# Patient Record
Sex: MALE | Race: OTHER | Employment: UNEMPLOYED | URBAN - NONMETROPOLITAN AREA
[De-identification: names, ages, dates, MRNs, and addresses within clinical notes are randomized per-mention and may not be internally consistent; named-entity substitution may affect disease eponyms.]

---

## 2019-08-12 ENCOUNTER — HOSPITAL ENCOUNTER (INPATIENT)
Age: 49
LOS: 3 days | Discharge: HOME | DRG: 313 | End: 2019-08-15
Attending: EMERGENCY MEDICINE | Admitting: INTERNAL MEDICINE
Payer: MEDICAID

## 2019-08-12 ENCOUNTER — APPOINTMENT (OUTPATIENT)
Dept: GENERAL RADIOLOGY | Age: 49
DRG: 313 | End: 2019-08-12
Payer: MEDICAID

## 2019-08-12 ENCOUNTER — APPOINTMENT (OUTPATIENT)
Dept: CT IMAGING | Age: 49
DRG: 313 | End: 2019-08-12
Payer: MEDICAID

## 2019-08-12 ENCOUNTER — ANESTHESIA EVENT (OUTPATIENT)
Dept: OPERATING ROOM | Age: 49
DRG: 313 | End: 2019-08-12
Payer: MEDICAID

## 2019-08-12 ENCOUNTER — ANESTHESIA (OUTPATIENT)
Dept: OPERATING ROOM | Age: 49
DRG: 313 | End: 2019-08-12
Payer: MEDICAID

## 2019-08-12 VITALS — SYSTOLIC BLOOD PRESSURE: 104 MMHG | DIASTOLIC BLOOD PRESSURE: 60 MMHG | OXYGEN SATURATION: 99 %

## 2019-08-12 PROBLEM — E87.1 HYPONATREMIA: Status: ACTIVE | Noted: 2019-08-12

## 2019-08-12 PROBLEM — I10 HTN (HYPERTENSION): Status: ACTIVE | Noted: 2019-08-12

## 2019-08-12 PROBLEM — S82.891A CLOSED FRACTURE DISLOCATION OF RIGHT ANKLE: Status: ACTIVE | Noted: 2019-08-12

## 2019-08-12 PROBLEM — S82.851A CLOSED RIGHT TRIMALLEOLAR FRACTURE, INITIAL ENCOUNTER: Status: ACTIVE | Noted: 2019-08-12

## 2019-08-12 PROBLEM — Z72.0 TOBACCO ABUSE: Status: ACTIVE | Noted: 2019-08-12

## 2019-08-12 PROBLEM — S82.851A CLOSED TRIMALLEOLAR FRACTURE OF RIGHT ANKLE: Status: ACTIVE | Noted: 2019-08-12

## 2019-08-12 PROBLEM — F10.10 ALCOHOL ABUSE: Status: ACTIVE | Noted: 2019-08-12

## 2019-08-12 LAB
ABO/RH: NORMAL
ABSOLUTE EOS #: 0.04 K/UL (ref 0–0.44)
ABSOLUTE IMMATURE GRANULOCYTE: 0.08 K/UL (ref 0–0.3)
ABSOLUTE LYMPH #: 1.45 K/UL (ref 1.1–3.7)
ABSOLUTE MONO #: 0.45 K/UL (ref 0.1–1.2)
ALBUMIN SERPL-MCNC: 3.5 G/DL (ref 3.5–5.2)
ALBUMIN/GLOBULIN RATIO: 0.8 (ref 1–2.5)
ALP BLD-CCNC: 159 U/L (ref 40–129)
ALT SERPL-CCNC: 28 U/L (ref 5–41)
AMPHETAMINE SCREEN URINE: NEGATIVE
ANION GAP SERPL CALCULATED.3IONS-SCNC: 14 MMOL/L (ref 9–17)
ANTIBODY SCREEN: NEGATIVE
ARM BAND NUMBER: NORMAL
AST SERPL-CCNC: 54 U/L
BARBITURATE SCREEN URINE: NEGATIVE
BASOPHILS # BLD: 0 % (ref 0–2)
BASOPHILS ABSOLUTE: <0.03 K/UL (ref 0–0.2)
BENZODIAZEPINE SCREEN, URINE: NEGATIVE
BILIRUB SERPL-MCNC: 0.43 MG/DL (ref 0.3–1.2)
BUN BLDV-MCNC: 21 MG/DL (ref 6–20)
BUN/CREAT BLD: 11 (ref 9–20)
BUPRENORPHINE URINE: NEGATIVE
CALCIUM SERPL-MCNC: 8.3 MG/DL (ref 8.6–10.4)
CANNABINOID SCREEN URINE: NEGATIVE
CHLORIDE BLD-SCNC: 97 MMOL/L (ref 98–107)
CO2: 16 MMOL/L (ref 20–31)
COCAINE METABOLITE, URINE: NEGATIVE
CREAT SERPL-MCNC: 1.93 MG/DL (ref 0.7–1.2)
DIFFERENTIAL TYPE: ABNORMAL
EOSINOPHILS RELATIVE PERCENT: 1 % (ref 1–4)
ESTIMATED AVERAGE GLUCOSE: 82 MG/DL
ETHANOL PERCENT: 0.2 %
ETHANOL: 204 MG/DL
EXPIRATION DATE: NORMAL
GFR AFRICAN AMERICAN: 45 ML/MIN
GFR NON-AFRICAN AMERICAN: 37 ML/MIN
GFR SERPL CREATININE-BSD FRML MDRD: ABNORMAL ML/MIN/{1.73_M2}
GFR SERPL CREATININE-BSD FRML MDRD: ABNORMAL ML/MIN/{1.73_M2}
GLUCOSE BLD-MCNC: 113 MG/DL (ref 70–99)
HBA1C MFR BLD: 4.5 % (ref 4.8–5.9)
HCT VFR BLD CALC: 33.3 % (ref 40.7–50.3)
HEMOGLOBIN: 10.5 G/DL (ref 13–17)
IMMATURE GRANULOCYTES: 1 %
INR BLD: 1.1 (ref 0.9–1.2)
LYMPHOCYTES # BLD: 20 % (ref 24–43)
MCH RBC QN AUTO: 29.7 PG (ref 25.2–33.5)
MCHC RBC AUTO-ENTMCNC: 31.5 G/DL (ref 28.4–34.8)
MCV RBC AUTO: 94.3 FL (ref 82.6–102.9)
MDMA URINE: ABNORMAL
METHADONE SCREEN, URINE: NEGATIVE
METHAMPHETAMINE, URINE: NEGATIVE
MONOCYTES # BLD: 6 % (ref 3–12)
NRBC AUTOMATED: 0 PER 100 WBC
OPIATES, URINE: POSITIVE
OXYCODONE SCREEN URINE: NEGATIVE
PARTIAL THROMBOPLASTIN TIME: 26.5 SEC (ref 23.2–34.4)
PDW BLD-RTO: 14.2 % (ref 11.8–14.4)
PHENCYCLIDINE, URINE: NEGATIVE
PLATELET # BLD: 117 K/UL (ref 138–453)
PLATELET ESTIMATE: ABNORMAL
PMV BLD AUTO: 10.4 FL (ref 8.1–13.5)
POTASSIUM SERPL-SCNC: 4.6 MMOL/L (ref 3.7–5.3)
PROPOXYPHENE, URINE: NEGATIVE
PROTHROMBIN TIME: 10.9 SEC (ref 9.7–12.2)
RBC # BLD: 3.53 M/UL (ref 4.21–5.77)
RBC # BLD: ABNORMAL 10*6/UL
SEG NEUTROPHILS: 72 % (ref 36–65)
SEGMENTED NEUTROPHILS ABSOLUTE COUNT: 5.37 K/UL (ref 1.5–8.1)
SODIUM BLD-SCNC: 127 MMOL/L (ref 135–144)
TEST INFORMATION: ABNORMAL
TOTAL PROTEIN: 7.9 G/DL (ref 6.4–8.3)
TRICYCLIC ANTIDEPRESSANTS, UR: NEGATIVE
WBC # BLD: 7.4 K/UL (ref 3.5–11.3)
WBC # BLD: ABNORMAL 10*3/UL

## 2019-08-12 PROCEDURE — 83036 HEMOGLOBIN GLYCOSYLATED A1C: CPT

## 2019-08-12 PROCEDURE — 2580000003 HC RX 258: Performed by: ORTHOPAEDIC SURGERY

## 2019-08-12 PROCEDURE — 71045 X-RAY EXAM CHEST 1 VIEW: CPT

## 2019-08-12 PROCEDURE — 99152 MOD SED SAME PHYS/QHP 5/>YRS: CPT

## 2019-08-12 PROCEDURE — 3700000000 HC ANESTHESIA ATTENDED CARE: Performed by: ORTHOPAEDIC SURGERY

## 2019-08-12 PROCEDURE — 6360000002 HC RX W HCPCS: Performed by: INTERNAL MEDICINE

## 2019-08-12 PROCEDURE — 6360000002 HC RX W HCPCS: Performed by: PHYSICIAN ASSISTANT

## 2019-08-12 PROCEDURE — 73600 X-RAY EXAM OF ANKLE: CPT

## 2019-08-12 PROCEDURE — 3700000001 HC ADD 15 MINUTES (ANESTHESIA): Performed by: ORTHOPAEDIC SURGERY

## 2019-08-12 PROCEDURE — 6370000000 HC RX 637 (ALT 250 FOR IP): Performed by: ORTHOPAEDIC SURGERY

## 2019-08-12 PROCEDURE — 3600000012 HC SURGERY LEVEL 2 ADDTL 15MIN: Performed by: ORTHOPAEDIC SURGERY

## 2019-08-12 PROCEDURE — 73610 X-RAY EXAM OF ANKLE: CPT

## 2019-08-12 PROCEDURE — 6360000002 HC RX W HCPCS: Performed by: ORTHOPAEDIC SURGERY

## 2019-08-12 PROCEDURE — 86901 BLOOD TYPING SEROLOGIC RH(D): CPT

## 2019-08-12 PROCEDURE — 80306 DRUG TEST PRSMV INSTRMNT: CPT

## 2019-08-12 PROCEDURE — 85730 THROMBOPLASTIN TIME PARTIAL: CPT

## 2019-08-12 PROCEDURE — 6370000000 HC RX 637 (ALT 250 FOR IP): Performed by: EMERGENCY MEDICINE

## 2019-08-12 PROCEDURE — 73630 X-RAY EXAM OF FOOT: CPT

## 2019-08-12 PROCEDURE — 2580000003 HC RX 258: Performed by: INTERNAL MEDICINE

## 2019-08-12 PROCEDURE — 3600000002 HC SURGERY LEVEL 2 BASE: Performed by: ORTHOPAEDIC SURGERY

## 2019-08-12 PROCEDURE — 99285 EMERGENCY DEPT VISIT HI MDM: CPT

## 2019-08-12 PROCEDURE — 6360000002 HC RX W HCPCS: Performed by: EMERGENCY MEDICINE

## 2019-08-12 PROCEDURE — 96375 TX/PRO/DX INJ NEW DRUG ADDON: CPT

## 2019-08-12 PROCEDURE — 73700 CT LOWER EXTREMITY W/O DYE: CPT

## 2019-08-12 PROCEDURE — 7100000001 HC PACU RECOVERY - ADDTL 15 MIN: Performed by: ORTHOPAEDIC SURGERY

## 2019-08-12 PROCEDURE — 86850 RBC ANTIBODY SCREEN: CPT

## 2019-08-12 PROCEDURE — 2500000003 HC RX 250 WO HCPCS: Performed by: ORTHOPAEDIC SURGERY

## 2019-08-12 PROCEDURE — 7100000000 HC PACU RECOVERY - FIRST 15 MIN: Performed by: ORTHOPAEDIC SURGERY

## 2019-08-12 PROCEDURE — 2580000003 HC RX 258: Performed by: PHYSICIAN ASSISTANT

## 2019-08-12 PROCEDURE — 27810 TREATMENT OF ANKLE FRACTURE: CPT

## 2019-08-12 PROCEDURE — 85610 PROTHROMBIN TIME: CPT

## 2019-08-12 PROCEDURE — 86900 BLOOD TYPING SEROLOGIC ABO: CPT

## 2019-08-12 PROCEDURE — 6370000000 HC RX 637 (ALT 250 FOR IP): Performed by: INTERNAL MEDICINE

## 2019-08-12 PROCEDURE — 2500000003 HC RX 250 WO HCPCS: Performed by: NURSE ANESTHETIST, CERTIFIED REGISTERED

## 2019-08-12 PROCEDURE — 85025 COMPLETE CBC W/AUTO DIFF WBC: CPT

## 2019-08-12 PROCEDURE — 93005 ELECTROCARDIOGRAM TRACING: CPT | Performed by: PHYSICIAN ASSISTANT

## 2019-08-12 PROCEDURE — 80053 COMPREHEN METABOLIC PANEL: CPT

## 2019-08-12 PROCEDURE — 0SSFXZZ REPOSITION RIGHT ANKLE JOINT, EXTERNAL APPROACH: ICD-10-PCS | Performed by: ORTHOPAEDIC SURGERY

## 2019-08-12 PROCEDURE — 36415 COLL VENOUS BLD VENIPUNCTURE: CPT

## 2019-08-12 PROCEDURE — 6360000002 HC RX W HCPCS: Performed by: NURSE ANESTHETIST, CERTIFIED REGISTERED

## 2019-08-12 PROCEDURE — 2500000003 HC RX 250 WO HCPCS: Performed by: PHYSICIAN ASSISTANT

## 2019-08-12 PROCEDURE — 2500000003 HC RX 250 WO HCPCS: Performed by: EMERGENCY MEDICINE

## 2019-08-12 PROCEDURE — G0480 DRUG TEST DEF 1-7 CLASSES: HCPCS

## 2019-08-12 PROCEDURE — 1200000000 HC SEMI PRIVATE

## 2019-08-12 PROCEDURE — 2580000003 HC RX 258: Performed by: NURSE ANESTHETIST, CERTIFIED REGISTERED

## 2019-08-12 PROCEDURE — 96374 THER/PROPH/DIAG INJ IV PUSH: CPT

## 2019-08-12 RX ORDER — LABETALOL HYDROCHLORIDE 5 MG/ML
INJECTION, SOLUTION INTRAVENOUS PRN
Status: DISCONTINUED | OUTPATIENT
Start: 2019-08-12 | End: 2019-08-12 | Stop reason: SDUPTHER

## 2019-08-12 RX ORDER — SODIUM CHLORIDE, SODIUM LACTATE, POTASSIUM CHLORIDE, CALCIUM CHLORIDE 600; 310; 30; 20 MG/100ML; MG/100ML; MG/100ML; MG/100ML
INJECTION, SOLUTION INTRAVENOUS CONTINUOUS PRN
Status: DISCONTINUED | OUTPATIENT
Start: 2019-08-12 | End: 2019-08-12 | Stop reason: SDUPTHER

## 2019-08-12 RX ORDER — CARVEDILOL 25 MG/1
25 TABLET ORAL 2 TIMES DAILY WITH MEALS
COMMUNITY

## 2019-08-12 RX ORDER — FENTANYL CITRATE 50 UG/ML
50 INJECTION, SOLUTION INTRAMUSCULAR; INTRAVENOUS EVERY 5 MIN PRN
Status: DISCONTINUED | OUTPATIENT
Start: 2019-08-12 | End: 2019-08-12 | Stop reason: HOSPADM

## 2019-08-12 RX ORDER — HYDROMORPHONE HCL 110MG/55ML
0.5 PATIENT CONTROLLED ANALGESIA SYRINGE INTRAVENOUS
Status: DISCONTINUED | OUTPATIENT
Start: 2019-08-12 | End: 2019-08-12

## 2019-08-12 RX ORDER — LORAZEPAM 1 MG/1
4 TABLET ORAL
Status: DISCONTINUED | OUTPATIENT
Start: 2019-08-12 | End: 2019-08-15 | Stop reason: HOSPADM

## 2019-08-12 RX ORDER — HYDROMORPHONE HCL 110MG/55ML
1 PATIENT CONTROLLED ANALGESIA SYRINGE INTRAVENOUS ONCE
Status: DISCONTINUED | OUTPATIENT
Start: 2019-08-12 | End: 2019-08-12

## 2019-08-12 RX ORDER — ENALAPRILAT 2.5 MG/2ML
1.25 INJECTION INTRAVENOUS
Status: DISCONTINUED | OUTPATIENT
Start: 2019-08-12 | End: 2019-08-12 | Stop reason: HOSPADM

## 2019-08-12 RX ORDER — HYDROMORPHONE HCL 110MG/55ML
1 PATIENT CONTROLLED ANALGESIA SYRINGE INTRAVENOUS
Status: DISCONTINUED | OUTPATIENT
Start: 2019-08-12 | End: 2019-08-13

## 2019-08-12 RX ORDER — PROPOFOL 10 MG/ML
INJECTION, EMULSION INTRAVENOUS PRN
Status: DISCONTINUED | OUTPATIENT
Start: 2019-08-12 | End: 2019-08-12 | Stop reason: SDUPTHER

## 2019-08-12 RX ORDER — MIDAZOLAM HYDROCHLORIDE 1 MG/ML
INJECTION INTRAMUSCULAR; INTRAVENOUS PRN
Status: DISCONTINUED | OUTPATIENT
Start: 2019-08-12 | End: 2019-08-12 | Stop reason: SDUPTHER

## 2019-08-12 RX ORDER — FENTANYL CITRATE 50 UG/ML
25 INJECTION, SOLUTION INTRAMUSCULAR; INTRAVENOUS EVERY 5 MIN PRN
Status: DISCONTINUED | OUTPATIENT
Start: 2019-08-12 | End: 2019-08-12 | Stop reason: HOSPADM

## 2019-08-12 RX ORDER — KETAMINE HYDROCHLORIDE 100 MG/ML
INJECTION, SOLUTION INTRAMUSCULAR; INTRAVENOUS PRN
Status: DISCONTINUED | OUTPATIENT
Start: 2019-08-12 | End: 2019-08-12 | Stop reason: SDUPTHER

## 2019-08-12 RX ORDER — FENTANYL CITRATE 50 UG/ML
100 INJECTION, SOLUTION INTRAMUSCULAR; INTRAVENOUS ONCE
Status: COMPLETED | OUTPATIENT
Start: 2019-08-12 | End: 2019-08-12

## 2019-08-12 RX ORDER — SODIUM CHLORIDE 0.9 % (FLUSH) 0.9 %
10 SYRINGE (ML) INJECTION PRN
Status: DISCONTINUED | OUTPATIENT
Start: 2019-08-12 | End: 2019-08-15 | Stop reason: HOSPADM

## 2019-08-12 RX ORDER — ALLOPURINOL 300 MG/1
300 TABLET ORAL DAILY
Status: DISCONTINUED | OUTPATIENT
Start: 2019-08-12 | End: 2019-08-15 | Stop reason: HOSPADM

## 2019-08-12 RX ORDER — SODIUM CHLORIDE 0.9 % (FLUSH) 0.9 %
10 SYRINGE (ML) INJECTION EVERY 12 HOURS SCHEDULED
Status: DISCONTINUED | OUTPATIENT
Start: 2019-08-12 | End: 2019-08-15 | Stop reason: HOSPADM

## 2019-08-12 RX ORDER — LORAZEPAM 1 MG/1
3 TABLET ORAL
Status: DISCONTINUED | OUTPATIENT
Start: 2019-08-12 | End: 2019-08-15 | Stop reason: HOSPADM

## 2019-08-12 RX ORDER — OXYCODONE HYDROCHLORIDE AND ACETAMINOPHEN 5; 325 MG/1; MG/1
1 TABLET ORAL ONCE
Status: COMPLETED | OUTPATIENT
Start: 2019-08-12 | End: 2019-08-12

## 2019-08-12 RX ORDER — OXYCODONE HYDROCHLORIDE AND ACETAMINOPHEN 5; 325 MG/1; MG/1
2 TABLET ORAL EVERY 4 HOURS PRN
Status: DISCONTINUED | OUTPATIENT
Start: 2019-08-12 | End: 2019-08-12

## 2019-08-12 RX ORDER — HYDROMORPHONE HCL 110MG/55ML
1 PATIENT CONTROLLED ANALGESIA SYRINGE INTRAVENOUS
Status: DISCONTINUED | OUTPATIENT
Start: 2019-08-12 | End: 2019-08-12

## 2019-08-12 RX ORDER — OXYCODONE HYDROCHLORIDE AND ACETAMINOPHEN 5; 325 MG/1; MG/1
2 TABLET ORAL EVERY 4 HOURS PRN
Status: DISCONTINUED | OUTPATIENT
Start: 2019-08-12 | End: 2019-08-13

## 2019-08-12 RX ORDER — CITALOPRAM 20 MG/1
40 TABLET ORAL DAILY
Status: DISCONTINUED | OUTPATIENT
Start: 2019-08-12 | End: 2019-08-15 | Stop reason: HOSPADM

## 2019-08-12 RX ORDER — MORPHINE SULFATE 4 MG/ML
4 INJECTION, SOLUTION INTRAMUSCULAR; INTRAVENOUS
Status: DISCONTINUED | OUTPATIENT
Start: 2019-08-12 | End: 2019-08-12

## 2019-08-12 RX ORDER — HYDROMORPHONE HCL 110MG/55ML
2 PATIENT CONTROLLED ANALGESIA SYRINGE INTRAVENOUS ONCE
Status: COMPLETED | OUTPATIENT
Start: 2019-08-12 | End: 2019-08-12

## 2019-08-12 RX ORDER — CITALOPRAM 40 MG/1
40 TABLET ORAL DAILY
COMMUNITY

## 2019-08-12 RX ORDER — ETOMIDATE 2 MG/ML
INJECTION INTRAVENOUS
Status: DISCONTINUED
Start: 2019-08-12 | End: 2019-08-12

## 2019-08-12 RX ORDER — ONDANSETRON 2 MG/ML
4 INJECTION INTRAMUSCULAR; INTRAVENOUS ONCE
Status: COMPLETED | OUTPATIENT
Start: 2019-08-12 | End: 2019-08-12

## 2019-08-12 RX ORDER — METOPROLOL TARTRATE 5 MG/5ML
5 INJECTION INTRAVENOUS EVERY 6 HOURS PRN
Status: DISCONTINUED | OUTPATIENT
Start: 2019-08-12 | End: 2019-08-13 | Stop reason: ALTCHOICE

## 2019-08-12 RX ORDER — LORAZEPAM 2 MG/ML
4 INJECTION INTRAMUSCULAR
Status: DISCONTINUED | OUTPATIENT
Start: 2019-08-12 | End: 2019-08-15 | Stop reason: HOSPADM

## 2019-08-12 RX ORDER — OXYCODONE HYDROCHLORIDE AND ACETAMINOPHEN 5; 325 MG/1; MG/1
1 TABLET ORAL EVERY 4 HOURS PRN
Status: DISCONTINUED | OUTPATIENT
Start: 2019-08-12 | End: 2019-08-13

## 2019-08-12 RX ORDER — LORAZEPAM 2 MG/ML
3 INJECTION INTRAMUSCULAR
Status: DISCONTINUED | OUTPATIENT
Start: 2019-08-12 | End: 2019-08-15 | Stop reason: HOSPADM

## 2019-08-12 RX ORDER — MORPHINE SULFATE 2 MG/ML
2 INJECTION, SOLUTION INTRAMUSCULAR; INTRAVENOUS
Status: DISCONTINUED | OUTPATIENT
Start: 2019-08-12 | End: 2019-08-12

## 2019-08-12 RX ORDER — LORAZEPAM 2 MG/ML
2 INJECTION INTRAMUSCULAR
Status: DISCONTINUED | OUTPATIENT
Start: 2019-08-12 | End: 2019-08-15 | Stop reason: HOSPADM

## 2019-08-12 RX ORDER — ETOMIDATE 2 MG/ML
INJECTION INTRAVENOUS DAILY PRN
Status: COMPLETED | OUTPATIENT
Start: 2019-08-12 | End: 2019-08-12

## 2019-08-12 RX ORDER — CARVEDILOL 25 MG/1
25 TABLET ORAL 2 TIMES DAILY WITH MEALS
Status: DISCONTINUED | OUTPATIENT
Start: 2019-08-12 | End: 2019-08-15 | Stop reason: HOSPADM

## 2019-08-12 RX ORDER — LORAZEPAM 2 MG/ML
1 INJECTION INTRAMUSCULAR
Status: DISCONTINUED | OUTPATIENT
Start: 2019-08-12 | End: 2019-08-15 | Stop reason: HOSPADM

## 2019-08-12 RX ORDER — MORPHINE SULFATE 4 MG/ML
4 INJECTION, SOLUTION INTRAMUSCULAR; INTRAVENOUS ONCE
Status: COMPLETED | OUTPATIENT
Start: 2019-08-12 | End: 2019-08-12

## 2019-08-12 RX ORDER — LORAZEPAM 1 MG/1
1 TABLET ORAL
Status: DISCONTINUED | OUTPATIENT
Start: 2019-08-12 | End: 2019-08-15 | Stop reason: HOSPADM

## 2019-08-12 RX ORDER — NICOTINE 21 MG/24HR
1 PATCH, TRANSDERMAL 24 HOURS TRANSDERMAL DAILY
Status: DISCONTINUED | OUTPATIENT
Start: 2019-08-12 | End: 2019-08-15 | Stop reason: HOSPADM

## 2019-08-12 RX ORDER — LABETALOL HYDROCHLORIDE 5 MG/ML
5 INJECTION, SOLUTION INTRAVENOUS EVERY 10 MIN PRN
Status: DISCONTINUED | OUTPATIENT
Start: 2019-08-12 | End: 2019-08-12 | Stop reason: HOSPADM

## 2019-08-12 RX ORDER — FAMOTIDINE 20 MG/1
20 TABLET, FILM COATED ORAL 2 TIMES DAILY
Status: DISCONTINUED | OUTPATIENT
Start: 2019-08-12 | End: 2019-08-12

## 2019-08-12 RX ORDER — CLONIDINE 100 UG/ML
INJECTION, SOLUTION EPIDURAL PRN
Status: DISCONTINUED | OUTPATIENT
Start: 2019-08-12 | End: 2019-08-12 | Stop reason: SDUPTHER

## 2019-08-12 RX ORDER — LORAZEPAM 1 MG/1
2 TABLET ORAL
Status: DISCONTINUED | OUTPATIENT
Start: 2019-08-12 | End: 2019-08-15 | Stop reason: HOSPADM

## 2019-08-12 RX ORDER — ALLOPURINOL 300 MG/1
300 TABLET ORAL DAILY
COMMUNITY

## 2019-08-12 RX ORDER — SODIUM CHLORIDE 9 MG/ML
INJECTION, SOLUTION INTRAVENOUS CONTINUOUS
Status: DISCONTINUED | OUTPATIENT
Start: 2019-08-12 | End: 2019-08-15 | Stop reason: HOSPADM

## 2019-08-12 RX ORDER — ONDANSETRON 2 MG/ML
4 INJECTION INTRAMUSCULAR; INTRAVENOUS EVERY 6 HOURS PRN
Status: DISCONTINUED | OUTPATIENT
Start: 2019-08-12 | End: 2019-08-15 | Stop reason: HOSPADM

## 2019-08-12 RX ORDER — HYDROMORPHONE HCL 110MG/55ML
0.5 PATIENT CONTROLLED ANALGESIA SYRINGE INTRAVENOUS
Status: DISCONTINUED | OUTPATIENT
Start: 2019-08-12 | End: 2019-08-13

## 2019-08-12 RX ORDER — HYDRALAZINE HYDROCHLORIDE 20 MG/ML
5 INJECTION INTRAMUSCULAR; INTRAVENOUS EVERY 10 MIN PRN
Status: DISCONTINUED | OUTPATIENT
Start: 2019-08-12 | End: 2019-08-12 | Stop reason: HOSPADM

## 2019-08-12 RX ADMIN — FAMOTIDINE 20 MG: 10 INJECTION, SOLUTION INTRAVENOUS at 20:59

## 2019-08-12 RX ADMIN — MORPHINE SULFATE 4 MG: 4 INJECTION, SOLUTION INTRAMUSCULAR; INTRAVENOUS at 05:51

## 2019-08-12 RX ADMIN — HYDROMORPHONE HYDROCHLORIDE 1 MG: 2 INJECTION, SOLUTION INTRAMUSCULAR; INTRAVENOUS; SUBCUTANEOUS at 12:37

## 2019-08-12 RX ADMIN — CLONIDINE HYDROCHLORIDE 100 MCG: 100 INJECTION, SOLUTION INTRAVENOUS at 18:14

## 2019-08-12 RX ADMIN — CARVEDILOL 25 MG: 25 TABLET, FILM COATED ORAL at 07:51

## 2019-08-12 RX ADMIN — HYDROMORPHONE HYDROCHLORIDE 2 MG: 2 INJECTION, SOLUTION INTRAMUSCULAR; INTRAVENOUS; SUBCUTANEOUS at 16:03

## 2019-08-12 RX ADMIN — METOPROLOL TARTRATE 5 MG: 5 INJECTION INTRAVENOUS at 14:38

## 2019-08-12 RX ADMIN — FENTANYL CITRATE 100 MCG: 50 INJECTION INTRAMUSCULAR; INTRAVENOUS at 03:11

## 2019-08-12 RX ADMIN — ALLOPURINOL 300 MG: 300 TABLET ORAL at 07:51

## 2019-08-12 RX ADMIN — HYDROMORPHONE HYDROCHLORIDE 1 MG: 2 INJECTION, SOLUTION INTRAMUSCULAR; INTRAVENOUS; SUBCUTANEOUS at 09:52

## 2019-08-12 RX ADMIN — PROPOFOL 30 MG: 10 INJECTION, EMULSION INTRAVENOUS at 18:40

## 2019-08-12 RX ADMIN — SODIUM CHLORIDE: 9 INJECTION, SOLUTION INTRAVENOUS at 20:59

## 2019-08-12 RX ADMIN — ONDANSETRON 4 MG: 2 INJECTION INTRAMUSCULAR; INTRAVENOUS at 03:11

## 2019-08-12 RX ADMIN — MORPHINE SULFATE 4 MG: 4 INJECTION, SOLUTION INTRAMUSCULAR; INTRAVENOUS at 07:46

## 2019-08-12 RX ADMIN — OXYCODONE HYDROCHLORIDE AND ACETAMINOPHEN 1 TABLET: 5; 325 TABLET ORAL at 04:49

## 2019-08-12 RX ADMIN — PROPOFOL 30 MG: 10 INJECTION, EMULSION INTRAVENOUS at 18:30

## 2019-08-12 RX ADMIN — FOLIC ACID: 5 INJECTION, SOLUTION INTRAMUSCULAR; INTRAVENOUS; SUBCUTANEOUS at 08:59

## 2019-08-12 RX ADMIN — HYDROMORPHONE HYDROCHLORIDE 1 MG: 2 INJECTION, SOLUTION INTRAMUSCULAR; INTRAVENOUS; SUBCUTANEOUS at 14:38

## 2019-08-12 RX ADMIN — KETAMINE HYDROCHLORIDE 20 MG: 100 INJECTION INTRAMUSCULAR; INTRAVENOUS at 18:30

## 2019-08-12 RX ADMIN — FAMOTIDINE 20 MG: 10 INJECTION, SOLUTION INTRAVENOUS at 07:50

## 2019-08-12 RX ADMIN — LABETALOL HYDROCHLORIDE 5 MG: 5 INJECTION INTRAVENOUS at 19:08

## 2019-08-12 RX ADMIN — ETOMIDATE 30 MG: 2 INJECTION INTRAVENOUS at 03:36

## 2019-08-12 RX ADMIN — LABETALOL HYDROCHLORIDE 5 MG: 5 INJECTION, SOLUTION INTRAVENOUS at 18:27

## 2019-08-12 RX ADMIN — CITALOPRAM HYDROBROMIDE 40 MG: 20 TABLET ORAL at 07:51

## 2019-08-12 RX ADMIN — HYDROMORPHONE HYDROCHLORIDE 1 MG: 2 INJECTION, SOLUTION INTRAMUSCULAR; INTRAVENOUS; SUBCUTANEOUS at 22:25

## 2019-08-12 RX ADMIN — KETAMINE HYDROCHLORIDE 20 MG: 100 INJECTION INTRAMUSCULAR; INTRAVENOUS at 18:26

## 2019-08-12 RX ADMIN — SODIUM CHLORIDE, POTASSIUM CHLORIDE, SODIUM LACTATE AND CALCIUM CHLORIDE: 600; 310; 30; 20 INJECTION, SOLUTION INTRAVENOUS at 18:11

## 2019-08-12 RX ADMIN — OXYCODONE HYDROCHLORIDE AND ACETAMINOPHEN 2 TABLET: 5; 325 TABLET ORAL at 21:04

## 2019-08-12 RX ADMIN — PROPOFOL 30 MG: 10 INJECTION, EMULSION INTRAVENOUS at 18:26

## 2019-08-12 RX ADMIN — MORPHINE SULFATE 4 MG: 4 INJECTION, SOLUTION INTRAMUSCULAR; INTRAVENOUS at 02:46

## 2019-08-12 RX ADMIN — MIDAZOLAM HYDROCHLORIDE 2 MG: 1 INJECTION, SOLUTION INTRAMUSCULAR; INTRAVENOUS at 18:17

## 2019-08-12 RX ADMIN — SODIUM CHLORIDE: 9 INJECTION, SOLUTION INTRAVENOUS at 05:51

## 2019-08-12 RX ADMIN — PROPOFOL 10 MG: 10 INJECTION, EMULSION INTRAVENOUS at 18:37

## 2019-08-12 RX ADMIN — HYDROMORPHONE HYDROCHLORIDE 1 MG: 2 INJECTION, SOLUTION INTRAMUSCULAR; INTRAVENOUS; SUBCUTANEOUS at 17:48

## 2019-08-12 ASSESSMENT — PAIN DESCRIPTION - FREQUENCY
FREQUENCY: INTERMITTENT
FREQUENCY: CONTINUOUS

## 2019-08-12 ASSESSMENT — PULMONARY FUNCTION TESTS
PIF_VALUE: 1
PIF_VALUE: 0
PIF_VALUE: 1
PIF_VALUE: 0
PIF_VALUE: 1
PIF_VALUE: 14
PIF_VALUE: 1
PIF_VALUE: 0
PIF_VALUE: 0
PIF_VALUE: 1
PIF_VALUE: 0
PIF_VALUE: 1

## 2019-08-12 ASSESSMENT — PAIN SCALES - GENERAL
PAINLEVEL_OUTOF10: 1
PAINLEVEL_OUTOF10: 9
PAINLEVEL_OUTOF10: 4
PAINLEVEL_OUTOF10: 7
PAINLEVEL_OUTOF10: 10
PAINLEVEL_OUTOF10: 8
PAINLEVEL_OUTOF10: 9
PAINLEVEL_OUTOF10: 4
PAINLEVEL_OUTOF10: 0
PAINLEVEL_OUTOF10: 9
PAINLEVEL_OUTOF10: 5
PAINLEVEL_OUTOF10: 4
PAINLEVEL_OUTOF10: 7
PAINLEVEL_OUTOF10: 7
PAINLEVEL_OUTOF10: 5
PAINLEVEL_OUTOF10: 10
PAINLEVEL_OUTOF10: 10
PAINLEVEL_OUTOF10: 9
PAINLEVEL_OUTOF10: 10
PAINLEVEL_OUTOF10: 8
PAINLEVEL_OUTOF10: 0
PAINLEVEL_OUTOF10: 7
PAINLEVEL_OUTOF10: 10
PAINLEVEL_OUTOF10: 4
PAINLEVEL_OUTOF10: 10

## 2019-08-12 ASSESSMENT — PAIN DESCRIPTION - DESCRIPTORS
DESCRIPTORS: ACHING;CONSTANT
DESCRIPTORS: ACHING;SHARP
DESCRIPTORS: ACHING
DESCRIPTORS: PRESSURE;SHARP;THROBBING
DESCRIPTORS: ACHING
DESCRIPTORS: ACHING
DESCRIPTORS: PRESSURE;SHARP;THROBBING
DESCRIPTORS: PRESSURE;SHARP;THROBBING

## 2019-08-12 ASSESSMENT — PAIN DESCRIPTION - ORIENTATION
ORIENTATION: RIGHT
ORIENTATION: LEFT
ORIENTATION: RIGHT

## 2019-08-12 ASSESSMENT — PAIN DESCRIPTION - LOCATION
LOCATION: ANKLE

## 2019-08-12 ASSESSMENT — PAIN DESCRIPTION - PROGRESSION
CLINICAL_PROGRESSION: GRADUALLY IMPROVING
CLINICAL_PROGRESSION: RAPIDLY WORSENING
CLINICAL_PROGRESSION: GRADUALLY WORSENING
CLINICAL_PROGRESSION: NOT CHANGED
CLINICAL_PROGRESSION: GRADUALLY WORSENING
CLINICAL_PROGRESSION: NOT CHANGED

## 2019-08-12 ASSESSMENT — ENCOUNTER SYMPTOMS
COUGH: 0
SHORTNESS OF BREATH: 0
COLOR CHANGE: 0
NAUSEA: 0
ABDOMINAL DISTENTION: 0
BACK PAIN: 0
TROUBLE SWALLOWING: 0
ABDOMINAL PAIN: 0

## 2019-08-12 ASSESSMENT — LIFESTYLE VARIABLES: SMOKING_STATUS: 1

## 2019-08-12 ASSESSMENT — PAIN DESCRIPTION - PAIN TYPE
TYPE: SURGICAL PAIN
TYPE: ACUTE PAIN
TYPE: SURGICAL PAIN
TYPE: ACUTE PAIN
TYPE: SURGICAL PAIN
TYPE: ACUTE PAIN
TYPE: ACUTE PAIN

## 2019-08-12 ASSESSMENT — PAIN - FUNCTIONAL ASSESSMENT: PAIN_FUNCTIONAL_ASSESSMENT: ACTIVITIES ARE NOT PREVENTED

## 2019-08-12 ASSESSMENT — PAIN DESCRIPTION - ONSET: ONSET: AWAKENED FROM SLEEP

## 2019-08-12 NOTE — PROGRESS NOTES
Dr. Pao Mohr office called to receive update on patient's oral status. Looks like he will be performing sx tonight for Dr. Beatriz Reed. Patient updated at this time.

## 2019-08-12 NOTE — H&P
sore throat, negative nasal congestion, and negative for earache  Respiratory: negative for shortness of breath, negative for cough, and negative for wheezing  Cardiovascular: negative for chest pain, negative for palpitations, and negative for syncope  Gastrointestinal: negative for abdominal pain, negative for nausea,negative for vomiting, negative for diarrhea, negative for constipation, and negative for hematochezia or melena  Genitourinary: negative for dysuria, negative for urinary urgency, negative for urinary frequency, and negative for hematuria  Skin: negative for skin rash, and negative for skin lesions  Neurological: negative for unilateral weakness, numbness or tingling.     Physical Exam:    Vitals:   Temp: 98.2 °F (36.8 °C)  BP: (!) 190/110  Resp: 18  Pulse: 87  SpO2: 99 %  24HR INTAKE/OUTPUT:      Intake/Output Summary (Last 24 hours) at 8/12/2019 0740  Last data filed at 8/12/2019 4665  Gross per 24 hour   Intake --   Output 400 ml   Net -400 ml       Exam:  GEN:  alert and oriented to person, place and time, well-developed and well-nourished, in no acute distress  EYES: PERRL  NECK: normal, supple  PULM: clear to auscultation bilaterally- no wheezes, rales or rhonchi, normal air movement, no respiratory distress  COR: regular rate & rhythm and no murmurs  ABD:  Obese, soft, non-tender, non-distended, normal bowel sounds, no masses or organomegaly  EXT:   RLE splinted, distal motor and sensation intact, very tender  NEURO: follows commands, TOLENTINO, no deficits  SKIN:  no rashes  -----------------------------------------------------------------  Diagnostic Data:   Lab Results   Component Value Date    WBC 7.4 08/12/2019    HGB 10.5 (L) 08/12/2019     (L) 08/12/2019       Lab Results   Component Value Date    BUN 21 (H) 08/12/2019    CREATININE 1.93 (H) 08/12/2019     (L) 08/12/2019    K 4.6 08/12/2019    CALCIUM 8.3 (L) 08/12/2019    CL 97 (L) 08/12/2019    CO2 16 (L) 08/12/2019    LABGLOM

## 2019-08-12 NOTE — PROGRESS NOTES
Dr. Zee Cartwright called and states that he did not want anesthesia to perform a block d/t swelling and compartment syndrome concerns. He educated writer on how to re-adjust splint and ace wrap for comfort if needed.

## 2019-08-12 NOTE — CONSULTS
Daily  nicotine (NICODERM CQ) 21 MG/24HR 1 patch, 1 patch, Transdermal, Daily  LORazepam (ATIVAN) tablet 1 mg, 1 mg, Oral, Q1H PRN **OR** LORazepam (ATIVAN) injection 1 mg, 1 mg, Intravenous, Q1H PRN **OR** LORazepam (ATIVAN) tablet 2 mg, 2 mg, Oral, Q1H PRN **OR** LORazepam (ATIVAN) injection 2 mg, 2 mg, Intravenous, Q1H PRN **OR** LORazepam (ATIVAN) tablet 3 mg, 3 mg, Oral, Q1H PRN **OR** LORazepam (ATIVAN) injection 3 mg, 3 mg, Intravenous, Q1H PRN **OR** LORazepam (ATIVAN) tablet 4 mg, 4 mg, Oral, Q1H PRN **OR** LORazepam (ATIVAN) injection 4 mg, 4 mg, Intravenous, Q1H PRN  metoprolol (LOPRESSOR) injection 5 mg, 5 mg, Intravenous, Q6H PRN  famotidine (PEPCID) injection 20 mg, 20 mg, Intravenous, BID  HYDROmorphone (DILAUDID) injection 0.5 mg, 0.5 mg, Intravenous, Q3H PRN **OR** HYDROmorphone (DILAUDID) injection 1 mg, 1 mg, Intravenous, Q3H PRN    Allergies:  Patient has no known allergies. Social History:   TOBACCO:   reports that he has been smoking cigarettes. He has been smoking about 1.00 pack per day. He has never used smokeless tobacco.  ETOH:   reports that he drinks about 84.0 standard drinks of alcohol per week. OCCUPATION: unemployed     Family History:   History reviewed. No pertinent family history. REVIEW OF SYSTEMS:     CONSTITUTIONAL: No weight loss, fever, chills, weakness or fatigue. HEENT:  No visual loss, blurred vision, double vision or yellow sclerae. No hearing loss, sneezing, congestion, runny nose or sore throat. SKIN: No rash or itching. CARDIOVASCULAR: No chest pain, chest pressure or chest discomfort. No palpitations or edema. RESPIRATORY: No shortness of breath, cough or sputum. GASTROINTESTINAL: No anorexia, nausea, vomiting or diarrhea. No abdominal pain or blood. GENITOURINARY: No burning on urination. No urinary urgency, No hematuria. NEUROLOGICAL: No headache, dizziness, syncope, paralysis, ataxia, numbness or tingling in the extremities.    MUSCULOSKELETAL: 08/12/2019    RDW 14.2 08/12/2019    LYMPHOPCT 20 08/12/2019    MONOPCT 6 08/12/2019    BASOPCT 0 08/12/2019    MONOSABS 0.45 08/12/2019    LYMPHSABS 1.45 08/12/2019    EOSABS 0.04 08/12/2019    BASOSABS <0.03 08/12/2019    DIFFTYPE NOT REPORTED 08/12/2019     CMP:    Lab Results   Component Value Date     08/12/2019    K 4.6 08/12/2019    CL 97 08/12/2019    CO2 16 08/12/2019    BUN 21 08/12/2019    CREATININE 1.93 08/12/2019    GFRAA 45 08/12/2019    LABGLOM 37 08/12/2019    GLUCOSE 113 08/12/2019    PROT 7.9 08/12/2019    LABALBU 3.5 08/12/2019    CALCIUM 8.3 08/12/2019    BILITOT 0.43 08/12/2019    ALKPHOS 159 08/12/2019    AST 54 08/12/2019    ALT 28 08/12/2019     ETOH level: .204  Opiate positive in urine    Multiple views of the right ankle demonstrate a trimalleolar ankle fracture dislocation which has been reduced. There is comminution to the distal fibula and shortening. Medial malleolus and posterior malleolus fractures are noted. Medial and lateral malleolar fractures are displaced. Posterior malleolus fracture appears nondisplaced. CT scan right ankle:  1. Acute trimalleolar fracture of the ankle.  Posterior malleolar fragment   measures 17 x 7 x 16 mm.   2. Nondisplaced acute fracture in the base of the 1st metatarsal.  Follow-up   foot radiographs should be considered to evaluate tarsometatarsal joint   stability. 3. Linear avulsion fracture from the medial talus associated with deltoid   ligament injury.  Linear dorsal talar avulsion fracture as well. IMPRESSION/RECOMMENDATIONS:    1. Right trimalleolar ankle fracture dislocation  2. Right talus avulsion fractures  3. Right nondisplaced 1st metatarsal base fracture    Diagnosis for this patient was discussed with him in detail. Imaging was reviewed with him. Surgical and nonsurgical treatment options were discussed with the patient in depth.   Surgical treatment in the form of open reduction internal fixation right

## 2019-08-12 NOTE — ANESTHESIA PRE PROCEDURE
Department of Anesthesiology  Preprocedure Note       Name:  Carola Massey   Age:  51 y.o.  :  1970                                          MRN:  [id number]         Date:  2019      Surgeon: Everette Remy):  Patrick Carney MD    Procedure: ANKLE CLOSED REDUCTION (Right )    Medications prior to admission:   Prior to Admission medications    Medication Sig Start Date End Date Taking?  Authorizing Provider   carvedilol (COREG) 25 MG tablet Take 25 mg by mouth 2 times daily (with meals)   Yes Historical Provider, MD   allopurinol (ZYLOPRIM) 300 MG tablet Take 300 mg by mouth daily   Yes Historical Provider, MD   citalopram (CELEXA) 40 MG tablet Take 40 mg by mouth daily   Yes Historical Provider, MD       Current medications:    Current Facility-Administered Medications   Medication Dose Route Frequency Provider Last Rate Last Dose    [MAR Hold] allopurinol (ZYLOPRIM) tablet 300 mg  300 mg Oral Daily Gracy Alvarez MD   300 mg at 19 0751    [MAR Hold] carvedilol (COREG) tablet 25 mg  25 mg Oral BID WC Gracy Alvarez MD   25 mg at 19 0751    [MAR Hold] citalopram (CELEXA) tablet 40 mg  40 mg Oral Daily Gracy Alvarez MD   40 mg at 19 0751    [MAR Hold] 0.9 % sodium chloride infusion   Intravenous Continuous Gracy Alvarez MD 75 mL/hr at 19 0551      [MAR Hold] sodium chloride flush 0.9 % injection 10 mL  10 mL Intravenous 2 times per day Gracy Alvarez MD        [facility] Hold] sodium chloride flush 0.9 % injection 10 mL  10 mL Intravenous PRN Gracy Alvarez MD        [facility] Hold] magnesium hydroxide (MILK OF MAGNESIA) 400 MG/5ML suspension 30 mL  30 mL Oral Daily PRN Gracy Alvarez MD        [facility] Hold] ondansetron [facility] injection 4 mg  4 mg Intravenous Q6H PRN rGacy Alvarez MD        [facility] Hold] sodium chloride flush 0.9 % injection 10 mL  10 mL Intravenous 2 times per day Christina Mcintyre PA-C        [facility] Hold] sodium chloride flush 0.9

## 2019-08-12 NOTE — ED PROVIDER NOTES
Judgment and thought content normal.     Nursing Notes were reviewed. Past Medical History:   Diagnosis Date    Depression     Gout     Hypertension        History reviewed. No pertinent family history. Past Surgical History:   Procedure Laterality Date    HERNIA REPAIR      SHOULDER SURGERY Left        Social History     Socioeconomic History    Marital status: Single     Spouse name: None    Number of children: None    Years of education: None    Highest education level: None   Occupational History    None   Social Needs    Financial resource strain: None    Food insecurity:     Worry: None     Inability: None    Transportation needs:     Medical: None     Non-medical: None   Tobacco Use    Smoking status: Current Every Day Smoker     Packs/day: 1.00     Types: Cigarettes    Smokeless tobacco: Never Used   Substance and Sexual Activity    Alcohol use: Yes     Alcohol/week: 84.0 standard drinks     Types: 84 Cans of beer per week    Drug use: Never    Sexual activity: Yes   Lifestyle    Physical activity:     Days per week: None     Minutes per session: None    Stress: None   Relationships    Social connections:     Talks on phone: None     Gets together: None     Attends Jew service: None     Active member of club or organization: None     Attends meetings of clubs or organizations: None     Relationship status: None    Intimate partner violence:     Fear of current or ex partner: None     Emotionally abused: None     Physically abused: None     Forced sexual activity: None   Other Topics Concern    None   Social History Narrative    None       Procedures    MDM CT scan of the neck and head was ordered patient refused to have the test done. Even though patient has been drinking he does not appear intoxicated. Patient tolerate the reduction of his ankle. He has been put in an OCL splint. He will require surgery.   I have spoken with Dr. Mony Mathew who is willing to admit the patient as hospitalist.       Labs PT 10.9 with an INR of 1.1 PTT 26.5. Ethanol 0.204. Glucose is 113. BUN 21 and creatinine 1.93. Sodium 127, potassium 4.6, chloride 97 and CO2 16. Alk phos is 159, ALT 28, AST 54 total bili 0.43. WBCs are 7.4 hemoglobin 10.5 with 72 neutrophils, 20 lymphs, 6 monocytes. Tox screen was positive for opiates but we had given him some pain medication so I do not believe he is abusing opiates. Radiology trimalleolar fracture of the right ankle. Postreduction film was good. EKG Interpretation. Summation      Patient Course: Uncomplicated fracture was reduced easily. ED Medications administered this visit:    Medications   etomidate (AMIDATE) 2 MG/ML injection (has no administration in time range)   etomidate (AMIDATE) 2 MG/ML injection (has no administration in time range)   morphine injection 4 mg (4 mg Intravenous Given 8/12/19 0246)   fentaNYL (SUBLIMAZE) injection 100 mcg (100 mcg Intravenous Given 8/12/19 0311)   ondansetron (ZOFRAN) injection 4 mg (4 mg Intravenous Given 8/12/19 0311)   etomidate (AMIDATE) injection (30 mg Intravenous Given 8/12/19 0336)       New Prescriptions from this visit:    New Prescriptions    No medications on file       Follow-up:  No follow-up provider specified. Final Impression:   1. Closed trimalleolar fracture of right ankle, initial encounter    2. Alcohol abuse               (Please note that portions of this note were completed with a voice recognition program.  Efforts were made to edit the dictations but occasionally words are mis-transcribed. )       Elvis Yarbrough MD  08/21/19 5356

## 2019-08-12 NOTE — ED NOTES
Received call from Dr Adrienne Bautista he stated that he was not on call his brother Dr Bethany Toussaint is on call. Per perfect serve states Adrienne Bautista is on call. Called Dr Bethany Toussaint and connected with Dr Blanca Coburn.      Kathy Murillo  08/12/19 7440

## 2019-08-12 NOTE — ANESTHESIA POSTPROCEDURE EVALUATION
Department of Anesthesiology  Postprocedure Note    Patient: Cheyanne Meier  MRN: [id number]  YOB: 1970  Date of evaluation: 8/12/2019  Time:  7:04 PM     Procedure Summary     Date:  08/12/19 Room / Location:  [address] [place] [address] / [facility]    Anesthesia Start:  8855 Anesthesia Stop:  1851    Procedure:  ANKLE CLOSED REDUCTION (Right ) Diagnosis:  (CLOSED RIGHT Orbie Rede)    Surgeon:  Asia Kaur MD Responsible Provider:  JAISON Vasquez CRNA    Anesthesia Type:  general ASA Status:  3          Anesthesia Type: general    Sade Phase I: Sade Score: 10    Sade Phase II:      Last vitals: Reviewed and per EMR flowsheets.        Anesthesia Post Evaluation    Patient location during evaluation: PACU  Patient participation: complete - patient participated  Level of consciousness: awake and alert  Pain score: 4  Airway patency: patent  Nausea & Vomiting: no vomiting and no nausea  Complications: no  Cardiovascular status: hemodynamically stable  Respiratory status: room air and spontaneous ventilation  Hydration status: stable

## 2019-08-13 ENCOUNTER — APPOINTMENT (OUTPATIENT)
Dept: GENERAL RADIOLOGY | Age: 49
DRG: 313 | End: 2019-08-13
Payer: MEDICAID

## 2019-08-13 ENCOUNTER — ANESTHESIA (OUTPATIENT)
Dept: OPERATING ROOM | Age: 49
DRG: 313 | End: 2019-08-13
Payer: MEDICAID

## 2019-08-13 ENCOUNTER — APPOINTMENT (OUTPATIENT)
Dept: CT IMAGING | Age: 49
DRG: 313 | End: 2019-08-13
Payer: MEDICAID

## 2019-08-13 ENCOUNTER — ANESTHESIA EVENT (OUTPATIENT)
Dept: OPERATING ROOM | Age: 49
DRG: 313 | End: 2019-08-13
Payer: MEDICAID

## 2019-08-13 VITALS
SYSTOLIC BLOOD PRESSURE: 90 MMHG | RESPIRATION RATE: 1 BRPM | TEMPERATURE: 99.7 F | OXYGEN SATURATION: 94 % | DIASTOLIC BLOOD PRESSURE: 47 MMHG

## 2019-08-13 LAB
ABSOLUTE EOS #: 0.07 K/UL (ref 0–0.44)
ABSOLUTE IMMATURE GRANULOCYTE: 0.07 K/UL (ref 0–0.3)
ABSOLUTE LYMPH #: 1.82 K/UL (ref 1.1–3.7)
ABSOLUTE MONO #: 0.63 K/UL (ref 0.1–1.2)
ANION GAP SERPL CALCULATED.3IONS-SCNC: 10 MMOL/L (ref 9–17)
BASOPHILS # BLD: 0 % (ref 0–2)
BASOPHILS ABSOLUTE: 0 K/UL (ref 0–0.2)
BUN BLDV-MCNC: 22 MG/DL (ref 6–20)
BUN/CREAT BLD: 12 (ref 9–20)
CALCIUM SERPL-MCNC: 8.4 MG/DL (ref 8.6–10.4)
CHLORIDE BLD-SCNC: 106 MMOL/L (ref 98–107)
CO2: 20 MMOL/L (ref 20–31)
CREAT SERPL-MCNC: 1.9 MG/DL (ref 0.7–1.2)
DIFFERENTIAL TYPE: ABNORMAL
EKG ATRIAL RATE: 70 BPM
EKG P AXIS: 12 DEGREES
EKG P-R INTERVAL: 144 MS
EKG Q-T INTERVAL: 450 MS
EKG QRS DURATION: 94 MS
EKG QTC CALCULATION (BAZETT): 486 MS
EKG T AXIS: 7 DEGREES
EKG VENTRICULAR RATE: 70 BPM
EOSINOPHILS RELATIVE PERCENT: 1 % (ref 1–4)
GFR AFRICAN AMERICAN: 46 ML/MIN
GFR NON-AFRICAN AMERICAN: 38 ML/MIN
GFR SERPL CREATININE-BSD FRML MDRD: ABNORMAL ML/MIN/{1.73_M2}
GFR SERPL CREATININE-BSD FRML MDRD: ABNORMAL ML/MIN/{1.73_M2}
GLUCOSE BLD-MCNC: 104 MG/DL (ref 70–99)
HCT VFR BLD CALC: 29.9 % (ref 40.7–50.3)
HEMOGLOBIN: 9.1 G/DL (ref 13–17)
IMMATURE GRANULOCYTES: 1 %
LYMPHOCYTES # BLD: 26 % (ref 24–43)
MCH RBC QN AUTO: 30 PG (ref 25.2–33.5)
MCHC RBC AUTO-ENTMCNC: 30.4 G/DL (ref 28.4–34.8)
MCV RBC AUTO: 98.7 FL (ref 82.6–102.9)
MONOCYTES # BLD: 9 % (ref 3–12)
MORPHOLOGY: ABNORMAL
NRBC AUTOMATED: 0 PER 100 WBC
PDW BLD-RTO: 14.6 % (ref 11.8–14.4)
PLATELET # BLD: ABNORMAL K/UL (ref 138–453)
PLATELET ESTIMATE: ABNORMAL
PLATELET, FLUORESCENCE: 99 K/UL (ref 138–453)
PLATELET, IMMATURE FRACTION: 2.1 % (ref 1.1–10.3)
PMV BLD AUTO: ABNORMAL FL (ref 8.1–13.5)
POTASSIUM SERPL-SCNC: 4.5 MMOL/L (ref 3.7–5.3)
RBC # BLD: 3.03 M/UL (ref 4.21–5.77)
RBC # BLD: ABNORMAL 10*6/UL
SEG NEUTROPHILS: 63 % (ref 36–65)
SEGMENTED NEUTROPHILS ABSOLUTE COUNT: 4.41 K/UL (ref 1.5–8.1)
SODIUM BLD-SCNC: 136 MMOL/L (ref 135–144)
WBC # BLD: 7 K/UL (ref 3.5–11.3)
WBC # BLD: ABNORMAL 10*3/UL

## 2019-08-13 PROCEDURE — 6370000000 HC RX 637 (ALT 250 FOR IP): Performed by: INTERNAL MEDICINE

## 2019-08-13 PROCEDURE — 80048 BASIC METABOLIC PNL TOTAL CA: CPT

## 2019-08-13 PROCEDURE — 6360000002 HC RX W HCPCS: Performed by: NURSE ANESTHETIST, CERTIFIED REGISTERED

## 2019-08-13 PROCEDURE — 2580000003 HC RX 258: Performed by: ORTHOPAEDIC SURGERY

## 2019-08-13 PROCEDURE — 6370000000 HC RX 637 (ALT 250 FOR IP): Performed by: ORTHOPAEDIC SURGERY

## 2019-08-13 PROCEDURE — 6360000002 HC RX W HCPCS: Performed by: ORTHOPAEDIC SURGERY

## 2019-08-13 PROCEDURE — 6360000002 HC RX W HCPCS: Performed by: PHYSICIAN ASSISTANT

## 2019-08-13 PROCEDURE — 64447 NJX AA&/STRD FEMORAL NRV IMG: CPT | Performed by: NURSE ANESTHETIST, CERTIFIED REGISTERED

## 2019-08-13 PROCEDURE — 2500000003 HC RX 250 WO HCPCS: Performed by: ORTHOPAEDIC SURGERY

## 2019-08-13 PROCEDURE — 7100000000 HC PACU RECOVERY - FIRST 15 MIN: Performed by: ORTHOPAEDIC SURGERY

## 2019-08-13 PROCEDURE — 0QSJ04Z REPOSITION RIGHT FIBULA WITH INTERNAL FIXATION DEVICE, OPEN APPROACH: ICD-10-PCS | Performed by: ORTHOPAEDIC SURGERY

## 2019-08-13 PROCEDURE — 2500000003 HC RX 250 WO HCPCS: Performed by: PHYSICIAN ASSISTANT

## 2019-08-13 PROCEDURE — 73600 X-RAY EXAM OF ANKLE: CPT

## 2019-08-13 PROCEDURE — 7100000001 HC PACU RECOVERY - ADDTL 15 MIN: Performed by: ORTHOPAEDIC SURGERY

## 2019-08-13 PROCEDURE — 2500000003 HC RX 250 WO HCPCS: Performed by: NURSE ANESTHETIST, CERTIFIED REGISTERED

## 2019-08-13 PROCEDURE — 93010 ELECTROCARDIOGRAM REPORT: CPT | Performed by: INTERNAL MEDICINE

## 2019-08-13 PROCEDURE — 1200000000 HC SEMI PRIVATE

## 2019-08-13 PROCEDURE — C1713 ANCHOR/SCREW BN/BN,TIS/BN: HCPCS | Performed by: ORTHOPAEDIC SURGERY

## 2019-08-13 PROCEDURE — 2500000003 HC RX 250 WO HCPCS: Performed by: INTERNAL MEDICINE

## 2019-08-13 PROCEDURE — 2580000003 HC RX 258: Performed by: NURSE ANESTHETIST, CERTIFIED REGISTERED

## 2019-08-13 PROCEDURE — C1769 GUIDE WIRE: HCPCS | Performed by: ORTHOPAEDIC SURGERY

## 2019-08-13 PROCEDURE — 3700000001 HC ADD 15 MINUTES (ANESTHESIA): Performed by: ORTHOPAEDIC SURGERY

## 2019-08-13 PROCEDURE — 2580000003 HC RX 258

## 2019-08-13 PROCEDURE — 3600000014 HC SURGERY LEVEL 4 ADDTL 15MIN: Performed by: ORTHOPAEDIC SURGERY

## 2019-08-13 PROCEDURE — 73700 CT LOWER EXTREMITY W/O DYE: CPT

## 2019-08-13 PROCEDURE — 94664 DEMO&/EVAL PT USE INHALER: CPT

## 2019-08-13 PROCEDURE — 85055 RETICULATED PLATELET ASSAY: CPT

## 2019-08-13 PROCEDURE — 2709999900 HC NON-CHARGEABLE SUPPLY: Performed by: ORTHOPAEDIC SURGERY

## 2019-08-13 PROCEDURE — 3600000004 HC SURGERY LEVEL 4 BASE: Performed by: ORTHOPAEDIC SURGERY

## 2019-08-13 PROCEDURE — 2720000010 HC SURG SUPPLY STERILE: Performed by: ORTHOPAEDIC SURGERY

## 2019-08-13 PROCEDURE — 6360000002 HC RX W HCPCS

## 2019-08-13 PROCEDURE — 3700000000 HC ANESTHESIA ATTENDED CARE: Performed by: ORTHOPAEDIC SURGERY

## 2019-08-13 PROCEDURE — 0QSG04Z REPOSITION RIGHT TIBIA WITH INTERNAL FIXATION DEVICE, OPEN APPROACH: ICD-10-PCS | Performed by: ORTHOPAEDIC SURGERY

## 2019-08-13 PROCEDURE — 85025 COMPLETE CBC W/AUTO DIFF WBC: CPT

## 2019-08-13 PROCEDURE — 36415 COLL VENOUS BLD VENIPUNCTURE: CPT

## 2019-08-13 DEVICE — SCREW BNE L14MM DIA3.5MM CORT ANK S STL NONLOCKING LO PROF: Type: IMPLANTABLE DEVICE | Site: ANKLE | Status: FUNCTIONAL

## 2019-08-13 DEVICE — SCREW BNE L12MM DIA2.7MM ANK S STL LOK LO PROF FOR FRAC: Type: IMPLANTABLE DEVICE | Site: ANKLE | Status: FUNCTIONAL

## 2019-08-13 DEVICE — PLATE BNE 5 H R DST FIB ANK S STL LOK FOR FRAC MGMT SYS: Type: IMPLANTABLE DEVICE | Site: ANKLE | Status: FUNCTIONAL

## 2019-08-13 DEVICE — IMPLANTABLE DEVICE: Type: IMPLANTABLE DEVICE | Site: ANKLE | Status: FUNCTIONAL

## 2019-08-13 DEVICE — SCREW BNE L16MM DIA3.5MM CORT ANK S STL NONLOCKING LO PROF: Type: IMPLANTABLE DEVICE | Site: ANKLE | Status: FUNCTIONAL

## 2019-08-13 DEVICE — SCREW BNE L16MM DIA2.7MM ANK S STL LOK LO PROF FOR FRAC: Type: IMPLANTABLE DEVICE | Site: ANKLE | Status: FUNCTIONAL

## 2019-08-13 RX ORDER — HYDROMORPHONE HCL 110MG/55ML
1 PATIENT CONTROLLED ANALGESIA SYRINGE INTRAVENOUS
Status: DISCONTINUED | OUTPATIENT
Start: 2019-08-13 | End: 2019-08-13

## 2019-08-13 RX ORDER — HYDROCODONE BITARTRATE AND ACETAMINOPHEN 5; 325 MG/1; MG/1
2 TABLET ORAL ONCE
Status: DISCONTINUED | OUTPATIENT
Start: 2019-08-13 | End: 2019-08-13 | Stop reason: HOSPADM

## 2019-08-13 RX ORDER — FENTANYL CITRATE 50 UG/ML
50 INJECTION, SOLUTION INTRAMUSCULAR; INTRAVENOUS EVERY 5 MIN PRN
Status: DISCONTINUED | OUTPATIENT
Start: 2019-08-13 | End: 2019-08-13 | Stop reason: HOSPADM

## 2019-08-13 RX ORDER — PROPOFOL 10 MG/ML
INJECTION, EMULSION INTRAVENOUS PRN
Status: DISCONTINUED | OUTPATIENT
Start: 2019-08-13 | End: 2019-08-13 | Stop reason: SDUPTHER

## 2019-08-13 RX ORDER — DEXTROSE MONOHYDRATE 50 MG/ML
INJECTION, SOLUTION INTRAVENOUS
Status: COMPLETED
Start: 2019-08-13 | End: 2019-08-13

## 2019-08-13 RX ORDER — MORPHINE SULFATE 4 MG/ML
4 INJECTION, SOLUTION INTRAMUSCULAR; INTRAVENOUS
Status: DISCONTINUED | OUTPATIENT
Start: 2019-08-13 | End: 2019-08-15 | Stop reason: HOSPADM

## 2019-08-13 RX ORDER — AMLODIPINE BESYLATE 10 MG/1
10 TABLET ORAL ONCE
Status: COMPLETED | OUTPATIENT
Start: 2019-08-13 | End: 2019-08-13

## 2019-08-13 RX ORDER — MEPERIDINE HYDROCHLORIDE 50 MG/ML
12.5 INJECTION INTRAMUSCULAR; INTRAVENOUS; SUBCUTANEOUS EVERY 5 MIN PRN
Status: DISCONTINUED | OUTPATIENT
Start: 2019-08-13 | End: 2019-08-13 | Stop reason: HOSPADM

## 2019-08-13 RX ORDER — OXYCODONE HYDROCHLORIDE 5 MG/1
5 TABLET ORAL EVERY 4 HOURS PRN
Status: DISCONTINUED | OUTPATIENT
Start: 2019-08-13 | End: 2019-08-15 | Stop reason: HOSPADM

## 2019-08-13 RX ORDER — HYDRALAZINE HYDROCHLORIDE 20 MG/ML
10 INJECTION INTRAMUSCULAR; INTRAVENOUS EVERY 6 HOURS PRN
Status: DISCONTINUED | OUTPATIENT
Start: 2019-08-13 | End: 2019-08-15 | Stop reason: HOSPADM

## 2019-08-13 RX ORDER — CLONIDINE 100 UG/ML
INJECTION, SOLUTION EPIDURAL PRN
Status: DISCONTINUED | OUTPATIENT
Start: 2019-08-13 | End: 2019-08-13 | Stop reason: SDUPTHER

## 2019-08-13 RX ORDER — PHENYLEPHRINE HYDROCHLORIDE 10 MG/ML
INJECTION INTRAVENOUS PRN
Status: DISCONTINUED | OUTPATIENT
Start: 2019-08-13 | End: 2019-08-13 | Stop reason: SDUPTHER

## 2019-08-13 RX ORDER — LABETALOL HYDROCHLORIDE 5 MG/ML
20 INJECTION, SOLUTION INTRAVENOUS EVERY 6 HOURS PRN
Status: DISCONTINUED | OUTPATIENT
Start: 2019-08-13 | End: 2019-08-15 | Stop reason: HOSPADM

## 2019-08-13 RX ORDER — ONDANSETRON 2 MG/ML
4 INJECTION INTRAMUSCULAR; INTRAVENOUS
Status: DISCONTINUED | OUTPATIENT
Start: 2019-08-13 | End: 2019-08-13 | Stop reason: HOSPADM

## 2019-08-13 RX ORDER — ENALAPRILAT 2.5 MG/2ML
1.25 INJECTION INTRAVENOUS EVERY 6 HOURS PRN
Status: DISCONTINUED | OUTPATIENT
Start: 2019-08-13 | End: 2019-08-15 | Stop reason: HOSPADM

## 2019-08-13 RX ORDER — LORAZEPAM 2 MG/ML
1 CONCENTRATE ORAL ONCE
Status: COMPLETED | OUTPATIENT
Start: 2019-08-13 | End: 2019-08-13

## 2019-08-13 RX ORDER — LABETALOL HYDROCHLORIDE 5 MG/ML
5 INJECTION, SOLUTION INTRAVENOUS EVERY 10 MIN PRN
Status: DISCONTINUED | OUTPATIENT
Start: 2019-08-13 | End: 2019-08-13 | Stop reason: HOSPADM

## 2019-08-13 RX ORDER — MORPHINE SULFATE 2 MG/ML
2 INJECTION, SOLUTION INTRAMUSCULAR; INTRAVENOUS
Status: DISCONTINUED | OUTPATIENT
Start: 2019-08-13 | End: 2019-08-15 | Stop reason: HOSPADM

## 2019-08-13 RX ORDER — SODIUM CHLORIDE, SODIUM LACTATE, POTASSIUM CHLORIDE, CALCIUM CHLORIDE 600; 310; 30; 20 MG/100ML; MG/100ML; MG/100ML; MG/100ML
INJECTION, SOLUTION INTRAVENOUS CONTINUOUS PRN
Status: DISCONTINUED | OUTPATIENT
Start: 2019-08-13 | End: 2019-08-13 | Stop reason: SDUPTHER

## 2019-08-13 RX ORDER — ACETAMINOPHEN 325 MG/1
650 TABLET ORAL EVERY 4 HOURS PRN
Status: DISCONTINUED | OUTPATIENT
Start: 2019-08-13 | End: 2019-08-15 | Stop reason: HOSPADM

## 2019-08-13 RX ORDER — HYDROMORPHONE HCL 110MG/55ML
2 PATIENT CONTROLLED ANALGESIA SYRINGE INTRAVENOUS
Status: DISCONTINUED | OUTPATIENT
Start: 2019-08-13 | End: 2019-08-13

## 2019-08-13 RX ORDER — DEXAMETHASONE SODIUM PHOSPHATE 4 MG/ML
INJECTION, SOLUTION INTRA-ARTICULAR; INTRALESIONAL; INTRAMUSCULAR; INTRAVENOUS; SOFT TISSUE PRN
Status: DISCONTINUED | OUTPATIENT
Start: 2019-08-13 | End: 2019-08-13 | Stop reason: SDUPTHER

## 2019-08-13 RX ORDER — PROMETHAZINE HYDROCHLORIDE 25 MG/ML
6.25 INJECTION, SOLUTION INTRAMUSCULAR; INTRAVENOUS
Status: DISCONTINUED | OUTPATIENT
Start: 2019-08-13 | End: 2019-08-13 | Stop reason: HOSPADM

## 2019-08-13 RX ORDER — ONDANSETRON 2 MG/ML
4 INJECTION INTRAMUSCULAR; INTRAVENOUS EVERY 6 HOURS PRN
Status: DISCONTINUED | OUTPATIENT
Start: 2019-08-13 | End: 2019-08-15 | Stop reason: HOSPADM

## 2019-08-13 RX ORDER — ALBUTEROL SULFATE 2.5 MG/3ML
2.5 SOLUTION RESPIRATORY (INHALATION) ONCE
Status: COMPLETED | OUTPATIENT
Start: 2019-08-13 | End: 2019-08-13

## 2019-08-13 RX ORDER — OXYCODONE HYDROCHLORIDE 5 MG/1
10 TABLET ORAL EVERY 4 HOURS PRN
Status: DISCONTINUED | OUTPATIENT
Start: 2019-08-13 | End: 2019-08-15 | Stop reason: HOSPADM

## 2019-08-13 RX ORDER — CEFAZOLIN SODIUM 1 G/3ML
INJECTION, POWDER, FOR SOLUTION INTRAMUSCULAR; INTRAVENOUS
Status: COMPLETED
Start: 2019-08-13 | End: 2019-08-13

## 2019-08-13 RX ORDER — DEXAMETHASONE SODIUM PHOSPHATE 10 MG/ML
INJECTION, SOLUTION INTRAMUSCULAR; INTRAVENOUS PRN
Status: DISCONTINUED | OUTPATIENT
Start: 2019-08-13 | End: 2019-08-13 | Stop reason: SDUPTHER

## 2019-08-13 RX ORDER — MIDAZOLAM HYDROCHLORIDE 1 MG/ML
INJECTION INTRAMUSCULAR; INTRAVENOUS PRN
Status: DISCONTINUED | OUTPATIENT
Start: 2019-08-13 | End: 2019-08-13 | Stop reason: SDUPTHER

## 2019-08-13 RX ORDER — ROPIVACAINE HYDROCHLORIDE 5 MG/ML
INJECTION, SOLUTION EPIDURAL; INFILTRATION; PERINEURAL PRN
Status: DISCONTINUED | OUTPATIENT
Start: 2019-08-13 | End: 2019-08-13 | Stop reason: SDUPTHER

## 2019-08-13 RX ORDER — FENTANYL CITRATE 50 UG/ML
25 INJECTION, SOLUTION INTRAMUSCULAR; INTRAVENOUS EVERY 5 MIN PRN
Status: DISCONTINUED | OUTPATIENT
Start: 2019-08-13 | End: 2019-08-13 | Stop reason: HOSPADM

## 2019-08-13 RX ORDER — LIDOCAINE HYDROCHLORIDE 20 MG/ML
INJECTION, SOLUTION INFILTRATION; PERINEURAL PRN
Status: DISCONTINUED | OUTPATIENT
Start: 2019-08-13 | End: 2019-08-13 | Stop reason: SDUPTHER

## 2019-08-13 RX ORDER — DOCUSATE SODIUM 100 MG/1
100 CAPSULE, LIQUID FILLED ORAL 2 TIMES DAILY
Status: DISCONTINUED | OUTPATIENT
Start: 2019-08-13 | End: 2019-08-15 | Stop reason: HOSPADM

## 2019-08-13 RX ORDER — ONDANSETRON 2 MG/ML
INJECTION INTRAMUSCULAR; INTRAVENOUS PRN
Status: DISCONTINUED | OUTPATIENT
Start: 2019-08-13 | End: 2019-08-13 | Stop reason: SDUPTHER

## 2019-08-13 RX ADMIN — MIDAZOLAM HYDROCHLORIDE 1 MG: 1 INJECTION, SOLUTION INTRAMUSCULAR; INTRAVENOUS at 12:24

## 2019-08-13 RX ADMIN — HYDROMORPHONE HYDROCHLORIDE 1 MG: 2 INJECTION, SOLUTION INTRAMUSCULAR; INTRAVENOUS; SUBCUTANEOUS at 05:55

## 2019-08-13 RX ADMIN — LORAZEPAM 1 MG: 1 TABLET ORAL at 19:31

## 2019-08-13 RX ADMIN — FOLIC ACID: 5 INJECTION, SOLUTION INTRAMUSCULAR; INTRAVENOUS; SUBCUTANEOUS at 16:43

## 2019-08-13 RX ADMIN — SODIUM CHLORIDE: 9 INJECTION, SOLUTION INTRAVENOUS at 16:35

## 2019-08-13 RX ADMIN — PROPOFOL 200 MG: 10 INJECTION, EMULSION INTRAVENOUS at 12:55

## 2019-08-13 RX ADMIN — PHENYLEPHRINE HYDROCHLORIDE 200 MCG: 10 INJECTION INTRAVENOUS at 14:05

## 2019-08-13 RX ADMIN — LIDOCAINE HYDROCHLORIDE 100 MG: 20 INJECTION, SOLUTION INFILTRATION; PERINEURAL at 12:55

## 2019-08-13 RX ADMIN — PHENYLEPHRINE HYDROCHLORIDE 200 MCG: 10 INJECTION INTRAVENOUS at 13:31

## 2019-08-13 RX ADMIN — ENOXAPARIN SODIUM 30 MG: 30 INJECTION SUBCUTANEOUS at 21:27

## 2019-08-13 RX ADMIN — HYDROMORPHONE HYDROCHLORIDE 1 MG: 2 INJECTION, SOLUTION INTRAMUSCULAR; INTRAVENOUS; SUBCUTANEOUS at 00:26

## 2019-08-13 RX ADMIN — ALBUTEROL SULFATE 2.5 MG: 2.5 SOLUTION RESPIRATORY (INHALATION) at 15:52

## 2019-08-13 RX ADMIN — Medication 1 MG: at 15:55

## 2019-08-13 RX ADMIN — DEXAMETHASONE SODIUM PHOSPHATE 8 MG: 4 INJECTION, SOLUTION INTRAMUSCULAR; INTRAVENOUS at 13:03

## 2019-08-13 RX ADMIN — ENALAPRILAT 1.25 MG: 1.25 INJECTION INTRAVENOUS at 23:03

## 2019-08-13 RX ADMIN — ONDANSETRON 4 MG: 2 INJECTION INTRAMUSCULAR; INTRAVENOUS at 15:05

## 2019-08-13 RX ADMIN — Medication 10 ML: at 10:33

## 2019-08-13 RX ADMIN — DEXAMETHASONE SODIUM PHOSPHATE 10 MG: 10 INJECTION, SOLUTION INTRAMUSCULAR; INTRAVENOUS at 12:36

## 2019-08-13 RX ADMIN — ROPIVACAINE HYDROCHLORIDE 40 ML: 5 INJECTION, SOLUTION EPIDURAL; INFILTRATION; PERINEURAL at 12:36

## 2019-08-13 RX ADMIN — CLONIDINE HYDROCHLORIDE 100 MCG: 100 INJECTION, SOLUTION INTRAVENOUS at 12:24

## 2019-08-13 RX ADMIN — HYDRALAZINE HYDROCHLORIDE 10 MG: 20 INJECTION INTRAMUSCULAR; INTRAVENOUS at 07:43

## 2019-08-13 RX ADMIN — PHENYLEPHRINE HYDROCHLORIDE 100 MCG: 10 INJECTION INTRAVENOUS at 13:07

## 2019-08-13 RX ADMIN — DEXTROSE MONOHYDRATE 2 G: 50 INJECTION, SOLUTION INTRAVENOUS at 12:44

## 2019-08-13 RX ADMIN — Medication 2 G: at 21:48

## 2019-08-13 RX ADMIN — SODIUM CHLORIDE, POTASSIUM CHLORIDE, SODIUM LACTATE AND CALCIUM CHLORIDE: 600; 310; 30; 20 INJECTION, SOLUTION INTRAVENOUS at 12:47

## 2019-08-13 RX ADMIN — LABETALOL HYDROCHLORIDE 20 MG: 5 INJECTION, SOLUTION INTRAVENOUS at 11:18

## 2019-08-13 RX ADMIN — LABETALOL HYDROCHLORIDE 20 MG: 5 INJECTION, SOLUTION INTRAVENOUS at 19:23

## 2019-08-13 RX ADMIN — HYDROMORPHONE HYDROCHLORIDE 2 MG: 2 INJECTION, SOLUTION INTRAMUSCULAR; INTRAVENOUS; SUBCUTANEOUS at 07:45

## 2019-08-13 RX ADMIN — CEFAZOLIN 2000 MG: 330 INJECTION, POWDER, FOR SOLUTION INTRAMUSCULAR; INTRAVENOUS at 21:47

## 2019-08-13 RX ADMIN — DEXTROSE MONOHYDRATE 100 ML: 50 INJECTION, SOLUTION INTRAVENOUS at 21:47

## 2019-08-13 RX ADMIN — HYDROMORPHONE HYDROCHLORIDE 1 MG: 2 INJECTION, SOLUTION INTRAMUSCULAR; INTRAVENOUS; SUBCUTANEOUS at 03:48

## 2019-08-13 RX ADMIN — CARVEDILOL 25 MG: 25 TABLET, FILM COATED ORAL at 17:51

## 2019-08-13 RX ADMIN — FAMOTIDINE 20 MG: 10 INJECTION, SOLUTION INTRAVENOUS at 09:57

## 2019-08-13 RX ADMIN — DOCUSATE SODIUM 100 MG: 100 CAPSULE, LIQUID FILLED ORAL at 21:27

## 2019-08-13 RX ADMIN — METOPROLOL TARTRATE 5 MG: 5 INJECTION INTRAVENOUS at 05:16

## 2019-08-13 RX ADMIN — PHENYLEPHRINE HYDROCHLORIDE 100 MCG: 10 INJECTION INTRAVENOUS at 13:15

## 2019-08-13 RX ADMIN — MIDAZOLAM HYDROCHLORIDE 1 MG: 1 INJECTION, SOLUTION INTRAMUSCULAR; INTRAVENOUS at 12:30

## 2019-08-13 RX ADMIN — PHENYLEPHRINE HYDROCHLORIDE 100 MCG: 10 INJECTION INTRAVENOUS at 13:36

## 2019-08-13 RX ADMIN — FAMOTIDINE 20 MG: 10 INJECTION, SOLUTION INTRAVENOUS at 21:27

## 2019-08-13 RX ADMIN — AMLODIPINE BESYLATE 10 MG: 10 TABLET ORAL at 23:03

## 2019-08-13 RX ADMIN — HYDROMORPHONE HYDROCHLORIDE 2 MG: 2 INJECTION, SOLUTION INTRAMUSCULAR; INTRAVENOUS; SUBCUTANEOUS at 09:57

## 2019-08-13 RX ADMIN — HYDRALAZINE HYDROCHLORIDE 10 MG: 20 INJECTION INTRAMUSCULAR; INTRAVENOUS at 21:26

## 2019-08-13 ASSESSMENT — PULMONARY FUNCTION TESTS
PIF_VALUE: 4
PIF_VALUE: 15
PIF_VALUE: 4
PIF_VALUE: 5
PIF_VALUE: 5
PIF_VALUE: 4
PIF_VALUE: 5
PIF_VALUE: 5
PIF_VALUE: 4
PIF_VALUE: 5
PIF_VALUE: 4
PIF_VALUE: 15
PIF_VALUE: 3
PIF_VALUE: 1
PIF_VALUE: 5
PIF_VALUE: 4
PIF_VALUE: 5
PIF_VALUE: 5
PIF_VALUE: 4
PIF_VALUE: 1
PIF_VALUE: 3
PIF_VALUE: 4
PIF_VALUE: 5
PIF_VALUE: 4
PIF_VALUE: 4
PIF_VALUE: 5
PIF_VALUE: 14
PIF_VALUE: 5
PIF_VALUE: 1
PIF_VALUE: 1
PIF_VALUE: 5
PIF_VALUE: 4
PIF_VALUE: 5
PIF_VALUE: 5
PIF_VALUE: 4
PIF_VALUE: 4
PIF_VALUE: 5
PIF_VALUE: 1
PIF_VALUE: 5
PIF_VALUE: 5
PIF_VALUE: 4
PIF_VALUE: 3
PIF_VALUE: 1
PIF_VALUE: 10
PIF_VALUE: 5
PIF_VALUE: 4
PIF_VALUE: 1
PIF_VALUE: 11
PIF_VALUE: 4
PIF_VALUE: 5
PIF_VALUE: 4
PIF_VALUE: 1
PIF_VALUE: 5
PIF_VALUE: 0
PIF_VALUE: 5
PIF_VALUE: 5
PIF_VALUE: 4
PIF_VALUE: 5
PIF_VALUE: 15
PIF_VALUE: 4
PIF_VALUE: 5
PIF_VALUE: 5
PIF_VALUE: 3
PIF_VALUE: 3
PIF_VALUE: 4
PIF_VALUE: 5
PIF_VALUE: 4
PIF_VALUE: 4
PIF_VALUE: 5
PIF_VALUE: 4
PIF_VALUE: 5
PIF_VALUE: 5
PIF_VALUE: 14
PIF_VALUE: 5
PIF_VALUE: 4
PIF_VALUE: 5
PIF_VALUE: 5
PIF_VALUE: 4
PIF_VALUE: 14
PIF_VALUE: 4
PIF_VALUE: 4
PIF_VALUE: 14
PIF_VALUE: 5
PIF_VALUE: 4
PIF_VALUE: 5
PIF_VALUE: 4
PIF_VALUE: 4
PIF_VALUE: 5
PIF_VALUE: 4
PIF_VALUE: 14
PIF_VALUE: 4
PIF_VALUE: 5
PIF_VALUE: 3
PIF_VALUE: 5
PIF_VALUE: 4
PIF_VALUE: 5
PIF_VALUE: 4
PIF_VALUE: 4
PIF_VALUE: 1
PIF_VALUE: 5
PIF_VALUE: 4
PIF_VALUE: 10
PIF_VALUE: 1
PIF_VALUE: 4
PIF_VALUE: 3
PIF_VALUE: 5
PIF_VALUE: 4
PIF_VALUE: 5
PIF_VALUE: 4
PIF_VALUE: 4
PIF_VALUE: 10
PIF_VALUE: 5
PIF_VALUE: 4
PIF_VALUE: 4
PIF_VALUE: 1
PIF_VALUE: 4
PIF_VALUE: 4
PIF_VALUE: 5
PIF_VALUE: 4
PIF_VALUE: 4
PIF_VALUE: 5

## 2019-08-13 ASSESSMENT — PAIN DESCRIPTION - PROGRESSION
CLINICAL_PROGRESSION: NOT CHANGED
CLINICAL_PROGRESSION: GRADUALLY WORSENING
CLINICAL_PROGRESSION: GRADUALLY WORSENING
CLINICAL_PROGRESSION: NOT CHANGED

## 2019-08-13 ASSESSMENT — PAIN SCALES - GENERAL
PAINLEVEL_OUTOF10: 0
PAINLEVEL_OUTOF10: 7
PAINLEVEL_OUTOF10: 7
PAINLEVEL_OUTOF10: 4
PAINLEVEL_OUTOF10: 0
PAINLEVEL_OUTOF10: 0
PAINLEVEL_OUTOF10: 9
PAINLEVEL_OUTOF10: 8
PAINLEVEL_OUTOF10: 0
PAINLEVEL_OUTOF10: 0
PAINLEVEL_OUTOF10: 7
PAINLEVEL_OUTOF10: 4
PAINLEVEL_OUTOF10: 0
PAINLEVEL_OUTOF10: 8
PAINLEVEL_OUTOF10: 8
PAINLEVEL_OUTOF10: 4
PAINLEVEL_OUTOF10: 0
PAINLEVEL_OUTOF10: 0

## 2019-08-13 ASSESSMENT — PAIN DESCRIPTION - ONSET
ONSET: GRADUAL
ONSET: ON-GOING

## 2019-08-13 ASSESSMENT — PAIN DESCRIPTION - LOCATION
LOCATION: ANKLE
LOCATION: ANKLE;FOOT
LOCATION: ANKLE

## 2019-08-13 ASSESSMENT — PAIN DESCRIPTION - DESCRIPTORS
DESCRIPTORS: ACHING;BURNING
DESCRIPTORS: ACHING;BURNING
DESCRIPTORS: BURNING;ACHING
DESCRIPTORS: BURNING

## 2019-08-13 ASSESSMENT — PAIN DESCRIPTION - PAIN TYPE
TYPE: ACUTE PAIN

## 2019-08-13 ASSESSMENT — PAIN DESCRIPTION - ORIENTATION
ORIENTATION: RIGHT

## 2019-08-13 ASSESSMENT — PAIN DESCRIPTION - FREQUENCY
FREQUENCY: CONTINUOUS

## 2019-08-13 ASSESSMENT — LIFESTYLE VARIABLES: SMOKING_STATUS: 1

## 2019-08-13 ASSESSMENT — PAIN - FUNCTIONAL ASSESSMENT: PAIN_FUNCTIONAL_ASSESSMENT: ACTIVITIES ARE NOT PREVENTED

## 2019-08-13 NOTE — ANESTHESIA PRE PROCEDURE
Hold] sodium chloride flush 0.9 % injection 10 mL  10 mL Intravenous 2 times per day Betina Dow MD   10 mL at 08/13/19 1033    [MAR Hold] sodium chloride flush 0.9 % injection 10 mL  10 mL Intravenous PRN Betina Dow MD        [facility] Hold] magnesium hydroxide (MILK OF MAGNESIA) 400 MG/5ML suspension 30 mL  30 mL Oral Daily PRN Betina Dow MD        [facility] Hold] [facility]ron [facility]) injection 4 mg  4 mg Intravenous Q6H PRN Betina Dow MD       [facility] Hold] sodium chloride flush 0.9 % injection 10 mL  10 mL Intravenous 2 times per day Betina Dow MD       [facility] Hold] sodium chloride flush 0.9 % injection 10 mL  10 mL Intravenous PRN Betina Dow MD       [facility] Hold] sodium chloride 0.9 % 3,734 mL with folic acid 1 mg, adult multi-vitamin with vitamin k 10 mL, thiamine 100 mg   Intravenous Daily Betina Dow  mL/hr at 08/12/19 0859      [MAR Hold] nicotine (NICODERM CQ) 21 MG/24HR 1 patch  1 patch Transdermal Daily Betina Dow MD        [facility] Hold] LORazepam (ATIVAN) tablet 1 mg  1 mg Oral Q1H PRN Betina Dow MD        Or   [facility] Hold] LORazepam (ATIVAN) injection 1 mg  1 mg Intravenous Q1H PRN Betina Dow MD        Or   [facility] Hold] LORazepam (ATIVAN) tablet 2 mg  2 mg Oral Q1H PRN Betina Dow MD        Or   [facility] Hold] LORazepam (ATIVAN) injection 2 mg  2 mg Intravenous Q1H PRN Betina Dow MD        Or   [facility] Hold] LORazepam (ATIVAN) tablet 3 mg  3 mg Oral Q1H PRN Betina Dow MD        Or   [facility] Hold] LORazepam (ATIVAN) injection 3 mg  3 mg Intravenous Q1H PRN Betina Dow MD        Or   [facility] Hold] LORazepam (ATIVAN) tablet 4 mg  4 mg Oral Q1H PRN Betina Dow MD        Or   [facility] Hold] LORazepam (ATIVAN) injection 4 mg  4 mg Intravenous Q1H PRN Betina Dow MD        [facility] Hold] famotidine (PEPCID) injection 20 mg  20 mg Intravenous BID Betina Dow MD   20 mg at 08/13/19 0957    [MAR Hold] oxyCODONE-acetaminophen (PERCOCET) 5-325 MG per tablet 1 tablet  1 tablet Oral Q4H PRN Betina Dow MD        Or   [facility] Hold]

## 2019-08-13 NOTE — PROGRESS NOTES
Department of Orthopedic Surgery  Attending Progress Note      SUBJECTIVE patient seen and examined. Continues to have right ankle pain. Was brought to the OR last night due to redislocation of the ankle joint. The joint was reduced and well splinted. Patient has kept the extremity elevated. Denies numbness and tingling. Denies any new changes. OBJECTIVE    Physical    VITALS:  BP (!) 168/103   Pulse 82   Temp 97.4 °F (36.3 °C) (Temporal)   Resp 18   Ht 5' 9\" (1.753 m)   Wt 251 lb (113.9 kg)   SpO2 93%   BMI 37.07 kg/m²     General: Patient is alert and oriented. No acute distress. Right lower extremity: Splint is in place. There is significant swelling to the foot, however, the foot remains soft and compressible. Patient is able to wiggle the toes without pain. No pain with passive motion of the toes. Sensation is grossly intact throughout the foot. Good cap refill in all digits. He does appear to have some hammertoe deformities to the toes. Otherwise no evidence for compartment syndrome at this time.     Data    CBC with Differential:    Lab Results   Component Value Date    WBC 7.0 08/13/2019    RBC 3.03 08/13/2019    HGB 9.1 08/13/2019    HCT 29.9 08/13/2019    PLT See Reflexed IPF Result 08/13/2019    MCV 98.7 08/13/2019    MCH 30.0 08/13/2019    MCHC 30.4 08/13/2019    RDW 14.6 08/13/2019    LYMPHOPCT 26 08/13/2019    MONOPCT 9 08/13/2019    BASOPCT 0 08/13/2019    MONOSABS 0.63 08/13/2019    LYMPHSABS 1.82 08/13/2019    EOSABS 0.07 08/13/2019    BASOSABS 0.00 08/13/2019    DIFFTYPE NOT REPORTED 08/13/2019     BMP:    Lab Results   Component Value Date     08/13/2019    K 4.5 08/13/2019     08/13/2019    CO2 20 08/13/2019    BUN 22 08/13/2019    LABALBU 3.5 08/12/2019    CREATININE 1.90 08/13/2019    CALCIUM 8.4 08/13/2019    GFRAA 46 08/13/2019    LABGLOM 38 08/13/2019    GLUCOSE 104 08/13/2019     Current Inpatient Medications    Current Facility-Administered mg, 4 mg, Oral, Q1H PRN **OR** [MAR Hold] LORazepam (ATIVAN) injection 4 mg, 4 mg, Intravenous, Q1H PRN  [MAR Hold] famotidine (PEPCID) injection 20 mg, 20 mg, Intravenous, BID  [MAR Hold] oxyCODONE-acetaminophen (PERCOCET) 5-325 MG per tablet 1 tablet, 1 tablet, Oral, Q4H PRN **OR** [MAR Hold] oxyCODONE-acetaminophen (PERCOCET) 5-325 MG per tablet 2 tablet, 2 tablet, Oral, Q4H PRN    ASSESSMENT AND PLAN      1. Right trimalleolar ankle fracture dislocation  2. Right first metatarsal fracture -possible concern for Lisfranc injury    Diagnosis for this patient was discussed with him in detail. Imaging was reviewed with him. The patient has a unstable right trimalleolar ankle fracture dislocation. Surgical treatment in the form of open reduction internal fixation with possible syndesmotic fixation was recommended. The risks, benefits, and alternatives were discussed with him in detail. Risks of the procedure include bleeding, infection, damage to surrounding neurovascular structures, implant failure, nonunion, malunion, need for further treatment including hardware removal, adverse reaction anesthesia, loss of limb, loss of life. The patient had all questions answered to his satisfaction, and elected to proceed with the surgery. Written informed consent was obtained. CT scan of the foot demonstrates a first metatarsal fracture with possible Lisfranc injury. Overall there does not appear to be any widening at the Lisfranc joint, however, we will also perform exam under fluoroscopy during the operation to evaluate this joint. Given the amount of swelling that is present, if there truly is a Lisfranc injury, will wait until swelling resolves before fixation.

## 2019-08-13 NOTE — PROGRESS NOTES
Patient returned from CT scan at this time. While patient Is awake, pt is reassessed and vitals obtained. All is as charted. Pt's IV dressing noted to be bleeding. IV dressing changed at this time. IV flushed first and flushed without any issues. Pt denies the need for anything else at this time.

## 2019-08-13 NOTE — PROGRESS NOTES
08/13/2019    CALCIUM 8.4 (L) 08/13/2019    PROT 7.9 08/12/2019    LABALBU 3.5 08/12/2019    BILITOT 0.43 08/12/2019    ALKPHOS 159 (H) 08/12/2019    AST 54 (H) 08/12/2019    ALT 28 08/12/2019    LABGLOM 38 (L) 08/13/2019    GFRAA 46 (L) 08/13/2019     Lab Results   Component Value Date    LABA1C 4.5 (L) 08/12/2019     Lab Results   Component Value Date    EAG 82 08/12/2019     Nutrition Interventions:   Start oral diet(post op)  Continued Inpatient Monitoring, Coordination of Care, Education not appropriate at this time    Nutrition Evaluation:   · Evaluation: Goals set   · Goals: PO >75% meals post op    · Monitoring: Meal Intake, I&O, Weight, Pertinent Labs    Electronically signed by Justin Guerrero RD, ALY on 8/13/19 at 9:06 AM    Contact Number: [phone]

## 2019-08-13 NOTE — PROGRESS NOTES
Supervisor made aware of labetalol just given for high b/p. Writer unable to assess effectiveness d/t patient being transferred down to OR at this time.

## 2019-08-13 NOTE — PROGRESS NOTES
Patient called out c/o the burning sensation in his right ankle that he had earlier today. Pt has a half an hour before next pain medication is available. Pt verbalizes understanding. Capillary refill remains less than 3 seconds and post popliteal pulse is +2. Will continue to monitor.

## 2019-08-13 NOTE — OP NOTE
Department of Orthopedic Surgery  Operative Report        Pre-operative Diagnosis:  Right ankle trimalleolar fracture dislocation, Right 1st metatarsal base fracture    Post-operative Diagnosis:  Same    Procedure: ORIF Right trimalleolar ankle fracture without fixation of posterior malleolus; evaluation right foot under fluoroscopy    Surgeon:  Lisa Bales DO    Anesthesia:  General endotrachial anesthesia; pre-operative regional block    Estimated blood loss:  Less than 50 ml    Total IV fluids:  Per anesthesia    Blood Transfusion?:  No    Total Urine Output:  None     Drains:  None    Specimens:  None    Implants:  Arthrex 5-hole locking distal fibula plate; 2 3.6NM cannulated partially threaded screws; Multiple 2.7mm locking screws and 3.5mm nonlocking screws    Findings:  Trimalleolar ankle fracture with stable syndesmosis, No gross instability at the lis-franc joint    Complications:  None      Condition:  Stable    Weight Bearing Status:  Non-weight bearing    Full operative report to follow.

## 2019-08-13 NOTE — PROGRESS NOTES
Hospitalist Progress Note    SUBJECTIVE/INTERVAL HISTORY:    Patient seen in follow up for right trimalleolar fracture dislocation, HTN, tobacco abuse, EtOH abuse. Ankle dislocated yesterday, taken to ER for closed reduction. Patient continues to have significant pain. CT foot  1. Slightly impacted 1st metatarsal base fracture with articular offset  measuring up to 1 mm in tiny age-indeterminate, possibly acute, fracture  fragment in the Lisfranc joint space. 2. Acute traumatic trimalleolar fracture of the ankle as detailed above. Avulsion fracture of the anterolateral aspect of the distal tibia.  Small  linear avulsion fracture fragments along the medial aspect of the talus. 3. Clawtoe deformities of the 1st through 5th digits. 4. Diffuse osteopenia and degenerative changes as above. 5. Small tibiotalar joint effusion.  Moderate edema in the subcutaneous fat  primarily about the ankle and dorsum of the foot. OBJECTIVE:    Vitals:   Temp: 97.4 °F (36.3 °C)  BP: (!) 195/113  Resp: 18  Pulse: 82  SpO2: 98 %  24HR INTAKE/OUTPUT:      Intake/Output Summary (Last 24 hours) at 8/13/2019 0742  Last data filed at 8/13/2019 0556  Gross per 24 hour   Intake 3309.2 ml   Output 1900 ml   Net 1409.2 ml       -----------------------------------------------------------------  Review of Systems:  Constitutional:negative  for fevers, and negative for chills.   Eyes: negative for visual disturbance   ENT: negative for sore throat, negative nasal congestion, and negative for earache  Respiratory: negative for shortness of breath, negative for cough, and negative for wheezing  Cardiovascular: negative for chest pain, negative for palpitations, and negative for syncope  Gastrointestinal: negative for abdominal pain, negative for nausea,negative for vomiting, negative for diarrhea, negative for constipation, and negative for hematochezia or melena  Genitourinary: negative for dysuria, negative for urinary urgency, negative for

## 2019-08-13 NOTE — PROGRESS NOTES
Patient called out stating his IV cam unhooked again. Upon entering the room, IV noted to have no dressing left and IV catheter partially out of patient. IV catheter guided back in and IV flushed with no complications noted and good blood return when drawn back. Pt's IV redressed again and IV fluids restarted. Pt also C/O 7/10 pain in his right ankle. PRN dilaudid given as per order. Pt Educated on CIWA scale and the possible need for ativan coverage. Pt verbalizes understanding. CIWA scale remains at 0. Will continue to monitor.

## 2019-08-13 NOTE — PROGRESS NOTES
Patients girlfriend came out of room to notify writer that patient was becoming agitated. Writer entered room and introduced self to patient. Pt was moving his leg all around the bed hitting it against the side rails stating in panic \"I cannot feel my leg, why can't I move my toes. \" Pt was educated on the nerve blocks he had earlier today to prevent any pain and patient verbalizes understanding. Pt also educated not to hit his leg against the side rails any more and patient agreed. Vitals and assessment completed. PRN labetalol given for elevated B/P. CIWA scale being utilized for alcohol withdraws. Pt's current CIWA score is currently 8. PRN 1mg oral ativan given as per order. Pt is alert to name and time, but disoriented to place and situation. Will continue to monitor.

## 2019-08-13 NOTE — ANESTHESIA POSTPROCEDURE EVALUATION
Department of Anesthesiology  Postprocedure Note    Patient: Ilene Mcgowan  MRN: [id number]  YOB: 1970  Date of evaluation: 8/13/2019  Time:  3:59 PM     Procedure Summary     Date:  08/13/19 [facility]  [address] [facility]    Anesthesia Start:  5255 Anesthesia Stop:  1117    Procedure:  ANKLE OPEN REDUCTION INTERNAL FIXATION-TRIMELLAR FRACTURE (Right ) Diagnosis:  (RIGHT ANKLE FRACTURE)    Surgeon:  Tony Cabral DO Responsible Provider:  Olden Opitz, APRN - CRNA    Anesthesia Type:  general ASA Status:  2          Anesthesia Type: general    Sade Phase I: Sade Score: 10    Sade Phase II:      Last vitals: Reviewed and per EMR flowsheets. Anesthesia Post Evaluation    Patient location during evaluation: PACU  Patient participation: complete - patient participated  Level of consciousness: awake and alert, agitated, confused and anxious  Pain score: 0  Airway patency: patent  Nausea & Vomiting: no nausea and no vomiting  Complications: no  Cardiovascular status: hemodynamically stable  Respiratory status: acceptable and spontaneous ventilation (Patient has expiratory wheezing)  Hydration status: stable  Comments: Gave patient breathing treatment and 1 mg of Ativan. He is confused but cooperative. This is baseline for him due to alcohol abuse at home.

## 2019-08-14 PROBLEM — N18.9 CKD (CHRONIC KIDNEY DISEASE): Status: ACTIVE | Noted: 2019-08-14

## 2019-08-14 LAB
ABSOLUTE EOS #: 0.06 K/UL (ref 0–0.44)
ABSOLUTE IMMATURE GRANULOCYTE: 0 K/UL (ref 0–0.3)
ABSOLUTE LYMPH #: 0.25 K/UL (ref 1.1–3.7)
ABSOLUTE MONO #: 0.13 K/UL (ref 0.1–1.2)
ANION GAP SERPL CALCULATED.3IONS-SCNC: 13 MMOL/L (ref 9–17)
BASOPHILS # BLD: 0 % (ref 0–2)
BASOPHILS ABSOLUTE: 0 K/UL (ref 0–0.2)
BUN BLDV-MCNC: 23 MG/DL (ref 6–20)
BUN/CREAT BLD: 11 (ref 9–20)
CALCIUM SERPL-MCNC: 8.3 MG/DL (ref 8.6–10.4)
CHLORIDE BLD-SCNC: 102 MMOL/L (ref 98–107)
CO2: 17 MMOL/L (ref 20–31)
CREAT SERPL-MCNC: 2.03 MG/DL (ref 0.7–1.2)
DIFFERENTIAL TYPE: ABNORMAL
EOSINOPHILS RELATIVE PERCENT: 1 % (ref 1–4)
GFR AFRICAN AMERICAN: 43 ML/MIN
GFR NON-AFRICAN AMERICAN: 35 ML/MIN
GFR SERPL CREATININE-BSD FRML MDRD: ABNORMAL ML/MIN/{1.73_M2}
GFR SERPL CREATININE-BSD FRML MDRD: ABNORMAL ML/MIN/{1.73_M2}
GLUCOSE BLD-MCNC: 170 MG/DL (ref 70–99)
HCT VFR BLD CALC: 27.3 % (ref 40.7–50.3)
HEMOGLOBIN: 8.1 G/DL (ref 13–17)
IMMATURE GRANULOCYTES: 0 %
LYMPHOCYTES # BLD: 4 % (ref 24–43)
MCH RBC QN AUTO: 29.6 PG (ref 25.2–33.5)
MCHC RBC AUTO-ENTMCNC: 29.7 G/DL (ref 28.4–34.8)
MCV RBC AUTO: 99.6 FL (ref 82.6–102.9)
MONOCYTES # BLD: 2 % (ref 3–12)
MORPHOLOGY: ABNORMAL
NRBC AUTOMATED: 0 PER 100 WBC
PDW BLD-RTO: 14.2 % (ref 11.8–14.4)
PLATELET # BLD: ABNORMAL K/UL (ref 138–453)
PLATELET ESTIMATE: ABNORMAL
PLATELET, FLUORESCENCE: 83 K/UL (ref 138–453)
PLATELET, IMMATURE FRACTION: 2.5 % (ref 1.1–10.3)
PMV BLD AUTO: ABNORMAL FL (ref 8.1–13.5)
POTASSIUM SERPL-SCNC: 4.1 MMOL/L (ref 3.7–5.3)
RBC # BLD: 2.74 M/UL (ref 4.21–5.77)
RBC # BLD: ABNORMAL 10*6/UL
SEG NEUTROPHILS: 93 % (ref 36–65)
SEGMENTED NEUTROPHILS ABSOLUTE COUNT: 5.86 K/UL (ref 1.5–8.1)
SODIUM BLD-SCNC: 132 MMOL/L (ref 135–144)
WBC # BLD: 6.3 K/UL (ref 3.5–11.3)
WBC # BLD: ABNORMAL 10*3/UL

## 2019-08-14 PROCEDURE — 2580000003 HC RX 258

## 2019-08-14 PROCEDURE — 97166 OT EVAL MOD COMPLEX 45 MIN: CPT

## 2019-08-14 PROCEDURE — 6370000000 HC RX 637 (ALT 250 FOR IP): Performed by: ORTHOPAEDIC SURGERY

## 2019-08-14 PROCEDURE — 80048 BASIC METABOLIC PNL TOTAL CA: CPT

## 2019-08-14 PROCEDURE — 2500000003 HC RX 250 WO HCPCS: Performed by: ORTHOPAEDIC SURGERY

## 2019-08-14 PROCEDURE — 6360000002 HC RX W HCPCS

## 2019-08-14 PROCEDURE — 97530 THERAPEUTIC ACTIVITIES: CPT

## 2019-08-14 PROCEDURE — 6370000000 HC RX 637 (ALT 250 FOR IP): Performed by: PHYSICIAN ASSISTANT

## 2019-08-14 PROCEDURE — 6370000000 HC RX 637 (ALT 250 FOR IP): Performed by: INTERNAL MEDICINE

## 2019-08-14 PROCEDURE — 6360000002 HC RX W HCPCS: Performed by: ORTHOPAEDIC SURGERY

## 2019-08-14 PROCEDURE — 85055 RETICULATED PLATELET ASSAY: CPT

## 2019-08-14 PROCEDURE — 97162 PT EVAL MOD COMPLEX 30 MIN: CPT

## 2019-08-14 PROCEDURE — 85025 COMPLETE CBC W/AUTO DIFF WBC: CPT

## 2019-08-14 PROCEDURE — 2580000003 HC RX 258: Performed by: ORTHOPAEDIC SURGERY

## 2019-08-14 PROCEDURE — 1200000000 HC SEMI PRIVATE

## 2019-08-14 PROCEDURE — 97110 THERAPEUTIC EXERCISES: CPT

## 2019-08-14 PROCEDURE — 36415 COLL VENOUS BLD VENIPUNCTURE: CPT

## 2019-08-14 PROCEDURE — 2500000003 HC RX 250 WO HCPCS: Performed by: INTERNAL MEDICINE

## 2019-08-14 RX ORDER — AMLODIPINE BESYLATE 10 MG/1
10 TABLET ORAL DAILY
Status: DISCONTINUED | OUTPATIENT
Start: 2019-08-14 | End: 2019-08-15 | Stop reason: HOSPADM

## 2019-08-14 RX ORDER — POLYETHYLENE GLYCOL 3350 17 G/17G
17 POWDER, FOR SOLUTION ORAL DAILY
Status: DISCONTINUED | OUTPATIENT
Start: 2019-08-14 | End: 2019-08-15 | Stop reason: HOSPADM

## 2019-08-14 RX ORDER — CETIRIZINE HYDROCHLORIDE 10 MG/1
10 TABLET ORAL DAILY
Status: DISCONTINUED | OUTPATIENT
Start: 2019-08-14 | End: 2019-08-15 | Stop reason: HOSPADM

## 2019-08-14 RX ORDER — CEFAZOLIN SODIUM 1 G/3ML
INJECTION, POWDER, FOR SOLUTION INTRAMUSCULAR; INTRAVENOUS
Status: COMPLETED
Start: 2019-08-14 | End: 2019-08-14

## 2019-08-14 RX ORDER — DEXTROSE MONOHYDRATE 50 MG/ML
INJECTION, SOLUTION INTRAVENOUS
Status: COMPLETED
Start: 2019-08-14 | End: 2019-08-14

## 2019-08-14 RX ORDER — OXYCODONE HYDROCHLORIDE AND ACETAMINOPHEN 5; 325 MG/1; MG/1
1 TABLET ORAL EVERY 4 HOURS PRN
Qty: 42 TABLET | Refills: 0 | Status: SHIPPED | OUTPATIENT
Start: 2019-08-14 | End: 2019-08-21

## 2019-08-14 RX ORDER — LOSARTAN POTASSIUM 50 MG/1
50 TABLET ORAL DAILY
Status: DISCONTINUED | OUTPATIENT
Start: 2019-08-14 | End: 2019-08-15

## 2019-08-14 RX ORDER — PANTOPRAZOLE SODIUM 40 MG/1
40 TABLET, DELAYED RELEASE ORAL
Status: DISCONTINUED | OUTPATIENT
Start: 2019-08-14 | End: 2019-08-15 | Stop reason: HOSPADM

## 2019-08-14 RX ADMIN — MORPHINE SULFATE 4 MG: 4 INJECTION, SOLUTION INTRAMUSCULAR; INTRAVENOUS at 21:10

## 2019-08-14 RX ADMIN — OXYCODONE HYDROCHLORIDE 10 MG: 5 TABLET ORAL at 18:54

## 2019-08-14 RX ADMIN — FOLIC ACID: 5 INJECTION, SOLUTION INTRAMUSCULAR; INTRAVENOUS; SUBCUTANEOUS at 16:14

## 2019-08-14 RX ADMIN — AMLODIPINE BESYLATE 10 MG: 10 TABLET ORAL at 10:19

## 2019-08-14 RX ADMIN — Medication 2 G: at 05:15

## 2019-08-14 RX ADMIN — HYDRALAZINE HYDROCHLORIDE 10 MG: 20 INJECTION INTRAMUSCULAR; INTRAVENOUS at 23:23

## 2019-08-14 RX ADMIN — DOCUSATE SODIUM 100 MG: 100 CAPSULE, LIQUID FILLED ORAL at 20:27

## 2019-08-14 RX ADMIN — MORPHINE SULFATE 4 MG: 4 INJECTION, SOLUTION INTRAMUSCULAR; INTRAVENOUS at 16:41

## 2019-08-14 RX ADMIN — LOSARTAN POTASSIUM 50 MG: 50 TABLET ORAL at 10:19

## 2019-08-14 RX ADMIN — LABETALOL HYDROCHLORIDE 20 MG: 5 INJECTION, SOLUTION INTRAVENOUS at 01:28

## 2019-08-14 RX ADMIN — DOCUSATE SODIUM 100 MG: 100 CAPSULE, LIQUID FILLED ORAL at 10:19

## 2019-08-14 RX ADMIN — HYDRALAZINE HYDROCHLORIDE 10 MG: 20 INJECTION INTRAMUSCULAR; INTRAVENOUS at 05:14

## 2019-08-14 RX ADMIN — CEFAZOLIN 2000 MG: 330 INJECTION, POWDER, FOR SOLUTION INTRAMUSCULAR; INTRAVENOUS at 05:14

## 2019-08-14 RX ADMIN — ENALAPRILAT 1.25 MG: 1.25 INJECTION INTRAVENOUS at 18:59

## 2019-08-14 RX ADMIN — OXYCODONE HYDROCHLORIDE 10 MG: 5 TABLET ORAL at 22:54

## 2019-08-14 RX ADMIN — CARVEDILOL 25 MG: 25 TABLET, FILM COATED ORAL at 06:17

## 2019-08-14 RX ADMIN — ENOXAPARIN SODIUM 30 MG: 30 INJECTION SUBCUTANEOUS at 10:20

## 2019-08-14 RX ADMIN — OXYCODONE HYDROCHLORIDE 10 MG: 5 TABLET ORAL at 14:22

## 2019-08-14 RX ADMIN — CITALOPRAM HYDROBROMIDE 40 MG: 20 TABLET ORAL at 10:19

## 2019-08-14 RX ADMIN — CETIRIZINE HYDROCHLORIDE 10 MG: 10 TABLET, FILM COATED ORAL at 10:19

## 2019-08-14 RX ADMIN — DEXTROSE MONOHYDRATE 100 ML: 50 INJECTION, SOLUTION INTRAVENOUS at 05:14

## 2019-08-14 RX ADMIN — POLYETHYLENE GLYCOL 3350 17 G: 17 POWDER, FOR SOLUTION ORAL at 10:18

## 2019-08-14 RX ADMIN — ENOXAPARIN SODIUM 30 MG: 30 INJECTION SUBCUTANEOUS at 20:27

## 2019-08-14 RX ADMIN — PANTOPRAZOLE SODIUM 40 MG: 40 TABLET, DELAYED RELEASE ORAL at 10:24

## 2019-08-14 RX ADMIN — ALLOPURINOL 300 MG: 300 TABLET ORAL at 10:19

## 2019-08-14 RX ADMIN — LORAZEPAM 2 MG: 1 TABLET ORAL at 06:17

## 2019-08-14 RX ADMIN — OXYCODONE HYDROCHLORIDE 10 MG: 5 TABLET ORAL at 10:24

## 2019-08-14 RX ADMIN — CARVEDILOL 25 MG: 25 TABLET, FILM COATED ORAL at 16:34

## 2019-08-14 RX ADMIN — SODIUM CHLORIDE: 9 INJECTION, SOLUTION INTRAVENOUS at 05:14

## 2019-08-14 ASSESSMENT — PAIN SCALES - GENERAL
PAINLEVEL_OUTOF10: 3
PAINLEVEL_OUTOF10: 8
PAINLEVEL_OUTOF10: 8
PAINLEVEL_OUTOF10: 10
PAINLEVEL_OUTOF10: 0
PAINLEVEL_OUTOF10: 10
PAINLEVEL_OUTOF10: 7
PAINLEVEL_OUTOF10: 0
PAINLEVEL_OUTOF10: 3
PAINLEVEL_OUTOF10: 8
PAINLEVEL_OUTOF10: 7
PAINLEVEL_OUTOF10: 8
PAINLEVEL_OUTOF10: 8

## 2019-08-14 ASSESSMENT — PAIN DESCRIPTION - LOCATION
LOCATION: ANKLE;FOOT
LOCATION: ANKLE;FOOT

## 2019-08-14 ASSESSMENT — PAIN DESCRIPTION - PAIN TYPE
TYPE: ACUTE PAIN;SURGICAL PAIN
TYPE: SURGICAL PAIN
TYPE: ACUTE PAIN

## 2019-08-14 ASSESSMENT — PAIN DESCRIPTION - ORIENTATION
ORIENTATION: RIGHT
ORIENTATION: RIGHT

## 2019-08-14 NOTE — PROGRESS NOTES
pain meds at this time. Exercises  Straight Leg Raise: x15  Heelslides: x15  Hip Flexion: x15  Hip Abduction: x15  Knee Long Arc Quad: x15  Knee Short Arc Quad: x15  Ankle Pumps: x15  Comments: Above listed exercises completed sitting edge of bed and supine. LLE only. Goals  Short term goals  Time Frame for Short term goals: 10 days  Short term goal 1: Pt to ambulate 100ft with RW and no LOB. Short term goal 2: Pt to ascend/descend 4 steps to prepare for entering the home. Short term goal 3: Pt to demonstrate good standing balance for decrease fall risk. Short term goal 4: Pt to perform all bed mob, transfers, and gait independently while maintaining right LE NWB. Patient Goals   Patient goals : home following discharge    Plan    Plan  Times per week: 7 x week  Times per day: Twice a day(daily on weekends)  Current Treatment Recommendations: Strengthening, Gait Training, Stair training, Balance Training, Neuromuscular Re-education, Functional Mobility Training, Endurance Training, Home Exercise Program, Transfer Training  Safety Devices  Type of devices:  All fall risk precautions in place, Bed alarm in place, Call light within reach, Nurse notified, Patient at risk for falls, Left in bed     Therapy Time   Individual Concurrent Group Co-treatment   Time In 1305         Time Out 1345         Minutes [address], PTA

## 2019-08-14 NOTE — PROGRESS NOTES
Department of Orthopedic Surgery  Attending Progress Note      SUBJECTIVE  Patient seen and examined. States his foot is still numb secondary to preoperative block. Denies any new issues or complications. OBJECTIVE     Physical    VITALS:  BP (!) 178/94   Pulse 90   Temp 98.5 °F (36.9 °C) (Temporal)   Resp 16   Ht 5' 9\" (1.753 m)   Wt 251 lb (113.9 kg)   SpO2 98%   BMI 37.07 kg/m²    General:  Patient is alert and oriented. No acute distress. Resting comfortably. Right lower extremity:  No sensation or motor function present the foot secondary to preoperative block still in effect. Toes are well perfused. Foot is swollen, however, compartments are soft and compressible.       Data    CBC with Differential:    Lab Results   Component Value Date    WBC 6.3 08/14/2019    RBC 2.74 08/14/2019    HGB 8.1 08/14/2019    HCT 27.3 08/14/2019    PLT See Reflexed IPF Result 08/14/2019    MCV 99.6 08/14/2019    MCH 29.6 08/14/2019    MCHC 29.7 08/14/2019    RDW 14.2 08/14/2019    LYMPHOPCT 4 08/14/2019    MONOPCT 2 08/14/2019    BASOPCT 0 08/14/2019    MONOSABS 0.13 08/14/2019    LYMPHSABS 0.25 08/14/2019    EOSABS 0.06 08/14/2019    BASOSABS 0.00 08/14/2019    DIFFTYPE NOT REPORTED 08/14/2019     BMP:    Lab Results   Component Value Date     08/14/2019    K 4.1 08/14/2019     08/14/2019    CO2 17 08/14/2019    BUN 23 08/14/2019    LABALBU 3.5 08/12/2019    CREATININE 2.03 08/14/2019    CALCIUM 8.3 08/14/2019    GFRAA 43 08/14/2019    LABGLOM 35 08/14/2019    GLUCOSE 170 08/14/2019     Current Inpatient Medications    Current Facility-Administered Medications: pantoprazole (PROTONIX) tablet 40 mg, 40 mg, Oral, QAM AC  amLODIPine (NORVASC) tablet 10 mg, 10 mg, Oral, Daily  polyethylene glycol (GLYCOLAX) packet 17 g, 17 g, Oral, Daily  losartan (COZAAR) tablet 50 mg, 50 mg, Oral, Daily  cetirizine (ZYRTEC) tablet 10 mg, 10 mg, Oral, Daily  hydrALAZINE (APRESOLINE) injection 10 mg, 10 mg, Intravenous,

## 2019-08-14 NOTE — PROGRESS NOTES
Writer entered patients room and introduced self to patient ans his girlfriend. Write asked patient is he remembered writer and pt states \"Of course I remember you, why wouldn't I remember you, I invented you for the program.\" When writer asked patient to explain what he had just said patient would not elaborate. White board updated and urinal provided so patient can void. Will continue to monitor.

## 2019-08-14 NOTE — PROGRESS NOTES
Physical Therapy    Facility/Department: [facility] AT THE [facility] MED SURG  Initial Assessment    NAME: Dylon Macias  : 1970  MRN: [id number]    Date of Service: 2019    Discharge Recommendations:  Continue to assess pending progress   PT Equipment Recommendations  Equipment Needed: Yes  Mobility Devices: Crutches  Crutches: Axillary    Assessment   Assessment: Pt is 50 y/o male with recent right ankle fracture and NWB status. Requires assistance for functional mobility and gait; will benefit from PT. Treatment Diagnosis: right ankle pain  Prognosis: Good  Decision Making: Medium Complexity  Patient Education: PT eval and POC  REQUIRES PT FOLLOW UP: Yes  Activity Tolerance  Activity Tolerance: Patient Tolerated treatment well       Patient Diagnosis(es): The primary encounter diagnosis was Closed trimalleolar fracture of right ankle, initial encounter. A diagnosis of Alcohol abuse was also pertinent to this visit. has a past medical history of Depression, Diabetes mellitus ([address] Gout, and Hypertension. has a past surgical history that includes hernia repair; shoulder surgery (Left); Ankle Closed Reduction (Right, 2019); Ankle Closed Reduction (Right, 2019); and Ankle fracture surgery (Right, 2019).     Restrictions  Restrictions/Precautions  Restrictions/Precautions: General Precautions, Fall Risk, Weight Bearing  Lower Extremity Weight Bearing Restrictions  Right Lower Extremity Weight Bearing: Non Weight Bearing     Vision/Hearing  Vision: Within Functional Limits  Hearing: Within functional limits       Subjective  General  Chart Reviewed: Yes  Patient assessed for rehabilitation services?: Yes  Response To Previous Treatment: Not applicable  Family / Caregiver Present: No  Follows Commands: Within Functional Limits  Pain Screening  Patient Currently in Pain: Denies    Orientation  Orientation  Overall Orientation Status: Within Functional Limits     Social/Functional History  Social/Functional History  Lives With: Alone(Pt states girlfriend can stay with him to help. )  Type of Home: House  Home Layout: Two level, Able to Live on Main level with bedroom/bathroom  Home Access: Stairs to enter with rails(Pt states both rails are \"junk\")  Entrance Stairs - Number of Steps: 4  Entrance Stairs - Rails: Both  Bathroom Shower/Tub: Tub/Shower unit  Bathroom Toilet: Standard  Home Equipment: Standard walker  ADL Assistance: Independent  Homemaking Assistance: Independent  Homemaking Responsibilities: Yes  Ambulation Assistance: Independent  Transfer Assistance: Independent  Active : Yes  Mode of Transportation: Car  Occupation: Full time employment     Cognition   Cognition  Overall Cognitive Status: WFL    Objective  AROM RLE (degrees)  RLE General AROM: ankle spinted, hip and knee WFL  AROM LLE (degrees)  LLE AROM : WFL     Strength RLE  Strength RLE: WFL  Comment: ankle not tested  Strength LLE  Strength LLE: WFL     Bed mobility  Sit to Supine: Supervision  Scooting: Supervision     Transfers  Sit to Stand: Contact guard assistance;Minimal Assistance  Stand to sit: Contact guard assistance     Ambulation  Ambulation?: Yes  Ambulation 1  Surface: level tile  Device: Rolling Walker  Assistance: Contact guard assistance  Distance: 2 small hops to chair NWB right  Comments: Pt to c/o increase lightheadedness and dizziness with transfer from supine; limited gait due to lightheadedness     Balance  Sitting - Dynamic: Good  Standing - Static: Fair;Good  Standing - Dynamic: Fair;Good        Plan   Plan  Times per week: 7 x week  Times per day: Twice a day(daily on weekends)  Current Treatment Recommendations: Strengthening, Gait Training, Stair training, Balance Training, Neuromuscular Re-education, Functional Mobility Training, Endurance Training, Home Exercise Program, Transfer Training  Safety Devices  Type of devices:  All fall risk precautions in place, Chair alarm in place    AM-PAC Score  AM-PAC Inpatient Mobility without Stair Climbing Raw Score : 15 (08/14/19 1212)  AM-PAC Inpatient without Stair Climbing T-Scale Score : 43.03 (08/14/19 1212)  Mobility Inpatient CMS 0-100% Score: 47.43 (08/14/19 1212)  Mobility Inpatient without Stair CMS G-Code Modifier : CK (08/14/19 1212)       Goals  Short term goals  Time Frame for Short term goals: 10 days  Short term goal 1: Pt to ambulate 100ft with RW and no LOB. Short term goal 2: Pt to ascend/descend 4 steps to prepare for entering the home. Short term goal 3: Pt to demonstrate good standing balance for decrease fall risk. Short term goal 4: Pt to perform all bed mob, transfers, and gait independently while maintaining right LE NWB.    Patient Goals   Patient goals : home following discharge       Therapy Time   Individual Concurrent Group Co-treatment   Time In 0724         Time Out 0757         Minutes [address], PT, DPT, CMPT

## 2019-08-14 NOTE — PROGRESS NOTES
Occupational Therapy   Occupational Therapy Initial Assessment  Date: 2019   Patient Name: Katrina Chirinos  MRN: [id number]     : 1970    Date of Service: 2019    Discharge Recommendations:  Continue to assess pending progress(plans on OP PT)       Assessment   Performance deficits / Impairments: Decreased functional mobility ; Decreased ADL status; Decreased balance;Decreased high-level IADLs  Prognosis: Good  Decision Making: Medium Complexity  OT Education: OT Role;Plan of Care;Precautions;Transfer Training  REQUIRES OT FOLLOW UP: Yes  Activity Tolerance  Activity Tolerance: Patient Tolerated treatment well  Safety Devices  Safety Devices in place: Yes  Type of devices: Left in chair;Call light within reach; Chair alarm in place           Patient Diagnosis(es): The primary encounter diagnosis was Closed trimalleolar fracture of right ankle, initial encounter. A diagnosis of Alcohol abuse was also pertinent to this visit. has a past medical history of Depression, Diabetes mellitus ([address] Gout, and Hypertension. has a past surgical history that includes hernia repair; shoulder surgery (Left); Ankle Closed Reduction (Right, 2019); and Ankle Closed Reduction (Right, 2019). Restrictions  Restrictions/Precautions  Restrictions/Precautions: General Precautions, Fall Risk, Weight Bearing  Lower Extremity Weight Bearing Restrictions  Right Lower Extremity Weight Bearing: Non Weight Bearing       Subjective   General  Patient assessed for rehabilitation services?: Yes  Diagnosis: R ankle disloacation/fracture with ORIF  General Comment  Comments: Pt. seated in bedside chair upon arrival. Reports no c/o pain.        Social/Functional History  Social/Functional History  Lives With: Significant other  Type of Home: House  Home Layout: Two level, Able to Live on Main level with bedroom/bathroom  Home Access: Stairs to enter with rails  Entrance Stairs - Number of Steps: 4  Entrance Stairs - Rails: Both  Bathroom Shower/Tub: Tub/Shower unit  Bathroom Toilet: Standard  Home Equipment: Standard walker  ADL Assistance: Independent  Homemaking Assistance: Independent  Homemaking Responsibilities: Yes  Ambulation Assistance: Independent  Transfer Assistance: Independent  Active : Yes  Mode of Transportation: Car  Occupation: Full time employment       Objective   Vision: Within Functional Limits  Hearing: Within functional limits    Orientation  Overall Orientation Status: Within Functional Limits        ADL  Feeding: Independent  Grooming: Contact guard assistance(for standing balance sinkside)  UE Bathing: Supervision  LE Bathing: Minimal assistance  UE Dressing: Supervision  LE Dressing: Minimal assistance(for RYNE hose)  Toileting: Contact guard assistance;Minimal assistance        Transfers  Sit to stand: Contact guard assistance  Stand to sit: Contact guard assistance  Transfer Comments: occ vc's for safe hand placement, G adherance to NWBing status of RLE, no c/o dizziness in standing     Cognition  Overall Cognitive Status: WFL       LUE AROM (degrees)  LUE AROM : WFL  LUE General AROM: reports past rotator cuff surgery  RUE AROM (degrees)  RUE AROM : WFL  LUE Strength  Gross LUE Strength: WFL  RUE Strength  Gross RUE Strength: WFL        Plan   Plan  Times per week: 7x/wk  Times per day: Daily  Current Treatment Recommendations: Strengthening, Balance Training, Safety Education & Training, Self-Care / ADL      Goals  Short term goals  Time Frame for Short term goals: 10 days  Short term goal 1: Pt. will demo standing tolerance of > 5 mins w/o LOB while maintaining NWBing status to increase participation with ADL's. Short term goal 2: Pt. will complete LB bathe/dress, including RYNE hose, with SUP using AE PRN to increase self care independence.   Short term goal 3: Pt. will complete toileting tasks with SUP with G adherance to [address] status using grab bars for assist with reduced risk for falls.  Short term goal 4: Pt. will demo G safety during functional ADL transfers/mobility for safe return home.        Therapy Time   Individual Concurrent Group Co-treatment   Time In 0902         Time Out 0917         Minutes [address]

## 2019-08-15 VITALS
SYSTOLIC BLOOD PRESSURE: 162 MMHG | BODY MASS INDEX: 39.09 KG/M2 | WEIGHT: 263.9 LBS | DIASTOLIC BLOOD PRESSURE: 93 MMHG | HEIGHT: 69 IN | RESPIRATION RATE: 16 BRPM | TEMPERATURE: 98.3 F | OXYGEN SATURATION: 97 % | HEART RATE: 88 BPM

## 2019-08-15 PROBLEM — D62 ACUTE BLOOD LOSS AS CAUSE OF POSTOPERATIVE ANEMIA: Status: ACTIVE | Noted: 2019-08-15

## 2019-08-15 LAB
ABSOLUTE EOS #: 0 K/UL (ref 0–0.44)
ABSOLUTE IMMATURE GRANULOCYTE: 0.09 K/UL (ref 0–0.3)
ABSOLUTE LYMPH #: 1.13 K/UL (ref 1.1–3.7)
ABSOLUTE MONO #: 0.61 K/UL (ref 0.1–1.2)
ANION GAP SERPL CALCULATED.3IONS-SCNC: 11 MMOL/L (ref 9–17)
BASOPHILS # BLD: 0 % (ref 0–2)
BASOPHILS ABSOLUTE: 0 K/UL (ref 0–0.2)
BUN BLDV-MCNC: 32 MG/DL (ref 6–20)
BUN/CREAT BLD: 18 (ref 9–20)
CALCIUM SERPL-MCNC: 8.2 MG/DL (ref 8.6–10.4)
CHLORIDE BLD-SCNC: 105 MMOL/L (ref 98–107)
CO2: 19 MMOL/L (ref 20–31)
CREAT SERPL-MCNC: 1.78 MG/DL (ref 0.7–1.2)
DIFFERENTIAL TYPE: ABNORMAL
EOSINOPHILS RELATIVE PERCENT: 0 % (ref 1–4)
GFR AFRICAN AMERICAN: 49 ML/MIN
GFR NON-AFRICAN AMERICAN: 41 ML/MIN
GFR SERPL CREATININE-BSD FRML MDRD: ABNORMAL ML/MIN/{1.73_M2}
GFR SERPL CREATININE-BSD FRML MDRD: ABNORMAL ML/MIN/{1.73_M2}
GLUCOSE BLD-MCNC: 116 MG/DL (ref 70–99)
HCT VFR BLD CALC: 26 % (ref 40.7–50.3)
HEMOGLOBIN: 8 G/DL (ref 13–17)
IMMATURE GRANULOCYTES: 1 %
LYMPHOCYTES # BLD: 13 % (ref 24–43)
MCH RBC QN AUTO: 30.2 PG (ref 25.2–33.5)
MCHC RBC AUTO-ENTMCNC: 30.8 G/DL (ref 28.4–34.8)
MCV RBC AUTO: 98.1 FL (ref 82.6–102.9)
MONOCYTES # BLD: 7 % (ref 3–12)
MORPHOLOGY: ABNORMAL
NRBC AUTOMATED: 0 PER 100 WBC
PDW BLD-RTO: 14.7 % (ref 11.8–14.4)
PLATELET # BLD: ABNORMAL K/UL (ref 138–453)
PLATELET ESTIMATE: ABNORMAL
PLATELET, FLUORESCENCE: 91 K/UL (ref 138–453)
PLATELET, IMMATURE FRACTION: 3 % (ref 1.1–10.3)
PMV BLD AUTO: ABNORMAL FL (ref 8.1–13.5)
POTASSIUM SERPL-SCNC: 4.3 MMOL/L (ref 3.7–5.3)
RBC # BLD: 2.65 M/UL (ref 4.21–5.77)
RBC # BLD: ABNORMAL 10*6/UL
SEG NEUTROPHILS: 79 % (ref 36–65)
SEGMENTED NEUTROPHILS ABSOLUTE COUNT: 6.87 K/UL (ref 1.5–8.1)
SODIUM BLD-SCNC: 135 MMOL/L (ref 135–144)
WBC # BLD: 8.7 K/UL (ref 3.5–11.3)
WBC # BLD: ABNORMAL 10*3/UL

## 2019-08-15 PROCEDURE — 6360000002 HC RX W HCPCS: Performed by: ORTHOPAEDIC SURGERY

## 2019-08-15 PROCEDURE — 6370000000 HC RX 637 (ALT 250 FOR IP): Performed by: INTERNAL MEDICINE

## 2019-08-15 PROCEDURE — 36415 COLL VENOUS BLD VENIPUNCTURE: CPT

## 2019-08-15 PROCEDURE — 6370000000 HC RX 637 (ALT 250 FOR IP): Performed by: ORTHOPAEDIC SURGERY

## 2019-08-15 PROCEDURE — 2580000003 HC RX 258: Performed by: PHYSICIAN ASSISTANT

## 2019-08-15 PROCEDURE — 85025 COMPLETE CBC W/AUTO DIFF WBC: CPT

## 2019-08-15 PROCEDURE — 6370000000 HC RX 637 (ALT 250 FOR IP): Performed by: PHYSICIAN ASSISTANT

## 2019-08-15 PROCEDURE — 2500000003 HC RX 250 WO HCPCS: Performed by: INTERNAL MEDICINE

## 2019-08-15 PROCEDURE — 80048 BASIC METABOLIC PNL TOTAL CA: CPT

## 2019-08-15 PROCEDURE — 85055 RETICULATED PLATELET ASSAY: CPT

## 2019-08-15 RX ORDER — LOSARTAN POTASSIUM 50 MG/1
100 TABLET ORAL DAILY
Status: DISCONTINUED | OUTPATIENT
Start: 2019-08-16 | End: 2019-08-15 | Stop reason: HOSPADM

## 2019-08-15 RX ORDER — AMLODIPINE BESYLATE 10 MG/1
10 TABLET ORAL DAILY
Qty: 30 TABLET | Refills: 0 | Status: SHIPPED | OUTPATIENT
Start: 2019-08-16 | End: 2021-11-18

## 2019-08-15 RX ORDER — POLYETHYLENE GLYCOL 3350 17 G/17G
17 POWDER, FOR SOLUTION ORAL DAILY PRN
Qty: 527 G | Refills: 0 | Status: SHIPPED | OUTPATIENT
Start: 2019-08-15 | End: 2019-09-14

## 2019-08-15 RX ORDER — PSEUDOEPHEDRINE HCL 30 MG
100 TABLET ORAL 2 TIMES DAILY
Qty: 60 CAPSULE | Refills: 0 | Status: SHIPPED | OUTPATIENT
Start: 2019-08-15 | End: 2021-11-18

## 2019-08-15 RX ORDER — LOSARTAN POTASSIUM 100 MG/1
100 TABLET ORAL DAILY
Qty: 30 TABLET | Refills: 0 | Status: SHIPPED | OUTPATIENT
Start: 2019-08-16 | End: 2021-11-18

## 2019-08-15 RX ADMIN — PANTOPRAZOLE SODIUM 40 MG: 40 TABLET, DELAYED RELEASE ORAL at 06:58

## 2019-08-15 RX ADMIN — ENALAPRILAT 1.25 MG: 1.25 INJECTION INTRAVENOUS at 04:10

## 2019-08-15 RX ADMIN — DOCUSATE SODIUM 100 MG: 100 CAPSULE, LIQUID FILLED ORAL at 11:00

## 2019-08-15 RX ADMIN — LOSARTAN POTASSIUM 50 MG: 50 TABLET ORAL at 07:12

## 2019-08-15 RX ADMIN — AMLODIPINE BESYLATE 10 MG: 10 TABLET ORAL at 07:12

## 2019-08-15 RX ADMIN — LABETALOL HYDROCHLORIDE 20 MG: 5 INJECTION, SOLUTION INTRAVENOUS at 11:08

## 2019-08-15 RX ADMIN — CITALOPRAM HYDROBROMIDE 40 MG: 20 TABLET ORAL at 11:00

## 2019-08-15 RX ADMIN — OXYCODONE HYDROCHLORIDE 10 MG: 5 TABLET ORAL at 11:00

## 2019-08-15 RX ADMIN — OXYCODONE HYDROCHLORIDE 10 MG: 5 TABLET ORAL at 03:33

## 2019-08-15 RX ADMIN — SODIUM CHLORIDE: 9 INJECTION, SOLUTION INTRAVENOUS at 01:16

## 2019-08-15 RX ADMIN — ALLOPURINOL 300 MG: 300 TABLET ORAL at 11:01

## 2019-08-15 RX ADMIN — MORPHINE SULFATE 4 MG: 4 INJECTION, SOLUTION INTRAMUSCULAR; INTRAVENOUS at 00:19

## 2019-08-15 RX ADMIN — CARVEDILOL 25 MG: 25 TABLET, FILM COATED ORAL at 07:12

## 2019-08-15 RX ADMIN — CETIRIZINE HYDROCHLORIDE 10 MG: 10 TABLET, FILM COATED ORAL at 11:01

## 2019-08-15 RX ADMIN — ENOXAPARIN SODIUM 30 MG: 30 INJECTION SUBCUTANEOUS at 11:01

## 2019-08-15 ASSESSMENT — PAIN SCALES - GENERAL
PAINLEVEL_OUTOF10: 7
PAINLEVEL_OUTOF10: 9
PAINLEVEL_OUTOF10: 10
PAINLEVEL_OUTOF10: 7
PAINLEVEL_OUTOF10: 7

## 2019-08-15 ASSESSMENT — PAIN DESCRIPTION - DESCRIPTORS: DESCRIPTORS: ACHING

## 2019-08-15 ASSESSMENT — PAIN DESCRIPTION - ORIENTATION: ORIENTATION: RIGHT

## 2019-08-15 ASSESSMENT — PAIN DESCRIPTION - PAIN TYPE
TYPE: SURGICAL PAIN
TYPE: SURGICAL PAIN

## 2019-08-15 ASSESSMENT — PAIN DESCRIPTION - LOCATION: LOCATION: ANKLE

## 2019-08-15 ASSESSMENT — PAIN DESCRIPTION - ONSET: ONSET: ON-GOING

## 2019-08-15 ASSESSMENT — PAIN DESCRIPTION - FREQUENCY: FREQUENCY: CONTINUOUS

## 2019-08-15 NOTE — DISCHARGE INSTR - DIET

## 2019-08-15 NOTE — PROGRESS NOTES
Discharge instructions given to pt and girlfriend. All questions addressed. Pt aware of follow up appointments as listed on AVS. Medications filled by our pharmacy, pt has received meds and counseling and denies questions at this time. Pt d/c'd off unit at this time, via wheelchair with girlfriend, to home. Belongings in hand. No issues noted.

## 2019-08-15 NOTE — DISCHARGE SUMMARY
shawn    Significant Diagnostic Studies:   Lab Results   Component Value Date    WBC 8.7 08/15/2019    HGB 8.0 (L) 08/15/2019    PLT See Reflexed IPF Result 08/15/2019       Lab Results   Component Value Date    BUN 32 (H) 08/15/2019    CREATININE 1.78 (H) 08/15/2019     08/15/2019    K 4.3 08/15/2019    CALCIUM 8.2 (L) 08/15/2019     08/15/2019    CO2 19 (L) 08/15/2019    LABGLOM 41 (L) 08/15/2019       No results found for: Maudry Galeazzi, EPITHUA, LEUKOCYTESUR, SPECGRAV, GLUCOSEU, KETUA, PROTEINU, HGBUR, CASTUA, CRYSTUA, BACTERIA, YEAST    Xr Ankle Right (2 Views)    Result Date: 8/13/2019  EXAMINATION: SPOT FLUOROSCOPIC IMAGES 8/13/2019 2:43 pm TECHNIQUE: Fluoroscopy was provided by the radiology department for procedure. Radiologist was not present during examination. FLUOROSCOPY DOSE AND TYPE OR TIME AND EXPOSURES: 95.8 seconds. DAP 1098.6 mGycm2 COMPARISON: None HISTORY: Intraprocedural imaging. FINDINGS: There were thirteen spot images of the ankle obtained. Images were submitted from internal fixation. Intraprocedural fluoroscopic spot images as above. See separate procedure report for more information. Xr Ankle Right (2 Views)    Result Date: 8/12/2019  EXAMINATION: SPOT FLUOROSCOPIC IMAGES 8/12/2019 6:31 pm TECHNIQUE: Fluoroscopy was provided by the radiology department for procedure. Radiologist was not present during examination. FLUOROSCOPY DOSE AND TYPE OR TIME AND EXPOSURES: 17 seconds. .4 mGycm2 COMPARISON: None HISTORY: Intraprocedural imaging. FINDINGS: 4 spot images of the ankle were obtained. Images were submitted from closed reduction. Intraprocedural fluoroscopic spot images as above. See separate procedure report for more information.      Xr Ankle Right (min 3 Views)    Result Date: 8/12/2019  EXAMINATION: THREE XRAY VIEWS OF THE RIGHT ANKLE 8/12/2019 4:18 pm COMPARISON: 13 hours prior HISTORY: ORDERING SYSTEM PROVIDED HISTORY: known tri-mal fracture s/p medial dislocation at the tibiotalar joint. And joint effusions present. Bimalleolar fracture dislocation. Xr Foot Right (min 3 Views)    Result Date: 8/12/2019  EXAMINATION: THREE XRAY VIEWS OF THE RIGHT FOOT 8/12/2019 1:04 pm COMPARISON: None HISTORY: ORDERING SYSTEM PROVIDED HISTORY: 1st metatarsal fracture TECHNOLOGIST PROVIDED HISTORY: 1st metatarsal fracture FINDINGS: A comminuted fracture at the base of the 1st metatarsal bone, intra-articular is noted. Moderate soft tissue swelling over the forefoot is noted. Plaster splint and positioning limits assessment. The LisFranc space is prominent. Evidence of a comminuted ankle fracture is noted with displacement of fragments in the medial and lateral malleoli. Positioning and presence of a cast limits assessment. Comminuted intra-articular fracture base of 1st metatarsal bone is noted with widen Lisfranc space suggesting instability. RECOMMENDATION: Advise CT scanning or MRI imaging of the foot. Ct Ankle Right Wo Contrast    Result Date: 8/12/2019  EXAMINATION: CT OF THE RIGHT ANKLE WITHOUT CONTRAST 8/12/2019 5:37 am TECHNIQUE: CT of the right ankle was performed without the administration of intravenous contrast.  Multiplanar reformatted images are provided for review. Dose modulation, iterative reconstruction, and/or weight based adjustment of the mA/kV was utilized to reduce the radiation dose to as low as reasonably achievable. COMPARISON: None HISTORY ORDERING SYSTEM PROVIDED HISTORY: FRACTURE, ANKLE Initial evaluation of acute traumatic right ankle pain. FINDINGS: Bones:  Acute traumatic trimalleolar fracture is present of the ankle. The posterior malleolar fragment measures 17 x 7 mm at the articular surface and extends craniocaudally 16 mm. The fracture involves less than 10% of the articular surface of the tibial plafond. An acute transverse fracture is present through the base of the medial malleolus.   An acute oblique fracture is insertion onto the posterior calcaneus. Soft Tissue:  Preservation of the sinus tarsi fat. Moderate edema in the subcutaneous fat primarily about the ankle and at the dorsum of the foot. Visualized peroneal, Achilles, flexor and extensor tendons are seen in their expected locations. Joint:  Small tibiotalar joint effusion. No sizable talonavicular or subtalar fusion identified. 1. Slightly impacted 1st metatarsal base fracture with articular offset measuring up to 1 mm in tiny age-indeterminate, possibly acute, fracture fragment in the Lisfranc joint space. 2. Acute traumatic trimalleolar fracture of the ankle as detailed above. Avulsion fracture of the anterolateral aspect of the distal tibia. Small linear avulsion fracture fragments along the medial aspect of the talus. 3. Clawtoe deformities of the 1st through 5th digits. 4. Diffuse osteopenia and degenerative changes as above. 5. Small tibiotalar joint effusion. Moderate edema in the subcutaneous fat primarily about the ankle and dorsum of the foot. Discharge Diagnoses:    Principal Problem:    Closed right trimalleolar fracture, initial encounter  Active Problems:    Closed trimalleolar fracture of right ankle    Alcohol abuse    Hyponatremia    HTN (hypertension)    Closed fracture dislocation of right ankle    Depression    Tobacco abuse    CKD vs MORGAN    Acute blood loss as cause of postoperative anemia  Resolved Problems:    * No resolved hospital problems.  *      Active Hospital Problems    Diagnosis Date Noted    Acute blood loss as cause of postoperative anemia [D62] 08/15/2019    CKD vs MORGAN [N18.9] 08/14/2019    Closed trimalleolar fracture of right ankle [S82.851A] 08/12/2019    Alcohol abuse [F10.10] 08/12/2019    Closed right trimalleolar fracture, initial encounter Raven Ramirez 08/12/2019    Hyponatremia [E87.1] 08/12/2019    HTN (hypertension) [I10] 08/12/2019    Closed fracture dislocation of right ankle [S82.891A] 08/12/2019 applicable)  Preparation of Discharge Summary  Preparation of Medication Reconciliation  Preparation of Discharge Prescriptions    Signed:  Kimberly Hodges PA-C  8/15/2019, 10:05 AM

## 2019-08-15 NOTE — OP NOTE
Department of Orthopedic Surgery  Brief Operative Report        Pre-operative Diagnosis:  Closed, displaced right trimalleolar ankle fracture dislocation;  1st metatarsal base fracture, right    Post-operative Diagnosis:   same    Procedure:    ORIF right trimalleolar fracture without fixation of posterior malleolus   exam under fluoroscopy right foot    Surgeon:   Bethany Toussaint D.O. Anesthesia:  General endotrachial anesthesia with preoperative regional block    Estimated blood loss:  Less than 50 ml    Total IV fluids:   Per anesthesia    Total Urine Output:   None measured     Drains:  none    Specimens:   none    Implants:   Arthrex 5 hole distal fibular locking plate, two  4.0 mm cannulated lag screws;  Multiple locking and nonlocking screws    Findings:  Stable syndesmosis, no gross instability of the Lisfranc joint    Complications:  none    Condition:  Stable    Weight Bearing Status:  Non-weight bearing    Indications:    Patient is a 78-year-old male who apparently was cleaning his floors while drinking. He sustained a fall and injured the right ankle. He was seen in the emergency department, where he was diagnosed with a right trimalleolar ankle fracture dislocation. The ER reduce the ankle and splinted the ankle prior to orthopedic consultation. Orthopedics was consulted following reduction and splinting. The patient was admitted to the floor by the medicine service. During the day of his admission, the patient somehow read dislocated the ankle. He then underwent closed reduction and splinting in the operating room by Dr. Nida Neely. The patient was then scheduled for surgery with the following day. Operative and non operative treatment options were discussed with the patient in depth. Operative treatment in the form of open reduction internal fixation right trimalleolar ankle fracture with exam under fluoroscopy of the right foot due to the 1st metatarsal base fracture  Was recommended.   The and this demonstrated no evidence for widening of the Lisfranc joint. The 1st metatarsal base fracture appears stable. The wounds were copiously irrigated with normal saline. 0 Vicryl suture was used to close the periosteal layer. The tourniquet was then deflated and bleeding was controlled with Bovie cautery. Wounds were again copiously irrigated. 2-0 Vicryl suture was used to close the subcutaneous tissue. Skin was then closed with staples. A sterile dressing was applied with Xeroform, 4x4s, ABDs, Webril, and a posterior short-leg splint with stirrups. The patient tolerated the procedure well and without complications. He was taken to recovery in stable condition.

## 2019-08-21 NOTE — ED NOTES
Using gentle traction foot was easily aligned to the tibia and fibula. A splint was immediately applied.      Lovetta Epley, MD  08/21/19 8994

## 2021-08-13 ENCOUNTER — APPOINTMENT (OUTPATIENT)
Dept: CT IMAGING | Age: 51
End: 2021-08-13
Payer: COMMERCIAL

## 2021-08-13 ENCOUNTER — HOSPITAL ENCOUNTER (EMERGENCY)
Age: 51
Discharge: LEFT BEFORE DISCHARGE | End: 2021-08-13
Attending: EMERGENCY MEDICINE
Payer: COMMERCIAL

## 2021-08-13 ENCOUNTER — APPOINTMENT (OUTPATIENT)
Dept: GENERAL RADIOLOGY | Age: 51
End: 2021-08-13
Payer: COMMERCIAL

## 2021-08-13 VITALS
HEIGHT: 69 IN | TEMPERATURE: 97.3 F | WEIGHT: 241 LBS | BODY MASS INDEX: 35.7 KG/M2 | OXYGEN SATURATION: 100 % | DIASTOLIC BLOOD PRESSURE: 85 MMHG | RESPIRATION RATE: 16 BRPM | HEART RATE: 63 BPM | SYSTOLIC BLOOD PRESSURE: 182 MMHG

## 2021-08-13 DIAGNOSIS — E87.1 HYPONATREMIA: ICD-10-CM

## 2021-08-13 DIAGNOSIS — G43.809 OTHER MIGRAINE WITHOUT STATUS MIGRAINOSUS, NOT INTRACTABLE: ICD-10-CM

## 2021-08-13 DIAGNOSIS — I50.9 ACUTE ON CHRONIC CONGESTIVE HEART FAILURE, UNSPECIFIED HEART FAILURE TYPE (HCC): Primary | ICD-10-CM

## 2021-08-13 DIAGNOSIS — R42 DIZZINESS: ICD-10-CM

## 2021-08-13 PROBLEM — G43.909 MIGRAINE WITHOUT STATUS MIGRAINOSUS, NOT INTRACTABLE: Status: ACTIVE | Noted: 2021-08-13

## 2021-08-13 LAB
ABSOLUTE EOS #: 0.2 K/UL (ref 0–0.44)
ABSOLUTE IMMATURE GRANULOCYTE: 0.03 K/UL (ref 0–0.3)
ABSOLUTE LYMPH #: 0.96 K/UL (ref 1.1–3.7)
ABSOLUTE MONO #: 0.5 K/UL (ref 0.1–1.2)
ALBUMIN SERPL-MCNC: 3.5 G/DL (ref 3.5–5.2)
ALBUMIN/GLOBULIN RATIO: 0.9 (ref 1–2.5)
ALP BLD-CCNC: 193 U/L (ref 40–129)
ALT SERPL-CCNC: 12 U/L (ref 5–41)
ANION GAP SERPL CALCULATED.3IONS-SCNC: 13 MMOL/L (ref 9–17)
AST SERPL-CCNC: 26 U/L
BASOPHILS # BLD: 0 % (ref 0–2)
BASOPHILS ABSOLUTE: <0.03 K/UL (ref 0–0.2)
BILIRUB SERPL-MCNC: 0.36 MG/DL (ref 0.3–1.2)
BNP INTERPRETATION: ABNORMAL
BUN BLDV-MCNC: 25 MG/DL (ref 6–20)
BUN/CREAT BLD: 11 (ref 9–20)
CALCIUM SERPL-MCNC: 8.1 MG/DL (ref 8.6–10.4)
CHLORIDE BLD-SCNC: 98 MMOL/L (ref 98–107)
CO2: 15 MMOL/L (ref 20–31)
CREAT SERPL-MCNC: 2.24 MG/DL (ref 0.7–1.2)
DIFFERENTIAL TYPE: ABNORMAL
EOSINOPHILS RELATIVE PERCENT: 3 % (ref 1–4)
GFR AFRICAN AMERICAN: 38 ML/MIN
GFR NON-AFRICAN AMERICAN: 31 ML/MIN
GFR SERPL CREATININE-BSD FRML MDRD: ABNORMAL ML/MIN/{1.73_M2}
GFR SERPL CREATININE-BSD FRML MDRD: ABNORMAL ML/MIN/{1.73_M2}
GLUCOSE BLD-MCNC: 84 MG/DL (ref 70–99)
HCT VFR BLD CALC: 27.1 % (ref 40.7–50.3)
HEMOGLOBIN: 8.2 G/DL (ref 13–17)
IMMATURE GRANULOCYTES: 0 %
LYMPHOCYTES # BLD: 13 % (ref 24–43)
MCH RBC QN AUTO: 27.8 PG (ref 25.2–33.5)
MCHC RBC AUTO-ENTMCNC: 30.3 G/DL (ref 28.4–34.8)
MCV RBC AUTO: 91.9 FL (ref 82.6–102.9)
MONOCYTES # BLD: 7 % (ref 3–12)
NRBC AUTOMATED: 0 PER 100 WBC
PDW BLD-RTO: 15.2 % (ref 11.8–14.4)
PLATELET # BLD: 153 K/UL (ref 138–453)
PLATELET ESTIMATE: ABNORMAL
PMV BLD AUTO: 10.5 FL (ref 8.1–13.5)
POTASSIUM SERPL-SCNC: 4.7 MMOL/L (ref 3.7–5.3)
PRO-BNP: ABNORMAL PG/ML
RBC # BLD: 2.95 M/UL (ref 4.21–5.77)
RBC # BLD: ABNORMAL 10*6/UL
SEDIMENTATION RATE, ERYTHROCYTE: 73 MM (ref 0–20)
SEG NEUTROPHILS: 77 % (ref 36–65)
SEGMENTED NEUTROPHILS ABSOLUTE COUNT: 5.54 K/UL (ref 1.5–8.1)
SODIUM BLD-SCNC: 126 MMOL/L (ref 135–144)
TOTAL PROTEIN: 7.5 G/DL (ref 6.4–8.3)
TROPONIN INTERP: ABNORMAL
TROPONIN INTERP: NORMAL
TROPONIN T: ABNORMAL NG/ML
TROPONIN T: NORMAL NG/ML
TROPONIN, HIGH SENSITIVITY: 21 NG/L (ref 0–22)
TROPONIN, HIGH SENSITIVITY: 25 NG/L (ref 0–22)
WBC # BLD: 7.3 K/UL (ref 3.5–11.3)
WBC # BLD: ABNORMAL 10*3/UL

## 2021-08-13 PROCEDURE — 83880 ASSAY OF NATRIURETIC PEPTIDE: CPT

## 2021-08-13 PROCEDURE — 84484 ASSAY OF TROPONIN QUANT: CPT

## 2021-08-13 PROCEDURE — 36415 COLL VENOUS BLD VENIPUNCTURE: CPT

## 2021-08-13 PROCEDURE — 80053 COMPREHEN METABOLIC PANEL: CPT

## 2021-08-13 PROCEDURE — 6360000002 HC RX W HCPCS: Performed by: EMERGENCY MEDICINE

## 2021-08-13 PROCEDURE — 96374 THER/PROPH/DIAG INJ IV PUSH: CPT

## 2021-08-13 PROCEDURE — 99284 EMERGENCY DEPT VISIT MOD MDM: CPT

## 2021-08-13 PROCEDURE — 2580000003 HC RX 258: Performed by: EMERGENCY MEDICINE

## 2021-08-13 PROCEDURE — 71045 X-RAY EXAM CHEST 1 VIEW: CPT

## 2021-08-13 PROCEDURE — 85025 COMPLETE CBC W/AUTO DIFF WBC: CPT

## 2021-08-13 PROCEDURE — 96375 TX/PRO/DX INJ NEW DRUG ADDON: CPT

## 2021-08-13 PROCEDURE — 6360000004 HC RX CONTRAST MEDICATION: Performed by: EMERGENCY MEDICINE

## 2021-08-13 PROCEDURE — 85652 RBC SED RATE AUTOMATED: CPT

## 2021-08-13 PROCEDURE — 93005 ELECTROCARDIOGRAM TRACING: CPT | Performed by: EMERGENCY MEDICINE

## 2021-08-13 PROCEDURE — 70470 CT HEAD/BRAIN W/O & W/DYE: CPT

## 2021-08-13 RX ORDER — FUROSEMIDE 10 MG/ML
20 INJECTION INTRAMUSCULAR; INTRAVENOUS ONCE
Status: COMPLETED | OUTPATIENT
Start: 2021-08-13 | End: 2021-08-13

## 2021-08-13 RX ORDER — PROCHLORPERAZINE EDISYLATE 5 MG/ML
10 INJECTION INTRAMUSCULAR; INTRAVENOUS ONCE
Status: COMPLETED | OUTPATIENT
Start: 2021-08-13 | End: 2021-08-13

## 2021-08-13 RX ORDER — KETOROLAC TROMETHAMINE 15 MG/ML
15 INJECTION, SOLUTION INTRAMUSCULAR; INTRAVENOUS ONCE
Status: COMPLETED | OUTPATIENT
Start: 2021-08-13 | End: 2021-08-13

## 2021-08-13 RX ORDER — METHYLPREDNISOLONE SODIUM SUCCINATE 125 MG/2ML
125 INJECTION, POWDER, LYOPHILIZED, FOR SOLUTION INTRAMUSCULAR; INTRAVENOUS ONCE
Status: COMPLETED | OUTPATIENT
Start: 2021-08-13 | End: 2021-08-13

## 2021-08-13 RX ORDER — DIPHENHYDRAMINE HYDROCHLORIDE 50 MG/ML
25 INJECTION INTRAMUSCULAR; INTRAVENOUS ONCE
Status: COMPLETED | OUTPATIENT
Start: 2021-08-13 | End: 2021-08-13

## 2021-08-13 RX ORDER — ORPHENADRINE CITRATE 30 MG/ML
30 INJECTION INTRAMUSCULAR; INTRAVENOUS ONCE
Status: COMPLETED | OUTPATIENT
Start: 2021-08-13 | End: 2021-08-13

## 2021-08-13 RX ADMIN — METHYLPREDNISOLONE SODIUM SUCCINATE 125 MG: 125 INJECTION, POWDER, FOR SOLUTION INTRAMUSCULAR; INTRAVENOUS at 18:31

## 2021-08-13 RX ADMIN — IOPAMIDOL 75 ML: 755 INJECTION, SOLUTION INTRAVENOUS at 20:39

## 2021-08-13 RX ADMIN — PROCHLORPERAZINE EDISYLATE 10 MG: 5 INJECTION INTRAMUSCULAR; INTRAVENOUS at 18:42

## 2021-08-13 RX ADMIN — KETOROLAC TROMETHAMINE 15 MG: 15 INJECTION, SOLUTION INTRAMUSCULAR; INTRAVENOUS at 18:40

## 2021-08-13 RX ADMIN — SODIUM CHLORIDE 1000 ML: 9 INJECTION, SOLUTION INTRAVENOUS at 18:31

## 2021-08-13 RX ADMIN — FUROSEMIDE 20 MG: 10 INJECTION, SOLUTION INTRAVENOUS at 20:55

## 2021-08-13 RX ADMIN — ORPHENADRINE CITRATE 30 MG: 30 INJECTION INTRAMUSCULAR; INTRAVENOUS at 18:43

## 2021-08-13 RX ADMIN — DIPHENHYDRAMINE HYDROCHLORIDE 25 MG: 50 INJECTION INTRAMUSCULAR; INTRAVENOUS at 18:38

## 2021-08-13 ASSESSMENT — ENCOUNTER SYMPTOMS
WHEEZING: 1
DIARRHEA: 0
TROUBLE SWALLOWING: 0
CONSTIPATION: 0
SHORTNESS OF BREATH: 1
SORE THROAT: 0
NAUSEA: 1
SINUS PRESSURE: 1
VOMITING: 0
CHOKING: 0
FACIAL SWELLING: 0
ABDOMINAL PAIN: 0
COUGH: 1
ABDOMINAL DISTENTION: 0

## 2021-08-13 ASSESSMENT — PAIN DESCRIPTION - FREQUENCY: FREQUENCY: CONTINUOUS

## 2021-08-13 ASSESSMENT — PAIN DESCRIPTION - ORIENTATION: ORIENTATION: RIGHT

## 2021-08-13 ASSESSMENT — PAIN SCALES - GENERAL
PAINLEVEL_OUTOF10: 6
PAINLEVEL_OUTOF10: 3

## 2021-08-13 ASSESSMENT — PAIN DESCRIPTION - PAIN TYPE: TYPE: ACUTE PAIN

## 2021-08-13 ASSESSMENT — PAIN DESCRIPTION - LOCATION: LOCATION: NECK

## 2021-08-13 NOTE — ED PROVIDER NOTES
[facility]  Emergency Department  Emergency Medicine Sign-out     Care of Raj Rodriguez was assumed from Dr. Filiberto Owens and is being seen for Headache (Onset yesterday after \"pulling muscle in neck\"), Dizziness (Onset yesterday, intermittent), and Diplopia (Onset yesterday, intermittent)  . The patient's initial evaluation and plan have been discussed with the prior provider who initially evaluated the patient. EMERGENCY DEPARTMENT COURSE / MEDICAL DECISION MAKING:       MEDICATIONS GIVEN:  Orders Placed This Encounter   Medications    0.9 % NaCl bolus    prochlorperazine (COMPAZINE) injection 10 mg    ketorolac (TORADOL) injection 15 mg    diphenhydrAMINE (BENADRYL) injection 25 mg    methylPREDNISolone sodium (SOLU-MEDROL) injection 125 mg    orphenadrine (NORFLEX) injection 30 mg       LABS / RADIOLOGY:     Labs Reviewed   CBC WITH AUTO DIFFERENTIAL   COMPREHENSIVE METABOLIC PANEL   TROPONIN   SEDIMENTATION RATE   BRAIN NATRIURETIC PEPTIDE       XR CHEST 1 VIEW    Result Date: 8/13/2021  EXAMINATION: ONE XRAY VIEW OF THE CHEST 8/13/2021 1:08 pm COMPARISON: August 12, 2019 HISTORY: ORDERING SYSTEM PROVIDED HISTORY: Congestion and cough TECHNOLOGIST PROVIDED HISTORY: Congestion and cough FINDINGS: Adequate inspiration is noted. Cardiomegaly is present, with mild central venous congestion. Small right pleural effusion is noted. No pneumothorax is present. Osseous structures are stable. 1. Cardiomegaly, with mild central venous congestion 2. Small right pleural effusion       RECENT VITALS:      ,  Pulse: 63, Resp: 20, BP: (!) 144/99, SpO2: 100 %    This patient is a 45 y.o. Male with headaches and lightheadedness. CT head is currently pending, also a cardiac work-up including BNP, troponin and chest x-ray. We will follow up on lab work, patient given for symptomatic treatment of Norflex, Compazine, Toradol and Benadryl, will reassess.     Patient blood work showed stable troponins however his BNP was over 10,000 with vascular congestion and pleural effusion which is why I recommended the patient to be admitted to the hospital.  Also has hyponatremia, appears to be hypervolemic hyponatremia, patient was given a dose of Lasix here in the emergency room. I talked to the patient and recommended an admission however at this time he is refusing admission. He understands that he could have a new diagnosis of congestive heart failure and he requires an echo and cardiology evaluation, patient says that he does not like being inside hospitals and will not be admitted. Currently refusing the admission. I did write the patient a prescription for an ultrasound as well as give him a phone number for a cardiologist for outpatient follow-up. Patient is currently being discharged [address] and will follow up on an outpatient basis. Patient understands that his shortness of breath can worsen, his renal function can worsen, his congestive heart failure could be secondary to coronary artery disease which can lead to heart attacks. Patient understands that his shortness of breath could worsen to the extent of him being intubated leading to organ damage. At this time he is choosing to leave AMA with outpatient ultrasound and cardiology follow-up    OUTSTANDING TASKS / RECOMMENDATIONS:    1. F/U labs and CT     FINAL IMPRESSION:     1. Dizziness    2. Migraine without status migrainosus, not intractable, unspecified migraine type        DISPOSITION:         DISPOSITION:  [x]  Discharge   []  Transfer -    []  Admission -     []  Against Medical Advice   []  Eloped   FOLLOW-UP: No follow-up provider specified.    DISCHARGE MEDICATIONS: New Prescriptions    No medications on file           Taniya Kunz MD  Emergency Medicine Attending        Taniya Kunz MD  08/13/21 7429

## 2021-08-13 NOTE — ED PROVIDER NOTES
[facility] ED  EMERGENCY DEPARTMENT ENCOUNTER      Pt Name:Flo Spears  MRN: [id number]  Birthdate 1970  Date of evaluation: 8/13/2021  Provider: Luz Lebron MD    CHIEF COMPLAINT     Chief Complaint   Patient presents with    Headache     Onset yesterday after \"pulling muscle in neck\"    Dizziness     Onset yesterday, intermittent    Diplopia     Onset yesterday, intermittent         HISTORY OF PRESENT ILLNESS   (Location/Symptom, Timing/Onset, Context/Setting, Quality, Duration, Modifying Factors, Severity)  Note limiting factors. HPI the patient is a 77-year-old male who presents with a headache after pulling a muscle in his neck yesterday the headache is posterior and a level 7. He had double vision today and during the double vision he felt dizzy. He denies any vomiting, diarrhea or constipation. No urinary symptoms. No fever or chills. Nursing Notes were reviewed. REVIEW OF SYSTEMS    (2-9 systems for level 4, 10 or more for level 5)     Review of Systems   Constitutional: Positive for activity change. HENT: Positive for congestion and sinus pressure. Negative for facial swelling, sore throat and trouble swallowing. Respiratory: Positive for cough, shortness of breath and wheezing. Negative for choking. Cardiovascular: Negative for chest pain, palpitations and leg swelling. Gastrointestinal: Positive for nausea. Negative for abdominal distention, abdominal pain, constipation, diarrhea and vomiting. Genitourinary: Negative for dysuria, flank pain and frequency. Skin: Negative. Neurological: Positive for dizziness and headaches. Negative for numbness. Patient has had intermittent double vision. Psychiatric/Behavioral: Negative for confusion, decreased concentration and dysphoric mood.               MEDICAL HISTORY     Past Medical History:   Diagnosis Date    Depression     Diabetes mellitus ([address]     Gout     Hypertension SURGICAL HISTORY       Past Surgical History:   Procedure Laterality Date    ANKLE CLOSED REDUCTION Right 08/12/2019    Dr. Yousif Santiago Right 8/12/2019    ANKLE CLOSED REDUCTION performed by Katerina Hu MD at [place] [address] Right 8/13/2019    ANKLE OPEN REDUCTION INTERNAL FIXATION-TRIMELLAR FRACTURE performed by Gentry Carney DO at [place] [address] Left          CURRENT MEDICATIONS       Previous Medications    ALLOPURINOL (ZYLOPRIM) 300 MG TABLET    Take 300 mg by mouth daily    AMLODIPINE (NORVASC) 10 MG TABLET    Take 1 tablet by mouth daily    CARVEDILOL (COREG) 25 MG TABLET    Take 25 mg by mouth 2 times daily (with meals)    CITALOPRAM (CELEXA) 40 MG TABLET    Take 40 mg by mouth daily    DOCUSATE SODIUM (COLACE, DULCOLAX) 100 MG CAPS    Take 100 mg by mouth 2 times daily    LOSARTAN (COZAAR) 100 MG TABLET    Take 1 tablet by mouth daily    RIVAROXABAN (XARELTO) 10 MG TABS TABLET    Take 1 tablet by mouth daily (with breakfast)       ALLERGIES     Patient has no known allergies. FAMILY HISTORY     History reviewed. No pertinent family history. SOCIAL HISTORY       Social History     Socioeconomic History    Marital status: Single     Spouse name: None    Number of children: None    Years of education: None    Highest education level: None   Occupational History    None   Tobacco Use    Smoking status: Current Every Day Smoker     Packs/day: 0.50     Types: Cigarettes    Smokeless tobacco: Never Used   Vaping Use    Vaping Use: Never used   Substance and Sexual Activity    Alcohol use:  Yes     Alcohol/week: 84.0 standard drinks     Types: 84 Cans of beer per week    Drug use: Never    Sexual activity: Yes   Other Topics Concern    None   Social History Narrative    None     Social Determinants of Health     Financial Resource Strain:     Difficulty of Paying Living Expenses:    Food Insecurity:     Worried About Running Out of Food in the Last Year:     Ashly of Food in the Last Year:    Transportation Needs:     Lack of Transportation (Medical):  Lack of Transportation (Non-Medical):    Physical Activity:     Days of Exercise per Week:     Minutes of Exercise per Session:    Stress:     Feeling of Stress :    Social Connections:     Frequency of Communication with Friends and Family:     Frequency of Social Gatherings with Friends and Family:     Attends [employer] Services:     Active Member of Clubs or Organizations:     Attends Club or Organization Meetings:     Marital Status:    Intimate Partner Violence:     Fear of Current or Ex-Partner:     Emotionally Abused:     Physically Abused:     Sexually Abused:        SCREENINGS             PHYSICAL EXAM  (up to 7 for level 4, 8 or more for level 5)     ED Triage Vitals [08/13/21 1746]   BP Temp Temp src Pulse Resp SpO2 Height Weight   (!) 209/98 -- -- 64 14 100 % 5' 9\" (1.753 m) 241 lb (109.3 kg)       Physical Exam  Constitutional:       General: He is not in acute distress. Appearance: Normal appearance. He is obese. He is not ill-appearing. HENT:      Head: Normocephalic and atraumatic. Nose: Nose normal.      Mouth/Throat:      Mouth: Mucous membranes are moist.      Pharynx: Oropharynx is clear. Eyes:      Extraocular Movements: Extraocular movements intact. Conjunctiva/sclera: Conjunctivae normal.      Pupils: Pupils are equal, round, and reactive to light. Cardiovascular:      Rate and Rhythm: Normal rate and regular rhythm. Pulses: Normal pulses. Heart sounds: Normal heart sounds. Pulmonary:      Effort: Pulmonary effort is normal.      Breath sounds: Wheezing, rhonchi and rales present. Comments: The patient has wheezing, rales and rhonchi in all lung fields. Abdominal:      General: Abdomen is flat. Bowel sounds are normal. There is no distension. Palpations: Abdomen is soft.       Tenderness: There is no abdominal tenderness. Musculoskeletal:         General: No swelling or tenderness. Normal range of motion. Cervical back: Normal range of motion and neck supple. Right lower leg: No edema. Left lower leg: No edema. Skin:     General: Skin is warm and dry. Neurological:      General: No focal deficit present. Mental Status: He is alert and oriented to person, place, and time. Mental status is at baseline. Psychiatric:         Mood and Affect: Mood normal.         Behavior: Behavior normal.         Thought Content: Thought content normal.         Judgment: Judgment normal.         DIAGNOSTIC RESULTS     EKG: All EKG's are interpreted by the Emergency Department Physician whoeither signs or Co-signs this chart in the absence of a cardiologist.      RADIOLOGY:   Non-plain film images such as CT, Ultrasound and MRI are read by the radiologist. Plain radiographic images are visualized and preliminarily interpreted by the emergency physician     Interpretation per the Radiologist below, if available at the time of this note:    [facility]    (Results Pending)   XR CHEST 1 VIEW    (Results Pending)         ED BEDSIDE ULTRASOUND:   Performed by ED Physician - none    LABS:  Labs Reviewed   CBC WITH AUTO DIFFERENTIAL   COMPREHENSIVE METABOLIC PANEL   TROPONIN   SEDIMENTATION RATE       EMERGENCY DEPARTMENT COURSE and DIFFERENTIAL DIAGNOSIS/MDM:   Vitals:    Vitals:    08/13/21 1800 08/13/21 1815 08/13/21 1830 08/13/21 1845   BP: (!) 177/84 (!) 169/91 (!) 169/80 (!) 178/91   Pulse: 59 62 63 61   Resp: 14 12 13 16   SpO2: 100% 100% 100% 100%   Weight:       Height:               MDM    CONSULTS:  None    PROCEDURES:  Unless otherwise noted below, none     Procedures    FINAL IMPRESSION      1. Dizziness    2.  Migraine without status migrainosus, not intractable, unspecified migraine type          DISPOSITION/PLAN   DISPOSITION        PATIENT REFERRED TO:  No follow-up provider specified.     DISCHARGE MEDICATIONS:  New Prescriptions    No medications on file              (Please note that portions ofthis note were completed with a voice recognition program.  Efforts were made to edit the dictations but occasionally words are mis-transcribed.)      Romain Ontiveros MD (electronically signed)  Attending Emergency Physician          Ledy Stanley MD  08/20/21 1346

## 2021-08-14 LAB
EKG ATRIAL RATE: 69 BPM
EKG P AXIS: 1 DEGREES
EKG P-R INTERVAL: 146 MS
EKG Q-T INTERVAL: 420 MS
EKG QRS DURATION: 88 MS
EKG QTC CALCULATION (BAZETT): 450 MS
EKG R AXIS: -12 DEGREES
EKG T AXIS: 6 DEGREES
EKG VENTRICULAR RATE: 69 BPM

## 2021-08-14 PROCEDURE — 93010 ELECTROCARDIOGRAM REPORT: CPT | Performed by: INTERNAL MEDICINE

## 2021-08-15 NOTE — PROGRESS NOTES
Cardiology follow-up requested by the emergency room team.  Please call the patient to schedule appointment.   Thank you

## 2021-11-03 DIAGNOSIS — D50.9 IRON DEFICIENCY ANEMIA, UNSPECIFIED IRON DEFICIENCY ANEMIA TYPE: ICD-10-CM

## 2021-11-03 RX ORDER — METHYLPREDNISOLONE SODIUM SUCCINATE 125 MG/2ML
125 INJECTION, POWDER, LYOPHILIZED, FOR SOLUTION INTRAMUSCULAR; INTRAVENOUS ONCE
Status: CANCELLED | OUTPATIENT
Start: 2021-11-03 | End: 2021-11-03

## 2021-11-03 RX ORDER — SODIUM CHLORIDE 0.9 % (FLUSH) 0.9 %
5-40 SYRINGE (ML) INJECTION PRN
Status: CANCELLED | OUTPATIENT
Start: 2021-11-03

## 2021-11-03 RX ORDER — EPINEPHRINE 1 MG/ML
0.5 INJECTION, SOLUTION, CONCENTRATE INTRAVENOUS PRN
Status: CANCELLED | OUTPATIENT
Start: 2021-11-03

## 2021-11-03 RX ORDER — SODIUM CHLORIDE 9 MG/ML
INJECTION, SOLUTION INTRAVENOUS CONTINUOUS
Status: CANCELLED | OUTPATIENT
Start: 2021-11-03

## 2021-11-11 ENCOUNTER — HOSPITAL ENCOUNTER (OUTPATIENT)
Dept: INFUSION THERAPY | Age: 51
Discharge: HOME | End: 2021-11-11
Payer: MEDICAID

## 2021-11-11 DIAGNOSIS — D50.9 IRON DEFICIENCY ANEMIA, UNSPECIFIED IRON DEFICIENCY ANEMIA TYPE: Primary | ICD-10-CM

## 2021-11-11 PROCEDURE — 96365 THER/PROPH/DIAG IV INF INIT: CPT

## 2021-11-11 PROCEDURE — 2580000003 HC RX 258: Performed by: INTERNAL MEDICINE

## 2021-11-11 PROCEDURE — 6360000002 HC RX W HCPCS: Performed by: INTERNAL MEDICINE

## 2021-11-11 RX ORDER — METHYLPREDNISOLONE SODIUM SUCCINATE 125 MG/2ML
125 INJECTION, POWDER, LYOPHILIZED, FOR SOLUTION INTRAMUSCULAR; INTRAVENOUS ONCE
Status: CANCELLED | OUTPATIENT
Start: 2021-11-18 | End: 2021-11-18

## 2021-11-11 RX ORDER — SODIUM CHLORIDE 9 MG/ML
INJECTION, SOLUTION INTRAVENOUS CONTINUOUS
Status: DISCONTINUED | OUTPATIENT
Start: 2021-11-11 | End: 2021-11-12 | Stop reason: HOSPADM

## 2021-11-11 RX ORDER — SODIUM CHLORIDE 9 MG/ML
INJECTION, SOLUTION INTRAVENOUS CONTINUOUS
Status: CANCELLED | OUTPATIENT
Start: 2021-11-18

## 2021-11-11 RX ORDER — SODIUM CHLORIDE 0.9 % (FLUSH) 0.9 %
5-40 SYRINGE (ML) INJECTION PRN
Status: CANCELLED | OUTPATIENT
Start: 2021-11-18

## 2021-11-11 RX ORDER — ISOSORBIDE MONONITRATE 60 MG/1
60 TABLET, EXTENDED RELEASE ORAL DAILY
COMMUNITY
End: 2021-11-18

## 2021-11-11 RX ORDER — PREDNISONE 10 MG/1
15 TABLET ORAL DAILY
Status: ON HOLD | COMMUNITY
End: 2022-05-11 | Stop reason: HOSPADM

## 2021-11-11 RX ORDER — EPINEPHRINE 1 MG/ML
0.5 INJECTION, SOLUTION, CONCENTRATE INTRAVENOUS PRN
Status: CANCELLED | OUTPATIENT
Start: 2021-11-18

## 2021-11-11 RX ORDER — FUROSEMIDE 40 MG/1
60 TABLET ORAL DAILY
COMMUNITY

## 2021-11-11 RX ORDER — SODIUM CHLORIDE 0.9 % (FLUSH) 0.9 %
5-40 SYRINGE (ML) INJECTION PRN
Status: DISCONTINUED | OUTPATIENT
Start: 2021-11-11 | End: 2021-11-12 | Stop reason: HOSPADM

## 2021-11-11 RX ADMIN — SODIUM CHLORIDE: 9 INJECTION, SOLUTION INTRAVENOUS at 13:38

## 2021-11-11 RX ADMIN — FERRIC CARBOXYMALTOSE INJECTION 750 MG: 50 INJECTION, SOLUTION INTRAVENOUS at 13:41

## 2021-11-18 ENCOUNTER — HOSPITAL ENCOUNTER (OUTPATIENT)
Dept: INFUSION THERAPY | Age: 51
Discharge: HOME | End: 2021-11-18
Payer: MEDICAID

## 2021-11-18 VITALS
TEMPERATURE: 97.2 F | DIASTOLIC BLOOD PRESSURE: 73 MMHG | RESPIRATION RATE: 18 BRPM | HEART RATE: 53 BPM | SYSTOLIC BLOOD PRESSURE: 134 MMHG

## 2021-11-18 DIAGNOSIS — D50.9 IRON DEFICIENCY ANEMIA, UNSPECIFIED IRON DEFICIENCY ANEMIA TYPE: Primary | ICD-10-CM

## 2021-11-18 PROCEDURE — 96365 THER/PROPH/DIAG IV INF INIT: CPT

## 2021-11-18 PROCEDURE — 2580000003 HC RX 258: Performed by: INTERNAL MEDICINE

## 2021-11-18 PROCEDURE — 6360000002 HC RX W HCPCS: Performed by: INTERNAL MEDICINE

## 2021-11-18 RX ORDER — SODIUM CHLORIDE 9 MG/ML
INJECTION, SOLUTION INTRAVENOUS CONTINUOUS
Status: DISCONTINUED | OUTPATIENT
Start: 2021-11-18 | End: 2021-11-19 | Stop reason: HOSPADM

## 2021-11-18 RX ORDER — MAGNESIUM OXIDE 400 MG/1
400 TABLET ORAL DAILY
COMMUNITY

## 2021-11-18 RX ORDER — OMEPRAZOLE 20 MG/1
20 CAPSULE, DELAYED RELEASE ORAL DAILY
COMMUNITY
End: 2022-05-08

## 2021-11-18 RX ORDER — LOPERAMIDE HYDROCHLORIDE 2 MG/1
2 CAPSULE ORAL 4 TIMES DAILY PRN
COMMUNITY
End: 2022-05-08

## 2021-11-18 RX ORDER — EPINEPHRINE 1 MG/ML
0.5 INJECTION, SOLUTION, CONCENTRATE INTRAVENOUS PRN
OUTPATIENT
Start: 2021-11-25

## 2021-11-18 RX ORDER — VERAPAMIL HYDROCHLORIDE 240 MG/1
240 CAPSULE, EXTENDED RELEASE ORAL NIGHTLY
COMMUNITY
End: 2022-05-08

## 2021-11-18 RX ORDER — FAMOTIDINE 40 MG/1
40 TABLET, FILM COATED ORAL DAILY
COMMUNITY
End: 2022-05-08

## 2021-11-18 RX ORDER — ERGOCALCIFEROL 1.25 MG/1
50000 CAPSULE ORAL DAILY
COMMUNITY

## 2021-11-18 RX ORDER — TAMSULOSIN HYDROCHLORIDE 0.4 MG/1
0.4 CAPSULE ORAL DAILY
COMMUNITY
End: 2022-05-08

## 2021-11-18 RX ORDER — SODIUM CHLORIDE 0.9 % (FLUSH) 0.9 %
5-40 SYRINGE (ML) INJECTION PRN
Status: DISCONTINUED | OUTPATIENT
Start: 2021-11-18 | End: 2021-11-19 | Stop reason: HOSPADM

## 2021-11-18 RX ORDER — METHYLPREDNISOLONE SODIUM SUCCINATE 125 MG/2ML
125 INJECTION, POWDER, LYOPHILIZED, FOR SOLUTION INTRAMUSCULAR; INTRAVENOUS ONCE
OUTPATIENT
Start: 2021-11-25 | End: 2021-11-25

## 2021-11-18 RX ORDER — SODIUM CHLORIDE 9 MG/ML
INJECTION, SOLUTION INTRAVENOUS CONTINUOUS
Status: CANCELLED | OUTPATIENT
Start: 2021-11-25

## 2021-11-18 RX ORDER — SODIUM CHLORIDE 0.9 % (FLUSH) 0.9 %
5-40 SYRINGE (ML) INJECTION PRN
OUTPATIENT
Start: 2021-11-25

## 2021-11-18 RX ADMIN — FERRIC CARBOXYMALTOSE INJECTION 750 MG: 50 INJECTION, SOLUTION INTRAVENOUS at 14:16

## 2021-11-18 RX ADMIN — SODIUM CHLORIDE, PRESERVATIVE FREE 10 ML: 5 INJECTION INTRAVENOUS at 14:07

## 2021-11-18 RX ADMIN — SODIUM CHLORIDE: 9 INJECTION, SOLUTION INTRAVENOUS at 14:12

## 2022-01-10 ENCOUNTER — HOSPITAL ENCOUNTER (OUTPATIENT)
Dept: LAB | Age: 52
Setting detail: SPECIMEN
Discharge: HOME | End: 2022-01-10
Payer: MEDICAID

## 2022-01-10 PROCEDURE — U0003 INFECTIOUS AGENT DETECTION BY NUCLEIC ACID (DNA OR RNA); SEVERE ACUTE RESPIRATORY SYNDROME CORONAVIRUS 2 (SARS-COV-2) (CORONAVIRUS DISEASE [COVID-19]), AMPLIFIED PROBE TECHNIQUE, MAKING USE OF HIGH THROUGHPUT TECHNOLOGIES AS DESCRIBED BY CMS-2020-01-R: HCPCS

## 2022-01-10 PROCEDURE — U0005 INFEC AGEN DETEC AMPLI PROBE: HCPCS

## 2022-01-10 PROCEDURE — C9803 HOPD COVID-19 SPEC COLLECT: HCPCS

## 2022-01-11 LAB
SARS-COV-2: ABNORMAL
SARS-COV-2: DETECTED
SOURCE: ABNORMAL

## 2022-01-21 ENCOUNTER — HOSPITAL ENCOUNTER (OUTPATIENT)
Age: 52
Setting detail: SPECIMEN
Discharge: HOME | End: 2022-01-21
Payer: MEDICAID

## 2022-01-21 LAB
ALBUMIN SERPL-MCNC: 3.7 G/DL (ref 3.5–5.2)
ANION GAP SERPL CALCULATED.3IONS-SCNC: 15 MMOL/L (ref 9–17)
BUN BLDV-MCNC: 27 MG/DL (ref 6–20)
BUN/CREAT BLD: 8 (ref 9–20)
CALCIUM SERPL-MCNC: 8.9 MG/DL (ref 8.6–10.4)
CHLORIDE BLD-SCNC: 100 MMOL/L (ref 98–107)
CO2: 23 MMOL/L (ref 20–31)
CREAT SERPL-MCNC: 3.36 MG/DL (ref 0.7–1.2)
GFR AFRICAN AMERICAN: 23 ML/MIN
GFR NON-AFRICAN AMERICAN: 19 ML/MIN
GFR SERPL CREATININE-BSD FRML MDRD: ABNORMAL ML/MIN/{1.73_M2}
GFR SERPL CREATININE-BSD FRML MDRD: ABNORMAL ML/MIN/{1.73_M2}
GLUCOSE BLD-MCNC: 98 MG/DL (ref 70–99)
PHOSPHORUS: 4.5 MG/DL (ref 2.5–4.5)
POTASSIUM SERPL-SCNC: 3.9 MMOL/L (ref 3.7–5.3)
SODIUM BLD-SCNC: 138 MMOL/L (ref 135–144)

## 2022-01-21 PROCEDURE — 80069 RENAL FUNCTION PANEL: CPT

## 2022-02-26 ENCOUNTER — HOSPITAL ENCOUNTER (EMERGENCY)
Age: 52
Discharge: HOME | End: 2022-02-26
Attending: EMERGENCY MEDICINE
Payer: MEDICAID

## 2022-02-26 ENCOUNTER — APPOINTMENT (OUTPATIENT)
Dept: GENERAL RADIOLOGY | Age: 52
End: 2022-02-26
Payer: MEDICAID

## 2022-02-26 VITALS
DIASTOLIC BLOOD PRESSURE: 88 MMHG | RESPIRATION RATE: 18 BRPM | SYSTOLIC BLOOD PRESSURE: 150 MMHG | TEMPERATURE: 97.9 F | OXYGEN SATURATION: 97 % | HEART RATE: 81 BPM
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
## 2022-02-26 DIAGNOSIS — S80.11XA CONTUSION OF MULTIPLE SITES OF RIGHT LOWER EXTREMITY, INITIAL ENCOUNTER: ICD-10-CM

## 2022-02-26 PROCEDURE — 99283 EMERGENCY DEPT VISIT LOW MDM: CPT

## 2022-02-26 PROCEDURE — 73590 X-RAY EXAM OF LOWER LEG: CPT

## 2022-02-26 PROCEDURE — 6370000000 HC RX 637 (ALT 250 FOR IP): Performed by: EMERGENCY MEDICINE

## 2022-02-26 RX ORDER — NIFEDIPINE 90 MG/1
30 TABLET, FILM COATED, EXTENDED RELEASE ORAL DAILY
Status: ON HOLD | COMMUNITY
End: 2022-05-11 | Stop reason: HOSPADM

## 2022-02-26 RX ORDER — OXYCODONE HYDROCHLORIDE AND ACETAMINOPHEN 5; 325 MG/1; MG/1
1 TABLET ORAL EVERY 6 HOURS PRN
Qty: 6 TABLET | Refills: 0 | Status: SHIPPED | OUTPATIENT
Start: 2022-02-26 | End: 2022-03-01

## 2022-02-26 RX ORDER — TERAZOSIN 5 MG/1
5 CAPSULE ORAL NIGHTLY
COMMUNITY
End: 2022-05-08

## 2022-02-26 RX ORDER — OXYCODONE HYDROCHLORIDE AND ACETAMINOPHEN 5; 325 MG/1; MG/1
1 TABLET ORAL ONCE
Status: COMPLETED | OUTPATIENT
Start: 2022-02-26 | End: 2022-02-26

## 2022-02-26 RX ORDER — FERROUS SULFATE 325(65) MG
325 TABLET ORAL
COMMUNITY

## 2022-02-26 RX ORDER — METOLAZONE 2.5 MG/1
2.5 TABLET ORAL
COMMUNITY

## 2022-02-26 RX ORDER — CLONIDINE HYDROCHLORIDE 0.1 MG/1
0.1 TABLET ORAL NIGHTLY
COMMUNITY

## 2022-02-26 RX ADMIN — OXYCODONE AND ACETAMINOPHEN 1 TABLET: 5; 325 TABLET ORAL at 21:52

## 2022-02-26 ASSESSMENT — ENCOUNTER SYMPTOMS
SORE THROAT: 0
CONSTIPATION: 0
RHINORRHEA: 0
SHORTNESS OF BREATH: 0
WHEEZING: 0
ABDOMINAL DISTENTION: 0
DIARRHEA: 0
NAUSEA: 0
COUGH: 0
VOMITING: 0

## 2022-02-26 ASSESSMENT — PAIN SCALES - GENERAL: PAINLEVEL_OUTOF10: 10

## 2022-02-27 NOTE — ED PROVIDER NOTES
[facility]  Emergency Department        Pt Name: Cash Mathew  MRN: [id number]  [place] 1970  Date of evaluation: 2/26/22    CHIEF COMPLAINT       Chief Complaint   Patient presents with    Fall     fell on ice hitting steps, injury to right lower ext       HISTORY OF PRESENT ILLNESS  (Location/Symptom, Timing/Onset, Context/Setting, Quality, Duration, ModifyingFactors, Severity.)      Cash Mathew is a 45 y.o. male who presents with mechanical fall, slipping on ice. The corner of the steps hit him in his lower right leg causing bruising. He was able to catch himself there was no head trauma no loss of consciousness. He ambulates with a cane occasionally. And is using it tonight. He is not on any anticoagulation he does have a history of end-stage renal disease and is on dialysis. PAST MEDICAL / SURGICAL / SOCIAL / FAMILY HISTORY      has a past medical history of Chronic kidney disease, Depression, Diabetes mellitus ([facility] [address] Dialysis patient ([facility] [address] Gout, and Hypertension. has a past surgical history that includes hernia repair; shoulder surgery (Left); Ankle Closed Reduction (Right, 08/12/2019); Ankle Closed Reduction (Right, 8/12/2019); and Ankle fracture surgery (Right, 8/13/2019). Social History     Socioeconomic History    Marital status: Single     Spouse name: Not on file    Number of children: Not on file    Years of education: Not on file    Highest education level: Not on file   Occupational History    Not on file   Tobacco Use    Smoking status: Current Every Day Smoker     Packs/day: 0.50     Types: Cigarettes    Smokeless tobacco: Never Used   Vaping Use    Vaping Use: Never used   Substance and Sexual Activity    Alcohol use:  Yes     Alcohol/week: 84.0 standard drinks     Types: 84 Cans of beer per week    Drug use: Never    Sexual activity: Yes   Other Topics Concern    Not on file   Social History Narrative    Not on file     Social Determinants of Health     Financial Resource Strain:     Difficulty of Paying Living Expenses: Not on file   Food Insecurity:     Worried About Running Out of Food in the Last Year: Not on file    Ashly of Food in the Last Year: Not on file   Transportation Needs:     Lack of Transportation (Medical): Not on file    Lack of Transportation (Non-Medical): Not on file   Physical Activity:     Days of Exercise per Week: Not on file    Minutes of Exercise per Session: Not on file   Stress:     Feeling of Stress : Not on file   Social Connections:     Frequency of Communication with Friends and Family: Not on file    Frequency of Social Gatherings with Friends and Family: Not on file    Attends Christian Services: Not on file    Active Member of [address] [place] [address] [employer] or Organizations: Not on file    Attends Club or Organization Meetings: Not on file    Marital Status: Not on file   Intimate Partner Violence:     Fear of Current or Ex-Partner: Not on file    Emotionally Abused: Not on file    Physically Abused: Not on file    Sexually Abused: Not on file   Housing Stability:     Unable to Pay for Housing in the Last Year: Not on file    Number of [place] in the Last Year: Not on file    Unstable Housing in the Last Year: Not on file       No family history on file. Allergies:  Patient has no known allergies. Home Medications:  Prior to Admission medications    Medication Sig Start Date End Date Taking?  Authorizing Provider   metOLazone (ZAROXOLYN) 2.5 MG tablet Take 2.5 mg by mouth three times a week   Yes Historical Provider, MD   NIFEdipine (ADALAT CC) 90 MG extended release tablet Take 90 mg by mouth daily   Yes Historical Provider, MD   terazosin (HYTRIN) 5 MG capsule Take 5 mg by mouth nightly   Yes Historical Provider, MD   cloNIDine (CATAPRES) 0.1 MG tablet Take 0.1 mg by mouth in the morning, at noon, and at bedtime   Yes Historical Provider, MD   ferrous sulfate (IRON 325) 325 (65 Fe) MG tablet Take 325 mg by mouth daily (with breakfast)   Yes Historical Provider, MD   famotidine (PEPCID) 40 MG tablet Take 40 mg by mouth daily   Yes Historical Provider, MD   vitamin D (ERGOCALCIFEROL) 1.25 MG (74784 UT) CAPS capsule Take 50,000 Units by mouth daily   Yes Historical Provider, MD   magnesium oxide (MAG-OX) 400 MG tablet Take 400 mg by mouth daily   Yes Historical Provider, MD   furosemide (LASIX) 40 MG tablet Take 60 mg by mouth 2 times daily    Yes Historical Provider, MD   carvedilol (COREG) 25 MG tablet Take 25 mg by mouth 2 times daily (with meals)   Yes Historical Provider, MD   allopurinol (ZYLOPRIM) 300 MG tablet Take 300 mg by mouth daily   Yes Historical Provider, MD   citalopram (CELEXA) 40 MG tablet Take 40 mg by mouth daily   Yes Historical Provider, MD   verapamil (VERELAN) 240 MG extended release capsule Take 240 mg by mouth nightly    Historical Provider, MD   tamsulosin (FLOMAX) 0.4 MG capsule Take 0.4 mg by mouth daily    Historical Provider, MD   loperamide (IMODIUM) 2 MG capsule Take 2 mg by mouth 4 times daily as needed for Diarrhea    Historical Provider, MD   omeprazole (PRILOSEC) 20 MG delayed release capsule Take 20 mg by mouth daily    Historical Provider, MD   predniSONE (DELTASONE) 10 MG tablet Take 20 mg by mouth 3 times daily Unsure of dosage at appointment. Historical Provider, MD       REVIEW OF SYSTEMS    (2-9 systems for level 4, 10 or more for level 5)      Review of Systems   Constitutional: Negative for activity change, appetite change, fatigue and fever. HENT: Negative for congestion, rhinorrhea and sore throat. Respiratory: Negative for cough, shortness of breath and wheezing. Cardiovascular: Negative for chest pain, palpitations and leg swelling. Gastrointestinal: Negative for abdominal distention, constipation, diarrhea, nausea and vomiting. Musculoskeletal: Positive for arthralgias, gait problem and myalgias. Skin: Positive for wound.  Negative for rash.   Neurological: Negative for dizziness, weakness, light-headedness, numbness and headaches. PHYSICAL EXAM   (up to 7 for level 4, 8 or more for level 5)     INITIAL VITALS:   BP (!) 150/88   Pulse 81   Temp 97.9 °F (36.6 °C)   Resp 18   SpO2 97%     Physical Exam  Constitutional:       General: He is not in acute distress. Appearance: Normal appearance. He is not ill-appearing. HENT:      Head: Normocephalic and atraumatic. Eyes:      General:         Right eye: No discharge. Left eye: No discharge. Extraocular Movements: Extraocular movements intact. Pupils: Pupils are equal, round, and reactive to light. Cardiovascular:      Rate and Rhythm: Normal rate. Pulmonary:      Effort: Pulmonary effort is normal. No respiratory distress. Musculoskeletal:      Right lower leg: No edema. Left lower leg: No edema. Comments: Patient with tenderness to palpation to the medial distal tibia, with area of contusion and edema. Tender to palpation superior as well as just inferior to this. Patient does have 2+ pitting edema bilateral which is symmetrical.  He has sensation to light touch intact. He is able to plantarflex and dorsiflex with 5 out of 5 motor strength. And is able to flex and extend fully at the right knee. Neurological:      General: No focal deficit present. Mental Status: He is alert and oriented to person, place, and time. DIFFERENTIAL  DIAGNOSIS     Patient with direct trauma to the right distal tibia. Concern for possible fracture versus contusion. We will plan on x-ray imaging, ice elevation Tylenol for pain control.     PLAN (LABS / IMAGING / EKG):  Orders Placed This Encounter   Procedures    XR TIBIA FIBULA RIGHT (2 VIEWS)    Apply ice    Elevate extremity       MEDICATIONS ORDERED:  Orders Placed This Encounter   Medications    oxyCODONE-acetaminophen (PERCOCET) 5-325 MG per tablet 1 tablet       DIAGNOSTIC RESULTS / EMERGENCY DEPARTMENT COURSE / MDM     LABS:  No results found for this visit on 02/26/22. IMPRESSION: leg contusion, fall      RADIOLOGY:  XR TIBIA FIBULA RIGHT (2 VIEWS)   Final Result   No acute bony abnormalities. Status post ORIF of bimalleolar fractures. EMERGENCY DEPARTMENT COURSE:  Patient updated on results of the imaging, no fractures noted, and plan for discharge home, continue with pain control, ice, and elevation, and follow up with pcp. Patient comfortable with ambulating with cane      FINAL IMPRESSION      1. Fall, initial encounter          DISPOSITION / PLAN     DISPOSITION        PATIENT REFERRED TO:  No follow-up provider specified.     DISCHARGE MEDICATIONS:  New Prescriptions    No medications on file       Irwin Martínez DO  10:35 PM    Attending Emergency Physician  [facility] ED    (Please note that portions of this note were completed with a voice recognition program.  Effortswere made to edit the dictations but occasionally words are mis-transcribed.)              Salas Rodney DO  02/26/22 0708

## 2022-05-04 ENCOUNTER — HOSPITAL ENCOUNTER (EMERGENCY)
Age: 52
Discharge: HOME | End: 2022-05-04
Payer: MEDICAID

## 2022-05-04 ENCOUNTER — APPOINTMENT (OUTPATIENT)
Dept: GENERAL RADIOLOGY | Age: 52
End: 2022-05-04
Payer: MEDICAID

## 2022-05-04 VITALS
HEART RATE: 78 BPM | SYSTOLIC BLOOD PRESSURE: 190 MMHG | DIASTOLIC BLOOD PRESSURE: 94 MMHG | TEMPERATURE: 100.5 F | OXYGEN SATURATION: 99 % | RESPIRATION RATE: 18 BRPM | BODY MASS INDEX: 32.93 KG/M2 | WEIGHT: 223 LBS

## 2022-05-04 DIAGNOSIS — M70.61 TROCHANTERIC BURSITIS OF RIGHT HIP: Primary | ICD-10-CM

## 2022-05-04 LAB
-: ABNORMAL
ABSOLUTE EOS #: <0.03 K/UL (ref 0–0.44)
ABSOLUTE IMMATURE GRANULOCYTE: 0.1 K/UL (ref 0–0.3)
ABSOLUTE LYMPH #: 0.95 K/UL (ref 1.1–3.7)
ABSOLUTE MONO #: 0.37 K/UL (ref 0.1–1.2)
ALBUMIN SERPL-MCNC: 4 G/DL (ref 3.5–5.2)
ALBUMIN/GLOBULIN RATIO: 1.5 (ref 1–2.5)
ALP BLD-CCNC: 137 U/L (ref 40–129)
ALT SERPL-CCNC: 23 U/L (ref 5–41)
ANION GAP SERPL CALCULATED.3IONS-SCNC: 9 MMOL/L (ref 9–17)
AST SERPL-CCNC: 25 U/L
BACTERIA: ABNORMAL
BASOPHILS # BLD: 0 % (ref 0–2)
BASOPHILS ABSOLUTE: 0.03 K/UL (ref 0–0.2)
BILIRUB SERPL-MCNC: 0.99 MG/DL (ref 0.3–1.2)
BILIRUBIN URINE: NEGATIVE
BUN BLDV-MCNC: 12 MG/DL (ref 6–20)
BUN/CREAT BLD: 6 (ref 9–20)
C-REACTIVE PROTEIN: 28.6 MG/L (ref 0–5)
CALCIUM SERPL-MCNC: 8.9 MG/DL (ref 8.6–10.4)
CHLORIDE BLD-SCNC: 97 MMOL/L (ref 98–107)
CO2: 30 MMOL/L (ref 20–31)
COLOR: YELLOW
CREAT SERPL-MCNC: 2.08 MG/DL (ref 0.7–1.2)
EOSINOPHILS RELATIVE PERCENT: 0 % (ref 1–4)
EPITHELIAL CELLS UA: ABNORMAL /HPF (ref 0–5)
FLU A ANTIGEN: NEGATIVE
FLU B ANTIGEN: NEGATIVE
GFR AFRICAN AMERICAN: 41 ML/MIN
GFR NON-AFRICAN AMERICAN: 34 ML/MIN
GFR SERPL CREATININE-BSD FRML MDRD: ABNORMAL ML/MIN/{1.73_M2}
GFR SERPL CREATININE-BSD FRML MDRD: ABNORMAL ML/MIN/{1.73_M2}
GLUCOSE BLD-MCNC: 93 MG/DL (ref 70–99)
GLUCOSE URINE: NEGATIVE
HCT VFR BLD CALC: 31.3 % (ref 40.7–50.3)
HEMOGLOBIN: 10 G/DL (ref 13–17)
IMMATURE GRANULOCYTES: 1 %
KETONES, URINE: NEGATIVE
LEUKOCYTE ESTERASE, URINE: NEGATIVE
LYMPHOCYTES # BLD: 12 % (ref 24–43)
MCH RBC QN AUTO: 33 PG (ref 25.2–33.5)
MCHC RBC AUTO-ENTMCNC: 31.9 G/DL (ref 28.4–34.8)
MCV RBC AUTO: 103.3 FL (ref 82.6–102.9)
MONOCYTES # BLD: 5 % (ref 3–12)
NITRITE, URINE: NEGATIVE
NRBC AUTOMATED: 0.2 PER 100 WBC
PDW BLD-RTO: 15.7 % (ref 11.8–14.4)
PH UA: 8 (ref 5–9)
PLATELET # BLD: ABNORMAL K/UL (ref 138–453)
PLATELET, FLUORESCENCE: 100 K/UL (ref 138–453)
PLATELET, IMMATURE FRACTION: 3.5 % (ref 1.1–10.3)
POTASSIUM SERPL-SCNC: 3.9 MMOL/L (ref 3.7–5.3)
PROTEIN UA: ABNORMAL
RBC # BLD: 3.03 M/UL (ref 4.21–5.77)
RBC UA: ABNORMAL /HPF (ref 0–2)
SARS-COV-2, RAPID: NOT DETECTED
SEDIMENTATION RATE, ERYTHROCYTE: 38 MM/HR (ref 0–20)
SEG NEUTROPHILS: 82 % (ref 36–65)
SEGMENTED NEUTROPHILS ABSOLUTE COUNT: 6.63 K/UL (ref 1.5–8.1)
SODIUM BLD-SCNC: 136 MMOL/L (ref 135–144)
SPECIFIC GRAVITY UA: 1.01 (ref 1.01–1.02)
SPECIMEN DESCRIPTION: NORMAL
TOTAL PROTEIN: 6.7 G/DL (ref 6.4–8.3)
TURBIDITY: CLEAR
URINE HGB: ABNORMAL
UROBILINOGEN, URINE: NORMAL
WBC # BLD: 8.1 K/UL (ref 3.5–11.3)
WBC UA: ABNORMAL /HPF (ref 0–5)

## 2022-05-04 PROCEDURE — 85025 COMPLETE CBC W/AUTO DIFF WBC: CPT

## 2022-05-04 PROCEDURE — 80053 COMPREHEN METABOLIC PANEL: CPT

## 2022-05-04 PROCEDURE — 96375 TX/PRO/DX INJ NEW DRUG ADDON: CPT

## 2022-05-04 PROCEDURE — 81001 URINALYSIS AUTO W/SCOPE: CPT

## 2022-05-04 PROCEDURE — 6360000002 HC RX W HCPCS: Performed by: PHYSICIAN ASSISTANT

## 2022-05-04 PROCEDURE — 87804 INFLUENZA ASSAY W/OPTIC: CPT

## 2022-05-04 PROCEDURE — 85055 RETICULATED PLATELET ASSAY: CPT

## 2022-05-04 PROCEDURE — 36415 COLL VENOUS BLD VENIPUNCTURE: CPT

## 2022-05-04 PROCEDURE — 85652 RBC SED RATE AUTOMATED: CPT

## 2022-05-04 PROCEDURE — 99284 EMERGENCY DEPT VISIT MOD MDM: CPT

## 2022-05-04 PROCEDURE — 87635 SARS-COV-2 COVID-19 AMP PRB: CPT

## 2022-05-04 PROCEDURE — 73502 X-RAY EXAM HIP UNI 2-3 VIEWS: CPT

## 2022-05-04 PROCEDURE — 96376 TX/PRO/DX INJ SAME DRUG ADON: CPT

## 2022-05-04 PROCEDURE — 96374 THER/PROPH/DIAG INJ IV PUSH: CPT

## 2022-05-04 PROCEDURE — 86140 C-REACTIVE PROTEIN: CPT

## 2022-05-04 PROCEDURE — 71045 X-RAY EXAM CHEST 1 VIEW: CPT

## 2022-05-04 RX ORDER — MORPHINE SULFATE 4 MG/ML
4 INJECTION, SOLUTION INTRAMUSCULAR; INTRAVENOUS ONCE
Status: COMPLETED | OUTPATIENT
Start: 2022-05-04 | End: 2022-05-04

## 2022-05-04 RX ORDER — DEXAMETHASONE SODIUM PHOSPHATE 4 MG/ML
4 INJECTION, SOLUTION INTRA-ARTICULAR; INTRALESIONAL; INTRAMUSCULAR; INTRAVENOUS; SOFT TISSUE ONCE
Status: COMPLETED | OUTPATIENT
Start: 2022-05-04 | End: 2022-05-04

## 2022-05-04 RX ORDER — HYDROCODONE BITARTRATE AND ACETAMINOPHEN 5; 325 MG/1; MG/1
1 TABLET ORAL EVERY 6 HOURS PRN
Qty: 12 TABLET | Refills: 0 | Status: SHIPPED | OUTPATIENT
Start: 2022-05-04 | End: 2022-05-07

## 2022-05-04 RX ADMIN — DEXAMETHASONE SODIUM PHOSPHATE 4 MG: 4 INJECTION, SOLUTION INTRAMUSCULAR; INTRAVENOUS at 17:20

## 2022-05-04 RX ADMIN — HYDROMORPHONE HYDROCHLORIDE 1 MG: 1 INJECTION, SOLUTION INTRAMUSCULAR; INTRAVENOUS; SUBCUTANEOUS at 17:19

## 2022-05-04 RX ADMIN — HYDROMORPHONE HYDROCHLORIDE 1 MG: 1 INJECTION, SOLUTION INTRAMUSCULAR; INTRAVENOUS; SUBCUTANEOUS at 15:39

## 2022-05-04 RX ADMIN — MORPHINE SULFATE 4 MG: 4 INJECTION, SOLUTION INTRAMUSCULAR; INTRAVENOUS at 14:49

## 2022-05-04 ASSESSMENT — ENCOUNTER SYMPTOMS
EYES NEGATIVE: 1
RESPIRATORY NEGATIVE: 1
GASTROINTESTINAL NEGATIVE: 1

## 2022-05-04 ASSESSMENT — PAIN DESCRIPTION - LOCATION
LOCATION: HIP

## 2022-05-04 ASSESSMENT — PAIN SCALES - GENERAL
PAINLEVEL_OUTOF10: 10
PAINLEVEL_OUTOF10: 10
PAINLEVEL_OUTOF10: 9
PAINLEVEL_OUTOF10: 9
PAINLEVEL_OUTOF10: 10

## 2022-05-04 ASSESSMENT — PAIN DESCRIPTION - FREQUENCY: FREQUENCY: CONTINUOUS

## 2022-05-04 ASSESSMENT — PAIN DESCRIPTION - ORIENTATION
ORIENTATION: RIGHT

## 2022-05-04 ASSESSMENT — PAIN DESCRIPTION - DESCRIPTORS
DESCRIPTORS: SHARP;STABBING
DESCRIPTORS: SHARP;STABBING
DESCRIPTORS: STABBING;SHARP
DESCRIPTORS: ACHING
DESCRIPTORS: SHARP;STABBING

## 2022-05-04 ASSESSMENT — PAIN - FUNCTIONAL ASSESSMENT: PAIN_FUNCTIONAL_ASSESSMENT: 0-10

## 2022-05-04 NOTE — ED PROVIDER NOTES
The PA/NP has seen the patient and I have performed this visit along with the involvement of the PA/NP. I agree with the  PA/NPs findings and plan. I have performed all aspects of MDM as documented including evaluation of the patient/patients  condition(s), review and analysis of available data, and determination of risk of patient management decisions. CKD on dialysis Monday Wednesday Friday went today had a full session without any abnormalities. Complaining of 1 week history of atraumatic right hip pain he has been ambulating without difficulty or assistance. No history of interventions on his hip no injections, noprosthesis, no prior injuries. Patient made aware of incidental finding suggesting a possible nodule on x-ray imaging that is better identified on CT we will follow this up as an outpatient. Elevated CRP and ESR possibly related to underlying inflammatory status given his comorbidities including dialysis however concern for possible bursitis was treated with dexamethasone. Very low suspicion clinically of septic arthritis. Patient will follow-up with orthopedics.     Armond Valenzuela MS, RD   Emergency Medicine       Jesse Lopez DO  Resident  05/05/22 2069

## 2022-05-04 NOTE — DISCHARGE INSTR - COC
Continuity of Care Form    Patient Name: Fortino Kim   :  1970  MRN:  [id number]    Admit date:  2022  Discharge date:  ***    Code Status Order: Prior   Advance Directives:      Admitting Physician:  No admitting provider for patient encounter. PCP: Joanne Ngo Sr, DO    Discharging Nurse: [facility] Unit/Room#:   Discharging Unit Phone Number: ***    Emergency Contact:   Extended Emergency Contact Information  Primary Emergency Contact: violet diego  Home Phone: [phone]  Relation: Other    Past Surgical History:  Past Surgical History:   Procedure Laterality Date    ANKLE CLOSED REDUCTION Right 2019    ANKLE CLOSED REDUCTION performed by Rob Ariza MD at [address] [place] [address] Right 2019    ANKLE OPEN REDUCTION INTERNAL FIXATION-TRIMELLAR FRACTURE performed by Jayda Arredondo DO at [address] [place] [address] Left        Immunization History: There is no immunization history on file for this patient. Active Problems:  Patient Active Problem List   Diagnosis Code    Closed trimalleolar fracture of right ankle S82.851A    Alcohol abuse F10.10    Closed right trimalleolar fracture, initial encounter S82.851A    Hyponatremia E87.1    HTN (hypertension) I10    Closed fracture dislocation of right ankle S82.891A    Depression F32. A    Tobacco abuse Z72.0    CKD vs MORGAN N18.9    Acute blood loss as cause of postoperative anemia D62    Dizziness R42    Migraine without status migrainosus, not intractable G43.909    Iron deficiency anemia D50.9       Isolation/Infection:   Isolation            No Isolation          Patient Infection Status       Infection Onset Added Last Indicated Last Indicated By Review Planned Expiration Resolved Resolved By    None active    Resolved    COVID-19 (Rule Out) 22 COVID-19, Rapid (Ordered)   22 Rule-Out Test Resulted    COVID-19 01/10/22 01/11/22 01/10/22 COVID-19 22     COVID-19 (Rule Out) 01/10/22 01/10/22 01/10/22 COVID-19 (Ordered)   22 Rule-Out Test Resulted            Nurse Assessment:  Last Vital Signs: BP (!) 190/94   Pulse 78   Temp 100.5 °F (38.1 °C) (Tympanic)   Resp 18   Wt 223 lb (101.2 kg)   SpO2 99%   BMI 32.93 kg/m²     Last documented pain score (0-10 scale): Pain Level: 9  Last Weight:   Wt Readings from Last 1 Encounters:   22 223 lb (101.2 kg)     Mental Status:  {IP PT MENTAL STATUS:}    IV Access:  { WALTER IV ACCESS:898854716}    Nursing Mobility/ADLs:  Walking   {CHP DME GUWE:493201080}  Transfer  {CHP DME KAWH:829640783}  Bathing  {CHP DME RHJF:328820485}  Dressing  {CHP DME FQPZ:846387336}  Toileting  {CHP DME WCMZ:076111946}  Feeding  {CHP DME QAEK:405315475}  Med Admin  {CHP DME MJVX:741749859}  Med Delivery   { WALTER MED Delivery:931517334}    Wound Care Documentation and Therapy:  Incision 19 Ankle Right (Active)   Number of days: 995       Incision 19 Ankle Right (Active)   Number of days: 995        Elimination:  Continence: Bowel: {YES / XI:43948}  Bladder: {YES / TP:94806}  Urinary Catheter: {Urinary Catheter:461243543}   Colostomy/Ileostomy/Ileal Conduit: {YES / WE:91680}       Date of Last BM: ***  No intake or output data in the 24 hours ending 22 1713  No intake/output data recorded.     Safety Concerns:     [address] Safety Concerns:064416899}    Impairments/Disabilities:      [address] Impairments/Disabilities:894981568}    Nutrition Therapy:  Current Nutrition Therapy:   [address] Diet List:664812757}    Routes of Feeding: {CHP DME Other Feedings:420266270}  Liquids: {Slp liquid thickness:79371}  Daily Fluid Restriction: {CHP DME Yes amt example:847398199}  Last Modified Barium Swallow with Video (Video Swallowing Test): {Done Not Done GEXL:481856581}    Treatments at the Time of Hospital Discharge:   Respiratory Treatments: ***  Oxygen Therapy:  {Therapy; copd oxygen:82084}  Ventilator: [address] CC Vent HJJV:986296950}    Rehab Therapies: {THERAPEUTIC INTERVENTION:9868948255}  Weight Bearing Status/Restrictions: [address] CC Weight Bearin}  Other Medical Equipment (for information only, NOT a DME order):  {EQUIPMENT:041061362}  Other Treatments: ***    Patient's personal belongings (please select all that are sent with patient):  {CHP DME Belongings:180512116}    RN SIGNATURE:  {Esignature:273793847}    CASE MANAGEMENT/SOCIAL WORK SECTION    Inpatient Status Date: ***    Readmission Risk Assessment Score:  Readmission Risk              Risk of Unplanned Readmission:  0           Discharging to Facility/ Agency   Name:   Address:  Phone:  Fax:    Dialysis Facility (if applicable)   Name:  Address:  Dialysis Schedule:  Phone:  Fax:    / signature: {Esignature:688914332}    PHYSICIAN SECTION    Prognosis: {Prognosis:1506557412}    Condition at Discharge: [address] Patient Condition:009059859}    Rehab Potential (if transferring to Rehab): {Prognosis:7522037156}    Recommended Labs or Other Treatments After Discharge: ***    Physician Certification: I certify the above information and transfer of Kajal Carroll  is necessary for the continuing treatment of the diagnosis listed and that he requires {Admit to Appropriate Level of Care:75113} for {GREATER/LESS:223517316} 30 days.      Update Admission H&P: {CHP DME Changes in VXWJP:548365295}    PHYSICIAN SIGNATURE:  {Esignature:611916682}

## 2022-05-04 NOTE — ED PROVIDER NOTES
[facility] ED  EMERGENCY DEPARTMENT ENCOUNTER      Pt Name: Jewel Frost  MRN: [id number]  [place] 1970  Date of evaluation: 5/4/2022  Provider: Francy Snow Dr       Chief Complaint   Patient presents with    Hip Pain     right hip pain, onset last week Pt states the pain h as steadily gotten worse         HISTORY OF PRESENT ILLNESS    Jewel Frost is a 45 y.o. male who presents to the emergency department with complaint of right hip pain. He states his pain has been hurting for 1 week. He admits to fall approximately 3 weeks ago. Patient denies any redness or warmth to right hip. He denies any fever or chills or cough. He does admit to going to dialysis Mondays Wednesdays and Fridays. He still produces urine. Pain is moderate to severe and worse with ambulation and movement. Denies any previous hip injuries. Nursing Notes were reviewed. REVIEW OF SYSTEMS    (2-9 systems for level 4, 10 or more for level 5)     Review of Systems   Constitutional: Negative. HENT: Negative. Eyes: Negative. Respiratory: Negative. Cardiovascular: Negative. Gastrointestinal: Negative. Endocrine: Negative. Genitourinary: Negative. Musculoskeletal: Positive for arthralgias. Skin: Negative. Neurological: Negative. Psychiatric/Behavioral: Negative. All other systems reviewed and are negative. Except as noted above the remainder of the review of systems was reviewed and negative.        PAST MEDICAL HISTORY     Past Medical History:   Diagnosis Date    Chronic kidney disease     Depression     [facility] ([facility]     Dialysis patient ([facility]     Gout     Hypertension          SURGICAL HISTORY       Past Surgical History:   Procedure Laterality Date    ANKLE CLOSED REDUCTION Right 08/12/2019    ANKLE CLOSED REDUCTION performed by Bella Wilcox MD at [address] [facility] Right 08/13/2019    ANKLE OPEN REDUCTION INTERNAL FIXATION-TRIMELLAR FRACTURE performed by Leanna Cronin DO at [facility] Left          CURRENT MEDICATIONS       Discharge Medication List as of 5/4/2022  5:13 PM      CONTINUE these medications which have NOT CHANGED    Details   metOLazone (ZAROXOLYN) 2.5 MG tablet Take 2.5 mg by mouth three times a weekHistorical Med      NIFEdipine (ADALAT CC) 90 MG extended release tablet Take 90 mg by mouth dailyHistorical Med      terazosin (HYTRIN) 5 MG capsule Take 5 mg by mouth nightlyHistorical Med      cloNIDine (CATAPRES) 0.1 MG tablet Take 0.1 mg by mouth in the morning, at noon, and at bedtimeHistorical Med      ferrous sulfate (IRON 325) 325 (65 Fe) MG tablet Take 325 mg by mouth daily (with breakfast)Historical Med      verapamil (VERELAN) 240 MG extended release capsule Take 240 mg by mouth nightlyHistorical Med      tamsulosin (FLOMAX) 0.4 MG capsule Take 0.4 mg by mouth dailyHistorical Med      famotidine (PEPCID) 40 MG tablet Take 40 mg by mouth dailyHistorical Med      vitamin D (ERGOCALCIFEROL) 1.25 MG (25856 UT) CAPS capsule Take 50,000 Units by mouth dailyHistorical Med      magnesium oxide (MAG-OX) 400 MG tablet Take 400 mg by mouth dailyHistorical Med      loperamide (IMODIUM) 2 MG capsule Take 2 mg by mouth 4 times daily as needed for DiarrheaHistorical Med      omeprazole (PRILOSEC) 20 MG delayed release capsule Take 20 mg by mouth dailyHistorical Med      predniSONE (DELTASONE) 10 MG tablet Take 20 mg by mouth 3 times daily Unsure of dosage at appointment.  Historical Med      furosemide (LASIX) 40 MG tablet Take 60 mg by mouth 2 times daily Historical Med      carvedilol (COREG) 25 MG tablet Take 25 mg by mouth 2 times daily (with meals)Historical Med      allopurinol (ZYLOPRIM) 300 MG tablet Take 300 mg by mouth dailyHistorical Med      citalopram (CELEXA) 40 MG tablet Take 40 mg by mouth dailyHistorical Med             ALLERGIES     Patient has no known allergies. FAMILY HISTORY     History reviewed. No pertinent family history. SOCIAL HISTORY       Social History     Socioeconomic History    Marital status: Single     Spouse name: None    Number of children: None    Years of education: None    Highest education level: None   Occupational History    None   Tobacco Use    Smoking status: Current Every Day Smoker     Packs/day: 0.50     Types: Cigarettes    Smokeless tobacco: Never Used   Vaping Use    Vaping Use: Never used   Substance and Sexual Activity    Alcohol use: Yes     Alcohol/week: 84.0 standard drinks     Types: 84 Cans of beer per week    Drug use: Never    Sexual activity: Yes   Other Topics Concern    None   Social History Narrative    None     Social Determinants of Health     Financial Resource Strain:     Difficulty of Paying Living Expenses: Not on file   Food Insecurity:     Worried About Running Out of Food in the Last Year: Not on file    Ashly of Food in the Last Year: Not on file   Transportation Needs:     Lack of Transportation (Medical): Not on file    Lack of Transportation (Non-Medical):  Not on file   Physical Activity:     Days of Exercise per Week: Not on file    Minutes of Exercise per Session: Not on file   Stress:     Feeling of Stress : Not on file   Social Connections:     Frequency of Communication with Friends and Family: Not on file    Frequency of Social Gatherings with Friends and Family: Not on file    Attends [employer]ism Services: Not on file    Active Member of Clubs or Organizations: Not on file    Attends Club or Organization Meetings: Not on file    Marital Status: Not on file   Intimate Partner Violence:     Fear of Current or Ex-Partner: Not on file    Emotionally Abused: Not on file    Physically Abused: Not on file    Sexually Abused: Not on file   Housing Stability:     Unable to Pay for Housing in the Last Year: Not on file    Number of [place] in the Last Year: Not on file    Unstable Housing in the Last Year: Not on file       SCREENINGS         Anat Coma Scale  Eye Opening: Spontaneous  Best Verbal Response: Oriented  Best Motor Response: Obeys commands  Anat Coma Scale Score: 15                     CIWA Assessment  BP: (!) 190/94  Pulse: 78                 PHYSICAL EXAM    (up to 7 for level 4, 8 or more for level 5)     ED Triage Vitals [05/04/22 1410]   BP Temp Temp Source Pulse Resp SpO2 Height Weight   (!) 135/95 100.5 °F (38.1 °C) Tympanic 78 18 99 % -- 223 lb (101.2 kg)       Physical Exam  Vitals and nursing note reviewed. Constitutional:       Appearance: Normal appearance. HENT:      Head: Normocephalic and atraumatic. Right Ear: External ear normal.      Left Ear: External ear normal.      Nose: Nose normal.      Mouth/Throat:      Mouth: Mucous membranes are moist.   Eyes:      Extraocular Movements: Extraocular movements intact. Conjunctiva/sclera: Conjunctivae normal.      Pupils: Pupils are equal, round, and reactive to light. Cardiovascular:      Rate and Rhythm: Normal rate and regular rhythm. Pulses: Normal pulses. Heart sounds: Normal heart sounds. Pulmonary:      Effort: Pulmonary effort is normal. No respiratory distress. Breath sounds: Normal breath sounds. Abdominal:      General: Abdomen is flat. Musculoskeletal:         General: Tenderness present. No swelling or deformity. Normal range of motion. Cervical back: Normal range of motion. Comments: Tenderness right greater trochanter. Pain with internal and external rotation right hip. Skin:     General: Skin is warm and dry. Neurological:      General: No focal deficit present. Mental Status: He is alert and oriented to person, place, and time. Cranial Nerves: No cranial nerve deficit.    Psychiatric:         Mood and Affect: Mood normal.         Behavior: Behavior normal.         DIAGNOSTIC RESULTS     EKG: All EKG's are interpreted by the Emergency Department Physician who either signs or Co-signs this chart in the absence of a cardiologist.      RADIOLOGY:   Non-plain film images such as CT, Ultrasound and MRI are read by the radiologist. Plain radiographic images are visualized and preliminarily interpreted by the emergency physician with the below findings:      Interpretation per the Radiologist below, if available at the time of this note:    XR CHEST PORTABLE   Final Result   Subtle ill-defined opacity in the periphery of the left mid lung zone,   possibly  shadows. Recommend follow-up CT to exclude pulmonary   nodule. .         XR HIP 2-3 VW W PELVIS RIGHT   Final Result   No acute abnormality of the hip.                LABS:  Labs Reviewed   CBC WITH AUTO DIFFERENTIAL - Abnormal; Notable for the following components:       Result Value    RBC 3.03 (*)     Hemoglobin 10.0 (*)     Hematocrit 31.3 (*)     .3 (*)     RDW 15.7 (*)     NRBC Automated 0.2 (*)     Seg Neutrophils 82 (*)     Lymphocytes 12 (*)     Eosinophils % 0 (*)     Immature Granulocytes 1 (*)     Absolute Lymph # 0.95 (*)     All other components within normal limits   COMPREHENSIVE METABOLIC PANEL W/ REFLEX TO MG FOR LOW K - Abnormal; Notable for the following components:    CREATININE 2.08 (*)     Bun/Cre Ratio 6 (*)     Chloride 97 (*)     Alkaline Phosphatase 137 (*)     GFR Non- 34 (*)     GFR  41 (*)     All other components within normal limits   URINALYSIS WITH REFLEX TO CULTURE - Abnormal; Notable for the following components:    Urine Hgb 1+ (*)     Protein, UA 1+ (*)     All other components within normal limits   C-REACTIVE PROTEIN - Abnormal; Notable for the following components:    CRP 28.6 (*)     All other components within normal limits   SEDIMENTATION RATE - Abnormal; Notable for the following components:    Sed Rate 38 (*)     All other components within normal limits   IMMATURE PLATELET FRACTION - Abnormal; Notable for the following components:    Platelet, Fluorescence 100 (*)     All other components within normal limits   MICROSCOPIC URINALYSIS - Abnormal; Notable for the following components:    Bacteria, UA 1+ (*)     All other components within normal limits   COVID-19, RAPID   RAPID INFLUENZA A/B ANTIGENS       All other labs were within normal range or not returned as of this dictation. EMERGENCY DEPARTMENT COURSE and DIFFERENTIAL DIAGNOSIS/MDM:   Vitals:    Vitals:    05/04/22 1410 05/04/22 1418   BP: (!) 135/95 (!) 190/94   Pulse: 78    Resp: 18    Temp: 100.5 °F (38.1 °C)    TempSrc: Tympanic    SpO2: 99% 99%   Weight: 223 lb (101.2 kg)          MDM  Number of Diagnoses or Management Options  Trochanteric bursitis of right hip  Diagnosis management comments: Patient is a 59-year-old male with history of chronic kidney disease on dialysis Monday Wednesday and Friday with complaint of right hip pain. States he has had pain for over a week. He fell 3 weeks ago. Pain is severe. He denies any redness or warmth. Patient denied any fever or chills at home. No tenderness of the trochanter right hip. No evidence of warmth or redness. X-ray of the right hip revealed no acute findings. Pain was worse with internal and external rotation of right hip. He was given Dilaudid IV as well as morphine sulfate with good pain relief. Patient is currently on prednisone at home according to wife. He was given dexamethasone 4 mg IV x1 for suspected Bursitis. Patient CRP and ESR were elevated. No evidence of leukocytosis. Patient did have low-grade temperature of 100.5. I do not suspect septic arthritis. Chest x-ray revealed no acute findings as well as UA. COVID and influenza negative. Patient to follow-up with orthopedic surgery next week. He could return to the emergency room for any worsening symptoms.   Patient to continue with Norco 5/325 every 6 hours for the next 3 days as needed for breakthrough pain only. He was also to follow-up with his primary care provider in the next 1 to 2 weeks. Amount and/or Complexity of Data Reviewed  Clinical lab tests: reviewed  Tests in the radiology section of CPT®: reviewed  Decide to obtain previous medical records or to obtain history from someone other than the patient: yes          REASSESSMENT     ED Course as of 05/04/22 2031   Wed May 04, 2022   1504 Platelet Count: See Reflexed IPF Result [MS]   1506 Platelet Count: See Reflexed IPF Result [MS]      ED Course User Index  [MS] Vidhi Medley PA-C         CONSULTS:  None    PROCEDURES:  Unless otherwise noted below, none     Procedures      FINAL IMPRESSION      1. Trochanteric bursitis of right hip          DISPOSITION/PLAN   DISPOSITION        PATIENT REFERRED TO:  Jack Luong [address] [place] [address]  [phone]            DISCHARGE MEDICATIONS:  Discharge Medication List as of 5/4/2022  5:13 PM      START taking these medications    Details   HYDROcodone-acetaminophen (NORCO) 5-325 MG per tablet Take 1 tablet by mouth every 6 hours as needed for Pain for up to 3 days. Intended supply: 3 days. Take lowest dose possible to manage pain, Disp-12 tablet, R-0Print           Controlled Substances Monitoring:     No flowsheet data found. (Please note that portions of this note were completed with a voice recognition program.  Efforts were made to edit the dictations but occasionally words are mis-transcribed. Frank Carrion (electronically signed)           Vidhi Medley PA-C  05/04/22 2031

## 2022-05-08 ENCOUNTER — HOSPITAL ENCOUNTER (EMERGENCY)
Age: 52
Discharge: ANOTHER HOSPITAL | End: 2022-05-09
Attending: EMERGENCY MEDICINE
Payer: MEDICAID

## 2022-05-08 ENCOUNTER — APPOINTMENT (OUTPATIENT)
Dept: CT IMAGING | Age: 52
End: 2022-05-08
Payer: MEDICAID

## 2022-05-08 ENCOUNTER — APPOINTMENT (OUTPATIENT)
Dept: GENERAL RADIOLOGY | Age: 52
End: 2022-05-08
Payer: MEDICAID

## 2022-05-08 DIAGNOSIS — M25.551 RIGHT HIP PAIN: ICD-10-CM

## 2022-05-08 DIAGNOSIS — M87.00 AVASCULAR NECROSIS (HCC): Primary | ICD-10-CM

## 2022-05-08 LAB
ABSOLUTE EOS #: <0.03 K/UL (ref 0–0.44)
ABSOLUTE IMMATURE GRANULOCYTE: 0.25 K/UL (ref 0–0.3)
ABSOLUTE LYMPH #: 1.14 K/UL (ref 1.1–3.7)
ABSOLUTE MONO #: 0.51 K/UL (ref 0.1–1.2)
ANION GAP SERPL CALCULATED.3IONS-SCNC: 16 MMOL/L (ref 9–17)
BASOPHILS # BLD: 0 % (ref 0–2)
BASOPHILS ABSOLUTE: <0.03 K/UL (ref 0–0.2)
BUN BLDV-MCNC: 37 MG/DL (ref 6–20)
BUN/CREAT BLD: 12 (ref 9–20)
C-REACTIVE PROTEIN: 35.9 MG/L (ref 0–5)
CALCIUM SERPL-MCNC: 8.8 MG/DL (ref 8.6–10.4)
CHLORIDE BLD-SCNC: 97 MMOL/L (ref 98–107)
CO2: 20 MMOL/L (ref 20–31)
CREAT SERPL-MCNC: 3.14 MG/DL (ref 0.7–1.2)
EOSINOPHILS RELATIVE PERCENT: 0 % (ref 1–4)
GFR AFRICAN AMERICAN: 25 ML/MIN
GFR NON-AFRICAN AMERICAN: 21 ML/MIN
GFR SERPL CREATININE-BSD FRML MDRD: ABNORMAL ML/MIN/{1.73_M2}
GFR SERPL CREATININE-BSD FRML MDRD: ABNORMAL ML/MIN/{1.73_M2}
GLUCOSE BLD-MCNC: 100 MG/DL (ref 70–99)
HCT VFR BLD CALC: 31 % (ref 40.7–50.3)
HEMOGLOBIN: 9.8 G/DL (ref 13–17)
IMMATURE GRANULOCYTES: 2 %
LYMPHOCYTES # BLD: 10 % (ref 24–43)
MCH RBC QN AUTO: 32.9 PG (ref 25.2–33.5)
MCHC RBC AUTO-ENTMCNC: 31.6 G/DL (ref 28.4–34.8)
MCV RBC AUTO: 104 FL (ref 82.6–102.9)
MONOCYTES # BLD: 4 % (ref 3–12)
NRBC AUTOMATED: 0 PER 100 WBC
PDW BLD-RTO: 15.5 % (ref 11.8–14.4)
PLATELET # BLD: 114 K/UL (ref 138–453)
PMV BLD AUTO: 10.6 FL (ref 8.1–13.5)
POTASSIUM SERPL-SCNC: 3.9 MMOL/L (ref 3.7–5.3)
RBC # BLD: 2.98 M/UL (ref 4.21–5.77)
SEDIMENTATION RATE, ERYTHROCYTE: 35 MM/HR (ref 0–20)
SEG NEUTROPHILS: 84 % (ref 36–65)
SEGMENTED NEUTROPHILS ABSOLUTE COUNT: 9.62 K/UL (ref 1.5–8.1)
SODIUM BLD-SCNC: 133 MMOL/L (ref 135–144)
WBC # BLD: 11.6 K/UL (ref 3.5–11.3)

## 2022-05-08 PROCEDURE — 6360000002 HC RX W HCPCS: Performed by: NURSE PRACTITIONER

## 2022-05-08 PROCEDURE — 73502 X-RAY EXAM HIP UNI 2-3 VIEWS: CPT

## 2022-05-08 PROCEDURE — 96375 TX/PRO/DX INJ NEW DRUG ADDON: CPT

## 2022-05-08 PROCEDURE — 6370000000 HC RX 637 (ALT 250 FOR IP): Performed by: EMERGENCY MEDICINE

## 2022-05-08 PROCEDURE — 6360000002 HC RX W HCPCS: Performed by: EMERGENCY MEDICINE

## 2022-05-08 PROCEDURE — 99285 EMERGENCY DEPT VISIT HI MDM: CPT

## 2022-05-08 PROCEDURE — 86140 C-REACTIVE PROTEIN: CPT

## 2022-05-08 PROCEDURE — 96374 THER/PROPH/DIAG INJ IV PUSH: CPT

## 2022-05-08 PROCEDURE — 73700 CT LOWER EXTREMITY W/O DYE: CPT

## 2022-05-08 PROCEDURE — 96376 TX/PRO/DX INJ SAME DRUG ADON: CPT

## 2022-05-08 PROCEDURE — 80048 BASIC METABOLIC PNL TOTAL CA: CPT

## 2022-05-08 PROCEDURE — 85652 RBC SED RATE AUTOMATED: CPT

## 2022-05-08 PROCEDURE — 85025 COMPLETE CBC W/AUTO DIFF WBC: CPT

## 2022-05-08 PROCEDURE — 36415 COLL VENOUS BLD VENIPUNCTURE: CPT

## 2022-05-08 RX ORDER — HYDROCODONE BITARTRATE AND ACETAMINOPHEN 5; 325 MG/1; MG/1
1 TABLET ORAL EVERY 4 HOURS PRN
Status: DISCONTINUED | OUTPATIENT
Start: 2022-05-08 | End: 2022-05-09 | Stop reason: HOSPADM

## 2022-05-08 RX ORDER — MORPHINE SULFATE 4 MG/ML
4 INJECTION, SOLUTION INTRAMUSCULAR; INTRAVENOUS ONCE
Status: COMPLETED | OUTPATIENT
Start: 2022-05-08 | End: 2022-05-08

## 2022-05-08 RX ORDER — PANTOPRAZOLE SODIUM 20 MG/1
20 TABLET, DELAYED RELEASE ORAL 2 TIMES DAILY
COMMUNITY

## 2022-05-08 RX ADMIN — HYDROMORPHONE HYDROCHLORIDE 1 MG: 1 INJECTION, SOLUTION INTRAMUSCULAR; INTRAVENOUS; SUBCUTANEOUS at 20:30

## 2022-05-08 RX ADMIN — HYDROMORPHONE HYDROCHLORIDE 1 MG: 1 INJECTION, SOLUTION INTRAMUSCULAR; INTRAVENOUS; SUBCUTANEOUS at 22:35

## 2022-05-08 RX ADMIN — MORPHINE SULFATE 4 MG: 4 INJECTION, SOLUTION INTRAMUSCULAR; INTRAVENOUS at 19:28

## 2022-05-08 RX ADMIN — HYDROCODONE BITARTRATE AND ACETAMINOPHEN 1 TABLET: 5; 325 TABLET ORAL at 22:35

## 2022-05-08 ASSESSMENT — PAIN - FUNCTIONAL ASSESSMENT: PAIN_FUNCTIONAL_ASSESSMENT: 0-10

## 2022-05-08 ASSESSMENT — PAIN SCALES - GENERAL
PAINLEVEL_OUTOF10: 10
PAINLEVEL_OUTOF10: 9
PAINLEVEL_OUTOF10: 10

## 2022-05-08 ASSESSMENT — PAIN DESCRIPTION - LOCATION: LOCATION: HIP

## 2022-05-08 ASSESSMENT — PAIN DESCRIPTION - DESCRIPTORS: DESCRIPTORS: SHARP;STABBING

## 2022-05-08 ASSESSMENT — PAIN DESCRIPTION - ORIENTATION: ORIENTATION: RIGHT

## 2022-05-09 ENCOUNTER — HOSPITAL ENCOUNTER (INPATIENT)
Age: 52
LOS: 2 days | Discharge: HOME | DRG: 351 | End: 2022-05-11
Attending: INTERNAL MEDICINE | Admitting: INTERNAL MEDICINE
Payer: MEDICAID

## 2022-05-09 VITALS
SYSTOLIC BLOOD PRESSURE: 156 MMHG | DIASTOLIC BLOOD PRESSURE: 83 MMHG | BODY MASS INDEX: 31.92 KG/M2 | TEMPERATURE: 97.3 F | HEIGHT: 70 IN | HEART RATE: 68 BPM | RESPIRATION RATE: 17 BRPM | OXYGEN SATURATION: 96 % | WEIGHT: 223 LBS

## 2022-05-09 DIAGNOSIS — M87.00 AVN (AVASCULAR NECROSIS OF BONE) (HCC): Primary | ICD-10-CM

## 2022-05-09 PROBLEM — N18.6 END-STAGE RENAL DISEASE ON HEMODIALYSIS (HCC): Status: ACTIVE | Noted: 2022-04-04

## 2022-05-09 PROBLEM — F41.9 SEVERE ANXIETY: Status: ACTIVE | Noted: 2022-04-04

## 2022-05-09 PROBLEM — F17.210 NICOTINE DEPENDENCE, CIGARETTES, UNCOMPLICATED: Status: ACTIVE | Noted: 2021-09-14

## 2022-05-09 PROBLEM — M00.9 SEPTIC HIP (HCC): Status: ACTIVE | Noted: 2022-05-09

## 2022-05-09 PROBLEM — E11.22 TYPE 2 DIABETES MELLITUS WITH DIABETIC CHRONIC KIDNEY DISEASE (HCC): Status: ACTIVE | Noted: 2021-09-14

## 2022-05-09 PROBLEM — N18.4 CHRONIC KIDNEY DISEASE, STAGE 4 (SEVERE) (HCC): Status: ACTIVE | Noted: 2021-12-04

## 2022-05-09 PROBLEM — Z99.2 END-STAGE RENAL DISEASE ON HEMODIALYSIS (HCC): Status: ACTIVE | Noted: 2022-04-04

## 2022-05-09 PROBLEM — E66.9 OBESITY WITH SERIOUS COMORBIDITY: Status: ACTIVE | Noted: 2022-04-04

## 2022-05-09 PROBLEM — F32.9 MAJOR DEPRESSIVE DISORDER, SINGLE EPISODE, UNSPECIFIED: Status: ACTIVE | Noted: 2021-09-14

## 2022-05-09 PROBLEM — M10.9 GOUT: Status: ACTIVE | Noted: 2021-09-14

## 2022-05-09 LAB
ANION GAP SERPL CALCULATED.3IONS-SCNC: 11 MMOL/L (ref 9–17)
BUN BLDV-MCNC: 42 MG/DL (ref 6–20)
CALCIUM SERPL-MCNC: 8.7 MG/DL (ref 8.6–10.4)
CHLORIDE BLD-SCNC: 98 MMOL/L (ref 98–107)
CO2: 24 MMOL/L (ref 20–31)
CREAT SERPL-MCNC: 3.46 MG/DL (ref 0.7–1.2)
ESTIMATED AVERAGE GLUCOSE: 88 MG/DL
FERRITIN: 549 NG/ML (ref 30–400)
GFR AFRICAN AMERICAN: 23 ML/MIN
GFR NON-AFRICAN AMERICAN: 19 ML/MIN
GFR SERPL CREATININE-BSD FRML MDRD: ABNORMAL ML/MIN/{1.73_M2}
GLUCOSE BLD-MCNC: 79 MG/DL (ref 70–99)
GLUCOSE BLD-MCNC: 91 MG/DL (ref 75–110)
HBA1C MFR BLD: 4.7 % (ref 4–6)
HCT VFR BLD CALC: 28.2 % (ref 40.7–50.3)
HEMOGLOBIN: 9.2 G/DL (ref 13–17)
IRON SATURATION: 31 % (ref 20–55)
IRON: 67 UG/DL (ref 59–158)
MCH RBC QN AUTO: 33.6 PG (ref 25.2–33.5)
MCHC RBC AUTO-ENTMCNC: 32.6 G/DL (ref 28.4–34.8)
MCV RBC AUTO: 102.9 FL (ref 82.6–102.9)
NRBC AUTOMATED: 0 PER 100 WBC
PDW BLD-RTO: 15.4 % (ref 11.8–14.4)
PLATELET # BLD: 96 K/UL (ref 138–453)
PMV BLD AUTO: 10.4 FL (ref 8.1–13.5)
POTASSIUM SERPL-SCNC: 3.7 MMOL/L (ref 3.7–5.3)
RBC # BLD: 2.74 M/UL (ref 4.21–5.77)
SODIUM BLD-SCNC: 133 MMOL/L (ref 135–144)
TOTAL IRON BINDING CAPACITY: 216 UG/DL (ref 250–450)
UNSATURATED IRON BINDING CAPACITY: 149 UG/DL (ref 112–347)
WBC # BLD: 8.7 K/UL (ref 3.5–11.3)

## 2022-05-09 PROCEDURE — 5A1D70Z PERFORMANCE OF URINARY FILTRATION, INTERMITTENT, LESS THAN 6 HOURS PER DAY: ICD-10-PCS | Performed by: INTERNAL MEDICINE

## 2022-05-09 PROCEDURE — 6370000000 HC RX 637 (ALT 250 FOR IP): Performed by: CLINICAL NURSE SPECIALIST

## 2022-05-09 PROCEDURE — 82947 ASSAY GLUCOSE BLOOD QUANT: CPT

## 2022-05-09 PROCEDURE — 6360000002 HC RX W HCPCS: Performed by: INTERNAL MEDICINE

## 2022-05-09 PROCEDURE — 2580000003 HC RX 258: Performed by: STUDENT IN AN ORGANIZED HEALTH CARE EDUCATION/TRAINING PROGRAM

## 2022-05-09 PROCEDURE — 2580000003 HC RX 258: Performed by: CLINICAL NURSE SPECIALIST

## 2022-05-09 PROCEDURE — 99222 1ST HOSP IP/OBS MODERATE 55: CPT | Performed by: INTERNAL MEDICINE

## 2022-05-09 PROCEDURE — 99254 IP/OBS CNSLTJ NEW/EST MOD 60: CPT | Performed by: INTERNAL MEDICINE

## 2022-05-09 PROCEDURE — 80048 BASIC METABOLIC PNL TOTAL CA: CPT

## 2022-05-09 PROCEDURE — 6370000000 HC RX 637 (ALT 250 FOR IP): Performed by: INTERNAL MEDICINE

## 2022-05-09 PROCEDURE — 6360000002 HC RX W HCPCS: Performed by: STUDENT IN AN ORGANIZED HEALTH CARE EDUCATION/TRAINING PROGRAM

## 2022-05-09 PROCEDURE — 90935 HEMODIALYSIS ONE EVALUATION: CPT | Performed by: INTERNAL MEDICINE

## 2022-05-09 PROCEDURE — 6360000002 HC RX W HCPCS: Performed by: CLINICAL NURSE SPECIALIST

## 2022-05-09 PROCEDURE — 85027 COMPLETE CBC AUTOMATED: CPT

## 2022-05-09 PROCEDURE — APPSS30 APP SPLIT SHARED TIME 16-30 MINUTES: Performed by: NURSE PRACTITIONER

## 2022-05-09 PROCEDURE — 6360000002 HC RX W HCPCS: Performed by: EMERGENCY MEDICINE

## 2022-05-09 PROCEDURE — 1200000000 HC SEMI PRIVATE

## 2022-05-09 PROCEDURE — 83036 HEMOGLOBIN GLYCOSYLATED A1C: CPT

## 2022-05-09 PROCEDURE — 90935 HEMODIALYSIS ONE EVALUATION: CPT

## 2022-05-09 PROCEDURE — 99222 1ST HOSP IP/OBS MODERATE 55: CPT | Performed by: STUDENT IN AN ORGANIZED HEALTH CARE EDUCATION/TRAINING PROGRAM

## 2022-05-09 PROCEDURE — 83540 ASSAY OF IRON: CPT

## 2022-05-09 PROCEDURE — 99253 IP/OBS CNSLTJ NEW/EST LOW 45: CPT | Performed by: ORTHOPAEDIC SURGERY

## 2022-05-09 PROCEDURE — 6370000000 HC RX 637 (ALT 250 FOR IP): Performed by: EMERGENCY MEDICINE

## 2022-05-09 PROCEDURE — 83550 IRON BINDING TEST: CPT

## 2022-05-09 PROCEDURE — 82728 ASSAY OF FERRITIN: CPT

## 2022-05-09 PROCEDURE — 6370000000 HC RX 637 (ALT 250 FOR IP): Performed by: STUDENT IN AN ORGANIZED HEALTH CARE EDUCATION/TRAINING PROGRAM

## 2022-05-09 RX ORDER — OXYCODONE HYDROCHLORIDE 5 MG/1
10 TABLET ORAL EVERY 6 HOURS PRN
Status: DISCONTINUED | OUTPATIENT
Start: 2022-05-09 | End: 2022-05-10

## 2022-05-09 RX ORDER — CITALOPRAM 20 MG/1
40 TABLET ORAL DAILY
Status: DISCONTINUED | OUTPATIENT
Start: 2022-05-09 | End: 2022-05-11 | Stop reason: HOSPADM

## 2022-05-09 RX ORDER — ERGOCALCIFEROL 1.25 MG/1
50000 CAPSULE ORAL WEEKLY
Status: DISCONTINUED | OUTPATIENT
Start: 2022-05-09 | End: 2022-05-11 | Stop reason: HOSPADM

## 2022-05-09 RX ORDER — ACETAMINOPHEN 325 MG/1
650 TABLET ORAL EVERY 6 HOURS PRN
Status: DISCONTINUED | OUTPATIENT
Start: 2022-05-09 | End: 2022-05-11 | Stop reason: HOSPADM

## 2022-05-09 RX ORDER — MORPHINE SULFATE 2 MG/ML
2 INJECTION, SOLUTION INTRAMUSCULAR; INTRAVENOUS EVERY 4 HOURS PRN
Status: DISCONTINUED | OUTPATIENT
Start: 2022-05-09 | End: 2022-05-11 | Stop reason: HOSPADM

## 2022-05-09 RX ORDER — INSULIN LISPRO 100 [IU]/ML
0-6 INJECTION, SOLUTION INTRAVENOUS; SUBCUTANEOUS NIGHTLY
Status: DISCONTINUED | OUTPATIENT
Start: 2022-05-09 | End: 2022-05-11

## 2022-05-09 RX ORDER — PANTOPRAZOLE SODIUM 20 MG/1
20 TABLET, DELAYED RELEASE ORAL
Status: DISCONTINUED | OUTPATIENT
Start: 2022-05-09 | End: 2022-05-11 | Stop reason: HOSPADM

## 2022-05-09 RX ORDER — NIFEDIPINE 90 MG/1
90 TABLET, FILM COATED, EXTENDED RELEASE ORAL DAILY
Status: DISCONTINUED | OUTPATIENT
Start: 2022-05-09 | End: 2022-05-09 | Stop reason: RX

## 2022-05-09 RX ORDER — NICOTINE POLACRILEX 4 MG
15 LOZENGE BUCCAL PRN
Status: DISCONTINUED | OUTPATIENT
Start: 2022-05-09 | End: 2022-05-11 | Stop reason: HOSPADM

## 2022-05-09 RX ORDER — OXYCODONE HYDROCHLORIDE 5 MG/1
5 TABLET ORAL EVERY 4 HOURS PRN
Status: DISCONTINUED | OUTPATIENT
Start: 2022-05-09 | End: 2022-05-09

## 2022-05-09 RX ORDER — SODIUM CHLORIDE 0.9 % (FLUSH) 0.9 %
5-40 SYRINGE (ML) INJECTION EVERY 12 HOURS SCHEDULED
Status: DISCONTINUED | OUTPATIENT
Start: 2022-05-09 | End: 2022-05-11 | Stop reason: HOSPADM

## 2022-05-09 RX ORDER — CARVEDILOL 25 MG/1
25 TABLET ORAL 2 TIMES DAILY WITH MEALS
Status: DISCONTINUED | OUTPATIENT
Start: 2022-05-09 | End: 2022-05-11 | Stop reason: HOSPADM

## 2022-05-09 RX ORDER — DEXTROSE MONOHYDRATE 50 MG/ML
100 INJECTION, SOLUTION INTRAVENOUS PRN
Status: DISCONTINUED | OUTPATIENT
Start: 2022-05-09 | End: 2022-05-11 | Stop reason: HOSPADM

## 2022-05-09 RX ORDER — SODIUM CHLORIDE 0.9 % (FLUSH) 0.9 %
5-40 SYRINGE (ML) INJECTION PRN
Status: DISCONTINUED | OUTPATIENT
Start: 2022-05-09 | End: 2022-05-11 | Stop reason: HOSPADM

## 2022-05-09 RX ORDER — SODIUM CHLORIDE 0.9 % (FLUSH) 0.9 %
10 SYRINGE (ML) INJECTION PRN
Status: DISCONTINUED | OUTPATIENT
Start: 2022-05-09 | End: 2022-05-11 | Stop reason: HOSPADM

## 2022-05-09 RX ORDER — ONDANSETRON 4 MG/1
4 TABLET, ORALLY DISINTEGRATING ORAL EVERY 8 HOURS PRN
Status: DISCONTINUED | OUTPATIENT
Start: 2022-05-09 | End: 2022-05-11 | Stop reason: HOSPADM

## 2022-05-09 RX ORDER — POTASSIUM CHLORIDE 7.45 MG/ML
10 INJECTION INTRAVENOUS PRN
Status: DISCONTINUED | OUTPATIENT
Start: 2022-05-09 | End: 2022-05-09

## 2022-05-09 RX ORDER — SODIUM CHLORIDE 9 MG/ML
INJECTION, SOLUTION INTRAVENOUS PRN
Status: DISCONTINUED | OUTPATIENT
Start: 2022-05-09 | End: 2022-05-09 | Stop reason: SDUPTHER

## 2022-05-09 RX ORDER — POLYETHYLENE GLYCOL 3350 17 G/17G
17 POWDER, FOR SOLUTION ORAL DAILY PRN
Status: DISCONTINUED | OUTPATIENT
Start: 2022-05-09 | End: 2022-05-11 | Stop reason: HOSPADM

## 2022-05-09 RX ORDER — INSULIN LISPRO 100 [IU]/ML
0-12 INJECTION, SOLUTION INTRAVENOUS; SUBCUTANEOUS
Status: DISCONTINUED | OUTPATIENT
Start: 2022-05-09 | End: 2022-05-11

## 2022-05-09 RX ORDER — ALLOPURINOL 300 MG/1
300 TABLET ORAL DAILY
Status: DISCONTINUED | OUTPATIENT
Start: 2022-05-09 | End: 2022-05-11 | Stop reason: HOSPADM

## 2022-05-09 RX ORDER — DEXTROSE MONOHYDRATE 25 G/50ML
12.5 INJECTION, SOLUTION INTRAVENOUS PRN
Status: DISCONTINUED | OUTPATIENT
Start: 2022-05-09 | End: 2022-05-11 | Stop reason: HOSPADM

## 2022-05-09 RX ORDER — SODIUM CHLORIDE 0.9 % (FLUSH) 0.9 %
5-40 SYRINGE (ML) INJECTION EVERY 12 HOURS SCHEDULED
Status: DISCONTINUED | OUTPATIENT
Start: 2022-05-09 | End: 2022-05-09 | Stop reason: SDUPTHER

## 2022-05-09 RX ORDER — ONDANSETRON 2 MG/ML
4 INJECTION INTRAMUSCULAR; INTRAVENOUS EVERY 6 HOURS PRN
Status: DISCONTINUED | OUTPATIENT
Start: 2022-05-09 | End: 2022-05-11 | Stop reason: HOSPADM

## 2022-05-09 RX ORDER — METOLAZONE 2.5 MG/1
2.5 TABLET ORAL
Status: DISCONTINUED | OUTPATIENT
Start: 2022-05-09 | End: 2022-05-11 | Stop reason: HOSPADM

## 2022-05-09 RX ORDER — CLONIDINE HYDROCHLORIDE 0.1 MG/1
0.1 TABLET ORAL NIGHTLY
Status: DISCONTINUED | OUTPATIENT
Start: 2022-05-09 | End: 2022-05-11 | Stop reason: HOSPADM

## 2022-05-09 RX ORDER — HEPARIN SODIUM 1000 [USP'U]/ML
1600 INJECTION, SOLUTION INTRAVENOUS; SUBCUTANEOUS PRN
Status: DISCONTINUED | OUTPATIENT
Start: 2022-05-09 | End: 2022-05-11 | Stop reason: HOSPADM

## 2022-05-09 RX ORDER — HEPARIN SODIUM 1000 [USP'U]/ML
1700 INJECTION, SOLUTION INTRAVENOUS; SUBCUTANEOUS PRN
Status: DISCONTINUED | OUTPATIENT
Start: 2022-05-09 | End: 2022-05-11 | Stop reason: HOSPADM

## 2022-05-09 RX ORDER — SODIUM CHLORIDE 9 MG/ML
INJECTION, SOLUTION INTRAVENOUS PRN
Status: DISCONTINUED | OUTPATIENT
Start: 2022-05-09 | End: 2022-05-11 | Stop reason: HOSPADM

## 2022-05-09 RX ORDER — POTASSIUM CHLORIDE 20 MEQ/1
40 TABLET, EXTENDED RELEASE ORAL PRN
Status: DISCONTINUED | OUTPATIENT
Start: 2022-05-09 | End: 2022-05-09

## 2022-05-09 RX ORDER — LANOLIN ALCOHOL/MO/W.PET/CERES
325 CREAM (GRAM) TOPICAL
Status: DISCONTINUED | OUTPATIENT
Start: 2022-05-10 | End: 2022-05-11 | Stop reason: HOSPADM

## 2022-05-09 RX ORDER — ACETAMINOPHEN 650 MG/1
650 SUPPOSITORY RECTAL EVERY 6 HOURS PRN
Status: DISCONTINUED | OUTPATIENT
Start: 2022-05-09 | End: 2022-05-11 | Stop reason: HOSPADM

## 2022-05-09 RX ORDER — MAGNESIUM SULFATE 1 G/100ML
1000 INJECTION INTRAVENOUS PRN
Status: DISCONTINUED | OUTPATIENT
Start: 2022-05-09 | End: 2022-05-09

## 2022-05-09 RX ORDER — HEPARIN SODIUM 5000 [USP'U]/ML
5000 INJECTION, SOLUTION INTRAVENOUS; SUBCUTANEOUS EVERY 8 HOURS SCHEDULED
Status: DISCONTINUED | OUTPATIENT
Start: 2022-05-09 | End: 2022-05-11 | Stop reason: HOSPADM

## 2022-05-09 RX ORDER — LANOLIN ALCOHOL/MO/W.PET/CERES
400 CREAM (GRAM) TOPICAL DAILY
Status: DISCONTINUED | OUTPATIENT
Start: 2022-05-09 | End: 2022-05-10 | Stop reason: CLARIF

## 2022-05-09 RX ORDER — MORPHINE SULFATE 2 MG/ML
1 INJECTION, SOLUTION INTRAMUSCULAR; INTRAVENOUS EVERY 4 HOURS PRN
Status: DISCONTINUED | OUTPATIENT
Start: 2022-05-09 | End: 2022-05-09

## 2022-05-09 RX ADMIN — SODIUM CHLORIDE, PRESERVATIVE FREE 10 ML: 5 INJECTION INTRAVENOUS at 21:05

## 2022-05-09 RX ADMIN — OXYCODONE HYDROCHLORIDE 5 MG: 5 TABLET ORAL at 11:56

## 2022-05-09 RX ADMIN — HYDROMORPHONE HYDROCHLORIDE 1 MG: 1 INJECTION, SOLUTION INTRAMUSCULAR; INTRAVENOUS; SUBCUTANEOUS at 01:15

## 2022-05-09 RX ADMIN — ALLOPURINOL 300 MG: 300 TABLET ORAL at 16:54

## 2022-05-09 RX ADMIN — HYDROCODONE BITARTRATE AND ACETAMINOPHEN 1 TABLET: 5; 325 TABLET ORAL at 09:46

## 2022-05-09 RX ADMIN — VANCOMYCIN HYDROCHLORIDE 1500 MG: 10 INJECTION, POWDER, LYOPHILIZED, FOR SOLUTION INTRAVENOUS at 15:20

## 2022-05-09 RX ADMIN — HYDROMORPHONE HYDROCHLORIDE 1 MG: 1 INJECTION, SOLUTION INTRAMUSCULAR; INTRAVENOUS; SUBCUTANEOUS at 04:22

## 2022-05-09 RX ADMIN — HYDROCODONE BITARTRATE AND ACETAMINOPHEN 1 TABLET: 5; 325 TABLET ORAL at 03:15

## 2022-05-09 RX ADMIN — CLONIDINE HYDROCHLORIDE 0.1 MG: 0.1 TABLET ORAL at 21:05

## 2022-05-09 RX ADMIN — CITALOPRAM 40 MG: 20 TABLET, FILM COATED ORAL at 16:55

## 2022-05-09 RX ADMIN — ACETAMINOPHEN 650 MG: 325 TABLET ORAL at 11:57

## 2022-05-09 RX ADMIN — OXYCODONE HYDROCHLORIDE 10 MG: 5 TABLET ORAL at 22:20

## 2022-05-09 RX ADMIN — PANTOPRAZOLE SODIUM 20 MG: 20 TABLET, DELAYED RELEASE ORAL at 16:55

## 2022-05-09 RX ADMIN — ERGOCALCIFEROL 50000 UNITS: 1.25 CAPSULE ORAL at 16:54

## 2022-05-09 RX ADMIN — MORPHINE SULFATE 2 MG: 2 INJECTION, SOLUTION INTRAMUSCULAR; INTRAVENOUS at 21:05

## 2022-05-09 RX ADMIN — SODIUM CHLORIDE, PRESERVATIVE FREE 10 ML: 5 INJECTION INTRAVENOUS at 17:10

## 2022-05-09 RX ADMIN — HEPARIN SODIUM 5000 UNITS: 5000 INJECTION INTRAVENOUS; SUBCUTANEOUS at 17:07

## 2022-05-09 RX ADMIN — OXYCODONE HYDROCHLORIDE 5 MG: 5 TABLET ORAL at 16:53

## 2022-05-09 RX ADMIN — CARVEDILOL 25 MG: 25 TABLET, FILM COATED ORAL at 16:54

## 2022-05-09 RX ADMIN — FUROSEMIDE 60 MG: 40 TABLET ORAL at 16:55

## 2022-05-09 RX ADMIN — HEPARIN SODIUM 1700 UNITS: 1000 INJECTION INTRAVENOUS; SUBCUTANEOUS at 16:35

## 2022-05-09 RX ADMIN — MORPHINE SULFATE 1 MG: 2 INJECTION, SOLUTION INTRAMUSCULAR; INTRAVENOUS at 17:02

## 2022-05-09 RX ADMIN — HYDROMORPHONE HYDROCHLORIDE 1 MG: 1 INJECTION, SOLUTION INTRAMUSCULAR; INTRAVENOUS; SUBCUTANEOUS at 07:25

## 2022-05-09 RX ADMIN — MAGNESIUM GLUCONATE 500 MG ORAL TABLET 400 MG: 500 TABLET ORAL at 16:53

## 2022-05-09 RX ADMIN — METOLAZONE 2.5 MG: 2.5 TABLET ORAL at 17:09

## 2022-05-09 RX ADMIN — HEPARIN SODIUM 1600 UNITS: 1000 INJECTION INTRAVENOUS; SUBCUTANEOUS at 16:35

## 2022-05-09 ASSESSMENT — PAIN SCALES - GENERAL
PAINLEVEL_OUTOF10: 8
PAINLEVEL_OUTOF10: 10
PAINLEVEL_OUTOF10: 10
PAINLEVEL_OUTOF10: 9
PAINLEVEL_OUTOF10: 10
PAINLEVEL_OUTOF10: 9
PAINLEVEL_OUTOF10: 9
PAINLEVEL_OUTOF10: 7
PAINLEVEL_OUTOF10: 5
PAINLEVEL_OUTOF10: 9
PAINLEVEL_OUTOF10: 10
PAINLEVEL_OUTOF10: 9
PAINLEVEL_OUTOF10: 10
PAINLEVEL_OUTOF10: 9

## 2022-05-09 ASSESSMENT — PAIN DESCRIPTION - LOCATION
LOCATION: HIP

## 2022-05-09 ASSESSMENT — PAIN DESCRIPTION - DESCRIPTORS
DESCRIPTORS: SHARP;STABBING
DESCRIPTORS: SHOOTING;STABBING
DESCRIPTORS: SHARP;STABBING
DESCRIPTORS: SHARP;STABBING;SHOOTING

## 2022-05-09 ASSESSMENT — PAIN DESCRIPTION - PAIN TYPE
TYPE: ACUTE PAIN

## 2022-05-09 ASSESSMENT — PAIN DESCRIPTION - FREQUENCY
FREQUENCY: CONTINUOUS
FREQUENCY: CONTINUOUS

## 2022-05-09 ASSESSMENT — PAIN DESCRIPTION - ORIENTATION
ORIENTATION: RIGHT

## 2022-05-09 ASSESSMENT — PAIN - FUNCTIONAL ASSESSMENT: PAIN_FUNCTIONAL_ASSESSMENT: ACTIVITIES ARE NOT PREVENTED

## 2022-05-09 ASSESSMENT — PAIN DESCRIPTION - ONSET: ONSET: ON-GOING

## 2022-05-09 NOTE — PROGRESS NOTES
Dialysis Post Treatment Note  Vitals:    05/09/22 1636   BP: (!) 170/92   Pulse: 66   Resp: 19   Temp: 98.2 °F (36.8 °C)   SpO2:      Pre-Weight = 98.6kg  Post-weight = Weight: 217 lb (98.4 kg)  Total Liters Processed = Total Liters Processed (l/min): 79.4 l/min  Rinseback Volume (mL) = Rinseback Volume (ml): 300 ml  Net Removal (mL) = NET Removed (ml): 1100 ml  Patient's dry weight= 97.5kg  Type of access used= CVC  Length of treatment= 3.5    Pt tolerated the treatment well with 1.1L taken off. No distress pre, intra and post dialysis. No catheter issues. Vancomycin IV given. Transported back to room per stretcher.

## 2022-05-09 NOTE — CONSULTS
[facility] - Initial Consult Note  Today's Date and Time: 5/9/2022, 3:26 PM    Impression :   · Concern for possible right hip septic arthritis  · Avascular necrosis Rt hip  · ESRD on hemodialysis  · Diabetes mellitus type 2  · Gout  · Hypertension    Recommendations:   · Continue vancomycin pending finalized culture results    Medical Decision Making/Summary/Discussion:5/9/2022     ·   Infection Control Recommendations   · Pimento Precautions    Antimicrobial Stewardship Recommendations     · Simplification of therapy  · Targeted therapy  · PK dosing    Coordination of Outpatient Care:   · Estimated Length of IV antimicrobials:TBD  · Patient will need Midline Catheter Insertion: TBD  · Patient will need PICC line Insertion:TBD  · Patient will need: Home IV , [place],  SNF,  LTAC: TBD  · Patient will need outpatient wound care:TBD    Chief complaint/reason for consultation:   · Concern for septic hip      History of Present Illness:   Yaneth Camarillo is a 45y.o.-year-old male who was initially admitted on 5/9/2022. Patient seen at the request of Dr. David Moore:    This patient, with ESRD on hemodialysis, initially presented to [facility], ER on 5/4/2022 with pain of right hip pain x 1 week. He states that he fell approximately 3 weeks ago but denies any previous hip injuries. Imaging showed no acute findings. He was given pain medication and Decadron for suspected bursitis. CRP and ESR were elevated without leukocytosis, and he had a low-grade fever 100.5. He was discharged home, but presented again to [facility], ED on 5/8/2022 with the inability to walk due to the severity of the pain in the right hip. A CT of the right hip revealed avascular necrosis of the right femoral head with no evidence for subchondral collapse. The patient was transferred to [address] [place] [address] for higher level care.     Plan is for IR aspiration of right hip joint with cultures. The patient was initiated on IV vancomycin. Imaging/Diagnostics:  XR HIP RIGHT (2-3 VIEWS)     Result Date: 5/8/2022  No acute osseous abnormality.      XR CHEST PORTABLE     Result Date: 5/4/2022  Subtle ill-defined opacity in the periphery of the left mid lung zone, possibly  shadows. Recommend follow-up CT to exclude pulmonary nodule. .      CT HIP RIGHT WO CONTRAST     Result Date: 5/8/2022  1. No acute fracture. 2. Avascular necrosis of the right femoral head with no evidence for subchondral collapse. 3. Moderate-sized fat filled umbilical hernia with fluid and stranding present within the hernia sac. Correlate clinically to exclude incarcerated hernia.      XR HIP 2-3 VW W PELVIS RIGHT     Result Date: 5/4/2022  No acute abnormality of the hip. CURRENT EVALUATION 5/9/2022    Afebrile  Hypertension    The patient is alert and oriented on room air. He is undergoing hemodialysis. He is experiencing pain and weakness in his right lower extremity. No other acute issues noted at this time. IR to aspirate the hip joint on 5-10-22. Labs, X rays reviewed: 5/9/2022    BUN:42  Cr:3.46    WBC:11.6-->8.7  Hb:9.2  Plat: 9.6    CRP: 35.9    Cultures:  Urine:  ·   Blood:  ·   Sputum :  ·   Wound:  ·     Discussed with patient, RN, family. I have personally reviewed the past medical history, past surgical history, medications, social history, and family history, and I have updated the database accordingly.   Past Medical History:     Past Medical History:   Diagnosis Date    Chronic kidney disease     Depression     [facility] mellitus ([facility]     Dialysis patient ([facility]     Gout     Hypertension        Past Surgical  History:     Past Surgical History:   Procedure Laterality Date    ANKLE CLOSED REDUCTION Right 08/12/2019    ANKLE CLOSED REDUCTION performed by Rahel Leiva MD at [place] [id number] Right 08/13/2019    ANKLE OPEN REDUCTION INTERNAL FIXATION-TRIMELLAR FRACTURE performed by Cullen Nguyen DO at [facility] Left        Medications:      allopurinol  300 mg Oral Daily    carvedilol  25 mg Oral BID WC    citalopram  40 mg Oral Daily    cloNIDine  0.1 mg Oral Nightly    [START ON 5/10/2022] ferrous sulfate  325 mg Oral Daily with breakfast    furosemide  60 mg Oral Daily    magnesium oxide  400 mg Oral Daily    metOLazone  2.5 mg Oral Once per day on Mon Wed Fri    pantoprazole  20 mg Oral BID AC    vitamin D  50,000 Units Oral Weekly    heparin (porcine)  5,000 Units SubCUTAneous 3 times per day    sodium chloride flush  5-40 mL IntraVENous 2 times per day    insulin lispro  0-12 Units SubCUTAneous TID WC    insulin lispro  0-6 Units SubCUTAneous Nightly    vancomycin  1,500 mg IntraVENous Once    vancomycin (VANCOCIN) intermittent dosing (placeholder)   Other RX Placeholder       Social History:     Social History     Socioeconomic History    Marital status: Single     Spouse name: Not on file    Number of children: Not on file    Years of education: Not on file    Highest education level: Not on file   Occupational History    Not on file   Tobacco Use    Smoking status: Current Every Day Smoker     Packs/day: 0.50     Types: Cigarettes    Smokeless tobacco: Never Used   Vaping Use    Vaping Use: Never used   Substance and Sexual Activity    Alcohol use: Yes     Alcohol/week: 2.0 standard drinks     Types: 2 Standard drinks or equivalent per week     Comment: States that he cut back on alcohol 12/2021, use to drink 12 beers a night. Now drinks 2 hard seltzers a night. Patient states he had D/T 12/2021 when he limit his alcohol intake.     Drug use: Never    Sexual activity: Yes   Other Topics Concern    Not on file   Social History Narrative    Not on file     Social Determinants of Health     Financial Resource Strain:     Difficulty of Paying Living Expenses: Not on file   Food Insecurity:     Worried About Running Out of Food in the Last Year: Not on file    Ashly of Food in the Last Year: Not on file   Transportation Needs:     Lack of Transportation (Medical): Not on file    Lack of Transportation (Non-Medical): Not on file   Physical Activity:     Days of Exercise per Week: Not on file    Minutes of Exercise per Session: Not on file   Stress:     Feeling of Stress : Not on file   Social Connections:     Frequency of Communication with Friends and Family: Not on file    Frequency of Social Gatherings with Friends and Family: Not on file    Attends Taoism Services: Not on file    Active Member of [address] [place] [address] [employer] or Organizations: Not on file    Attends Club or Organization Meetings: Not on file    Marital Status: Not on file   Intimate Partner Violence:     Fear of Current or Ex-Partner: Not on file    Emotionally Abused: Not on file    Physically Abused: Not on file    Sexually Abused: Not on file   Housing Stability:     Unable to Pay for Housing in the Last Year: Not on file    Number of [place] in the Last Year: Not on file    Unstable Housing in the Last Year: Not on file       Family History:   No family history on file. Allergies:   Patient has no known allergies. Review of Systems:   Constitutional: No fevers or chills. No systemic complaints  Head: No headaches  Eyes: No double vision or blurry vision. No conjunctival inflammation. ENT: No sore throat or runny nose. . No hearing loss, tinnitus or vertigo. Cardiovascular: No chest pain or palpitations. No shortness of breath. No ROSARIO  Lung: No shortness of breath or cough. No sputum production  Abdomen: No nausea, vomiting, diarrhea, or abdominal pain. [place] No cramps. Genitourinary: No increased urinary frequency, or dysuria. No hematuria. No suprapubic or CVA pain  Musculoskeletal: Right hip pain  Hematologic: No bleeding or bruising. Neurologic: No headache, weakness, numbness, or tingling.   Integument: No rash, no ulcers. Psychiatric: No depression. Endocrine: No polyuria, no polydipsia, no polyphagia. Physical Examination :     Patient Vitals for the past 8 hrs:   BP Temp Pulse Resp SpO2 Weight   05/09/22 1458 (!) 180/99 -- 61 -- -- --   05/09/22 1434 (!) 167/103 -- 59 -- -- --   05/09/22 1402 (!) 162/93 -- 58 -- -- --   05/09/22 1326 (!) 157/95 -- 58 -- -- --   05/09/22 1300 (!) 158/102 -- 66 -- -- --   05/09/22 1252 (!) 164/96 97.8 °F (36.6 °C) 68 20 -- 217 lb 6 oz (98.6 kg)   05/09/22 1200 (!) 164/99 97.7 °F (36.5 °C) 68 20 99 % --     General Appearance: Awake, alert, and in no apparent distress  Head:  Normocephalic, no trauma  Eyes: Pupils equal, round, reactive to light and accommodation; extraocular movements intact; sclera anicteric; conjunctivae pink. No embolic phenomena. ENT: Oropharynx clear, without erythema, exudate, or thrush. No tenderness of sinuses. Mouth/throat: mucosa pink and moist. No lesions. Dentition in good repair. Neck:Supple, without lymphadenopathy. Thyroid normal, No bruits. Pulmonary/Chest: Clear to auscultation, without wheezes, rales, or rhonchi. No dullness to percussion. Cardiovascular: Regular rate and rhythm without murmurs, rubs, or gallops. Abdomen: Soft, non tender. Bowel sounds normal. No organomegaly  All four Extremities: No cyanosis, clubbing, edema, or effusions. Right lower extremity weakness  Neurologic: No gross sensory or motor deficits. Skin: Warm and dry with good turgor. No signs of peripheral arterial or venous insufficiency. No ulcerations. No open wounds.     Medical Decision Making -Laboratory:   I have independently reviewed/ordered the following labs:    CBC with Differential:   Recent Labs     05/08/22  1826 05/09/22  1349   WBC 11.6* 8.7   HGB 9.8* 9.2*   HCT 31.0* 28.2*   * 96*   LYMPHOPCT 10*  --    MONOPCT 4  --      BMP:   Recent Labs     05/08/22  1826 05/09/22  1349   * 133*   K 3.9 3.7   CL 97* 98   CO2 20 24   BUN 37* 42*   CREATININE 3.14* 3.46*     Hepatic Function Panel: No results for input(s): PROT, LABALBU, BILIDIR, IBILI, BILITOT, ALKPHOS, ALT, AST in the last 72 hours. No results for input(s): RPR in the last 72 hours. No results for input(s): HIV in the last 72 hours. No results for input(s): BC in the last 72 hours. Lab Results   Component Value Date    RBC 2.74 05/09/2022    WBC 8.7 05/09/2022    TURBIDITY Clear 05/04/2022     Lab Results   Component Value Date    CREATININE 3.46 05/09/2022    GLUCOSE 79 05/09/2022       Medical Decision Making-Imaging:     Narrative   EXAMINATION:   CT OF THE RIGHT HIP WITHOUT CONTRAST 5/8/2022 6:13 pm       TECHNIQUE:   CT of the right hip was performed without the administration of intravenous   contrast.  Multiplanar reformatted images are provided for review. Dose   modulation, iterative reconstruction, and/or weight based adjustment of the   mA/kV was utilized to reduce the radiation dose to as low as reasonably   achievable.       COMPARISON:   Right hip radiograph May 8, 2022; pelvis and right hip radiograph May 4, 2022       HISTORY   ORDERING SYSTEM PROVIDED HISTORY: pain   TECHNOLOGIST PROVIDED HISTORY:   pain   Decision Support Exception - unselect if not a suspected or confirmed   emergency medical condition->Emergency Medical Condition (MA)       FINDINGS:   Bones: No acute fracture of the pelvis or right hip identified.  Avascular   necrosis of the right femoral head with no evidence for subchondral collapse.    Remote healed fracture deformity of the right inferior pubic ramus.       Soft Tissue: Visualized intrapelvic structures demonstrate a moderate-sized   umbilical hernia containing fat and fluid with a small amount of stranding   present.  Correlate clinically to exclude the possibility of incarcerated   hernia.  Small amount of free fluid present within the pelvis.  Small   bilateral fat filled inguinal hernias.       Joint: Anatomic alignment of the hips with mild degenerative change present.           Impression   1. No acute fracture. 2. A vascular necrosis of the right femoral head with no evidence for   subchondral collapse. 3. Moderate-sized fat filled umbilical hernia with fluid and stranding   present within the hernia sac.  Correlate clinically to exclude incarcerated   hernia. Narrative   EXAMINATION:   TWO XRAY VIEWS OF THE RIGHT HIP       5/8/2022 2:29 pm       COMPARISON:   05/04/2022       HISTORY:   ORDERING SYSTEM PROVIDED HISTORY: hip pain   TECHNOLOGIST PROVIDED HISTORY:   hip pain       FINDINGS:   There is no evidence of acute fracture.  There is normal alignment.  No acute   joint abnormality.  No focal osseous lesion. No focal soft tissue abnormality.           Impression   No acute osseous abnormality.             Medical Decision [id number]:       Medical Decision Making-Other:     Note:  · Labs, medications, radiologic studies were reviewed with personal review of films  · Large amounts of data were reviewed  · Discussed with nursing Staff, Discharge planner  · Infection Control and Prevention measures reviewed  · All prior entries were reviewed  · Administer medications as ordered  · Prognosis: Good  · Discharge planning reviewed      Thank you for allowing us to participate in the care of this patient. Please call with questions. JAISON Valera - CNP     ATTESTATION:    I have discussed the case, including pertinent history and exam findings with the APRN. I have evaluated the  History, physical findings and pictures of the patient and the key elements of the encounter have been performed by me. I have reviewed the laboratory data, other diagnostic studies and discussed them with the APRN. I have updated the medical record where necessary. I agree with the assessment, plan and orders as documented by the APRN.     Catrina Gutierrez MD.      Pager: [phone] - Office: [phone]

## 2022-05-09 NOTE — CONSULTS
Orthopedic Surgery Consult  (Dr. Paolo Cowan)                   CC/Reason for consult:  Right hip pain rule out septic arthritis    HPI:    The patient is a 45 y.o. male who we are consulted on for evaluation management of right hip pain with concern for right hip septic arthritis. Patient states that he has had a 1 week history of right hip pain that is atraumatic in nature. He describes that he woke up in the morning and had acute pain to his right hip. He describes the pain as wrapping from the lateral portion of his hip into his right groin region. He notes that prior to 1 week ago he had no right hip pain. At baseline he ambulates without assistive devices. He states that right now it has been extremely difficult to ambulate and has been unable to do so for any considerable distance due to his right hip pain. He does have a history of a right ankle fracture that was treated surgically from which she has chronic dysesthesias from the ankle to the foot but otherwise denies any new onset of numbness or tingling to right lower extremity. Patient does have a past medical history significant for ESRD on hemodialysis, gout, diabetes, depression, and steroid use since January. He denies any generalized feelings of unwellness states that his primary concern is the pain to his right hip. I did see the patient while he was in the dialysis unit receiving treatment. Patient did have elevated inflammatory markers drawn yesterday with a CRP at 35.9 and ESR at 35 as well as a WBC of 11.6. Patient has remained afebrile since admission.     Past Medical History:    Past Medical History:   Diagnosis Date    Chronic kidney disease     Depression     Diabetes mellitus ([facility]     Dialysis patient ([facility]     Gout     Hypertension        Past Surgical History:    Past Surgical History:   Procedure Laterality Date    ANKLE CLOSED REDUCTION Right 08/12/2019    ANKLE CLOSED REDUCTION performed by Bella Wilcox MD at [address] [place] [address] ANKLE FRACTURE SURGERY Right 08/13/2019    ANKLE OPEN REDUCTION INTERNAL FIXATION-TRIMELLAR FRACTURE performed by Swathi Lemus DO at [facility] [address] Left        Medications Prior to Admission:   Prior to Admission medications    Medication Sig Start Date End Date Taking? Authorizing Provider   pantoprazole (PROTONIX) 20 MG tablet Take 20 mg by mouth in the morning and at bedtime    Historical Provider, MD   metOLazone (ZAROXOLYN) 2.5 MG tablet Take 2.5 mg by mouth three times a week    Historical Provider, MD   NIFEdipine (ADALAT CC) 90 MG extended release tablet Take 30 mg by mouth daily     Historical Provider, MD   cloNIDine (CATAPRES) 0.1 MG tablet Take 0.1 mg by mouth nightly     Historical Provider, MD   ferrous sulfate (IRON 325) 325 (65 Fe) MG tablet Take 325 mg by mouth daily (with breakfast)    Historical Provider, MD   vitamin D (ERGOCALCIFEROL) 1.25 MG (11824 UT) CAPS capsule Take 50,000 Units by mouth daily    Historical Provider, MD   magnesium oxide (MAG-OX) 400 MG tablet Take 400 mg by mouth daily    Historical Provider, MD   predniSONE (DELTASONE) 10 MG tablet Take 15 mg by mouth daily Unsure of dosage at appointment. Historical Provider, MD   furosemide (LASIX) 40 MG tablet Take 60 mg by mouth daily     Historical Provider, MD   carvedilol (COREG) 25 MG tablet Take 25 mg by mouth 2 times daily (with meals)    Historical Provider, MD   allopurinol (ZYLOPRIM) 300 MG tablet Take 300 mg by mouth daily    Historical Provider, MD   citalopram (CELEXA) 40 MG tablet Take 40 mg by mouth daily    Historical Provider, MD       Allergies:    Patient has no known allergies.     Social History:   Social History     Socioeconomic History    Marital status: Single     Spouse name: None    Number of children: None    Years of education: None    Highest education level: None   Occupational History    None   Tobacco Use    Smoking status: Current Every Day Smoker Packs/day: 0.50     Types: Cigarettes    Smokeless tobacco: Never Used   Vaping Use    Vaping Use: Never used   Substance and Sexual Activity    Alcohol use: Yes     Alcohol/week: 2.0 standard drinks     Types: 2 Standard drinks or equivalent per week     Comment: States that he cut back on alcohol 12/2021, use to drink 12 beers a night. Now drinks 2 hard seltzers a night. Patient states he had D/T 12/2021 when he limit his alcohol intake.  Drug use: Never    Sexual activity: Yes   Other Topics Concern    None   Social History Narrative    None     Social Determinants of Health     Financial Resource Strain:     Difficulty of Paying Living Expenses: Not on file   Food Insecurity:     Worried About Running Out of Food in the Last Year: Not on file    Ashly of Food in the Last Year: Not on file   Transportation Needs:     Lack of Transportation (Medical): Not on file    Lack of Transportation (Non-Medical): Not on file   Physical Activity:     Days of Exercise per Week: Not on file    Minutes of Exercise per Session: Not on file   Stress:     Feeling of Stress : Not on file   Social Connections:     Frequency of Communication with Friends and Family: Not on file    Frequency of Social Gatherings with Friends and Family: Not on file    Attends Catholic Services: Not on file    Active Member of [address] [place] [address] [employer] or Organizations: Not on file    Attends Club or Organization Meetings: Not on file    Marital Status: Not on file   Intimate Partner Violence:     Fear of Current or Ex-Partner: Not on file    Emotionally Abused: Not on file    Physically Abused: Not on file    Sexually Abused: Not on file   Housing Stability:     Unable to Pay for Housing in the Last Year: Not on file    Number of [place] in the Last Year: Not on file    Unstable Housing in the Last Year: Not on file       Family History:  No family history on file. REVIEW OF SYSTEMS:    General: Negative for fever and chills. Cardiovascular: Negative for chest pain and palpitations. Musculoskeletal: Positive for right hip pain. See HPI   Neurological: Positive for numbness of the right ankle/few which is chronic. 10 remaining systems reviewed and negative    PHYSICAL EXAM:  BP (!) 175/93   Pulse 60   Temp 97.8 °F (36.6 °C)   Resp 20   Wt 217 lb 6 oz (98.6 kg)   SpO2 99%   BMI 31.64 kg/m²     Gen: AAOx3, NAD, cooperative   Head: Normocephalic, atraumatic   Chest: Non labored breathing  Cardiovascular: Regular rate  Respiratory: Chest symmetric, no accessory muscle use, normal respirations    RLE: No open wounds or lacerations noted to the RLE. No ecchymosis or deformities. Skin intact. Nontender to palpation to lateral trochanteric region of the hip. Nontender to palpation about the knee/ankle. Compartments soft and compressible. EHL/FHL/TA/GSC gross motor intact. Baseline dysesthesias in the superficial/deep peroneal and plantar nerve distribution with remaining sural and saphenous nerve distribution sensation intact light touch. Foot and toes warm and well-perfused w/ BCR; DP pulses 2+. Patient tolerated logroll examination with pain at maximal internal and external rotation. Patient demonstrated relief of pain with Eliverto Leech test as I flexed the hip to 90 degrees. He did have pain with attempted FADIR test.    LABS:  Recent Labs     05/08/22  1826 05/08/22  1826 05/09/22  1349   WBC 11.6*   < > 8.7   HGB 9.8*   < > 9.2*   HCT 31.0*   < > 28.2*   *   < > 96*   *   < > 133*   K 3.9   < > 3.7   BUN 37*   < > 42*   CREATININE 3.14*   < > 3.46*   GLUCOSE 100*   < > 79   SEDRATE 35*  --   --    CRP 35.9*  --   --     < > = values in this interval not displayed. Radiology:   X-ray and CT imaging of the right hip were reviewed which demonstrate no acute fractures, dislocations, or subluxations. Concern for chondrocalcinosis of the right hip noted.   Avascular necrosis noted of the right femoral head with no evidence of any subchondral collapse. A/P: 45 y.o. male being seen for right hip pain with concern for right hip septic arthritis    -Patient does have elevated inflammatory markers with significant right hip pain that limits his ability to mobilize. Patient does also have a history of gout with concerns for chondrocalcinosis on the CT scan. At this point we recommend IR aspiration of the right hip for elucidation of any possible septic etiology.   This was discussed with the patient at bedside.  -Weight bearing: WBAT RLE  -NPO   -Trend CRP  -F/u IR aspiration results  -Will want formal cell count, Gram stain, anaerobic/aerobic culture, and crystal analysis of the aspiration results of the right hip  -Medicine team primary  -Infectious disease team consulted for assistance  -Nephrology on board for hemodialysis  -Pain control per primary  -DVT: Managed per primary  -Ice for pain/swelling  -Please page Ortho with any questions or concerns    Shauna Luciano DO,   PGY-3 Orthopedic Surgery  4:31 PM 5/9/2022

## 2022-05-09 NOTE — H&P
[facility]  Office: [address], DO, Thomas Katz, DO, Tamera Samuels, DO, Shelli Capps, DO, Gina Martines MD, Annabella Prater MD, Yogi Pineda MD, Vickie Busby MD, Ludy Diaz MD, Cely Gaspar MD, Marcus See MD, Katlyn Cardona, DO, Ritesh Sensor, DO, Laci Bailey MD,  Alicia Nava DO, Aimee Montague MD, Hector Montanez MD, Jenny Aldana MD, Jeannine Denise DO, Lianne James MD, Henry Rojas MD, Ashlee Singh MD, Thiago [facility], [facility], CNP, Li Wilburn, CNP, Elena Colorado, CNP, Khoa Murray, CNP, Keven Cunningham, CNP, Tereso Willett PA-C, Flower Evans, DNP, Mango Gonzalez, CNP, Marina Sage, CNP, Migue Dailey, CNP, Francheska Hall, CNS, Faye German, [facility], Zari Bender, CNP, Neda Avilez, CNP, Reji Godoy, [facility]    HISTORY AND PHYSICAL EXAMINATION            Date:   5/9/2022  Patient name:  Luciana Sheppard  Date of admission:  5/9/2022 11:12 AM  MRN:   [id number]  Account:  [de-identified]  YOB: 1970  PCP:    Danyel Edmondson [facility]  Room:   [facility]/0330-  Code Status:    Full Code    Chief Complaint:     Right hip pain    History Obtained From:     patient, electronic medical record    History of Present Illness:     Luciana Sheppard is a 45 y.o.  /  male who presents with No chief complaint on file. and is admitted to the hospital for the management of Septic hip ([facility] [address] 21-year-old male past medical history of hypertension, end-stage renal disease, gout, seasonal allergies, history of alcoholic cardiomyopathy, depression, smoker, type 2 diabetes, BPH presents with right hip pain and decreased range of motion and unable to stand for the past week. Patient states that he noticed on Monday when he went to stand up he was able to tolerate some weight however had been decreasing over the day and worse on Tuesday.   Patient states that he was still able to obtain his Monday Wednesday Friday dialysis sessions however required using a wheelchair and hospital bed for ambulation and sitting. Patient admits to 1 episode of fever which occurred on 5/4/2022. Past Medical History:     Past Medical History:   Diagnosis Date    Chronic kidney disease     Depression     Diabetes mellitus ([facility] [address]     Dialysis patient ([facility] [address]     Gout     Hypertension         Past Surgical History:     Past Surgical History:   Procedure Laterality Date    ANKLE CLOSED REDUCTION Right 08/12/2019    ANKLE CLOSED REDUCTION performed by Maynor Lou MD at [facility] Right 08/13/2019    ANKLE OPEN REDUCTION INTERNAL FIXATION-TRIMELLAR FRACTURE performed by Leydi Harper DO at [place] [address] Left         Medications Prior to Admission:     Prior to Admission medications    Medication Sig Start Date End Date Taking? Authorizing Provider   pantoprazole (PROTONIX) 20 MG tablet Take 20 mg by mouth in the morning and at bedtime    Historical Provider, MD   metOLazone (ZAROXOLYN) 2.5 MG tablet Take 2.5 mg by mouth three times a week    Historical Provider, MD   NIFEdipine (ADALAT CC) 90 MG extended release tablet Take 30 mg by mouth daily     Historical Provider, MD   cloNIDine (CATAPRES) 0.1 MG tablet Take 0.1 mg by mouth nightly     Historical Provider, MD   ferrous sulfate (IRON 325) 325 (65 Fe) MG tablet Take 325 mg by mouth daily (with breakfast)    Historical Provider, MD   vitamin D (ERGOCALCIFEROL) 1.25 MG (48058 UT) CAPS capsule Take 50,000 Units by mouth daily    Historical Provider, MD   magnesium oxide (MAG-OX) 400 MG tablet Take 400 mg by mouth daily    Historical Provider, MD   predniSONE (DELTASONE) 10 MG tablet Take 15 mg by mouth daily Unsure of dosage at appointment.      Historical Provider, MD   furosemide (LASIX) 40 MG tablet Take 60 mg by mouth daily     Historical Provider, MD   carvedilol (COREG) 25 MG tablet Take 25 mg by mouth 2 times daily (with meals)    Historical Provider, MD   allopurinol (ZYLOPRIM) 300 MG tablet Take 300 mg by mouth daily    Historical Provider, MD   citalopram (CELEXA) 40 MG tablet Take 40 mg by mouth daily    Historical Provider, MD        Allergies:     Patient has no known allergies. Social History:     Tobacco:    reports that he has been smoking cigarettes. He has been smoking about 0.50 packs per day. He has never used smokeless tobacco.  Alcohol:      reports current alcohol use of about 2.0 standard drinks of alcohol per week. Drug Use:  reports no history of drug use. Family History:     Family history of coronary artery disease    Review of Systems:     Positive and Negative as described in HPI.     CONSTITUTIONAL:  negative for fevers, chills, sweats, fatigue, weight loss  HEENT:  negative for vision, hearing changes, runny nose, throat pain  RESPIRATORY:  negative for shortness of breath, cough, congestion, wheezing  CARDIOVASCULAR:  negative for chest pain, palpitations  GASTROINTESTINAL:  negative for nausea, vomiting, diarrhea, constipation, change in bowel habits, abdominal pain   GENITOURINARY:  negative for difficulty of urination, burning with urination, frequency   INTEGUMENT:  negative for rash, skin lesions, easy bruising   HEMATOLOGIC/LYMPHATIC:  negative for swelling/edema   ALLERGIC/IMMUNOLOGIC:  negative for urticaria , itching  ENDOCRINE:  negative increase in drinking, increase in urination, hot or cold intolerance  MUSCULOSKELETAL:  negative joint pains, muscle aches, swelling of joints  NEUROLOGICAL:  negative for headaches, dizziness, lightheadedness, numbness, pain, tingling extremities  BEHAVIOR/PSYCH:  negative for depression, anxiety    Physical Exam:   BP (!) 164/99   Pulse 68   Temp 97.7 °F (36.5 °C)   Resp 20   SpO2 99%   Temp (24hrs), Av.6 °F (36.4 °C), Min:97.3 °F (36.3 °C), Max:97.8 °F (36.6 °C)    No results for input(s): ELLA in the last 72 hours.   No intake or output data in the 24 hours ending 05/09/22 1201    General Appearance: alert, chronically ill-appearing  Mental status: oriented to person, place, and time  Head: normocephalic, atraumatic  Eye: no icterus, redness, pupils equal and reactive, extraocular eye movements intact, conjunctiva clear  Ear: normal external ear, no discharge, hearing intact  Nose: no drainage noted  Mouth: mucous membranes moist  Neck: supple, no carotid bruits, thyroid not palpable  Lungs: Bilateral equal air entry, clear to ausculation, no wheezing, rales or rhonchi, normal effort  Cardiovascular: normal rate, regular rhythm, no murmur, gallop, rub  Abdomen: Soft, nontender, nondistended, normal bowel sounds, no hepatomegaly or splenomegaly  Neurologic: There are no new focal motor or sensory deficits, normal muscle tone and bulk, no abnormal sensation, normal speech, cranial nerves II through XII grossly intact  Skin: No gross lesions, rashes, bruising or bleeding on exposed skin area  Extremities: Pulses intact and symmetric in lower extremities, decreased range of motion in hip unable to flex more than 10 degrees with great pain  Psych: normal affect    Investigations:      Laboratory Testing:  Recent Results (from the past 24 hour(s))   CBC with Auto Differential    Collection Time: 05/08/22  6:26 PM   Result Value Ref Range    WBC 11.6 (H) 3.5 - 11.3 k/uL    RBC 2.98 (L) 4.21 - 5.77 m/uL    Hemoglobin 9.8 (L) 13.0 - 17.0 g/dL    Hematocrit 31.0 (L) 40.7 - 50.3 %    .0 (H) 82.6 - 102.9 fL    MCH 32.9 25.2 - 33.5 pg    MCHC 31.6 28.4 - 34.8 g/dL    RDW 15.5 (H) 11.8 - 14.4 %    Platelets 703 (L) 088 - 453 k/uL    MPV 10.6 8.1 - 13.5 fL    NRBC Automated 0.0 0.0 per 100 WBC    Seg Neutrophils 84 (H) 36 - 65 %    Lymphocytes 10 (L) 24 - 43 %    Monocytes 4 3 - 12 %    Eosinophils % 0 (L) 1 - 4 %    Basophils 0 0 - 2 %    Immature Granulocytes 2 (H) 0 %    Segs Absolute 9.62 (H) 1.50 - 8.10 k/uL Absolute Lymph # 1.14 1.10 - 3.70 k/uL    Absolute Mono # 0.51 0.10 - 1.20 k/uL    Absolute Eos # <0.03 0.00 - 0.44 k/uL    Basophils Absolute <0.03 0.00 - 0.20 k/uL    Absolute Immature Granulocyte 0.25 0.00 - 0.30 k/uL   Basic Metabolic Panel w/ Reflex to MG    Collection Time: 05/08/22  6:26 PM   Result Value Ref Range    Glucose 100 (H) 70 - 99 mg/dL    BUN 37 (H) 6 - 20 mg/dL    CREATININE 3.14 (H) 0.70 - 1.20 mg/dL    Bun/Cre Ratio 12 9 - 20    Calcium 8.8 8.6 - 10.4 mg/dL    Sodium 133 (L) 135 - 144 mmol/L    Potassium 3.9 3.7 - 5.3 mmol/L    Chloride 97 (L) 98 - 107 mmol/L    CO2 20 20 - 31 mmol/L    Anion Gap 16 9 - 17 mmol/L    GFR Non-African American 21 (L) >60 mL/min    GFR  25 (L) >60 mL/min    GFR Comment          GFR Staging         C-Reactive Protein    Collection Time: 05/08/22  6:26 PM   Result Value Ref Range    CRP 35.9 (H) 0.0 - 5.0 mg/L   Sedimentation Rate    Collection Time: 05/08/22  6:26 PM   Result Value Ref Range    Sed Rate 35 (H) 0 - 20 mm/Hr       Imaging/Diagnostics:  XR HIP RIGHT (2-3 VIEWS)    Result Date: 5/8/2022  No acute osseous abnormality. XR CHEST PORTABLE    Result Date: 5/4/2022  Subtle ill-defined opacity in the periphery of the left mid lung zone, possibly  shadows. Recommend follow-up CT to exclude pulmonary nodule. .     CT HIP RIGHT WO CONTRAST    Result Date: 5/8/2022  1. No acute fracture. 2. A vascular necrosis of the right femoral head with no evidence for subchondral collapse. 3. Moderate-sized fat filled umbilical hernia with fluid and stranding present within the hernia sac. Correlate clinically to exclude incarcerated hernia. XR HIP 2-3 VW W PELVIS RIGHT    Result Date: 5/4/2022  No acute abnormality of the hip.        Assessment :      Hospital Problems           Last Modified POA    * (Principal) Septic hip ([address] 5/9/2022 Yes    Chronic kidney disease, stage 4 (severe) ([facility] [address] 5/9/2022 Yes    Type 2 diabetes mellitus with diabetic chronic kidney disease ([facility] [address] 5/9/2022 Yes    Severe anxiety 5/9/2022 Yes    Overview Signed 5/9/2022 11:55 AM by Dolores Chaves MD     Last Assessment & Plan:   Formatting of this note is different from the original.  Condition: symptomatic, GURU-7: 15    Educated member on: ? Informing and correcting misconceptions regarding anxiety, worry, and associated symptoms  ? Causative factors of pathological worry and anxiety    Follow up in: if symptoms worsen or fail to improve with pcp         Obesity with serious comorbidity 5/9/2022 Yes    Overview Signed 5/9/2022 11:55 AM by Dolores Chaves MD     Last Assessment & Plan:   Formatting of this note might be different from the original.  Condition: symptomatic     Discussed healthy weight, the importance of diet and exercise. Follow up in: one month with pcp         Nicotine dependence, cigarettes, uncomplicated 1/9/5061 Yes    Major depressive disorder, single episode, unspecified 5/9/2022 Yes    Gout 5/9/2022 Yes    Overview Signed 5/9/2022 11:55 AM by Dolores Chaves MD     Last Assessment & Plan:   Formatting of this note might be different from the original.  Condition: symptomatic     Educated member to take medication as prescribed. Educated member to avoid organ meats and alcohol.     Follow up in: if symptoms worsen or fail to improve with pcp         [facility] 5/9/2022 Yes    Overview Signed 5/9/2022 11:55 AM by Dolores Chaves MD     Last Assessment & Plan:   Formatting of this note might be different from the original.  Condition: symptomatic, MWF 7:15am-10:45am    Educates member to follow Nephrology recommendations    Follow up in: if symptoms worsen or fail to improve with pcp         Alcohol abuse 5/9/2022 Yes    HTN (hypertension) 5/9/2022 Yes    Depression 5/9/2022 Yes    Tobacco abuse 5/9/2022 Yes          Plan:     Patient status inpatient in the Med/Surge    1. cocnern for right septic hip. Appreicate ortho and IR assistance. Plan for aspiration of joint today with IR, start vancomycin. ID conuslted as well  2. [facility] dialysis session. Tenisha takes. Corege, catapres, metolazone, nifedipine  3. GERD. Protonix BID  4. Encourage smoking cessation  5. Encourage EtOH abstinence  6. Gout allopurinol  7. Roxicodone for pain  8. Diabetes. ISS, hypoglycemia protocol  9. Anemia of chronic disease suspected follow up labs  10. Depression. celexa  11. Heparin DVT prophylaxis  12. Will plan for PT and OT after ortho recommendations    Consultations:   IP CONSULT TO INTERVENTIONAL RADIOLOGY  IP CONSULT TO ORTHOPEDIC SURGERY  IP CONSULT TO NEPHROLOGY  IP CONSULT TO INFECTIOUS DISEASES  PHARMACY TO DOSE VANCOMYCIN     Patient is admitted as inpatient status because of co-morbidities listed above, severity of signs and symptoms as outlined, requirement for current medical therapies and most importantly because of direct risk to patient if care not provided in a hospital setting. Expected length of stay > 48 hours.     Carolyn Ca MD  5/9/2022  12:01 PM    Copy sent to Dr. Mee Lo, Sr, DO

## 2022-05-09 NOTE — PROGRESS NOTES
PHARMACY NOTE:    The electrolyte replacement protocol for magnesium has been discontinued per P&T guidelines because the patient has reduced renal function (CrCl < 30 mL/min). The patient's most recent potassium & magnesium levels are:  Recent Labs     05/08/22  1826 05/09/22  1349   K 3.9 3.7     Estimated Creatinine Clearance: 29 mL/min (A) (based on SCr of 3.46 mg/dL (H)). ESRD on  HD    For patients with decreased renal function (below 30ml/min) needing magnesium supplementation, please order individual bolus doses with appropriate monitoring. Please contact the inpatient pharmacy with any concerns. Thank you.   Joanna Saravia, LuisD, CARLOS, BCPS 5/9/2022 4:32 PM

## 2022-05-09 NOTE — ED NOTES
Patient accepted at [facility]'s by hospitalist      Nimesh York  05/08/22 1278

## 2022-05-09 NOTE — PROGRESS NOTES
[address] [facility] Pharmacokinetic Monitoring Service - Vancomycin    Diana Duran is a 45 y.o. male starting on vancomycin therapy for suspected bone and joint infection. Pharmacy consulted by Dr. Teresita Russo for monitoring and adjustment. Target Concentration:  Pre-HD concentration 15-25  Additional Antimicrobials: none    Pertinent Laboratory Values: Wt Readings from Last 1 Encounters:   05/08/22 223 lb (101.2 kg)     Temp Readings from Last 1 Encounters:   05/09/22 97.7 °F (36.5 °C)     Recent Labs     05/08/22  1826   CREATININE 3.14*   WBC 11.6*       Plan:  Concentration-guided dosing due to renal impairment and intermittent hemodialysis   Start vancomycin 1500 mg IV x 1 dose now as a loading dose, future doses to be based on HD schedule.   Pharmacy will continue to monitor patient and adjust therapy as indicated    Thank you for the consult,  Bairon Calvo [facility]  5/9/2022 12:23 PM

## 2022-05-09 NOTE — PROGRESS NOTES
Occupational [address]  Occupational Therapy Not Seen Note    DATE: 2022    NAME: Jewel Frost  MRN: [id number]   : 1970      Patient not seen this date for Occupational Therapy due to: Off the floor for Hemodialysis. OT will re-attempt at later time / date as appropriate.         Electronically signed by Carina Paz OT on 2022 at 2:53 PM

## 2022-05-09 NOTE — PROGRESS NOTES
Patient was seen and examined on HD at bedside. Orders were confirmed with the HD nurse. Tolerating the procedure well. Patient normally gets dialysis at [address] [facility] HD unit under Dr. Olivia Nichols as per [facility] schedule. Norman Park MD  [facility]     This note is created with the assistance of a speech-recognition program. While intending to generate a document that actually reflects the content of the visit, no guarantees can be provided that every mistake has been identified and corrected by editing.

## 2022-05-09 NOTE — ED PROVIDER NOTES
[facility] Emergency Department ED  EMERGENCY DEPARTMENT ENCOUNTER      Pt Name: Gisel Mendez  MRN: [id number]  [place] 1970  Date of evaluation: 5/8/2022  Provider: JAISON Santiago CNP    CHIEF COMPLAINT       Chief Complaint   Patient presents with    Extremity Weakness     unable to walk due to pain in right hip, previous dx of bursitis         HISTORY OF PRESENT ILLNESS   (Location/Symptom, Timing/Onset, Context/Setting, Quality, Duration, Modifying Factors, Severity)  Note limiting factors. Gisel Mendez is a 45 y.o. male who presents to the emergency department with complaints of persistent right hip pain x1 week. Patient was seen and evaluated several days ago in the emergency department with plain film images of the right hip and discharged home. He presents today as he is unable to ambulate or put pressure on the right lower extremity. He denies fever. Reports no decrease in p.o. intake appetite difficulty swallowing. No chest pain, cough or difficulty breathing. Denies any abdominal pain, vomiting, diarrhea or dysuria. HPI    Nursing Notes were reviewed. REVIEW OF SYSTEMS    (2-9 systems for level 4, 10 or more for level 5)     Review of Systems    Except as noted above the remainder of the review of systems was reviewed and negative.        PAST MEDICAL HISTORY     Past Medical History:   Diagnosis Date    Chronic kidney disease     Depression     [facility] mellitus [facility]     Dialysis patient ([facility]     Gout     Hypertension          SURGICAL HISTORY       Past Surgical History:   Procedure Laterality Date    ANKLE CLOSED REDUCTION Right 08/12/2019    ANKLE CLOSED REDUCTION performed by Colletta Jaegers, MD at [facility] 25 Right 08/13/2019    ANKLE OPEN REDUCTION INTERNAL FIXATION-TRIMELLAR FRACTURE performed by Tejas Root DO at [facility] 87 Left          CURRENT MEDICATIONS       Previous Medications    ALLOPURINOL (ZYLOPRIM) 300 MG TABLET    Take 300 mg by mouth daily    CARVEDILOL (COREG) 25 MG TABLET    Take 25 mg by mouth 2 times daily (with meals)    CITALOPRAM (CELEXA) 40 MG TABLET    Take 40 mg by mouth daily    CLONIDINE (CATAPRES) 0.1 MG TABLET    Take 0.1 mg by mouth nightly     FERROUS SULFATE (IRON 325) 325 (65 FE) MG TABLET    Take 325 mg by mouth daily (with breakfast)    FUROSEMIDE (LASIX) 40 MG TABLET    Take 60 mg by mouth daily     MAGNESIUM OXIDE (MAG-OX) 400 MG TABLET    Take 400 mg by mouth daily    METOLAZONE (ZAROXOLYN) 2.5 MG TABLET    Take 2.5 mg by mouth three times a week    NIFEDIPINE (ADALAT CC) 90 MG EXTENDED RELEASE TABLET    Take 30 mg by mouth daily     PANTOPRAZOLE (PROTONIX) 20 MG TABLET    Take 20 mg by mouth in the morning and at bedtime    PREDNISONE (DELTASONE) 10 MG TABLET    Take 15 mg by mouth daily Unsure of dosage at appointment. VITAMIN D (ERGOCALCIFEROL) 1.25 MG (39593 UT) CAPS CAPSULE    Take 50,000 Units by mouth daily       ALLERGIES     Patient has no known allergies. FAMILY HISTORY     History reviewed. No pertinent family history. SOCIAL HISTORY       Social History     Socioeconomic History    Marital status: Single     Spouse name: None    Number of children: None    Years of education: None    Highest education level: None   Occupational History    None   Tobacco Use    Smoking status: Current Every Day Smoker     Packs/day: 0.50     Types: Cigarettes    Smokeless tobacco: Never Used   Vaping Use    Vaping Use: Never used   Substance and Sexual Activity    Alcohol use:  Yes     Alcohol/week: 84.0 standard drinks     Types: 84 Cans of beer per week    Drug use: Never    Sexual activity: Yes   Other Topics Concern    None   Social History Narrative    None     Social Determinants of Health     Financial Resource Strain:     Difficulty of Paying Living Expenses: Not on file   Food Insecurity:     Worried About Running Out of Food in the Last Year: Not on file    Ran Out of Food in the Last Year: Not on file   Transportation Needs:     Lack of Transportation (Medical): Not on file    Lack of Transportation (Non-Medical): Not on file   Physical Activity:     Days of Exercise per Week: Not on file    Minutes of Exercise per Session: Not on file   Stress:     Feeling of Stress : Not on file   Social Connections:     Frequency of Communication with Friends and Family: Not on file    Frequency of Social Gatherings with Friends and Family: Not on file    Attends Adventism Services: Not on file    Active Member of [address] [place] [address] [employer] or Organizations: Not on file    Attends Club or Organization Meetings: Not on file    Marital Status: Not on file   Intimate Partner Violence:     Fear of Current or Ex-Partner: Not on file    Emotionally Abused: Not on file    Physically Abused: Not on file    Sexually Abused: Not on file   Housing Stability:     Unable to Pay for Housing in the Last Year: Not on file    Number of [place] in the Last Year: Not on file    Unstable Housing in the Last Year: Not on file       SCREENINGS         Anat Coma Scale  Eye Opening: Spontaneous  Best Verbal Response: Oriented  Best Motor Response: Obeys commands  Blair Coma Scale Score: 15                     CIWA Assessment  BP: (!) 109/56  Pulse: 71                 PHYSICAL EXAM    (up to 7 for level 4, 8 or more for level 5)     ED Triage Vitals [05/08/22 1711]   BP Temp Temp Source Pulse Resp SpO2 Height Weight   113/64 97.8 °F (36.6 °C) Tympanic 80 20 99 % 5' 9.5\" (1.765 m) 223 lb (101.2 kg)       Physical Exam  General: Well-developed, well-nourished, uncomfortable. HEENT exam: Normocephalic, atraumatic. Pupils equal round and reactive to light and external ocular muscles intact. Posterior pharynx noninjected. Oral airway widely patent. No exudate present. Neck exam: Supple no lymphadenopathy, trachea midline. Chest exam: No audible wheezing.   No increased respiratory effort. Heart: Normal heart rate and rhythm. No murmur. Abdomen: Soft, obese, grossly nontender. Back: No midline tenderness. No CVA tenderness. Extremities: Patient moving bilateral upper and left lower extremities without difficulty. Examination of the right lower extremity reveals patient unable to lift right lower extremity off the exam table. Any range of motion activities of the right hip elicits significant pain. He has intact distal pulses and sensation. Neurologic: Alert and oriented x3. Able to make informed decisions. Skin exam: Clean dry and intact. DIAGNOSTIC RESULTS     EKG: All EKG's are interpreted by the Emergency Department Physician who either signs or Co-signs this chart in the absence of a cardiologist.        RADIOLOGY:   Non-plain film images such as CT, Ultrasound and MRI are read by the radiologist. Plain radiographic images are visualized and preliminarily interpreted by the emergency physician with the below findings:        Interpretation per the Radiologist below, if available at the time of this note:    CT HIP RIGHT WO CONTRAST   Final Result   1. No acute fracture. 2. A vascular necrosis of the right femoral head with no evidence for   subchondral collapse. 3. Moderate-sized fat filled umbilical hernia with fluid and stranding   present within the hernia sac. Correlate clinically to exclude incarcerated   hernia. XR HIP RIGHT (2-3 VIEWS)   Final Result   No acute osseous abnormality.                ED BEDSIDE ULTRASOUND:   Performed by ED Physician - none    LABS:  Labs Reviewed   CBC WITH AUTO DIFFERENTIAL - Abnormal; Notable for the following components:       Result Value    WBC 11.6 (*)     RBC 2.98 (*)     Hemoglobin 9.8 (*)     Hematocrit 31.0 (*)     .0 (*)     RDW 15.5 (*)     Platelets 269 (*)     Seg Neutrophils 84 (*)     Lymphocytes 10 (*)     Eosinophils % 0 (*)     Immature Granulocytes 2 (*)     Segs Absolute 9.62 (*)     All other components within normal limits   BASIC METABOLIC PANEL W/ REFLEX TO MG FOR LOW K - Abnormal; Notable for the following components:    Glucose 100 (*)     BUN 37 (*)     CREATININE 3.14 (*)     Sodium 133 (*)     Chloride 97 (*)     GFR Non- 21 (*)     GFR  25 (*)     All other components within normal limits   C-REACTIVE PROTEIN - Abnormal; Notable for the following components:    CRP 35.9 (*)     All other components within normal limits   SEDIMENTATION RATE - Abnormal; Notable for the following components:    Sed Rate 35 (*)     All other components within normal limits       All other labs were within normal range or not returned as of this dictation. EMERGENCY DEPARTMENT COURSE and DIFFERENTIAL DIAGNOSIS/MDM:   Vitals:    Vitals:    05/08/22 1711 05/08/22 1930 05/08/22 2000   BP: 113/64 112/64 (!) 109/56   Pulse: 80 67 71   Resp: 20 18 18   Temp: 97.8 °F (36.6 °C)     TempSrc: Tympanic     SpO2: 99% 100% 95%   Weight: 223 lb (101.2 kg)     Height: 5' 9.5\" (1.765 m)             HARISH Duran is a 45 y.o. male who presents to the emergency department with complaints of persistent right hip pain x1 week. Patient was seen and evaluated several days ago in the emergency department with plain film images of the right hip and discharged home. He presents today as he is unable to ambulate or put pressure on the right lower extremity. He denies fever. Reports no decrease in p.o. intake appetite difficulty swallowing. No chest pain, cough or difficulty breathing. Denies any abdominal pain, vomiting, diarrhea or dysuria. Exam remarkable for an uncomfortable appearing 41-year-old male. HEENT unremarkable. He has no audible wheezing and no increased respiratory effort. Heart is normal rate and rhythm. Abdomen obese but grossly nontender. He is moving his bilateral upper and left lower extremities without difficulty.   Patient unable to lift his right leg off of the exam table. Any range of motion activities of the right hip elicits significant pain. He has intact distal pulses and sensation x4. CBC revealed a white count of 11.6, hemoglobin 9.8, platelets 908. Basic metabolic panel revealed a BUN of 37 with creatinine of 3.14. Chloride 97. Patient's GFR was 21. C-reactive protein 35.9. Sed rate 35. Film images of the right hip obtained, reviewed myself and read by radiology without acute findings. CT of the right hip obtained, reviewed by myself and read by radiology without acute fracture identified. There is avascular necrosis of the right femoral head with no evidence for subchondral collapse. Moderate sized fat filled umbilical hernia with fluid and stranding present within the hernial sac. Given Dilaudid IV. He is agreeable with transfer. I spoke with orthopedic surgeon Dr. Rajesh Shay who indicated the patient likely needs a hip aspiration but given the fact that he was a dialysis patient he needs to be transferred as he will need both of these interventions. Unfortunately [place] and seen and could not accommodate the patient. I spoke with orthopedic surgery at [facility] who said he also could not handle this patient at [facility]. Access is seeking another orthopedic surgeon for consult. Patient signed out to Dr. Mony Patterson pending finding orthopedic surgeon to accept patient. REASSESSMENT            CONSULTS:  None    PROCEDURES:  Unless otherwise noted below, none     Procedures        FINAL IMPRESSION      1. Avascular necrosis ([address] New Problem   2. Right hip pain New Problem         DISPOSITION/PLAN   DISPOSITION Decision To Transfer 05/08/2022 08:28:09 PM      PATIENT REFERRED TO:  No follow-up provider specified. DISCHARGE MEDICATIONS:  New Prescriptions    No medications on file     Controlled Substances Monitoring:     No flowsheet data found.     (Please note that portions of this note were completed with a voice recognition program.  Efforts were made to edit the dictations but occasionally words are mis-transcribed.)    JAISON Mercado CNP (electronically signed)  Attending Emergency Physician           JAISON Mercado CNP  05/08/22 5694

## 2022-05-09 NOTE — ED NOTES
Called [facility] to begin transfer to [facility], waiting on call back with ortho surgery doctor      [facility]  05/08/22 2027

## 2022-05-09 NOTE — CONSULTS
Renal Consult Note    Patient :  Jorgito Sullivan; 45 y.o. MRN# [id number]  Location:  [facility]/0898-55  Attending:  Mahendra Armstrong MD  Admit Date:  5/9/2022   Hospital Day: 0    Reason for Consult:     Asked by Dr Mahendra Armstrong MD to see for MORGAN/Elevated Creatinine. History Obtained From:     Patient, electronic medical record    HD Access:     previous permacath    HD Unit:      [facility] [place]. Nephrologist:     Dr. Seven Lama Weight:     97.5 kg    Admission Weight:     98.6 kg    History of Present Illness:     Jorgito Sullivan; 45 y.o. male with past medical history as mentioned below presented to the hospital with the chief complaint of right hip pain. Patient mentioned that he has been having increasing right hip pain with decreased range of motion and unable to stand since last 1 week. Primary did consult IR for possible right hip aspiration. Orthopedics and infectious disease also consulted. Patient no mucous dialysis as per MWF schedule at  renal in [place] hemodialysis unit under Dr. Margie Escamilla. Nephrology is consulted due to ESRD on HD. Past History/Allergies? Social History:     Past Medical History:   Diagnosis Date    Chronic kidney disease     Depression     Diabetes mellitus ([facility]     Dialysis patient ([facility] [facility]     Gout     Hypertension        Past Surgical History:   Procedure Laterality Date    ANKLE CLOSED REDUCTION Right 08/12/2019    ANKLE CLOSED REDUCTION performed by Brenda Haas MD at [facility] 25 Right 08/13/2019    ANKLE OPEN REDUCTION INTERNAL FIXATION-TRIMELLAR FRACTURE performed by Swathi Lemus DO at [facility] 87 Left        No Known Allergies    Social History     Socioeconomic History    Marital status: Single     Spouse name: Not on file    Number of children: Not on file    Years of education: Not on file    Highest education level: Not on file   Occupational History    Not on file   Tobacco Use  Smoking status: Current Every Day Smoker     Packs/day: 0.50     Types: Cigarettes    Smokeless tobacco: Never Used   Vaping Use    Vaping Use: Never used   Substance and Sexual Activity    Alcohol use: Yes     Alcohol/week: 2.0 standard drinks     Types: 2 Standard drinks or equivalent per week     Comment: States that he cut back on alcohol 12/2021, use to drink 12 beers a night. Now drinks 2 hard seltzers a night. Patient states he had D/T 12/2021 when he limit his alcohol intake.  Drug use: Never    Sexual activity: Yes   Other Topics Concern    Not on file   Social History Narrative    Not on file     Social Determinants of Health     Financial Resource Strain:     Difficulty of Paying Living Expenses: Not on file   Food Insecurity:     Worried About Running Out of Food in the Last Year: Not on file    Ashly of Food in the Last Year: Not on file   Transportation Needs:     Lack of Transportation (Medical): Not on file    Lack of Transportation (Non-Medical): Not on file   Physical Activity:     Days of Exercise per Week: Not on file    Minutes of Exercise per Session: Not on file   Stress:     Feeling of Stress : Not on file   Social Connections:     Frequency of Communication with Friends and Family: Not on file    Frequency of Social Gatherings with Friends and Family: Not on file    Attends Mormonism Services: Not on file    Active Member of [address] [place] [address] or Organizations: Not on file    Attends Club or Organization Meetings: Not on file    Marital Status: Not on file   Intimate Partner Violence:     Fear of Current or Ex-Partner: Not on file    Emotionally Abused: Not on file    Physically Abused: Not on file    Sexually Abused: Not on file   Housing Stability:     Unable to Pay for Housing in the Last Year: Not on file    Number of [place] in the Last Year: Not on file    Unstable Housing in the Last Year: Not on file       Family History:      None reported.     Outpatient Medications:     Medications Prior to Admission: pantoprazole (PROTONIX) 20 MG tablet, Take 20 mg by mouth in the morning and at bedtime  metOLazone (ZAROXOLYN) 2.5 MG tablet, Take 2.5 mg by mouth three times a week  NIFEdipine (ADALAT CC) 90 MG extended release tablet, Take 30 mg by mouth daily   cloNIDine (CATAPRES) 0.1 MG tablet, Take 0.1 mg by mouth nightly   ferrous sulfate (IRON 325) 325 (65 Fe) MG tablet, Take 325 mg by mouth daily (with breakfast)  vitamin D (ERGOCALCIFEROL) 1.25 MG (47264 UT) CAPS capsule, Take 50,000 Units by mouth daily  magnesium oxide (MAG-OX) 400 MG tablet, Take 400 mg by mouth daily  predniSONE (DELTASONE) 10 MG tablet, Take 15 mg by mouth daily Unsure of dosage at appointment.    furosemide (LASIX) 40 MG tablet, Take 60 mg by mouth daily   carvedilol (COREG) 25 MG tablet, Take 25 mg by mouth 2 times daily (with meals)  allopurinol (ZYLOPRIM) 300 MG tablet, Take 300 mg by mouth daily  citalopram (CELEXA) 40 MG tablet, Take 40 mg by mouth daily    Current Medications:     Scheduled Meds:    allopurinol  300 mg Oral Daily    carvedilol  25 mg Oral BID WC    citalopram  40 mg Oral Daily    cloNIDine  0.1 mg Oral Nightly    [START ON 5/10/2022] ferrous sulfate  325 mg Oral Daily with breakfast    furosemide  60 mg Oral Daily    magnesium oxide  400 mg Oral Daily    metOLazone  2.5 mg Oral Once per day on Mon Wed Fri    NIFEdipine  90 mg Oral Daily    pantoprazole  40 mg Oral BID AC    vitamin D  50,000 Units Oral Weekly    heparin (porcine)  5,000 Units SubCUTAneous 3 times per day    sodium chloride flush  5-40 mL IntraVENous 2 times per day    insulin lispro  0-12 Units SubCUTAneous TID     insulin lispro  0-6 Units SubCUTAneous Nightly     Continuous Infusions:    sodium chloride      dextrose       PRN Meds:  sodium chloride flush, magnesium sulfate, ondansetron **OR** ondansetron, polyethylene glycol, acetaminophen **OR** acetaminophen, oxyCODONE, sodium chloride flush, sodium chloride, glucose, dextrose, glucagon (rDNA), dextrose    Review of Systems:     Constitutional: No fever, no chills, no lethargy, no weakness. HEENT:  No headache, otalgia, itchy eyes, nasal discharge or sore throat. Cardiac:  No chest pain, dyspnea, orthopnea or PND. Chest:   No cough, phlegm or wheezing. Abdomen:  No abdominal pain, nausea or vomiting. Neuro:  No focal weakness, abnormal movements orseizure like activity. Skin:   No rashes, no itching. :   No hematuria, no pyuria, no dysuria, no flank pain. Extremities:  No swelling positive right hip pain. ROS was otherwise negative except as mentioned in the [address] Input/Output:     No intake/output data recorded. Vital Signs:   Temperature:  Temp: 97.7 °F (36.5 °C)  TMax:   Temp (24hrs), Av.6 °F (36.4 °C), Min:97.3 °F (36.3 °C), Max:97.8 °F (36.6 °C)    Respirations:  Resp: 20  Pulse:   Pulse: 68  BP:    BP: (!) 164/99  BP Range: Systolic (08FGD), GUH:848 , Min:109 , MVH:431       Diastolic (07KZW), CNL:27, Min:56, Max:99      Physical Examination:     General:  AAO x 3, speaking in full sentences, no accessory muscle use. HEENT: Atraumatic, normocephalic, no throat congestion, moist mucosa. Eyes:   Pupils equal, round and reactive to light, EOMI. Neck:   Supple  Chest:   Bilateral vesicular breath sounds, no rales or wheezes. Cardiac:  S1 S2 RR, no murmurs, gallops or rubs. Abdomen: Soft, non-tender, no masses or organomegaly, BS audible. :   No suprapubic or flank tenderness. Neuro:  AAO x 3, No FND. SKIN:  No rashes, good skin turgor. Extremities:  No edema.     Labs:       Recent Labs     22  1826   WBC 11.6*   RBC 2.98*   HGB 9.8*   HCT 31.0*   .0*   MCH 32.9   MCHC 31.6   RDW 15.5*   *   MPV 10.6      BMP:   Recent Labs     22  1826   *   K 3.9   CL 97*   CO2 20   BUN 37*   CREATININE 3.14*   GLUCOSE 100*   CALCIUM 8.8      Urinalysis/Chemistries:      Lab Results Component Value Date    NITRU NEGATIVE 05/04/2022    COLORU Yellow 05/04/2022    PHUR 8.0 05/04/2022    WBCUA 0 TO 2 05/04/2022    RBCUA 0 TO 2 05/04/2022    BACTERIA 1+ 05/04/2022    SPECGRAV 1.010 05/04/2022    LEUKOCYTESUR NEGATIVE 05/04/2022    UROBILINOGEN Normal 05/04/2022    BILIRUBINUR NEGATIVE 05/04/2022    GLUCOSEU NEGATIVE 05/04/2022    [address] NEGATIVE 05/04/2022       Radiology:     Reviewed. Assessment:       1. ESRD on Hemodialysis. His regular HD days are MWF at [address] renal in [place] hemodialysis facility using tunnel catheter under Dr. Adams Hill. His dry weight is 97.5 kg. Admitted with 98.6 kg.  2. Right hip pain, concern for septic arthritis. 3. Diabetes type 2.  4. History of BPH. 5. History of alcoholic cardiomyopathy. 6. History of alcohol abuse. 7. Positive smoking history. 8. Anemia of chronic disease  9. Secondary hyperparathyroidism  10. Hypertension    Plan:     1. Patient was seen and examined on HD at bedside. Orders were confirmed with the HD nurse. 2. Strict Input and Output, Daily weigh and document in the chart. 3. Low Potassium, Low phosphorus and low salt diet. Fluids to be restricted to 1500ml/day. 4. IV Aranesp/Epogen for anemia of chronic disease with HD weekly. 5. IV Zemplar per protocol for secondary hyperparathyroidism with HD thrice a week. 6. Right hip pain management as per orthopedics, likely to get joint aspiration done. 7. Antibiotics as per ESRD dosing. 8. BMP in CBC to be done. 9. Will follow. Nutrition   Please ensure that patient is on a renal diet/TF. Thank you for the consultation. Please do not hesitate to contact us for any further questions/concerns. Norman Dominguez MD  [facility]     This note is created with the assistance of a speech-recognition program. While intending to generate a document that actually reflects the content of the visit, no guarantees can be provided that every mistake has been identified and corrected by editing.

## 2022-05-09 NOTE — PROGRESS NOTES
Dialysis Time Out  To be done by RN and tech or 2 RNs  Staff Names Inga Taylor RN and Anish Do RN. [x]  Identity of the patient using 2 patient identifiers  [x]  Consent for treatment  [x]  Equipment-proper machine and dialyzer  [x]  B-Hep B status  [x]  Orders- to include bath, blood flow, dialyzer, time and fluid removal  [x]  Access-Correct site and in working order  [x]  Time for patient to ask questions.

## 2022-05-10 ENCOUNTER — APPOINTMENT (OUTPATIENT)
Dept: INTERVENTIONAL RADIOLOGY/VASCULAR | Age: 52
DRG: 351 | End: 2022-05-10
Attending: INTERNAL MEDICINE
Payer: MEDICAID

## 2022-05-10 LAB
ABSOLUTE RETIC #: 0.15 M/UL (ref 0.03–0.08)
ANION GAP SERPL CALCULATED.3IONS-SCNC: 11 MMOL/L (ref 9–17)
BUN BLDV-MCNC: 19 MG/DL (ref 6–20)
CALCIUM SERPL-MCNC: 8.4 MG/DL (ref 8.6–10.4)
CHLORIDE BLD-SCNC: 95 MMOL/L (ref 98–107)
CO2: 26 MMOL/L (ref 20–31)
CREAT SERPL-MCNC: 2.53 MG/DL (ref 0.7–1.2)
CRYSTALS, FLUID: NEGATIVE
FOLATE: >20 NG/ML
GFR AFRICAN AMERICAN: 33 ML/MIN
GFR NON-AFRICAN AMERICAN: 27 ML/MIN
GFR SERPL CREATININE-BSD FRML MDRD: ABNORMAL ML/MIN/{1.73_M2}
GLUCOSE BLD-MCNC: 100 MG/DL (ref 70–99)
GLUCOSE BLD-MCNC: 100 MG/DL (ref 75–110)
GLUCOSE BLD-MCNC: 104 MG/DL (ref 75–110)
GLUCOSE BLD-MCNC: 89 MG/DL (ref 75–110)
GLUCOSE BLD-MCNC: 91 MG/DL (ref 75–110)
IMMATURE RETIC FRACT: 28.9 % (ref 2.7–18.3)
INR BLD: 1
POTASSIUM SERPL-SCNC: 3.7 MMOL/L (ref 3.7–5.3)
PROTHROMBIN TIME: 10.9 SEC (ref 9.1–12.3)
RETIC %: 5.1 % (ref 0.5–1.9)
RETIC HEMOGLOBIN: 32.4 PG (ref 28.2–35.7)
SODIUM BLD-SCNC: 132 MMOL/L (ref 135–144)
SPECIMEN TYPE: NORMAL
URIC ACID: 3.1 MG/DL (ref 3.4–7)
VITAMIN B-12: 712 PG/ML (ref 232–1245)

## 2022-05-10 PROCEDURE — 0S993ZX DRAINAGE OF RIGHT HIP JOINT, PERCUTANEOUS APPROACH, DIAGNOSTIC: ICD-10-PCS | Performed by: RADIOLOGY

## 2022-05-10 PROCEDURE — 82947 ASSAY GLUCOSE BLOOD QUANT: CPT

## 2022-05-10 PROCEDURE — 82607 VITAMIN B-12: CPT

## 2022-05-10 PROCEDURE — 87075 CULTR BACTERIA EXCEPT BLOOD: CPT

## 2022-05-10 PROCEDURE — 2580000003 HC RX 258: Performed by: STUDENT IN AN ORGANIZED HEALTH CARE EDUCATION/TRAINING PROGRAM

## 2022-05-10 PROCEDURE — 99232 SBSQ HOSP IP/OBS MODERATE 35: CPT | Performed by: NURSE PRACTITIONER

## 2022-05-10 PROCEDURE — 97162 PT EVAL MOD COMPLEX 30 MIN: CPT

## 2022-05-10 PROCEDURE — 97530 THERAPEUTIC ACTIVITIES: CPT

## 2022-05-10 PROCEDURE — 20610 DRAIN/INJ JOINT/BURSA W/O US: CPT

## 2022-05-10 PROCEDURE — 6360000002 HC RX W HCPCS: Performed by: INTERNAL MEDICINE

## 2022-05-10 PROCEDURE — 6370000000 HC RX 637 (ALT 250 FOR IP): Performed by: INTERNAL MEDICINE

## 2022-05-10 PROCEDURE — 85045 AUTOMATED RETICULOCYTE COUNT: CPT

## 2022-05-10 PROCEDURE — 85610 PROTHROMBIN TIME: CPT

## 2022-05-10 PROCEDURE — 36415 COLL VENOUS BLD VENIPUNCTURE: CPT

## 2022-05-10 PROCEDURE — 1200000000 HC SEMI PRIVATE

## 2022-05-10 PROCEDURE — 87070 CULTURE OTHR SPECIMN AEROBIC: CPT

## 2022-05-10 PROCEDURE — 87205 SMEAR GRAM STAIN: CPT

## 2022-05-10 PROCEDURE — 87102 FUNGUS ISOLATION CULTURE: CPT

## 2022-05-10 PROCEDURE — 80048 BASIC METABOLIC PNL TOTAL CA: CPT

## 2022-05-10 PROCEDURE — 89051 BODY FLUID CELL COUNT: CPT

## 2022-05-10 PROCEDURE — 6370000000 HC RX 637 (ALT 250 FOR IP): Performed by: CLINICAL NURSE SPECIALIST

## 2022-05-10 PROCEDURE — 2709999900 HC NON-CHARGEABLE SUPPLY

## 2022-05-10 PROCEDURE — 97166 OT EVAL MOD COMPLEX 45 MIN: CPT

## 2022-05-10 PROCEDURE — 77002 NEEDLE LOCALIZATION BY XRAY: CPT

## 2022-05-10 PROCEDURE — 6370000000 HC RX 637 (ALT 250 FOR IP): Performed by: NURSE PRACTITIONER

## 2022-05-10 PROCEDURE — 82746 ASSAY OF FOLIC ACID SERUM: CPT

## 2022-05-10 PROCEDURE — 89060 EXAM SYNOVIAL FLUID CRYSTALS: CPT

## 2022-05-10 PROCEDURE — 99232 SBSQ HOSP IP/OBS MODERATE 35: CPT | Performed by: INTERNAL MEDICINE

## 2022-05-10 PROCEDURE — 84550 ASSAY OF BLOOD/URIC ACID: CPT

## 2022-05-10 PROCEDURE — 99231 SBSQ HOSP IP/OBS SF/LOW 25: CPT | Performed by: INTERNAL MEDICINE

## 2022-05-10 PROCEDURE — 6360000002 HC RX W HCPCS: Performed by: CLINICAL NURSE SPECIALIST

## 2022-05-10 RX ORDER — LANOLIN ALCOHOL/MO/W.PET/CERES
400 CREAM (GRAM) TOPICAL DAILY
Status: DISCONTINUED | OUTPATIENT
Start: 2022-05-11 | End: 2022-05-11 | Stop reason: HOSPADM

## 2022-05-10 RX ORDER — OXYCODONE HYDROCHLORIDE 5 MG/1
5 TABLET ORAL EVERY 6 HOURS PRN
Status: DISCONTINUED | OUTPATIENT
Start: 2022-05-10 | End: 2022-05-11 | Stop reason: HOSPADM

## 2022-05-10 RX ORDER — CALCIUM CARBONATE 200(500)MG
500 TABLET,CHEWABLE ORAL 3 TIMES DAILY PRN
Status: DISCONTINUED | OUTPATIENT
Start: 2022-05-10 | End: 2022-05-11 | Stop reason: HOSPADM

## 2022-05-10 RX ADMIN — OXYCODONE HYDROCHLORIDE 5 MG: 5 TABLET ORAL at 18:31

## 2022-05-10 RX ADMIN — OXYCODONE HYDROCHLORIDE 5 MG: 5 TABLET ORAL at 12:19

## 2022-05-10 RX ADMIN — MORPHINE SULFATE 2 MG: 2 INJECTION, SOLUTION INTRAMUSCULAR; INTRAVENOUS at 15:54

## 2022-05-10 RX ADMIN — FERROUS SULFATE TAB EC 325 MG (65 MG FE EQUIVALENT) 325 MG: 325 (65 FE) TABLET DELAYED RESPONSE at 09:05

## 2022-05-10 RX ADMIN — SODIUM CHLORIDE, PRESERVATIVE FREE 10 ML: 5 INJECTION INTRAVENOUS at 09:05

## 2022-05-10 RX ADMIN — MORPHINE SULFATE 2 MG: 2 INJECTION, SOLUTION INTRAMUSCULAR; INTRAVENOUS at 06:53

## 2022-05-10 RX ADMIN — MORPHINE SULFATE 2 MG: 2 INJECTION, SOLUTION INTRAMUSCULAR; INTRAVENOUS at 20:28

## 2022-05-10 RX ADMIN — ANTACID TABLETS 500 MG: 500 TABLET, CHEWABLE ORAL at 10:26

## 2022-05-10 RX ADMIN — CLONIDINE HYDROCHLORIDE 0.1 MG: 0.1 TABLET ORAL at 21:24

## 2022-05-10 RX ADMIN — MORPHINE SULFATE 2 MG: 2 INJECTION, SOLUTION INTRAMUSCULAR; INTRAVENOUS at 11:13

## 2022-05-10 RX ADMIN — CITALOPRAM 40 MG: 20 TABLET, FILM COATED ORAL at 09:05

## 2022-05-10 RX ADMIN — HEPARIN SODIUM 5000 UNITS: 5000 INJECTION INTRAVENOUS; SUBCUTANEOUS at 14:31

## 2022-05-10 RX ADMIN — OXYCODONE HYDROCHLORIDE 10 MG: 5 TABLET ORAL at 05:56

## 2022-05-10 RX ADMIN — HEPARIN SODIUM 5000 UNITS: 5000 INJECTION INTRAVENOUS; SUBCUTANEOUS at 21:24

## 2022-05-10 RX ADMIN — ALLOPURINOL 300 MG: 300 TABLET ORAL at 09:05

## 2022-05-10 RX ADMIN — MORPHINE SULFATE 2 MG: 2 INJECTION, SOLUTION INTRAMUSCULAR; INTRAVENOUS at 01:38

## 2022-05-10 RX ADMIN — PANTOPRAZOLE SODIUM 20 MG: 20 TABLET, DELAYED RELEASE ORAL at 17:39

## 2022-05-10 RX ADMIN — MAGNESIUM GLUCONATE 500 MG ORAL TABLET 400 MG: 500 TABLET ORAL at 09:04

## 2022-05-10 RX ADMIN — CARVEDILOL 25 MG: 25 TABLET, FILM COATED ORAL at 17:39

## 2022-05-10 RX ADMIN — FUROSEMIDE 60 MG: 40 TABLET ORAL at 09:04

## 2022-05-10 RX ADMIN — CARVEDILOL 25 MG: 25 TABLET, FILM COATED ORAL at 09:05

## 2022-05-10 RX ADMIN — SODIUM CHLORIDE, PRESERVATIVE FREE 10 ML: 5 INJECTION INTRAVENOUS at 20:29

## 2022-05-10 ASSESSMENT — PAIN SCALES - GENERAL
PAINLEVEL_OUTOF10: 9
PAINLEVEL_OUTOF10: 8
PAINLEVEL_OUTOF10: 9
PAINLEVEL_OUTOF10: 8
PAINLEVEL_OUTOF10: 7

## 2022-05-10 ASSESSMENT — PAIN DESCRIPTION - ORIENTATION
ORIENTATION: RIGHT
ORIENTATION: RIGHT

## 2022-05-10 ASSESSMENT — PAIN DESCRIPTION - DESCRIPTORS: DESCRIPTORS: STABBING;SHARP

## 2022-05-10 ASSESSMENT — PAIN DESCRIPTION - LOCATION
LOCATION: HIP
LOCATION: HIP

## 2022-05-10 NOTE — PROGRESS NOTES
Orthopedic Progress Note    Patient:  Luciana Sheppard  YOB: 1970     45 y.o. male    Subjective:  Patient seen and examined  No complaints or concerns  No issue overnight  Denies fever, HA, CP, SOB, N/V, dysuria    Vitals reviewed, afebrile    Objective:   Vitals:    05/09/22 2039   BP: (!) 140/87   Pulse: 63   Resp: 20   Temp: 99 °F (37.2 °C)   SpO2: 97%     Gen: NAD, cooperative    Cardiovascular: Regular rate no dependent edema  Respiratory: No acute respiratory distress  MSK:  Right lower extremity:  Skin intact. Stinchfield positive. Log roll mildly positive. EHL/FHL/TA/GS motor complex intact. Sensation intact to light touch about sural, saph, S/D peroneal and plantar nerves. Dorsalis pedis pulse 2+    Recent Labs     05/09/22  1349   WBC 8.7   HGB 9.2*   HCT 28.2*   PLT 96*   *   K 3.7   BUN 42*   CREATININE 3.46*   GLUCOSE 79      See rec for complete list    Impression 45 y.o. male   1. Early AVN to right hip and rule out septic arthritis     Plan  - Plan for IR aspiration today with cultures  - Remain NPO  - WB status: weight bearing as tolerated to right lower extremity   - Pain control PO/IV Medication. Attempt to Wean IV medications.    - Ice/Elevate  - Please page ortho with any questions    Emilio Kennedy DO  Orthopedic Surgery Resident, PGY-3  [id number]

## 2022-05-10 NOTE — PROGRESS NOTES
Occupational Therapy  Facility/Department: [facility] [[email or web address] RENAL//MED SURG  Occupational Therapy Initial Assessment    Name: Gisel Mendez  : 1970  MRN: [id number]  Date of Service: 5/10/2022     Copied from chart: \"45year-old male past medical history of hypertension, end-stage renal disease, gout, seasonal allergies, history of alcoholic cardiomyopathy, depression, smoker, type 2 diabetes, BPH presents with right hip pain and decreased range of motion and unable to stand for the past week\"    Discharge Recommendations:  Patient would benefit from continued therapy after discharge  OT Equipment Recommendations  Equipment Needed: Yes  Mobility Devices: ADL Assistive Devices  ADL Assistive Devices: Shower Chair with back;Long-handled Shoe Horn;Long-handled Sponge;Reacher;Sock-Aid Hard       Patient Diagnosis(es): There were no encounter diagnoses. Past Medical History:  has a past medical history of Chronic kidney disease, Depression, Diabetes mellitus ([facility] [address] Dialysis patient ([facility] [address] Gout, and Hypertension. Past Surgical History:  has a past surgical history that includes hernia repair; shoulder surgery (Left); Ankle Closed Reduction (Right, 2019); and Ankle fracture surgery (Right, 2019). Assessment   Performance deficits / Impairments: Decreased functional mobility ; Decreased ADL status; Decreased balance;Decreased high-level IADLs;Decreased endurance  Assessment: Pt agreeable to OT eval this date. Pt completed bed mobility, functional transfers, and functional mobility tasks with CGA and RW use. Pt demonstrates pain in R hip throughout, limiting some activities. Pt engaged in LB dressing task requiring assistance specifically for dressing RLE.  Pt demonstrates deficits in balance and endurance and will require continued OT services to increase independence and safety with ADLs/IADLs and functional transfers/mobility  Prognosis: Good  Decision Making: Medium Complexity  REQUIRES OT FOLLOW-UP: Yes  Activity Tolerance  Activity Tolerance: Patient Tolerated treatment well;Patient limited by pain        Plan   Plan  Times per Week: 3-5 x/wk  Current Treatment Recommendations: Balance training,Functional mobility training,Endurance training,Pain management,Safety education & training,Patient/Caregiver education & training,Equipment evaluation, education, & procurement,Self-Care / ADL,Home management training     Restrictions  Restrictions/Precautions  Restrictions/Precautions: Weight Bearing,Fall Risk  Required Braces or Orthoses?: No  Lower Extremity Weight Bearing Restrictions  Right Lower Extremity Weight Bearing: Weight Bearing As Tolerated  Position Activity Restriction  Other position/activity restrictions: up w/assist; s/p R hip aspiration 5/10/2022    Subjective   General  Patient assessed for rehabilitation services?: Yes  Family / Caregiver Present: No  General Comment  Comments: RN ok'd pt for OT eval this date. Pt agreeable to sessoin and pleasant/cooperative throughout. Pt c/o pain in 9/10 RLE     Social/Functional History  Social/Functional History  Lives With: Alone  Type of Home: House  Home Layout: Two level,Performs ADL's on one level,Able to Live on Main level with bedroom/bathroom  Home Access: Stairs to enter without rails  Entrance Stairs - Number of Steps: 3  Bathroom Shower/Tub: Tub/Shower unit  Bathroom Toilet: Handicap height  Bathroom Equipment: Shower chair (pt reports shower chair is \"wobbly\")  Home Equipment: Cane,Walker, rolling,Wheelchair-manual (pt reports utilizing str cane PRN, specifically on HD days.  Pt reports manual w/c is in bad shape)  ADL Assistance: [facility] [address]: Independent (pt reports since pain began all ADLs/IADLs have become significantly more difficult)  Homemaking Responsibilities: Yes  Ambulation Assistance: Independent  Transfer Assistance: Independent  Active : No  Patient's  Info: friend  Occupation: On disability  Leisure & Hobbies: watch TV  Additional Comments: Pt reports having a friend that can assist occasionally however no 24/7 support available       Objective   Hearing: Within functional limits         Safety Devices  Type of Devices: Left in bed;Call light within reach;Gait belt;Nurse notified  Restraints  Restraints Initially in Place: No     Bed Mobility Training  Bed Mobility Training: Yes  Overall Level of Assistance: Contact-guard assistance; Additional time (pt completed sit <> supine with HOB flat to simulate home environment requiring CGA and increased time d/t pain)  Supine to Sit: Contact-guard assistance; Additional time  Sit to Supine: Additional time;Contact-guard assistance  Balance  Sitting: Without support  Standing: With support (CGA)  Transfer Training  Transfer Training: Yes  Overall Level of Assistance: Contact-guard assistance; Adaptive equipment; Additional time (utilized RW with 1 VC for proper hand placement and increased time)  Sit to Stand: Contact-guard assistance; Adaptive equipment; Additional time  Stand to Sit: Contact-guard assistance; Additional time; Adaptive equipment  Gait  Overall Level of Assistance: Contact-guard assistance; Adaptive equipment; Additional time (pt completed functional mobility within hospital room with CGA and RW use; increased time to complete. Pt ed on step to tech for pain management with good return)  Assistive Device: Walker, rolling    AROM: Within functional limits  Strength: Within functional limits  Coordination: Within functional limits  Tone: Normal  Sensation: Intact     ADL  Feeding: Modified independent ;Setup  Grooming: Modified independent ;Setup  UE Bathing: Contact guard assistance;Setup  LE Bathing: Minimal assistance;Setup; Increased time to complete  UE Dressing: Contact guard assistance;Setup  LE Dressing: Minimal assistance;Setup; Increased time to complete (pt demo ability to don L sock with CGA however requires Mod A to don R sock d/t pain and slightly decreased dynamic sitting balance)  Toileting: Contact guard assistance;Setup; Increased time to complete    Activity Tolerance  Activity Tolerance: Patient limited by pain          Cognition  Overall Cognitive Status: [facility]                 Education Given To: Patient  Education Provided Comments: Pt ed on OT role, OT POC, safety awareness, bed mobility training, pain , transfer training, DME use, and importance of continued OT. Good return noted  Education Method: Demonstration;Verbal  Barriers to Learning: None  Education Outcome: Verbalized understanding;Demonstrated understanding     LUE AROM (degrees)  LUE AROM : WFL  Left Hand AROM (degrees)  Left Hand AROM: WFL  RUE AROM (degrees)  RUE AROM : WFL  Right Hand AROM (degrees)  Right Hand AROM: WFL        Hand Dominance  Hand Dominance: Left            AM-PAC Score        AM-PAC Inpatient Daily Activity Raw Score: 20 (05/10/22 1546)  AM-PAC Inpatient ADL T-Scale Score : 42.03 (05/10/22 1546)  ADL Inpatient CMS 0-100% Score: 38.32 (05/10/22 1546)  ADL Inpatient CMS G-Code Modifier : CJ (05/10/22 1546)    Goals  Short Term Goals  Time Frame for Short term goals: By discharge, pt will:  Short Term Goal 1: Demo Mod I for functional transfers and functional mobility with use of LRD for improved independence with ADLs/IADLs  Short Term Goal 2: Demo I with UB ADLs  Short Term Goal 3: Demo Mod I for LB ADLs and toileting tasks with AE use PRN  Short Term Goal 4: Demo 8+ min dynamic standing and reaching outside MANNY with unilateral hand release and SUP for improved ADL/IADL participation  Short Term Goal 5:  Incorporate 2+ non-pharmaceutical pain interventions throughout therapeutic interventions to promote carryover during ADLs/IADLs upon discharge       Therapy Time   Individual Concurrent Group Co-treatment   Time In 1051         Time Out 1109         Minutes 18         Timed Code Treatment Minutes: 8 Minutes       Shakila Body, OT

## 2022-05-10 NOTE — PLAN OF CARE

## 2022-05-10 NOTE — PROGRESS NOTES
Renal Progress Note    Patient :  Ihsan Wheeler; 45 y.o. MRN# [id number]  Location:  0330/0330-01  Attending:  Bhakti Rowland DO  Admit Date:  5/9/2022   Hospital Day: 1      Subjective:     Came to the hospital with hip pain going on for a week. Right femoral head AVN. Concern for septic joint. Ortho and ID on board. Pt was seen and examined at bedside. Resting comfortably  Still has pain in Rt hip, underwent arthrocentesis for a concern of septic joint  Underwent HD yesterday     HD through [facility], at [address] [facility] renal tiffin under Dr Radha Shafer. Dry weight is 97.5. Outpatient Medications:     Medications Prior to Admission: pantoprazole (PROTONIX) 20 MG tablet, Take 20 mg by mouth in the morning and at bedtime  metOLazone (ZAROXOLYN) 2.5 MG tablet, Take 2.5 mg by mouth three times a week  NIFEdipine (ADALAT CC) 90 MG extended release tablet, Take 30 mg by mouth daily   cloNIDine (CATAPRES) 0.1 MG tablet, Take 0.1 mg by mouth nightly   ferrous sulfate (IRON 325) 325 (65 Fe) MG tablet, Take 325 mg by mouth daily (with breakfast)  vitamin D (ERGOCALCIFEROL) 1.25 MG (91529 UT) CAPS capsule, Take 50,000 Units by mouth daily  magnesium oxide (MAG-OX) 400 MG tablet, Take 400 mg by mouth daily  predniSONE (DELTASONE) 10 MG tablet, Take 15 mg by mouth daily Unsure of dosage at appointment.    furosemide (LASIX) 40 MG tablet, Take 60 mg by mouth daily   carvedilol (COREG) 25 MG tablet, Take 25 mg by mouth 2 times daily (with meals)  allopurinol (ZYLOPRIM) 300 MG tablet, Take 300 mg by mouth daily  citalopram (CELEXA) 40 MG tablet, Take 40 mg by mouth daily    Current Medications:     Scheduled Meds:    allopurinol  300 mg Oral Daily    carvedilol  25 mg Oral BID WC    citalopram  40 mg Oral Daily    cloNIDine  0.1 mg Oral Nightly    ferrous sulfate  325 mg Oral Daily with breakfast    furosemide  60 mg Oral Daily    magnesium oxide  400 mg Oral Daily    metOLazone  2.5 mg Oral Once per day on Mon Wed Fri  pantoprazole  20 mg Oral BID AC    vitamin D  50,000 Units Oral Weekly    heparin (porcine)  5,000 Units SubCUTAneous 3 times per day    sodium chloride flush  5-40 mL IntraVENous 2 times per day    insulin lispro  0-12 Units SubCUTAneous TID WC    insulin lispro  0-6 Units SubCUTAneous Nightly    vancomycin (VANCOCIN) intermittent dosing (placeholder)   Other RX Placeholder    [START ON 2022] vancomycin  1,000 mg IntraVENous Once per day on      Continuous Infusions:    sodium chloride      dextrose       PRN Meds:  calcium carbonate, oxyCODONE, sodium chloride flush, ondansetron **OR** ondansetron, polyethylene glycol, acetaminophen **OR** acetaminophen, sodium chloride flush, sodium chloride, glucose, dextrose, glucagon (rDNA), dextrose, heparin (porcine), heparin (porcine), morphine    Input/Output:           Intake/Output Summary (Last 24 hours) at 5/10/2022 1045  Last data filed at 5/10/2022 0557  Gross per 24 hour   Intake 300 ml   Output 1900 ml   Net -1600 ml   . Patient Vitals for the past 96 hrs (Last 3 readings):   Weight   05/10/22 0558 210 lb 5.1 oz (95.4 kg)   22 1715 217 lb (98.4 kg)   22 1636 215 lb 2.7 oz (97.6 kg)       Vital Signs:   Temperature:  Temp: 98.8 °F (37.1 °C)  TMax:   Temp (24hrs), Av.3 °F (36.8 °C), Min:97.7 °F (36.5 °C), Max:99 °F (37.2 °C)    Respirations:  Resp: 14  Pulse:   Pulse: 63  BP:    BP: (!) 158/84  BP Range: Systolic (58DFV), DGL:581 , Min:140 , OUU:567       Diastolic (16EAR), KTK:60, Min:84, Max:103      Physical Examination:     General:          AAO x 3, speaking in full sentences, no accessory muscle use. HEENT:           Atraumatic, normocephalic, no throat congestion, moist mucosa. Eyes:               Pupils equal, round and reactive to light, EOMI. Neck:               Supple  Chest:              Bilateral vesicular breath sounds, no rales or wheezes.   Cardiac:           S1 S2 RR, no murmurs, gallops or rubs.  Abdomen:        Soft, non-tender, no masses or organomegaly, BS audible. :                  No suprapubic or flank tenderness. Neuro:             AAO x 3, No FND. SKIN:               No rashes, good skin turgor. Extremities:     No edema. Labs:       Recent Labs     05/08/22 1826 05/09/22  1349   WBC 11.6* 8.7   RBC 2.98* 2.74*   HGB 9.8* 9.2*   HCT 31.0* 28.2*   .0* 102.9   MCH 32.9 33.6*   MCHC 31.6 32.6   RDW 15.5* 15.4*   * 96*   MPV 10.6 10.4      BMP:   Recent Labs     05/08/22 1826 05/09/22  1349 05/10/22  0613   * 133* 132*   K 3.9 3.7 3.7   CL 97* 98 95*   CO2 20 24 26   BUN 37* 42* 19   CREATININE 3.14* 3.46* 2.53*   GLUCOSE 100* 79 100*   CALCIUM 8.8 8.7 8.4*      Phosphorus:   No results for input(s): PHOS in the last 72 hours. Magnesium:  No results for input(s): MG in the last 72 hours. Albumin:  No results for input(s): LABALBU in the last 72 hours.   BNP:    No results found for: BNP  TANJA:    No results found for: TANJA  SPEP:  Lab Results   Component Value Date    PROT 6.7 05/04/2022     UPEP:   No results found for: LABPE  C3:   No results found for: C3  C4:   No results found for: C4  MPO ANCA:   No results found for: MPO  PR3 ANCA:   No results found for: PR3  Anti-GBM:   No results found for: GBMABIGG  Hep BsAg:       No results found for: HEPBSAG  Hep C AB:        No results found for: HEPCAB    Urinalysis/Chemistries:      Lab Results   Component Value Date    NITRU NEGATIVE 05/04/2022    COLORU Yellow 05/04/2022    PHUR 8.0 05/04/2022    WBCUA 0 TO 2 05/04/2022    RBCUA 0 TO 2 05/04/2022    BACTERIA 1+ 05/04/2022    SPECGRAV 1.010 05/04/2022    LEUKOCYTESUR NEGATIVE 05/04/2022    UROBILINOGEN Normal 05/04/2022    BILIRUBINUR NEGATIVE 05/04/2022    GLUCOSEU NEGATIVE 05/04/2022    KETUA NEGATIVE 05/04/2022     Urine Sodium:   No results found for: GRIS  Urine Potassium:  No results found for: KUR  Urine Chloride:  No results found for: CLUR  Urine Osmolarity: No results found for: OSMOU  Urine Protein:   No components found for: TOTALPROTEIN, URINE   Urine Creatinine:   No results found for: LABCREA  Urine Eosinophils:  No components found for: UEOS    Radiology:     Reviewed as available    Assessment:     1. ESRD on Hemodialysis. His regular HD days are MWF at [address] renal in [facility] using tunnel catheter under Dr. Connor Stacy. His dry weight is 97.5 kg. Admitted with 98.6 kg.  2. Right hip pain, concern for septic arthritis. 3. Diabetes type 2.  4. History of BPH. 5. History of alcoholic cardiomyopathy. 6. History of alcohol abuse. 7. Positive smoking history. 8. Anemia of chronic disease  9. Secondary hyperparathyroidism  10. Hypertension         Plan:   1. HD as per janeth. Dialysis tomorrow. MWF.   2.Strict Input and Output, Daily weigh and document in the chart. 3. Low Potassium, Low phosphorus and low salt diet. Fluids to be restricted to 1500ml/day. 4. Antibiotics as per ESRD dosing. Nutrition   Keep patient on renal diet/TF. Avoid nephrotoxic drugs/contrast exposure. We will continue to follow this patient along with you. Magui Lee MD  PGY-3, Internal medicine resident  [address] [place] [address], [place], [place]    Attending Physician Statement  I have discussed the care of Amos Johnson, including pertinent history and exam findings with the resident/fellow. I have reviewed the key elements of all parts of the encounter with the resident/fellow. I have seen and examined the patient with the resident/fellow. I agree with the assessment and plan and status of the problem list as documented.        Wayne Gross MD   Nephrology Attending Physician  [facility]  5/10/2022

## 2022-05-10 NOTE — PROGRESS NOTES
Pt arrives to room for rt hip aspirate   Pa and CR RT to bedside  Site prepped and draped  Access obtained and 7ml of thick clear yellow fluid removed  Access removed and site covered with band aid  Tolerated well  Specimen to lab

## 2022-05-10 NOTE — PROGRESS NOTES
[facility]  Office: [address], DO, Colindres Kras, DO, Mattie Vallejo, DO, Edward Salas Blood, DO, Janet Lynch MD, Malachi Lewis MD, Alexander Fox MD, Bernard Mcintosh MD, Reyes Suazo MD, Azar Mendoza MD, Kacy Lennon MD, Dorothea Lux, DO, Abner Ramirez, DO, Giovanni Miller MD,  Glenn Webb, DO, Dallas Guzman MD, Babar Wyman MD, Frederick Clark MD, Raghavendra Landis, DO, Cynthia Chew MD, Renetta Cage MD, Chela Johnson MD, Maya Ortega, [facility], [facility], CNP, Se Luque, CNP, Naseem Section, CNP, Vinay Hayes, CNP, Ananya Solders, CNP, BRANDON BernalC, Jelena Lara, DNP, Raina Husbands, CNP, Christopher Sifuentes, CNP, Rebeka Martinez, CNP, Nadya Ornelas, CNS, Orysia Soda, DNP, Starlett Market, CNP, Jasbir Tsai, CNP, Iman Fitting, CNP         [facility]   [address] [facility]    Progress Note    5/10/2022    7:41 AM    Name:   Rc Stiles  MRN:     [id number]     [place]:      [de-identified]   Room:   [address] [place] [address] Day:  1  Admit Date:  5/9/2022 11:12 AM    PCP:   Danyel Edmondson Sr, DO  Code Status:  Full Code    Subjective:     C/C:right hip[ pain   Interval History Status: not changed.      Patient seen and evaluated in room after IR guided biopsy for possible septic arthritis  Patient stating reflux and pain to his right hip, significantly impacted range of motion to right lower extremity  States it started approximately 1 week ago without trauma to the area  Does endorse a history of plates and screws to his right ankle after a fracture 2 years ago as well and has hardware in his left shoulder after rotator cuff surgery  Wants to know if he can eat    Brief History:     65-year-old male past medical history of hypertension, end-stage renal disease, gout, seasonal allergies, history of alcoholic cardiomyopathy, depression, smoker, type 2 diabetes, BPH presents with right hip pain and decreased range of motion and unable to stand for the past week. Patient states that he noticed on Monday when he went to stand up he was able to tolerate some weight however had been decreasing over the day and worse on Tuesday. Patient states that he was still able to obtain his Monday Wednesday Friday dialysis sessions however required using a wheelchair and hospital bed for ambulation and sitting. Patient admits to 1 episode of fever which occurred on 5/4/2022    Review of Systems:     Constitutional:  negative for chills, fevers, sweats, + RLE pain with limited ROM  Respiratory:  negative for cough, dyspnea on exertion, shortness of breath, wheezing  Cardiovascular:  negative for chest pain, chest pressure/discomfort, lower extremity edema, palpitations  Gastrointestinal:  negative for abdominal pain, constipation, diarrhea, nausea, vomiting, + reflux  Neurological:  negative for dizziness, headache    Medications:      Allergies:  No Known Allergies    Current Meds:   Scheduled Meds:    allopurinol  300 mg Oral Daily    carvedilol  25 mg Oral BID WC    citalopram  40 mg Oral Daily    cloNIDine  0.1 mg Oral Nightly    ferrous sulfate  325 mg Oral Daily with breakfast    furosemide  60 mg Oral Daily    magnesium oxide  400 mg Oral Daily    metOLazone  2.5 mg Oral Once per day on Mon Wed Fri    pantoprazole  20 mg Oral BID AC    vitamin D  50,000 Units Oral Weekly    heparin (porcine)  5,000 Units SubCUTAneous 3 times per day    sodium chloride flush  5-40 mL IntraVENous 2 times per day    insulin lispro  0-12 Units SubCUTAneous TID     insulin lispro  0-6 Units SubCUTAneous Nightly    vancomycin (VANCOCIN) intermittent dosing (placeholder)   Other RX Placeholder    [START ON 5/11/2022] vancomycin  1,000 mg IntraVENous Once per day on Mon Wed Fri     Continuous Infusions:    sodium chloride      dextrose       PRN Meds: sodium chloride flush, ondansetron **OR** ondansetron, polyethylene glycol, acetaminophen **OR** acetaminophen, sodium chloride flush, sodium chloride, glucose, dextrose, glucagon (rDNA), dextrose, heparin (porcine), heparin (porcine), oxyCODONE, morphine    Data:     Past Medical History:   has a past medical history of Chronic kidney disease, Depression, Diabetes mellitus ([facility] [address] Dialysis patient ([facility] Gout, and Hypertension. Social History:   reports that he has been smoking cigarettes. He has been smoking about 0.50 packs per day. He has never used smokeless tobacco. He reports current alcohol use of about 2.0 standard drinks of alcohol per week. He reports that he does not use drugs. Family History: No family history on file. Vitals:  BP (!) 140/87   Pulse 63   Temp 99 °F (37.2 °C) (Oral)   Resp 20   Ht 5' 9.5\" (1.765 m)   Wt 210 lb 5.1 oz (95.4 kg)   SpO2 97%   BMI 30.61 kg/m²   Temp (24hrs), Av.2 °F (36.8 °C), Min:97.7 °F (36.5 °C), Max:99 °F (37.2 °C)    Recent Labs     22   POCGLU 91       I/O (24Hr):     Intake/Output Summary (Last 24 hours) at 5/10/2022 0741  Last data filed at 5/10/2022 0557  Gross per 24 hour   Intake 300 ml   Output 1900 ml   Net -1600 ml       Labs:  Hematology:  Recent Labs     05/08/22  1826 05/09/22  1349 05/10/22  0613   WBC 11.6* 8.7  --    RBC 2.98* 2.74*  --    HGB 9.8* 9.2*  --    HCT 31.0* 28.2*  --    .0* 102.9  --    MCH 32.9 33.6*  --    MCHC 31.6 32.6  --    RDW 15.5* 15.4*  --    * 96*  --    MPV 10.6 10.4  --    SEDRATE 35*  --   --    CRP 35.9*  --   --    INR  --   --  1.0     Chemistry:  Recent Labs     05/08/22  1826 05/09/22  1349 05/10/22  0613   * 133* 132*   K 3.9 3.7 3.7   CL 97* 98 95*   CO2 20 24 26   GLUCOSE 100* 79 100*   BUN 37* 42* 19   CREATININE 3.14* 3.46* 2.53*   ANIONGAP 16 11 11   LABGLOM 21* 19* 27*   GFRAA 25* 23* 33*   CALCIUM 8.8 8.7 8.4*     Recent Labs     22  1349 22  2042   LABA1C 4.7  --    POCGLU  --  91       Radiology:  XR HIP RIGHT (2-3 VIEWS)    Result Date: 5/8/2022  No acute osseous abnormality. XR CHEST PORTABLE    Result Date: 5/4/2022  Subtle ill-defined opacity in the periphery of the left mid lung zone, possibly  shadows. Recommend follow-up CT to exclude pulmonary nodule. .     CT HIP RIGHT WO CONTRAST    Addendum Date: 5/9/2022    ADDENDUM: The case was discussed with Dr. Laura Yeung on May 9, 2022 at 3:53 p.m. [facility] Upon further review of the examination, there is a small hip effusion on the right. Incidental note also made of chondrocalcinosis at the right hip with amorphous calcification extending along the anterior superior right acetabular labrum. Result Date: 5/9/2022  1. No acute fracture. 2. A vascular necrosis of the right femoral head with no evidence for subchondral collapse. 3. Moderate-sized fat filled umbilical hernia with fluid and stranding present within the hernia sac. Correlate clinically to exclude incarcerated hernia. XR HIP 2-3 VW W PELVIS RIGHT    Result Date: 5/4/2022  No acute abnormality of the hip. Physical Examination:        General appearance:  alert, cooperative and no distress  Mental Status:  oriented to person, place and time and normal affect  Lungs:  clear to auscultation bilaterally anteriorly, posterior expiratory wheezes noted, normal effort  Heart:  regular rate and rhythm, no murmur  Abdomen:  soft, nontender, nondistended, normal bowel sounds  Extremities:  no edema, redness, tenderness in the calves, unable to perform straight leg raise on the right, unable to perform abduction on the right. Able to bend knee with some difficulty. Pain to palpation right hip.   Left lower extremity with good range of motion  Skin:  no gross lesions, rashes, induration    Assessment:        Hospital Problems           Last Modified POA    * (Principal) Septic hip ([address] 5/9/2022 Yes    Chronic kidney disease, stage 4 (severe) ([address] 5/9/2022 Yes    Severe anxiety 5/9/2022 Yes    Overview Signed 5/9/2022 11:55 AM by Herber Linda MD     Last Assessment & Plan:   Formatting of this note is different from the original.  Condition: symptomatic, GURU-7: 15    Educated member on: ? Informing and correcting misconceptions regarding anxiety, worry, and associated symptoms  ? Causative factors of pathological worry and anxiety    Follow up in: if symptoms worsen or fail to improve with pcp         Obesity with serious comorbidity 5/9/2022 Yes    Overview Signed 5/9/2022 11:55 AM by Herber Linda MD     Last Assessment & Plan:   Formatting of this note might be different from the original.  Condition: symptomatic     Discussed healthy weight, the importance of diet and exercise. Follow up in: one month with pcp         Nicotine dependence, cigarettes, uncomplicated 1/8/1605 Yes    Major depressive disorder, single episode, unspecified 5/9/2022 Yes    Gout 5/9/2022 Yes    Overview Signed 5/9/2022 11:55 AM by Herber Linda MD     Last Assessment & Plan:   Formatting of this note might be different from the original.  Condition: symptomatic     Educated member to take medication as prescribed. Educated member to avoid organ meats and alcohol. Follow up in: if symptoms worsen or fail to improve with pcp         End-stage renal disease on hemodialysis ([facility] [address] 5/9/2022 Yes    Overview Signed 5/9/2022 11:55 AM by Herber Linda MD     Last Assessment & Plan:   Formatting of this note might be different from the original.  Condition: symptomatic, MWF 7:15am-10:45am    Educates member to follow Nephrology recommendations    Follow up in: if symptoms worsen or fail to improve with pcp         Anemia of chronic disease 5/9/2022 Yes    Alcohol abuse 5/9/2022 Yes    Hypertension, essential 5/9/2022 Yes    Depression 5/9/2022 Yes    Tobacco abuse 5/9/2022 Yes          Plan:        1. Septic hip:  2. AVN:   - Plan for IR aspiration 5/10 with follow up on cultures.    - ID following patient on vanc.   - multimodal pain management . - White count downtrending, ID following     3. History of gout:   - Check uric acid level    4. Primary hypertension:   - Currently stable on Coreg, Catapres, Lasix, Zaroxolyn. -  Initially high on presentation secondary to pain  - ECHo ordered    5. ESRD:   - on iHD M/W/F    6. ACD:   - Macrocytic normochromic anemia  - check B12 and folate. - Iron, TIBC and ferritin levels reviewed    7. GI/DVT prophylaxis: Protonix, heparin      On this date 05/10/22 I have spent 20 mins  in direct patient care, reviewing notes, test results and diagnosis and plan of care discussions with the patient, as well as coordination of care.   Plan of care made with DO Daylin Orellana APRN - NP  5/10/2022  7:41 AM

## 2022-05-10 NOTE — BRIEF OP NOTE
Brief Postoperative Note    Beni Garrett  YOB: 1970  [id number]    Pre-operative Diagnosis: Right hip pain    Post-operative Diagnosis: Same    Procedure: Right hip aspiration    Anesthesia: Local    Surgeons/Assistants: CHERELLE Doherty    Estimated Blood Loss: less than 50     Complications: None    Specimens: Was Obtained:     Findings: Successful aspiration of right hip. Approximately 7 mL of thick yellow fluid aspirated.      Electronically signed by CHERELLE Doherty on 5/10/2022 at 8:15 AM

## 2022-05-10 NOTE — PLAN OF CARE
Orthopedic Surgery Update Progress Note:    Luciana Sheppard is a 45 y.o. male, being seen for:    -Left hip AVN     - Results from IR aspiration of the left hip demonstrate WBC of 553, 22% neutrophils, and negative for crystals. NGTD. - Low suspicion for septic arthritis after aspiration.  - Hip pain is most likely due to AVN from long term steroid use for treatment of psoriasis. - WBAT LLE  - Recommend discussing stopping steroid use with prescribing physician.  - Recommend referral to Rheumatology for outpatient treatment of psoriasis. - Azalea Caicedo to discharge from orthopedic perspective once medially stable  - Follow up in the orthopedic clinic in 10-14 days for outpatient treatment of left hip AVN. - Please page orthopedics with questions.     Isidoro Velasquez DO  Orthopedic Surgery Resident, PGY-1  [facility]

## 2022-05-10 NOTE — PROGRESS NOTES
[facility] -Progress Note  Today's Date and Time: 5/10/2022, 1:54 PM    Impression :   · Concern for possible right hip septic arthritis  · Avascular necrosis Rt hip  · Hx Left shoulder rotator cuff surgery with hardware placement  · Hx right ankle fracture with hard wear placement 2 years ago  · ESRD on hemodialysis  · Diabetes mellitus type 2  · Gout  · Steroid use since January 2022  · Hypertension  · Severe anxiety  · Cigarette smoker    Recommendations:   · Continue vancomycin pending finalized culture results  · Ortho recommendations    Medical Decision Making/Summary/Discussion:5/10/2022     · Right hip aspiration completed 5/10/22 in IR-7 ml thick, clear yellow fluid aspirated  Infection Control Recommendations   · [place] Precautions    Antimicrobial Stewardship Recommendations     · Simplification of therapy  · Targeted therapy  · PK dosing    Coordination of Outpatient Care:   · Estimated Length of IV antimicrobials:TBD  · Patient will need Midline Catheter Insertion: TBD  · Patient will need PICC line Insertion:TBD  · Patient will need: Home IV , [place],  SNF,  LTAC: TBD  · Patient will need outpatient wound care:TBD    Chief complaint/reason for consultation:   · Concern for septic hip      History of Present Illness:   Janes Izquierdo is a 45y.o.-year-old male who was initially admitted on 5/9/2022. Patient seen at the request of Dr. Sandy Batista:    This patient, with ESRD on hemodialysis, initially presented to [facility], ER on 5/4/2022 with pain of right hip pain x 1 week. He states that he fell approximately 3 weeks ago but denies any previous hip injuries. Imaging showed no acute findings. He was given pain medication and Decadron for suspected bursitis. CRP and ESR were elevated without leukocytosis, and he had a low-grade fever 100.5.     He was discharged home, but presented again to [facility], ED on 5/8/2022 with the inability to walk due to the severity of the pain in the right hip. A CT of the right hip revealed avascular necrosis of the right femoral head with no evidence for subchondral collapse. The patient was transferred to [facility] for higher level care. Plan is for IR aspiration of right hip joint with cultures. The patient was initiated on IV vancomycin. Imaging/Diagnostics:  XR HIP RIGHT (2-3 VIEWS)     Result Date: 5/8/2022  No acute osseous abnormality.      XR CHEST PORTABLE     Result Date: 5/4/2022  Subtle ill-defined opacity in the periphery of the left mid lung zone, possibly  shadows. Recommend follow-up CT to exclude pulmonary nodule. .      CT HIP RIGHT WO CONTRAST     Result Date: 5/8/2022  1. No acute fracture. 2. Avascular necrosis of the right femoral head with no evidence for subchondral collapse. 3. Moderate-sized fat filled umbilical hernia with fluid and stranding present within the hernia sac. Correlate clinically to exclude incarcerated hernia.      XR HIP 2-3 VW W PELVIS RIGHT     Result Date: 5/4/2022  No acute abnormality of the hip. CURRENT EVALUATION 5/10/2022    Afebrile  Intermittent hypertension    The patient remains alert and oriented on room air. He underwent aspiration of his right hip join in IR today. 7 ml of thick, clear, yellow liquid was aspirated and sent for culture. Rare neutrophils with no bacteria seen pending finalized culture. No other acute issues noted at this time. Labs, X rays reviewed: 5/10/2022    BUN:42-->19  Cr:3.46-->2.53    WBC:11.6-->8.7  Hb:9.2  Plat: 9.6    CRP: 35.9    Cultures:  Urine:  ·   Blood:  ·   Sputum :  ·   Right hip aspirate:  · 5/10/22: No growth thus far    Discussed with patient, RN, family. I have personally reviewed the past medical history, past surgical history, medications, social history, and family history, and I have updated the database accordingly.   Past Medical History:     Past Medical History:   Diagnosis Date    Chronic kidney disease     Depression     [facility] mellitus ([facility] [address] [facility]     Dialysis patient ([facility] [address] [facility]     Gout     Hypertension        Past Surgical  History:     Past Surgical History:   Procedure Laterality Date    ANKLE CLOSED REDUCTION Right 08/12/2019    ANKLE CLOSED REDUCTION performed by Colletta Jaegers, MD at [facility] Right 08/13/2019    ANKLE OPEN REDUCTION INTERNAL FIXATION-TRIMELLAR FRACTURE performed by Tejas Root DO at [place] [id number] Left        Medications:      [START ON 5/11/2022] magnesium oxide  400 mg Oral Daily    allopurinol  300 mg Oral Daily    carvedilol  25 mg Oral BID WC    citalopram  40 mg Oral Daily    cloNIDine  0.1 mg Oral Nightly    ferrous sulfate  325 mg Oral Daily with breakfast    furosemide  60 mg Oral Daily    metOLazone  2.5 mg Oral Once per day on Mon Wed Fri    pantoprazole  20 mg Oral BID AC    vitamin D  50,000 Units Oral Weekly    heparin (porcine)  5,000 Units SubCUTAneous 3 times per day    sodium chloride flush  5-40 mL IntraVENous 2 times per day    insulin lispro  0-12 Units SubCUTAneous TID WC    insulin lispro  0-6 Units SubCUTAneous Nightly    vancomycin (VANCOCIN) intermittent dosing (placeholder)   Other RX Placeholder    [START ON 5/11/2022] vancomycin  1,000 mg IntraVENous Once per day on Mon Wed Fri       Social History:     Social History     Socioeconomic History    Marital status: Single     Spouse name: Not on file    Number of children: Not on file    Years of education: Not on file    Highest education level: Not on file   Occupational History    Not on file   Tobacco Use    Smoking status: Current Every Day Smoker     Packs/day: 0.50     Types: Cigarettes    Smokeless tobacco: Never Used   Vaping Use    Vaping Use: Never used   Substance and Sexual Activity    Alcohol use:  Yes     Alcohol/week: 2.0 standard drinks     Types: 2 Standard drinks or equivalent per week     Comment: States that he cut back on alcohol 12/2021, use to drink 12 beers a night. Now drinks 2 hard seltzers a night. Patient states he had D/T 12/2021 when he limit his alcohol intake.  Drug use: Never    Sexual activity: Yes   Other Topics Concern    Not on file   Social History Narrative    Not on file     Social Determinants of Health     Financial Resource Strain:     Difficulty of Paying Living Expenses: Not on file   Food Insecurity:     Worried About Running Out of Food in the Last Year: Not on file    Ashly of Food in the Last Year: Not on file   Transportation Needs:     Lack of Transportation (Medical): Not on file    Lack of Transportation (Non-Medical): Not on file   Physical Activity:     Days of Exercise per Week: Not on file    Minutes of Exercise per Session: Not on file   Stress:     Feeling of Stress : Not on file   Social Connections:     Frequency of Communication with Friends and Family: Not on file    Frequency of Social Gatherings with Friends and Family: Not on file    Attends Jain Services: Not on file    Active Member of [address] [place] [address] [employer] or Organizations: Not on file    Attends Club or Organization Meetings: Not on file    Marital Status: Not on file   Intimate Partner Violence:     Fear of Current or Ex-Partner: Not on file    Emotionally Abused: Not on file    Physically Abused: Not on file    Sexually Abused: Not on file   Housing Stability:     Unable to Pay for Housing in the Last Year: Not on file    Number of [place] in the Last Year: Not on file    Unstable Housing in the Last Year: Not on file       Family History:   No family history on file. Allergies:   Patient has no known allergies. Review of Systems:   Constitutional: No fevers or chills. No systemic complaints  Head: No headaches  Eyes: No double vision or blurry vision. No conjunctival inflammation. ENT: No sore throat or runny nose. . No hearing loss, tinnitus or vertigo. Cardiovascular: No chest pain or palpitations. No shortness of breath. No ROSARIO  Lung: No shortness of breath or cough. No sputum production  Abdomen: No nausea, vomiting, diarrhea, or abdominal pain. [place] Stands No cramps. Genitourinary: No increased urinary frequency, or dysuria. No hematuria. No suprapubic or CVA pain  Musculoskeletal: Right hip pain  Hematologic: No bleeding or bruising. Neurologic: No headache, weakness, numbness, or tingling. Integument: No rash, no ulcers. Psychiatric: No depression. Endocrine: No polyuria, no polydipsia, no polyphagia. Physical Examination :     Patient Vitals for the past 8 hrs:   BP Temp Temp src Pulse Resp SpO2 Weight   05/10/22 1126 130/83 98 °F (36.7 °C) Oral 64 16 100 % --   05/10/22 1113 -- -- -- -- 16 -- --   05/10/22 0844 (!) 158/84 98.8 °F (37.1 °C) Oral 63 14 98 % --   05/10/22 0723 -- -- -- -- 16 -- --   05/10/22 0558 -- -- -- -- -- -- 210 lb 5.1 oz (95.4 kg)     General Appearance: Awake, alert, and in no apparent distress  Head:  Normocephalic, no trauma  Eyes: Pupils equal, round, reactive to light and accommodation; extraocular movements intact; sclera anicteric; conjunctivae pink. No embolic phenomena. ENT: Oropharynx clear, without erythema, exudate, or thrush. No tenderness of sinuses. Mouth/throat: mucosa pink and moist. No lesions. Dentition in good repair. Neck:Supple, without lymphadenopathy. Thyroid normal, No bruits. Pulmonary/Chest: Clear to auscultation, without wheezes, rales, or rhonchi. No dullness to percussion. Cardiovascular: Regular rate and rhythm without murmurs, rubs, or gallops. Abdomen: Soft, non tender. Bowel sounds normal. No organomegaly  All four Extremities: No cyanosis, clubbing, edema, or effusions. Right lower extremity weakness  Neurologic: No gross sensory or motor deficits. Skin: Warm and dry with good turgor. No signs of peripheral arterial or venous insufficiency. No ulcerations.  No open wounds. Medical Decision Making -Laboratory:   I have independently reviewed/ordered the following labs:    CBC with Differential:   Recent Labs     05/08/22  1826 05/09/22  1349   WBC 11.6* 8.7   HGB 9.8* 9.2*   HCT 31.0* 28.2*   * 96*   LYMPHOPCT 10*  --    MONOPCT 4  --      BMP:   Recent Labs     05/09/22  1349 05/10/22  0613   * 132*   K 3.7 3.7   CL 98 95*   CO2 24 26   BUN 42* 19   CREATININE 3.46* 2.53*     Hepatic Function Panel: No results for input(s): PROT, LABALBU, BILIDIR, IBILI, BILITOT, ALKPHOS, ALT, AST in the last 72 hours. No results for input(s): RPR in the last 72 hours. No results for input(s): HIV in the last 72 hours. No results for input(s): BC in the last 72 hours. Lab Results   Component Value Date    RBC 2.74 05/09/2022    WBC 8.7 05/09/2022    TURBIDITY Clear 05/04/2022     Lab Results   Component Value Date    CREATININE 2.53 05/10/2022    GLUCOSE 100 05/10/2022       Medical Decision Making-Imaging:     Narrative   EXAMINATION:   CT OF THE RIGHT HIP WITHOUT CONTRAST 5/8/2022 6:13 pm       TECHNIQUE:   CT of the right hip was performed without the administration of intravenous   contrast.  Multiplanar reformatted images are provided for review. Dose   modulation, iterative reconstruction, and/or weight based adjustment of the   mA/kV was utilized to reduce the radiation dose to as low as reasonably   achievable.       COMPARISON:   Right hip radiograph May 8, 2022; pelvis and right hip radiograph May 4, 2022       HISTORY   ORDERING SYSTEM PROVIDED HISTORY: pain   TECHNOLOGIST PROVIDED HISTORY:   pain   Decision Support Exception - unselect if not a suspected or confirmed   emergency medical condition->Emergency Medical Condition (MA)       FINDINGS:   Bones: No acute fracture of the pelvis or right hip identified.  Avascular   necrosis of the right femoral head with no evidence for subchondral collapse.    Remote healed fracture deformity of the right inferior pubic ramus.       Soft Tissue: Visualized intrapelvic structures demonstrate a moderate-sized   umbilical hernia containing fat and fluid with a small amount of stranding   present.  Correlate clinically to exclude the possibility of incarcerated   hernia.  Small amount of free fluid present within the pelvis.  Small   bilateral fat filled inguinal hernias.       Joint: Anatomic alignment of the hips with mild degenerative change present.           Impression   1. No acute fracture. 2. A vascular necrosis of the right femoral head with no evidence for   subchondral collapse. 3. Moderate-sized fat filled umbilical hernia with fluid and stranding   present within the hernia sac.  Correlate clinically to exclude incarcerated   hernia. Narrative   EXAMINATION:   TWO XRAY VIEWS OF THE RIGHT HIP       5/8/2022 2:29 pm       COMPARISON:   05/04/2022       HISTORY:   ORDERING SYSTEM PROVIDED HISTORY: hip pain   TECHNOLOGIST PROVIDED HISTORY:   hip pain       FINDINGS:   There is no evidence of acute fracture.  There is normal alignment.  No acute   joint abnormality.  No focal osseous lesion. No focal soft tissue abnormality.           Impression   No acute osseous abnormality.             Medical Decision [id number]-[id number]:       Medical Decision Making-Other:     Note:  · Labs, medications, radiologic studies were reviewed with personal review of films  · Large amounts of data were reviewed  · Discussed with nursing Staff, Discharge planner  · Infection Control and Prevention measures reviewed  · All prior entries were reviewed  · Administer medications as ordered  · Prognosis: Good  · Discharge planning reviewed      Thank you for allowing us to participate in the care of this patient. Please call with questions. JAISON Dobbins - CNP     ATTESTATION:    I have discussed the case, including pertinent history and exam findings with the APRN.  I have evaluated the  History, physical findings and pictures of the patient and the key elements of the encounter have been performed by me. I have reviewed the laboratory data, other diagnostic studies and discussed them with the APRN. I have updated the medical record where necessary. I agree with the assessment, plan and orders as documented by the APRN.     Varun Thayer MD.        Pager: [phone] - Office: [phone]

## 2022-05-10 NOTE — CARE COORDINATION
Case Management Initial Discharge Plan  Ania Barakat,             Met with:patient to discuss discharge plans. Information verified: address, contacts, phone number, , insurance Yes  Insurance Provider: [address] [place] [address]     Emergency Contact/Next of Kin name & number: friend Gloria Cosme  Who are involved in patient's support system? friends    PCP:  Pam Martinez  Date of last visit:       Discharge Planning    Living Arrangements:    lives alone    Home has 2 stories  3 stairs to climb to get into front door, flight stairs to climb to reach second floor  Location of bedroom/bathroom in home 1st     Patient able to perform ADL's:Assisted    Current Services (outpatient & in home)  renal joseph mwpooja chair time 7:15  DME equipment: cane, walker      Is patient receiving oral anticoagulation therapy?  no    Does patient have any issues/concerns obtaining medications? No      Is there a preferred Pharmacy after hours or on weekends?   [facility]        Potential Assistance Needed:   pt eval, op therapy, home care       Evaluation: no      Patient expects to be discharged to:   home    If home: is the family and/or caregiver wiling & able to provide support at home? Yes   Who will be providing this support? [facility]      Transportation provider: ernestine   Transportation arrangements needed for discharge: No    Readmission Risk              Risk of Unplanned Readmission:  19             Does patient have a readmission risk score greater than 14?: Yes  If yes, follow-up appointment must be made within 7 days of discharge.      Goals of Care: safe transition plan       Educated yes on transitional options, provided freedom of choice and are agreeable with plan      Discharge Plan: PT colette to determine if safe to return home           Electronically signed by Doug Espinoza RN on 5/10/22 at 2:56 PM EDT

## 2022-05-10 NOTE — PROGRESS NOTES
Physical Therapy  Facility/Department: [facility] RENAL//MED SURG  Physical Therapy Initial Assessment    Name: Maggie Lowe  : 1970  MRN: [id number]  Date of Service: 5/10/2022  \"45year-old male past medical history of hypertension, end-stage renal disease, gout, seasonal allergies, history of alcoholic cardiomyopathy, depression, smoker, type 2 diabetes, BPH presents with right hip pain and decreased range of motion and unable to stand for the past week\"  Discharge Recommendations:    Further therapy recommended at discharge. PT Equipment Recommendations  Equipment Needed: Yes  Mobility Devices: Wheelchair  Wheelchair: Standard    Equipment recommendations listed below are based on what the patient would need if they were able to return to prior living arrangements at the time of discharge. Patient Diagnosis(es): There were no encounter diagnoses. Past Medical History:  has a past medical history of Chronic kidney disease, Depression, Diabetes mellitus ([facility] [address] Dialysis patient ([facility] [address] Gout, and Hypertension. Past Surgical History:  has a past surgical history that includes hernia repair; shoulder surgery (Left); Ankle Closed Reduction (Right, 2019); and Ankle fracture surgery (Right, 2019). Assessment   Body Structures, Functions, Activity Limitations Requiring Skilled Therapeutic Intervention: Decreased functional mobility ; Decreased cognition;Decreased endurance;Decreased ROM; Decreased strength;Decreased balance; Increased pain  Assessment: Pt ambulated 15ft w/ RW CGA, ambulation distance limited due to R hip pain. Pt will require 24hr support upon discharge due to fall risk and increased pain with functional mobility. Recommending continued skilled physical therapy to address strength, functional mobility deficits and stair negotiation to return pt to prior level of independence.   Therapy Prognosis: Good  Decision Making: Medium Complexity  Requires PT Follow-Up: Yes  Activity Tolerance  Activity Tolerance: Patient limited by pain     Plan   Plan  Plan:  (5-6x/week)  Current Treatment Recommendations: Strengthening,ROM,Gait training,Stair training,Balance training,Functional mobility training,Transfer training,Endurance training,Home exercise program,Equipment evaluation, education, & procurement,Safety education & training  Safety Devices  Type of Devices: Left in bed,Call light within reach,Gait belt  Restraints  Restraints Initially in Place: No     Restrictions  Restrictions/Precautions  Restrictions/Precautions: Weight Bearing  Required Braces or Orthoses?: No  Lower Extremity Weight Bearing Restrictions  Right Lower Extremity Weight Bearing: Weight Bearing As Tolerated  Position Activity Restriction  Other position/activity restrictions: up w/assist     Subjective   General  Patient assessed for rehabilitation services?: Yes  Response To Previous Treatment: Not applicable  Family / Caregiver Present: No  Follows Commands: Within Functional Limits  General Comment  Comments: Pt reporting 9/10 R hip and groin pain. Subjective  Subjective: RN and pt in agreeement for PT eval; pt supine in bed upon PT arrival, pt pleasant and cooperative throughout session. Social/Functional History  Social/Functional History  Lives With: Alone  Type of Home: House  Home Layout: Two level,Performs ADL's on one level,Able to Live on Main level with bedroom/bathroom  Home Access: Stairs to enter without rails  Entrance Stairs - Number of Steps: 3  Bathroom Shower/Tub: Tub/Shower unit  Bathroom Toilet: Handicap height  Bathroom Equipment: Shower chair (pt reports shower chair is \"wobbly\")  Home Equipment: Cane,Walker, rolling,Wheelchair-manual (pt reports utilizing str cane PRN, specifically on HD days.  Pt reports manual w/c is in bad shape)  ADL Assistance: [address]: Independent (pt reports since pain began all ADLs/IADLs have become significantly more difficult)  Homemaking Responsibilities: Yes  Ambulation Assistance: Independent  Transfer Assistance: Independent  Active : No  Patient's  Info: friend  Occupation: On disability  Leisure & Hobbies: watch TV  Additional Comments: Pt reports having a friend that can assist occasionally however no 24/7 support available  Vision/Hearing  Hearing: Within functional limits    Cognition   Orientation  Overall Orientation Status: Within Functional Limits  Cognition  Overall Cognitive Status: WFL     Objective      Gross Assessment  Sensation: Impaired (pt reports chronic numbness/tingling in the R ankle/foot)     Joint Mobility  Spine: WFL  ROM RLE: WFL, pain noted at end range of hip flexion  ROM LLE: WFL  ROM RUE: Shoulder flexion limited to ~90; Elbow, wrist, hand WFL  ROM LUE: Shoulder flexion limited to ~90; Elbow, wrist, hand WFL  Strength RLE  Strength RLE: Exception  Comment: hip flexion not formally assessed due to pain, AROM against gravity noted, knee flexion/extension and ankle DF/PF WFL  Strength LLE  Strength LLE: WFL  Strength RUE  Strength RUE: WFL  Strength LUE  Strength LUE: WFL           Bed mobility  Rolling to Right: Contact guard assistance  Supine to Sit: Contact guard assistance  Sit to Supine:  (Did not formally assess- pt retired seated EOB upon writer's exit)  Scooting: Contact guard assistance  Bed Mobility Comments: Increased time required to complete; HOB flat  Transfers  Sit to Stand: Contact guard assistance  Stand to sit: Contact guard assistance  Comment: VC's required for proper hand placement with functional transfers with good return demo  Ambulation  Surface: level tile  Device: Rolling Walker  Assistance: Contact guard assistance  Quality of Gait: Decreased stance time RLE  Gait Deviations: Slow Zoe;Decreased step length;Decreased step height  Distance: 15ft  Comments: Pt educated on step to gait pattern with fair return demo.   More Ambulation?: No  Stairs/Curb  Stairs?: No     Balance  Posture: Fair (kyphotic, forward head)  Sitting - Static: Good;-  Sitting - Dynamic: Good;-  Standing - Static: Fair;+  Standing - Dynamic: Fair;+  Comments: standing balance assessed w/ RW; pt able to sit EOB CGA       AM-PAC Score  AM-PAC Inpatient Mobility Raw Score : 20 (05/10/22 1521)  AM-PAC Inpatient T-Scale Score : 47.67 (05/10/22 1521)  Mobility Inpatient CMS 0-100% Score: 35.83 (05/10/22 1521)  Mobility Inpatient CMS G-Code Modifier : CJ (05/10/22 1521)          Goals  Short Term Goals  Time Frame for Short term goals: 14  Short term goal 1: Pt perform bed mobility independently  Short term goal 2: Demonstrate functional transfers independently  Short term goal 3: Pt to ambulate 200ft with least restrictive AD independently  Short term goal 4: Ascend/descend 3 stairs with no rail to simulate home environment SBA  Patient Goals   Patient goals :  To go home       Therapy Time   Individual Concurrent Group Co-treatment   Time In 1051         Time Out 1109         Minutes 18         Timed Code Treatment Minutes: 8 Minutes       Brandan Acuna, PT

## 2022-05-11 VITALS
HEART RATE: 81 BPM | SYSTOLIC BLOOD PRESSURE: 132 MMHG | HEIGHT: 70 IN | TEMPERATURE: 98.1 F | OXYGEN SATURATION: 96 % | WEIGHT: 210.76 LBS | BODY MASS INDEX: 30.17 KG/M2 | DIASTOLIC BLOOD PRESSURE: 91 MMHG | RESPIRATION RATE: 16 BRPM

## 2022-05-11 PROBLEM — N18.6 ESRD (END STAGE RENAL DISEASE) (HCC): Status: ACTIVE | Noted: 2021-12-04

## 2022-05-11 PROBLEM — F32.9 MAJOR DEPRESSIVE DISORDER, SINGLE EPISODE, UNSPECIFIED: Status: RESOLVED | Noted: 2021-09-14 | Resolved: 2022-05-11

## 2022-05-11 PROBLEM — M87.00 AVN (AVASCULAR NECROSIS OF BONE) (HCC): Status: ACTIVE | Noted: 2022-05-11

## 2022-05-11 LAB
ANION GAP SERPL CALCULATED.3IONS-SCNC: 12 MMOL/L (ref 9–17)
BUN BLDV-MCNC: 31 MG/DL (ref 6–20)
CALCIUM SERPL-MCNC: 9 MG/DL (ref 8.6–10.4)
CHLORIDE BLD-SCNC: 94 MMOL/L (ref 98–107)
CO2: 26 MMOL/L (ref 20–31)
CREAT SERPL-MCNC: 3.76 MG/DL (ref 0.7–1.2)
GFR AFRICAN AMERICAN: 21 ML/MIN
GFR NON-AFRICAN AMERICAN: 17 ML/MIN
GFR SERPL CREATININE-BSD FRML MDRD: ABNORMAL ML/MIN/{1.73_M2}
GLUCOSE BLD-MCNC: 102 MG/DL (ref 70–99)
GLUCOSE BLD-MCNC: 102 MG/DL (ref 75–110)
HCT VFR BLD CALC: 31.6 % (ref 40.7–50.3)
HEMOGLOBIN: 9.9 G/DL (ref 13–17)
LYMPHOCYTES, BODY FLUID: 49 %
MCH RBC QN AUTO: 32.2 PG (ref 25.2–33.5)
MCHC RBC AUTO-ENTMCNC: 31.3 G/DL (ref 28.4–34.8)
MCV RBC AUTO: 102.9 FL (ref 82.6–102.9)
NEUTROPHIL, FLUID: 22 %
NRBC AUTOMATED: 0 PER 100 WBC
OTHER CELLS FLUID: NORMAL %
PDW BLD-RTO: 15.5 % (ref 11.8–14.4)
PLATELET # BLD: ABNORMAL K/UL (ref 138–453)
PLATELET, FLUORESCENCE: 91 K/UL (ref 138–453)
PLATELET, IMMATURE FRACTION: 3.6 % (ref 1.1–10.3)
POTASSIUM SERPL-SCNC: 3.9 MMOL/L (ref 3.7–5.3)
RBC # BLD: 3.07 M/UL (ref 4.21–5.77)
RBC FLUID: <3000 /MM3
SODIUM BLD-SCNC: 132 MMOL/L (ref 135–144)
SPECIMEN TYPE: NORMAL
WBC # BLD: 7.9 K/UL (ref 3.5–11.3)
WBC FLUID: 553 /MM3

## 2022-05-11 PROCEDURE — 6360000002 HC RX W HCPCS: Performed by: CLINICAL NURSE SPECIALIST

## 2022-05-11 PROCEDURE — 85055 RETICULATED PLATELET ASSAY: CPT

## 2022-05-11 PROCEDURE — 6360000002 HC RX W HCPCS: Performed by: INTERNAL MEDICINE

## 2022-05-11 PROCEDURE — 99239 HOSP IP/OBS DSCHRG MGMT >30: CPT | Performed by: NURSE PRACTITIONER

## 2022-05-11 PROCEDURE — 85027 COMPLETE CBC AUTOMATED: CPT

## 2022-05-11 PROCEDURE — 99232 SBSQ HOSP IP/OBS MODERATE 35: CPT | Performed by: INTERNAL MEDICINE

## 2022-05-11 PROCEDURE — 90935 HEMODIALYSIS ONE EVALUATION: CPT

## 2022-05-11 PROCEDURE — 6370000000 HC RX 637 (ALT 250 FOR IP): Performed by: CLINICAL NURSE SPECIALIST

## 2022-05-11 PROCEDURE — APPSS30 APP SPLIT SHARED TIME 16-30 MINUTES: Performed by: NURSE PRACTITIONER

## 2022-05-11 PROCEDURE — 90935 HEMODIALYSIS ONE EVALUATION: CPT | Performed by: INTERNAL MEDICINE

## 2022-05-11 PROCEDURE — 80048 BASIC METABOLIC PNL TOTAL CA: CPT

## 2022-05-11 PROCEDURE — 82947 ASSAY GLUCOSE BLOOD QUANT: CPT

## 2022-05-11 PROCEDURE — 6370000000 HC RX 637 (ALT 250 FOR IP): Performed by: NURSE PRACTITIONER

## 2022-05-11 RX ORDER — LANOLIN ALCOHOL/MO/W.PET/CERES
400 CREAM (GRAM) TOPICAL DAILY
Qty: 30 TABLET | Refills: 0 | Status: SHIPPED | OUTPATIENT
Start: 2022-05-12 | End: 2022-06-11

## 2022-05-11 RX ORDER — CALCIUM CARBONATE 200(500)MG
500 TABLET,CHEWABLE ORAL 3 TIMES DAILY PRN
Qty: 30 TABLET | Refills: 0 | Status: SHIPPED | OUTPATIENT
Start: 2022-05-11 | End: 2022-05-21

## 2022-05-11 RX ORDER — OXYCODONE HYDROCHLORIDE 5 MG/1
5 TABLET ORAL EVERY 6 HOURS PRN
Qty: 12 TABLET | Refills: 0 | Status: SHIPPED | OUTPATIENT
Start: 2022-05-11 | End: 2022-05-14

## 2022-05-11 RX ADMIN — FUROSEMIDE 60 MG: 40 TABLET ORAL at 14:01

## 2022-05-11 RX ADMIN — MORPHINE SULFATE 2 MG: 2 INJECTION, SOLUTION INTRAMUSCULAR; INTRAVENOUS at 01:54

## 2022-05-11 RX ADMIN — OXYCODONE HYDROCHLORIDE 5 MG: 5 TABLET ORAL at 00:31

## 2022-05-11 RX ADMIN — PANTOPRAZOLE SODIUM 20 MG: 20 TABLET, DELAYED RELEASE ORAL at 06:11

## 2022-05-11 RX ADMIN — MORPHINE SULFATE 2 MG: 2 INJECTION, SOLUTION INTRAMUSCULAR; INTRAVENOUS at 10:43

## 2022-05-11 RX ADMIN — ALLOPURINOL 300 MG: 300 TABLET ORAL at 08:03

## 2022-05-11 RX ADMIN — MORPHINE SULFATE 2 MG: 2 INJECTION, SOLUTION INTRAMUSCULAR; INTRAVENOUS at 06:16

## 2022-05-11 RX ADMIN — HEPARIN SODIUM 5000 UNITS: 5000 INJECTION INTRAVENOUS; SUBCUTANEOUS at 06:11

## 2022-05-11 RX ADMIN — FERROUS SULFATE TAB EC 325 MG (65 MG FE EQUIVALENT) 325 MG: 325 (65 FE) TABLET DELAYED RESPONSE at 08:03

## 2022-05-11 RX ADMIN — OXYCODONE HYDROCHLORIDE 5 MG: 5 TABLET ORAL at 14:01

## 2022-05-11 RX ADMIN — METOLAZONE 2.5 MG: 2.5 TABLET ORAL at 14:01

## 2022-05-11 RX ADMIN — HEPARIN SODIUM 1700 UNITS: 1000 INJECTION INTRAVENOUS; SUBCUTANEOUS at 12:30

## 2022-05-11 RX ADMIN — MAGNESIUM GLUCONATE 500 MG ORAL TABLET 400 MG: 500 TABLET ORAL at 08:03

## 2022-05-11 RX ADMIN — HEPARIN SODIUM 1600 UNITS: 1000 INJECTION INTRAVENOUS; SUBCUTANEOUS at 12:30

## 2022-05-11 RX ADMIN — OXYCODONE HYDROCHLORIDE 5 MG: 5 TABLET ORAL at 08:03

## 2022-05-11 RX ADMIN — CITALOPRAM 40 MG: 20 TABLET, FILM COATED ORAL at 08:03

## 2022-05-11 ASSESSMENT — PAIN SCALES - GENERAL
PAINLEVEL_OUTOF10: 8
PAINLEVEL_OUTOF10: 7
PAINLEVEL_OUTOF10: 8
PAINLEVEL_OUTOF10: 8

## 2022-05-11 ASSESSMENT — PAIN DESCRIPTION - PAIN TYPE
TYPE: ACUTE PAIN
TYPE: ACUTE PAIN

## 2022-05-11 ASSESSMENT — PAIN DESCRIPTION - LOCATION
LOCATION: HIP
LOCATION: HIP

## 2022-05-11 NOTE — PROGRESS NOTES
[facility]  Office: [address], , [facility] Olegmasood, DO, Ambrosio Garcia, DO, Corine Capps, DO, Leeann Dick MD, Lupis Grey MD, Leny Pace MD, Amos Nunez MD, Jorden Macedo MD, Carrie Prince MD, Tristin Minor MD, Herbert Daly, DO, Alyssa Hill, DO, Ophelia George MD,  Fifi Toth, DO, Cheryl Tidwell MD, Rocio Delaney MD, Rubio Pickard MD, María Elena Burleson DO, Alyce Newton MD, Kusum Cardona MD, Carlos Gonzalez MD, Cameron Lee, [facility], [facility], CNP, Divina Patel, CNP, Enrique Barclay, CNP, Chula Kimbrough, CNP, Mayank Danielle, CNP, Fidelia Appiah PAWilliamC, Kina Man, [facility], Betty Omalley, CNP, Ani Burns, CNP, Tim Raygoza, CNP, Nilda Chacon, CNS, uJvencio Evangelista, [facility], Letitia Barr, CNP, Carola Geller, CNP, Thierry Israel, CNP         M[address]    Progress Note    5/11/2022    7:51 AM    Name:   Tricia Hunter  MRN:     [id number]     Georgette Reyter:      [de-identified]   Room:   [address] [place] [address] Day:  2  Admit Date:  5/9/2022 11:12 AM    PCP:   Danyel Edmondson, , DO  Code Status:  Full Code    Subjective:     C/C:right hip[ pain   Interval History Status: not changed. Patient seen and evaluated in dialysis  Right hip aspiration without clear signs of infection  Patient to follow-up with Ortho as an outpatient for further evaluation of AVN  Antibiotics discontinued per infectious disease recommendations    Brief History:     39-year-old male past medical history of hypertension, end-stage renal disease, gout, seasonal allergies, history of alcoholic cardiomyopathy, depression, smoker, type 2 diabetes, BPH presents with right hip pain and decreased range of motion and unable to stand for the past week. Patient states that he noticed on Monday when he went to stand up he was able to tolerate some weight however had been decreasing over the day and worse on Tuesday.   Patient states that he was still able to obtain his Monday Wednesday Friday dialysis sessions however required using a wheelchair and hospital bed for ambulation and sitting. Patient admits to 1 episode of fever which occurred on 5/4/2022    Review of Systems:     Constitutional:  negative for chills, fevers, sweats, + RLE pain with limited ROM  Respiratory:  negative for cough, dyspnea on exertion, shortness of breath, wheezing  Cardiovascular:  negative for chest pain, chest pressure/discomfort, lower extremity edema, palpitations  Gastrointestinal:  negative for abdominal pain, constipation, diarrhea, nausea, vomiting  Neurological:  negative for dizziness, headache    Medications:      Allergies:  No Known Allergies    Current Meds:   Scheduled Meds:    magnesium oxide  400 mg Oral Daily    allopurinol  300 mg Oral Daily    carvedilol  25 mg Oral BID WC    citalopram  40 mg Oral Daily    cloNIDine  0.1 mg Oral Nightly    ferrous sulfate  325 mg Oral Daily with breakfast    furosemide  60 mg Oral Daily    metOLazone  2.5 mg Oral Once per day on Mon Wed Fri    pantoprazole  20 mg Oral BID AC    vitamin D  50,000 Units Oral Weekly    heparin (porcine)  5,000 Units SubCUTAneous 3 times per day    sodium chloride flush  5-40 mL IntraVENous 2 times per day    insulin lispro  0-12 Units SubCUTAneous TID WC    insulin lispro  0-6 Units SubCUTAneous Nightly    vancomycin (VANCOCIN) intermittent dosing (placeholder)   Other RX Placeholder    vancomycin  1,000 mg IntraVENous Once per day on Mon Wed Fri     Continuous Infusions:    sodium chloride      dextrose       PRN Meds: calcium carbonate, oxyCODONE, sodium chloride flush, ondansetron **OR** ondansetron, polyethylene glycol, acetaminophen **OR** acetaminophen, sodium chloride flush, sodium chloride, glucose, dextrose, glucagon (rDNA), dextrose, heparin (porcine), heparin (porcine), morphine    Data:     Past Medical History:   has a past medical history of Chronic kidney disease, Depression, Diabetes mellitus ([facility] [address] [facility] Gout, and Hypertension. Social History:   reports that he has been smoking cigarettes. He has been smoking about 0.50 packs per day. He has never used smokeless tobacco. He reports current alcohol use of about 2.0 standard drinks of alcohol per week. He reports that he does not use drugs. Family History: No family history on file. Vitals:  /75   Pulse 62   Temp 97.9 °F (36.6 °C)   Resp 14   Ht 5' 9.5\" (1.765 m)   Wt 211 lb 10.3 oz (96 kg)   SpO2 94%   BMI 30.81 kg/m²   Temp (24hrs), Av.5 °F (36.9 °C), Min:97.9 °F (36.6 °C), Max:99.2 °F (37.3 °C)    Recent Labs     05/10/22  0842 05/10/22  1120 05/10/22  1625 05/10/22  2052   POCGLU 91 89 104 100       I/O (24Hr): Intake/Output Summary (Last 24 hours) at 2022 0751  Last data filed at 2022 5819  Gross per 24 hour   Intake 355 ml   Output 925 ml   Net -570 ml       Labs:  Hematology:  Recent Labs     05/08/22  1826 05/09/22  1349 05/10/22  0613   WBC 11.6* 8.7  --    RBC 2.98* 2.74*  --    HGB 9.8* 9.2*  --    HCT 31.0* 28.2*  --    .0* 102.9  --    MCH 32.9 33.6*  --    MCHC 31.6 32.6  --    RDW 15.5* 15.4*  --    * 96*  --    MPV 10.6 10.4  --    SEDRATE 35*  --   --    CRP 35.9*  --   --    INR  --   --  1.0     Chemistry:  Recent Labs     05/08/22  1826 05/09/22  1349 05/10/22  0613   * 133* 132*   K 3.9 3.7 3.7   CL 97* 98 95*   CO2 20 24 26   GLUCOSE 100* 79 100*   BUN 37* 42* 19   CREATININE 3.14* 3.46* 2.53*   ANIONGAP 16 11 11   LABGLOM 21* 19* 27*   GFRAA 25* 23* 33*   CALCIUM 8.8 8.7 8.4*     Recent Labs     22  1349 22  2042 05/10/22  0613 05/10/22  0842 05/10/22  1120 05/10/22  1625 05/10/22  2052   LABA1C 4.7  --   --   --   --   --   --    URICACID  --   --  3.1*  --   --   --   --    POCGLU  --  91  --  91 89 104 100       Radiology:  XR HIP RIGHT (2-3 VIEWS)    Result Date: 2022  No acute osseous abnormality. XR CHEST PORTABLE    Result Date: 5/4/2022  Subtle ill-defined opacity in the periphery of the left mid lung zone, possibly  shadows. Recommend follow-up CT to exclude pulmonary nodule. .     CT HIP RIGHT WO CONTRAST    Addendum Date: 5/9/2022    ADDENDUM: The case was discussed with Dr. Meli Claros on May 9, 2022 at 3:53 p.m. Alvin Martinez Upon further review of the examination, there is a small hip effusion on the right. Incidental note also made of chondrocalcinosis at the right hip with amorphous calcification extending along the anterior superior right acetabular labrum. Result Date: 5/9/2022  1. No acute fracture. 2. A vascular necrosis of the right femoral head with no evidence for subchondral collapse. 3. Moderate-sized fat filled umbilical hernia with fluid and stranding present within the hernia sac. Correlate clinically to exclude incarcerated hernia. XR HIP 2-3 VW W PELVIS RIGHT    Result Date: 5/4/2022  No acute abnormality of the hip. Physical Examination:        General appearance:  alert, cooperative and no distress  Mental Status:  oriented to person, place and time and normal affect  Lungs:  clear to auscultation bilaterally anteriorly, posterior expiratory wheezes noted, normal effort  Heart:  regular rate and rhythm, no murmur  Abdomen:  soft, nontender, nondistended, normal bowel sounds  Extremities:  no edema, redness, tenderness in the calves, unable to perform straight leg raise on the right, unable to perform abduction on the right. Able to bend knee with some difficulty. Pain to palpation right hip.   Left lower extremity with good range of motion  Skin:  no gross lesions, rashes, induration    Assessment:        Hospital Problems           Last Modified POA    * (Principal) Septic hip ([address] 5/9/2022 Yes    Chronic kidney disease, stage 4 (severe) ([address] 5/9/2022 Yes    Severe anxiety 5/9/2022 Yes    Overview Signed 5/9/2022 11:55 AM by Mahendra Armstrong MD     Last Assessment & Plan:   Formatting of this note is different from the original.  Condition: symptomatic, GURU-7: 15    Educated member on: ? Informing and correcting misconceptions regarding anxiety, worry, and associated symptoms  ? Causative factors of pathological worry and anxiety    Follow up in: if symptoms worsen or fail to improve with pcp         Obesity with serious comorbidity 5/9/2022 Yes    Overview Signed 5/9/2022 11:55 AM by Laci Bailey MD     Last Assessment & Plan:   Formatting of this note might be different from the original.  Condition: symptomatic     Discussed healthy weight, the importance of diet and exercise. Follow up in: one month with pcp         Nicotine dependence, cigarettes, uncomplicated 2/1/7869 Yes    Major depressive disorder, single episode, unspecified 5/9/2022 Yes    Gout 5/9/2022 Yes    Overview Signed 5/9/2022 11:55 AM by Laci Bailey MD     Last Assessment & Plan:   Formatting of this note might be different from the original.  Condition: symptomatic     Educated member to take medication as prescribed. Educated member to avoid organ meats and alcohol. Follow up in: if symptoms worsen or fail to improve with pcp         End-stage renal disease on hemodialysis ([facility] [address] 5/9/2022 Yes    Overview Signed 5/9/2022 11:55 AM by Laci Bailey MD     Last Assessment & Plan:   Formatting of this note might be different from the original.  Condition: symptomatic, MWF 7:15am-10:45am    Educates member to follow Nephrology recommendations    Follow up in: if symptoms worsen or fail to improve with pcp         Anemia of chronic disease 5/9/2022 Yes    Alcohol abuse 5/9/2022 Yes    Hypertension, essential 5/9/2022 Yes    Depression 5/9/2022 Yes    Tobacco abuse 5/9/2022 Yes          Plan:        1. Septic hip: ruled out  2. AVN:   - s/p IR aspiration 5/10 with negative cultures thus far  - ID following, vanc discontinued   - multimodal pain management       3.  History of gout:   - uric acid level stable on allopruinol    4. Primary hypertension:   - Currently stable on Coreg, Catapres, Lasix, Zaroxolyn. -  Initially high on presentation secondary to pain    5. ESRD:   - on iHD M/W/F    6. ACD:   - Macrocytic normochromic anemia  - check B12 and folate. - Iron, TIBC and ferritin levels reviewed    7. GI/DVT prophylaxis: Protonix, heparin    8. Okay for discharge after dialysis today with follow-up with orthopedic clinic.   Patient states he has a walker and shower chair at home, denies further need      JAISON Cruz NP  5/11/2022  7:51 AM

## 2022-05-11 NOTE — PROGRESS NOTES
[facility]up[facility] [address] [employer]  Occupational Therapy Not Seen Note    DATE: 2022    NAME: Janes Cough  MRN: [id number]   : 1970      Patient not seen this date for Occupational Therapy due to:    Hemodialysis:    Next Scheduled Treatment:     Electronically signed by JADEN Vincent on 2022 at 1:11 PM

## 2022-05-11 NOTE — PROGRESS NOTES
Nephrology Progress Note        Subjective: Patient seen and evaluated on dialysis. Patient is stable on dialysis. Access cannulated without problems. No new issues overnite. Stable hemodynamics. No chest pain or shortness of breath. Patient continues receiving pain medications for his hip pain. No infection discovered on fluid aspirate. Labs are reviewed. Dialysis orders are reviewed with dialysis personnel. Objective:  CURRENT TEMPERATURE:  Temp: 99 °F (37.2 °C)  MAXIMUM TEMPERATURE OVER 24HRS:  Temp (24hrs), Av.6 °F (37 °C), Min:97.9 °F (36.6 °C), Max:99.2 °F (37.3 °C)    CURRENT RESPIRATORY RATE:  Resp: 17  CURRENT PULSE:  Pulse: 69  CURRENT BLOOD PRESSURE:  BP: 128/82  24HR BLOOD PRESSURE RANGE:  Systolic (02JHS), RCX:425 , Min:128 , SPW:861   ; Diastolic (16LAD), PYA:13, Min:75, Max:85    24HR INTAKE/OUTPUT:    Intake/Output Summary (Last 24 hours) at 2022 1132  Last data filed at 2022 4627  Gross per 24 hour   Intake 355 ml   Output 925 ml   Net -570 ml     Patient Vitals for the past 96 hrs (Last 3 readings):   Weight   22 0900 213 lb 6.5 oz (96.8 kg)   22 0600 211 lb 10.3 oz (96 kg)   05/10/22 0558 210 lb 5.1 oz (95.4 kg)         Physical Exam:  General appearance:Awake, alert, in no acute distress  Skin: warm and dry, no rash or erythema  Eyes: conjunctivae normal and sclera anicteric  ENT:no thrush, moist mucous membranes  Neck:  No JVD, midline trachea and no accessory muscle use  Pulmonary: clear to auscultation bilaterally and no wheezing or rhonchi   Cardiovascular: regular rate and rhythm positive S1 and S2 and no rubs   Abdomen: soft nontender, bowel sounds present, no organomegaly,  no ascites   Extremities: no cyanosis, clubbing or edema     Access:   As per previous    Current Medications:    calcium carbonate (TUMS) chewable tablet 500 mg, TID PRN  oxyCODONE (ROXICODONE) immediate release tablet 5 mg, Q6H PRN  magnesium oxide (MAG-OX) tablet 400 mg, Daily  allopurinol (ZYLOPRIM) tablet 300 mg, Daily  carvedilol (COREG) tablet 25 mg, BID WC  citalopram (CELEXA) tablet 40 mg, Daily  cloNIDine (CATAPRES) tablet 0.1 mg, Nightly  ferrous sulfate (FE TABS 325) EC tablet 325 mg, Daily with breakfast  furosemide (LASIX) tablet 60 mg, Daily  metOLazone (ZAROXOLYN) tablet 2.5 mg, Once per day on Mon Wed Fri  pantoprazole (PROTONIX) tablet 20 mg, BID AC  vitamin D (ERGOCALCIFEROL) capsule 50,000 Units, Weekly  sodium chloride flush 0.9 % injection 10 mL, PRN  ondansetron (ZOFRAN-ODT) disintegrating tablet 4 mg, Q8H PRN   Or  ondansetron (ZOFRAN) injection 4 mg, Q6H PRN  polyethylene glycol (GLYCOLAX) packet 17 g, Daily PRN  acetaminophen (TYLENOL) tablet 650 mg, Q6H PRN   Or  acetaminophen (TYLENOL) suppository 650 mg, Q6H PRN  heparin (porcine) injection 5,000 Units, 3 times per day  sodium chloride flush 0.9 % injection 5-40 mL, 2 times per day  sodium chloride flush 0.9 % injection 5-40 mL, PRN  0.9 % sodium chloride infusion, PRN  insulin lispro (HUMALOG) injection vial 0-12 Units, TID WC  insulin lispro (HUMALOG) injection vial 0-6 Units, Nightly  glucose (GLUTOSE) 40 % oral gel 15 g, PRN  dextrose 50 % IV solution, PRN  glucagon (rDNA) injection 1 mg, PRN  dextrose 5 % solution, PRN  heparin (porcine) injection 1,600 Units, PRN  heparin (porcine) injection 1,700 Units, PRN  morphine (PF) injection 2 mg, Q4H PRN      Labs:   CBC:   Recent Labs     05/08/22  1826 05/09/22  1349 05/11/22  0906   WBC 11.6* 8.7 7.9   RBC 2.98* 2.74* 3.07*   HGB 9.8* 9.2* 9.9*   HCT 31.0* 28.2* 31.6*   * 96* See Reflexed IPF Result   MPV 10.6 10.4  --       BMP:   Recent Labs     05/09/22  1349 05/10/22  0613 05/11/22  0906   * 132* 132*   K 3.7 3.7 3.9   CL 98 95* 94*   CO2 24 26 26   BUN 42* 19 31*   CREATININE 3.46* 2.53* 3.76*   GLUCOSE 79 100* 102*   CALCIUM 8.7 8.4* 9.0        Phosphorus:  No results for input(s): PHOS in the last 72 hours.   Magnesium: No results for input(s): MG in the last 72 hours. Albumin: No results for input(s): LABALBU in the last 72 hours. Dialysis bath: Dialysis Bath  K+ (Potassium): 3  Ca+ (Calcium): 3  Na+ (Sodium): 138  HCO3 (Bicarb): 35    Radiology:  Reviewed as available. Assessment:  1 ESRD:dialysis bath reviewed with nurse. 2.HTN:  3. No fluid overload  4 .right hip avascular necrosis  5. ANEMIA OF CHRONIC DISEASE:   6.SECONDARY HYPERPARATHYROIDISM:     Plan:  1. Weight removal and dialysis orders reviewed. 2. Patient stable for discharge from nephrology standpoint  3. Continue Monday and Wednesday and Friday dialysis outpatient schedule upon discharge    Please do not hesitate to call with questions.     Electronically signed by Lisa Peña MD on 5/11/2022 at 11:32 AM

## 2022-05-11 NOTE — PLAN OF CARE
Problem: Discharge Planning  Goal: Discharge to home or other facility with appropriate resources  5/10/2022 2244 by Ebony Talley RN  Outcome: Progressing  5/10/2022 1807 by Lucho Aguayo RN  Outcome: Progressing     Problem: Safety - Adult  Goal: Free from fall injury  5/10/2022 2244 by Ebony Talley RN  Outcome: Progressing  5/10/2022 1807 by Lucho Aguayo RN  Outcome: Progressing     Problem: ABCDS Injury Assessment  Goal: Absence of physical injury  5/10/2022 2244 by Ebony Talley RN  Outcome: Progressing  5/10/2022 1807 by Lucho Aguayo RN  Outcome: Progressing     Problem: Pain  Goal: Verbalizes/displays adequate comfort level or baseline comfort level  5/10/2022 2244 by Ebony Talley RN  Outcome: Progressing  5/10/2022 1807 by Lucho Aguayo RN  Outcome: Progressing     Problem: Chronic Conditions and Co-morbidities  Goal: Patient's chronic conditions and co-morbidity symptoms are monitored and maintained or improved  5/10/2022 2244 by Ebony Talley RN  Outcome: Progressing  5/10/2022 1807 by Lucho Aguayo RN  Outcome: Progressing

## 2022-05-11 NOTE — PROGRESS NOTES
[facility] -Progress Note  Today's Date and Time: 5/11/2022, 10:20 AM    Impression :   · Concern for possible right hip septic arthritis  · Culture: No growth   · Avascular necrosis Rt hip  · Hx Left shoulder rotator cuff surgery with hardware placement  · Hx right ankle fracture with hardware placement 2 years ago  · ESRD on hemodialysis  · Diabetes mellitus type 2  · Gout  · Steroid use since January 2022 for Psoriasis  · Hypertension  · Severe anxiety  · Cigarette smoker    Recommendations:   · Discontinue Vancomycin as there is no growth on culture and the patient's pain appears to be related to AVN rather than infection. · Ortho recommendations    Medical Decision Making/Summary/Discussion:5/11/2022     · Right hip aspiration completed 5/10/22 in IR-7 ml thick, clear yellow fluid aspirated  Infection Control Recommendations   · [place] Precautions    Antimicrobial Stewardship Recommendations     · Discontinuation of therapy    Coordination of Outpatient Care:   · Estimated Length of IV antimicrobials:NA  · Patient will need Midline Catheter Insertion: No  · Patient will need PICC line Insertion:No  · Patient will need: Home IV , [address]: No  · Patient will need outpatient wound care:No    Chief complaint/reason for consultation:   · Concern for septic hip      History of Present Illness:   Georgiana Waller is a 45y.o.-year-old male who was initially admitted on 5/9/2022. Patient seen at the request of Dr. Seymour Landau:    This patient, with ESRD on hemodialysis, initially presented to [facility], ER on 5/4/2022 with pain of right hip pain x 1 week. He states that he fell approximately 3 weeks ago but denies any previous hip injuries. Imaging showed no acute findings. He was given pain medication and Decadron for suspected bursitis. CRP and ESR were elevated without leukocytosis, and he had a low-grade fever 100.5.     He was discharged home, but presented again to [facility], ED on 5/8/2022 with the inability to walk due to the severity of the pain in the right hip. A CT of the right hip revealed avascular necrosis of the right femoral head with no evidence for subchondral collapse. The patient was transferred to [facility] for higher level care. Plan is for IR aspiration of right hip joint with cultures. The patient was initiated on IV vancomycin. Imaging/Diagnostics:  XR HIP RIGHT (2-3 VIEWS)     Result Date: 5/8/2022  No acute osseous abnormality.      XR CHEST PORTABLE     Result Date: 5/4/2022  Subtle ill-defined opacity in the periphery of the left mid lung zone, possibly  shadows. Recommend follow-up CT to exclude pulmonary nodule. .      CT HIP RIGHT WO CONTRAST     Result Date: 5/8/2022  1. No acute fracture. 2. Avascular necrosis of the right femoral head with no evidence for subchondral collapse. 3. Moderate-sized fat filled umbilical hernia with fluid and stranding present within the hernia sac. Correlate clinically to exclude incarcerated hernia.      XR HIP 2-3 VW W PELVIS RIGHT     Result Date: 5/4/2022  No acute abnormality of the hip. CURRENT EVALUATION 5/11/2022    Afebrile  Intermittent hypertension    The patient remains alert and oriented on room air. He underwent aspiration of his right hip join in IR on 5/10/22.  7 ml of thick, clear, yellow liquid was aspirated and sent for culture. There is no bacterial growth    He is undergoing hemodialysis today. Hip pain appears to be related to AVN  He is to follow-up as an outpatient with Ortho in 10 to 14 days for outpatient AVN treatment    No other acute issues noted at this time.         Labs, X rays reviewed: 5/11/2022    BUN:42-->19-->31  Cr:3.46-->2.53-->3.76    WBC:11.6-->8.7-->7.9  Hb:9.2-->9.9  Plat: 96    CRP: 35.9    Cultures:  Urine:  ·   Blood:  ·   Sputum :  ·   Right hip aspirate:  · 5/10/22: No growth     Discussed with patient, RN, family. I have personally reviewed the past medical history, past surgical history, medications, social history, and family history, and I have updated the database accordingly.   Past Medical History:     Past Medical History:   Diagnosis Date    Chronic kidney disease     Depression     Diabetes mellitus ([facility]     Dialysis patient ([facility]     Gout     Hypertension        Past Surgical  History:     Past Surgical History:   Procedure Laterality Date    ANKLE CLOSED REDUCTION Right 08/12/2019    ANKLE CLOSED REDUCTION performed by Maci Hawley MD at [facility] Right 08/13/2019    ANKLE OPEN REDUCTION INTERNAL FIXATION-TRIMELLAR FRACTURE performed by Leann Montgomery DO at [place] [id number] Left        Medications:      magnesium oxide  400 mg Oral Daily    allopurinol  300 mg Oral Daily    carvedilol  25 mg Oral BID WC    citalopram  40 mg Oral Daily    cloNIDine  0.1 mg Oral Nightly    ferrous sulfate  325 mg Oral Daily with breakfast    furosemide  60 mg Oral Daily    metOLazone  2.5 mg Oral Once per day on Mon Wed Fri    pantoprazole  20 mg Oral BID AC    vitamin D  50,000 Units Oral Weekly    heparin (porcine)  5,000 Units SubCUTAneous 3 times per day    sodium chloride flush  5-40 mL IntraVENous 2 times per day    insulin lispro  0-12 Units SubCUTAneous TID WC    insulin lispro  0-6 Units SubCUTAneous Nightly    vancomycin (VANCOCIN) intermittent dosing (placeholder)   Other RX Placeholder    vancomycin  1,000 mg IntraVENous Once per day on Mon Wed Fri       Social History:     Social History     Socioeconomic History    Marital status: Single     Spouse name: Not on file    Number of children: Not on file    Years of education: Not on file    Highest education level: Not on file   Occupational History    Not on file   Tobacco Use    Smoking status: Current Every Day Smoker     Packs/day: 0.50     Types: Cigarettes    Smokeless tobacco: Never Used   Vaping Use    Vaping Use: Never used   Substance and Sexual Activity    Alcohol use: Yes     Alcohol/week: 2.0 standard drinks     Types: 2 Standard drinks or equivalent per week     Comment: States that he cut back on alcohol 12/2021, use to drink 12 beers a night. Now drinks 2 hard seltzers a night. Patient states he had D/T 12/2021 when he limit his alcohol intake.  Drug use: Never    Sexual activity: Yes   Other Topics Concern    Not on file   Social History Narrative    Not on file     Social Determinants of Health     Financial Resource Strain:     Difficulty of Paying Living Expenses: Not on file   Food Insecurity:     Worried About Running Out of Food in the Last Year: Not on file    Ashly of Food in the Last Year: Not on file   Transportation Needs:     Lack of Transportation (Medical): Not on file    Lack of Transportation (Non-Medical): Not on file   Physical Activity:     Days of Exercise per Week: Not on file    Minutes of Exercise per Session: Not on file   Stress:     Feeling of Stress : Not on file   Social Connections:     Frequency of Communication with Friends and Family: Not on file    Frequency of Social Gatherings with Friends and Family: Not on file    Attends [employer] Services: Not on file    Active Member of [address] [place] [address] [employer] or Organizations: Not on file    Attends Club or Organization Meetings: Not on file    Marital Status: Not on file   Intimate Partner Violence:     Fear of Current or Ex-Partner: Not on file    Emotionally Abused: Not on file    Physically Abused: Not on file    Sexually Abused: Not on file   Housing Stability:     Unable to Pay for Housing in the Last Year: Not on file    Number of [place] in the Last Year: Not on file    Unstable Housing in the Last Year: Not on file       Family History:   No family history on file. Allergies:   Patient has no known allergies.      Review of Systems: Constitutional: No fevers or chills. No systemic complaints  Head: No headaches  Eyes: No double vision or blurry vision. No conjunctival inflammation. ENT: No sore throat or runny nose. . No hearing loss, tinnitus or vertigo. Cardiovascular: No chest pain or palpitations. No shortness of breath. No ROSARIO  Lung: No shortness of breath or cough. No sputum production  Abdomen: No nausea, vomiting, diarrhea, or abdominal pain. Cait Graven No cramps. Genitourinary: No increased urinary frequency, or dysuria. No hematuria. No suprapubic or CVA pain  Musculoskeletal: Right hip pain  Hematologic: No bleeding or bruising. Neurologic: No headache, weakness, numbness, or tingling. Integument: No rash, no ulcers. Psychiatric: No depression. Endocrine: No polyuria, no polydipsia, no polyphagia. Physical Examination :     Patient Vitals for the past 8 hrs:   BP Temp Temp src Pulse Resp SpO2 Weight   05/11/22 0920 128/82 -- -- 69 -- -- --   05/11/22 0910 132/85 -- -- 70 -- -- --   05/11/22 0900 136/85 99 °F (37.2 °C) -- 70 17 -- 213 lb 6.5 oz (96.8 kg)   05/11/22 0848 137/83 98.3 °F (36.8 °C) Oral 72 18 97 % --   05/11/22 0600 -- -- -- -- -- -- 211 lb 10.3 oz (96 kg)   05/11/22 0426 131/75 97.9 °F (36.6 °C) -- 62 14 94 % --     General Appearance: Awake, alert, and in no apparent distress  Head:  Normocephalic, no trauma  Eyes: Pupils equal, round, reactive to light and accommodation; extraocular movements intact; sclera anicteric; conjunctivae pink. No embolic phenomena. ENT: Oropharynx clear, without erythema, exudate, or thrush. No tenderness of sinuses. Mouth/throat: mucosa pink and moist. No lesions. Dentition in good repair. Neck:Supple, without lymphadenopathy. Thyroid normal, No bruits. Pulmonary/Chest: Clear to auscultation, without wheezes, rales, or rhonchi. No dullness to percussion. Cardiovascular: Regular rate and rhythm without murmurs, rubs, or gallops. Abdomen: Soft, non tender.  Bowel sounds normal. No organomegaly  All four Extremities: No cyanosis, clubbing, edema, or effusions. Right lower extremity weakness  Neurologic: No gross sensory or motor deficits. Skin: Warm and dry with good turgor. No signs of peripheral arterial or venous insufficiency. No ulcerations. No open wounds. Medical Decision Making -Laboratory:   I have independently reviewed/ordered the following labs:    CBC with Differential:   Recent Labs     05/08/22  1826 05/08/22  1826 05/09/22  1349 05/11/22  0906   WBC 11.6*   < > 8.7 7.9   HGB 9.8*   < > 9.2* 9.9*   HCT 31.0*   < > 28.2* 31.6*   *   < > 96* See Reflexed IPF Result   LYMPHOPCT 10*  --   --   --    MONOPCT 4  --   --   --     < > = values in this interval not displayed. BMP:   Recent Labs     05/09/22  1349 05/10/22  0613   * 132*   K 3.7 3.7   CL 98 95*   CO2 24 26   BUN 42* 19   CREATININE 3.46* 2.53*     Hepatic Function Panel: No results for input(s): PROT, LABALBU, BILIDIR, IBILI, BILITOT, ALKPHOS, ALT, AST in the last 72 hours. No results for input(s): RPR in the last 72 hours. No results for input(s): HIV in the last 72 hours. No results for input(s): BC in the last 72 hours. Lab Results   Component Value Date    RBC 3.07 05/11/2022    WBC 7.9 05/11/2022    TURBIDITY Clear 05/04/2022     Lab Results   Component Value Date    CREATININE 2.53 05/10/2022    GLUCOSE 100 05/10/2022       Medical Decision Making-Imaging:     Narrative   EXAMINATION:   CT OF THE RIGHT HIP WITHOUT CONTRAST 5/8/2022 6:13 pm       TECHNIQUE:   CT of the right hip was performed without the administration of intravenous   contrast.  Multiplanar reformatted images are provided for review.  Dose   modulation, iterative reconstruction, and/or weight based adjustment of the   mA/kV was utilized to reduce the radiation dose to as low as reasonably   achievable.       COMPARISON:   Right hip radiograph May 8, 2022; pelvis and right hip radiograph May 4, 2022       HISTORY   ORDERING SYSTEM PROVIDED HISTORY: pain   TECHNOLOGIST PROVIDED HISTORY:   pain   Decision Support Exception - unselect if not a suspected or confirmed   emergency medical condition->Emergency Medical Condition (MA)       FINDINGS:   Bones: No acute fracture of the pelvis or right hip identified.  Avascular   necrosis of the right femoral head with no evidence for subchondral collapse. Remote healed fracture deformity of the right inferior pubic ramus.       Soft Tissue: Visualized intrapelvic structures demonstrate a moderate-sized   umbilical hernia containing fat and fluid with a small amount of stranding   present.  Correlate clinically to exclude the possibility of incarcerated   hernia.  Small amount of free fluid present within the pelvis.  Small   bilateral fat filled inguinal hernias.       Joint: Anatomic alignment of the hips with mild degenerative change present.           Impression   1. No acute fracture. 2. A vascular necrosis of the right femoral head with no evidence for   subchondral collapse. 3. Moderate-sized fat filled umbilical hernia with fluid and stranding   present within the hernia sac.  Correlate clinically to exclude incarcerated   hernia. Narrative   EXAMINATION:   TWO XRAY VIEWS OF THE RIGHT HIP       5/8/2022 2:29 pm       COMPARISON:   05/04/2022       HISTORY:   ORDERING SYSTEM PROVIDED HISTORY: hip pain   TECHNOLOGIST PROVIDED HISTORY:   hip pain       FINDINGS:   There is no evidence of acute fracture.  There is normal alignment.  No acute   joint abnormality.  No focal osseous lesion.  No focal soft tissue abnormality.           Impression   No acute osseous abnormality.             Medical Decision Ljtank-Whoowrbd-Gzoxl:       Medical Decision Making-Other:     Note:  · Labs, medications, radiologic studies were reviewed with personal review of films  · Large amounts of data were reviewed  · Discussed with nursing Staff, Discharge planner  · Infection Control and Prevention measures reviewed  · All prior entries were reviewed  · Administer medications as ordered  · Prognosis: Good  · Discharge planning reviewed      Thank you for allowing us to participate in the care of this patient. Please call with questions. Leandro Lopez APRN - CNP     ATTESTATION:    I have discussed the case, including pertinent history and exam findings with the APRN. I have evaluated the  History, physical findings and pictures of the patient and the key elements of the encounter have been performed by me. I have reviewed the laboratory data, other diagnostic studies and discussed them with the APRN. I have updated the medical record where necessary. I agree with the assessment, plan and orders as documented by the APRN.     Melissa Lopez MD.        Pager: [phone] - Office: [phone]

## 2022-05-11 NOTE — PROGRESS NOTES
Assessment: Patient was awake and alert when  visited. Family was not present at the time. However, patient talked about his family in [place]. When asked how he was feeling, patient responded; \"rough but I am a bit better. \" Patient was very receptive to spiritual care. Intervention:  provided presence, offered support, prayed with patient and reassured him that he was in good hands. Outcome: Patient expressed appreciation for the spiritual support he received. Plan: Follow up visits recommended for more prayers and support.

## 2022-05-11 NOTE — PROGRESS NOTES
Detail Level: Zone Physical Therapy        Physical Therapy Cancel Note      DATE: 2022    NAME: Christopher Nava  MRN: [id number]   : 1970      Patient not seen this date for Physical Therapy due to:    Hemodialysis: Therapy will resume 22      Electronically signed by Cass Camarena PTA on 2022 at 11:45 AM

## 2022-05-11 NOTE — PROGRESS NOTES
CLINICAL PHARMACY NOTE: MEDS TO BEDS    Total # of Prescriptions Filled: 3   The following medications were delivered to the patient:  · Magnesium oxide 400 mg tab  · Oxycodone 5 mg tab  · Calcium carbonate antacid 500 mg tab    Additional Documentation:Medications delivered to the patient in room 330 @ 2:13 pm. Paid cash. RN aware of meds delivered.

## 2022-05-11 NOTE — PROGRESS NOTES
Dialysis Time Out  To be done by RN and tech or 2 RNs  Staff Names Mariana Bronson RN  [x]  Identity of the patient using 2 patient identifiers  [x]  Consent for treatment  [x]  Equipment-proper machine and dialyzer  [x]  B-Hep B status  [x]  Orders- to include bath, blood flow, dialyzer, time and fluid removal  [x]  Access-Correct site and in working order  [x]  Time for patient to ask questions.

## 2022-05-11 NOTE — DISCHARGE SUMMARY
[facility]  Office: [address], DO, Bonita Martinez, DO, Charleen Manifold, DO, Eric Regalado Blood, DO, Patricia Herrera MD, Brenton Chew MD, Roxann Melchor MD, Aravind Osborn MD, Chela Murillo MD, Stella Bennett MD, Violet Khan MD, Naseem Kern, DO, Johanny Molina, DO, Miesha Tavarez MD,  Hiral Beckman, DO, Norris Holter, MD, Cindi Blankenship MD, Shelbie Qureshi MD, Jyotsna Payan DO, Massiel Blanchard MD, Tasha Marroquin MD, Camryn Park MD, Elsie Snyder CNP, [facility], CNP, Ernesto Cowart, CNP, Rodrigue Morrow, CNP, Libby Rios, CNP, Javan Nicole, CNP, BRANDON GreenC, Cole Patel, DNP, Mabel Watters, CNP, Mesha Rogers, CNP, Janee Alva, CNP, Ihsan Brady, [facility], Gema Kunz, [facility], Imelda Carranza, CNP, Jolanta Mcbride, CNP, Hugo Zuñiga, On license of [facility]    Discharge Summary     Patient ID: Luz Fernandez  :  1970   MRN: [id number]     ACCOUNT:  [de-identified]   Patient's PCP: Danyel Edmondson DO  Admit Date: 2022   Discharge Date: 2022     Length of Stay: 2  Code Status:  Full Code  Admitting Physician: Jackelyn Cedillo DO  Discharge Physician: JAISON Foster - SUJATA     Active Discharge Diagnoses:     Hospital Problem Lists:  Active Problems:    ESRD (end stage renal disease) ([facility]    Severe anxiety    Obesity with serious comorbidity    Nicotine dependence, cigarettes, uncomplicated    Gout    End-stage renal disease on hemodialysis ([facility]    Anemia of chronic disease    AVN (avascular necrosis of bone) ([facility]    Alcohol abuse    Hypertension, essential    Depression    Tobacco abuse  Resolved Problems:    Major depressive disorder, single episode, unspecified      Admission Condition:  good     Discharged Condition: stable    Hospital Stay:     Hospital Course:  Luz Fernandez is a 45 y.o. male who was admitted for the management of   <principal problem not specified> , presented to ER with No chief complaint on file. 50-year-old male past medical history of hypertension, end-stage renal disease, gout, seasonal allergies, history of alcoholic cardiomyopathy, depression, smoker, type 2 diabetes, BPH presents with right hip pain and decreased range of motion and unable to stand for the past week.  Patient states that he noticed on Monday when he went to stand up he was able to tolerate some weight however had been decreasing over the day and worse on Tuesday.  Patient states that he was still able to obtain his Monday Wednesday Friday dialysis sessions however required using a wheelchair and hospital bed for ambulation and sitting.  Patient admits to 1 episode of fever which occurred on 5/4/2022    Patient seen and evaluated in dialysis  Right hip aspiration without clear signs of infection  Patient to follow-up with Ortho as an outpatient for further evaluation of AVN  Antibiotics discontinued per infectious disease recommendations    Significant therapeutic interventions:       1. Septic hip: ruled out  2. AVN:   - s/p IR aspiration 5/10 with negative cultures thus far  - ID following, vanc discontinued   - multimodal pain management        3. History of gout:   -  uric acid level stable on allopruinol     4. Primary hypertension:   - Currently stable on Coreg, Catapres, Lasix, Zaroxolyn. -  Initially high on presentation secondary to pain     5. ESRD:   - on iHD M/W/F     6. ACD:   - Macrocytic normochromic anemia  - check B12 and folate. - Iron, TIBC and ferritin levels reviewed     7. GI/DVT prophylaxis: Protonix, heparin     8. Okay for discharge after dialysis today with follow-up with orthopedic clinic.   Patient states he has a walker and shower chair at home, denies further need      Significant Diagnostic Studies:   Labs / Micro:  CBC:   Lab Results   Component Value Date    WBC 7.9 05/11/2022    RBC 3.07 05/11/2022    HGB 9.9 05/11/2022    HCT 31.6 05/11/2022 .9 05/11/2022    MCH 32.2 05/11/2022    MCHC 31.3 05/11/2022    RDW 15.5 05/11/2022    PLT See Reflexed IPF Result 05/11/2022     BMP:    Lab Results   Component Value Date    GLUCOSE 102 05/11/2022     05/11/2022    K 3.9 05/11/2022    CL 94 05/11/2022    CO2 26 05/11/2022    ANIONGAP 12 05/11/2022    BUN 31 05/11/2022    CREATININE 3.76 05/11/2022    BUNCRER 12 05/08/2022    CALCIUM 9.0 05/11/2022    LABGLOM 17 05/11/2022    GFRAA 21 05/11/2022    GFR      05/11/2022     HFP:    Lab Results   Component Value Date    PROT 6.7 05/04/2022     CMP:    Lab Results   Component Value Date    GLUCOSE 102 05/11/2022     05/11/2022    K 3.9 05/11/2022    CL 94 05/11/2022    CO2 26 05/11/2022    BUN 31 05/11/2022    CREATININE 3.76 05/11/2022    ANIONGAP 12 05/11/2022    ALKPHOS 137 05/04/2022    ALT 23 05/04/2022    AST 25 05/04/2022    BILITOT 0.99 05/04/2022    LABALBU 4.0 05/04/2022    ALBUMIN 1.5 05/04/2022    LABGLOM 17 05/11/2022    GFRAA 21 05/11/2022    GFR      05/11/2022    PROT 6.7 05/04/2022    CALCIUM 9.0 05/11/2022     PT/INR:    Lab Results   Component Value Date    PROTIME 10.9 05/10/2022    INR 1.0 05/10/2022     PTT:   Lab Results   Component Value Date    APTT 26.5 08/12/2019     FLP:  No results found for: CHOL, TRIG, HDL  U/A:    Lab Results   Component Value Date    COLORU Yellow 05/04/2022    TURBIDITY Clear 05/04/2022    SPECGRAV 1.010 05/04/2022    HGBUR 1+ 05/04/2022    PHUR 8.0 05/04/2022    PROTEINU 1+ 05/04/2022    GLUCOSEU NEGATIVE 05/04/2022    KETUA NEGATIVE 05/04/2022    BILIRUBINUR NEGATIVE 05/04/2022    UROBILINOGEN Normal 05/04/2022    NITRU NEGATIVE 05/04/2022    LEUKOCYTESUR NEGATIVE 05/04/2022     TSH:  No results found for: TSH     Radiology:  XR HIP RIGHT (2-3 VIEWS)    Result Date: 5/8/2022  No acute osseous abnormality.      XR CHEST PORTABLE    Result Date: 5/4/2022  Subtle ill-defined opacity in the periphery of the left mid lung zone, possibly  shadows. Recommend follow-up CT to exclude pulmonary nodule. .     CT HIP RIGHT WO CONTRAST    Addendum Date: 5/9/2022    ADDENDUM: The case was discussed with Dr. Kendra Lewis on May 9, 2022 at 3:53 p.m. Ramona Wyatt Upon further review of the examination, there is a small hip effusion on the right. Incidental note also made of chondrocalcinosis at the right hip with amorphous calcification extending along the anterior superior right acetabular labrum. Result Date: 5/9/2022  1. No acute fracture. 2. A vascular necrosis of the right femoral head with no evidence for subchondral collapse. 3. Moderate-sized fat filled umbilical hernia with fluid and stranding present within the hernia sac. Correlate clinically to exclude incarcerated hernia. IR ARTHR/ASP/INJ MAJOR JT/BURSA RIGHT WO US    Result Date: 5/10/2022  Successful fluoroscopic-guided right hip aspiration. XR HIP 2-3 VW W PELVIS RIGHT    Result Date: 5/4/2022  No acute abnormality of the hip. Consultations:    Consults:     Final Specialist Recommendations/Findings:   IP CONSULT TO ORTHOPEDIC SURGERY  IP CONSULT TO NEPHROLOGY  IP CONSULT TO INFECTIOUS DISEASES      The patient was seen and examined on day of discharge and this discharge summary is in conjunction with any daily progress note from day of discharge. Discharge plan:     Disposition: Home    Physician Follow Up:     Lani Rodriguez, [address]  MOB 1 Eren Harris [address] [facility]  [phone]    Schedule an appointment as soon as possible for a visit in 10 days  for follow up after hospitalization    Critical access hospital, DO  [address] [facility]  [phone]    In 1 week  for follow up after hospitalization       Requiring Further Evaluation/Follow Up POST HOSPITALIZATION/Incidental Findings:  Follow-up with orthopedics and 10 to 14 days regarding further management of AVN    Diet: regular diet    Activity: As tolerated    Instructions to Patient:     Discharge Medications: Medication List      START taking these medications    calcium carbonate 500 MG chewable tablet  Commonly known as: TUMS  Take 1 tablet by mouth 3 times daily as needed for Heartburn     magnesium oxide 400 (240 Mg) MG tablet  Commonly known as: MAG-OX  Take 1 tablet by mouth daily  Start taking on: May 12, 2022     oxyCODONE 5 MG immediate release tablet  Commonly known as: ROXICODONE  Take 1 tablet by mouth every 6 hours as needed for Pain for up to 3 days. CONTINUE taking these medications    allopurinol 300 MG tablet  Commonly known as: ZYLOPRIM     carvedilol 25 MG tablet  Commonly known as: COREG     citalopram 40 MG tablet  Commonly known as: CELEXA     cloNIDine 0.1 MG tablet  Commonly known as: CATAPRES     ferrous sulfate 325 (65 Fe) MG tablet  Commonly known as: IRON 325     furosemide 40 MG tablet  Commonly known as: LASIX     magnesium oxide 400 MG tablet  Commonly known as: MAG-OX     metOLazone 2.5 MG tablet  Commonly known as: ZAROXOLYN     pantoprazole 20 MG tablet  Commonly known as: PROTONIX     vitamin D 1.25 MG (52776 UT) Caps capsule  Commonly known as: ERGOCALCIFEROL        STOP taking these medications    NIFEdipine 90 MG extended release tablet  Commonly known as: ADALAT CC     predniSONE 10 MG tablet  Commonly known as: DELTASONE           Where to Get Your Medications      These medications were sent to [facility] [address] [place] [address] 37, [address]  [address]    Phone: [phone]   · calcium carbonate 500 MG chewable tablet  · magnesium oxide 400 (240 Mg) MG tablet  · oxyCODONE 5 MG immediate release tablet         No discharge procedures on file. Time Spent on discharge is  31 mins in patient examination, evaluation, counseling as well as medication reconciliation, prescriptions for required medications, discharge plan and follow up.     Discussed with attending  Electronically signed by   Mortimer Guardian, APRN - NP  5/11/2022  12:03 PM      Thank you Dr. Rosy Mak, Sr, DO for the opportunity to be involved in this patient's care.

## 2022-05-11 NOTE — PROGRESS NOTES
Dialysis Post Treatment Note  Vitals:    05/11/22 1245   BP: 139/84   Pulse: 75   Resp: 17   Temp: 98.8 °F (37.1 °C)   SpO2:      Pre-Weight = 96.8 kg  Post-weight = Weight: 210 lb 12.2 oz (95.6 kg)  Total Liters Processed = Total Liters Processed (l/min): 80.3 l/min  Rinseback Volume (mL) = Rinseback Volume (ml): 300 ml  Net Removal (mL) = NET Removed (ml): 1000 ml  Patient's dry weight= 97.5 kg  Type of access used= cath  Length of treatment=210 minutes      1L removed w/o issue. Pt stable throughout tx.

## 2022-05-14 LAB — CRYSTALS, FLUID: ABNORMAL

## 2022-05-15 LAB
CULTURE: ABNORMAL
DIRECT EXAM: ABNORMAL
DIRECT EXAM: ABNORMAL
SPECIMEN DESCRIPTION: ABNORMAL

## 2022-06-13 LAB
CULTURE: NORMAL
SPECIMEN DESCRIPTION: NORMAL

## 2022-07-11 ENCOUNTER — HOSPITAL ENCOUNTER (OUTPATIENT)
Age: 52
Discharge: HOME | End: 2022-07-13
Payer: MEDICAID

## 2022-07-11 ENCOUNTER — HOSPITAL ENCOUNTER (OUTPATIENT)
Dept: GENERAL RADIOLOGY | Age: 52
Discharge: HOME | End: 2022-07-13
Payer: MEDICAID

## 2022-07-11 DIAGNOSIS — R06.02 SOB (SHORTNESS OF BREATH): ICD-10-CM

## 2022-07-11 DIAGNOSIS — R09.89 CHEST CONGESTION: ICD-10-CM

## 2022-07-11 PROCEDURE — 71046 X-RAY EXAM CHEST 2 VIEWS: CPT

## 2022-07-19 ENCOUNTER — HOSPITAL ENCOUNTER (EMERGENCY)
Age: 52
Discharge: HOME | End: 2022-07-19
Attending: EMERGENCY MEDICINE
Payer: MEDICAID

## 2022-07-19 ENCOUNTER — APPOINTMENT (OUTPATIENT)
Dept: GENERAL RADIOLOGY | Age: 52
End: 2022-07-19
Payer: MEDICAID

## 2022-07-19 VITALS
DIASTOLIC BLOOD PRESSURE: 94 MMHG | HEART RATE: 73 BPM | RESPIRATION RATE: 20 BRPM | SYSTOLIC BLOOD PRESSURE: 145 MMHG | HEIGHT: 69 IN | WEIGHT: 212 LBS | BODY MASS INDEX: 31.4 KG/M2 | TEMPERATURE: 98.6 F | OXYGEN SATURATION: 96 %

## 2022-07-19 DIAGNOSIS — M25.551 RIGHT HIP PAIN: Primary | ICD-10-CM

## 2022-07-19 PROCEDURE — 99283 EMERGENCY DEPT VISIT LOW MDM: CPT

## 2022-07-19 PROCEDURE — 73502 X-RAY EXAM HIP UNI 2-3 VIEWS: CPT

## 2022-07-19 PROCEDURE — 6370000000 HC RX 637 (ALT 250 FOR IP): Performed by: EMERGENCY MEDICINE

## 2022-07-19 RX ORDER — OXYCODONE HYDROCHLORIDE AND ACETAMINOPHEN 5; 325 MG/1; MG/1
1 TABLET ORAL ONCE
Status: COMPLETED | OUTPATIENT
Start: 2022-07-19 | End: 2022-07-19

## 2022-07-19 RX ORDER — OXYCODONE HYDROCHLORIDE AND ACETAMINOPHEN 5; 325 MG/1; MG/1
1 TABLET ORAL ONCE
Status: DISCONTINUED | OUTPATIENT
Start: 2022-07-19 | End: 2022-07-19 | Stop reason: HOSPADM

## 2022-07-19 RX ADMIN — OXYCODONE HYDROCHLORIDE AND ACETAMINOPHEN 1 TABLET: 5; 325 TABLET ORAL at 19:29

## 2022-07-19 ASSESSMENT — PAIN - FUNCTIONAL ASSESSMENT
PAIN_FUNCTIONAL_ASSESSMENT: 0-10
PAIN_FUNCTIONAL_ASSESSMENT: 0-10
PAIN_FUNCTIONAL_ASSESSMENT: PREVENTS OR INTERFERES SOME ACTIVE ACTIVITIES AND ADLS

## 2022-07-19 ASSESSMENT — PAIN DESCRIPTION - ORIENTATION
ORIENTATION: RIGHT

## 2022-07-19 ASSESSMENT — PAIN DESCRIPTION - FREQUENCY
FREQUENCY: CONTINUOUS
FREQUENCY: CONTINUOUS

## 2022-07-19 ASSESSMENT — PAIN DESCRIPTION - PAIN TYPE: TYPE: CHRONIC PAIN

## 2022-07-19 ASSESSMENT — ENCOUNTER SYMPTOMS
SHORTNESS OF BREATH: 0
BACK PAIN: 0
SORE THROAT: 0
ABDOMINAL DISTENTION: 0

## 2022-07-19 ASSESSMENT — PAIN SCALES - GENERAL
PAINLEVEL_OUTOF10: 8
PAINLEVEL_OUTOF10: 10
PAINLEVEL_OUTOF10: 10

## 2022-07-19 ASSESSMENT — PAIN DESCRIPTION - DESCRIPTORS
DESCRIPTORS: SHARP
DESCRIPTORS: SHARP

## 2022-07-19 ASSESSMENT — PAIN DESCRIPTION - LOCATION
LOCATION: HIP

## 2022-07-19 NOTE — ED PROVIDER NOTES
[facility]  EMERGENCY DEPARTMENT ENCOUNTER      Pt Name: Fabienne Isaacs  MRN: [id number]  [place] 1970  Date of evaluation: 7/19/2022  Provider: Mazin Weiss [facility]       Chief Complaint   Patient presents with    Hip Pain     Pt presents to emergency department via wheelchair with complaint of Right Hip Discomfort. Reports he was to have hip surgery this past Friday. It was cancelled due to a \"cold\"  Pt also reports he was referred to [facility] for procedure/surgery and has an appointment this coming Friday          HISTORY OF PRESENT ILLNESS   (Location/Symptom, Timing/Onset, Context/Setting, Quality, Duration, Modifying Factors, Severity)  Note limiting factors. Fabienne Isaacs is a 45 y.o. male who presents to the emergency department with complains of chronic right hip pain. Patient states that he was supposed to have hip surgery done last Friday but due to having a cold it was delayed. He states that his surgeon has now referred him to [facility] for the hip replacement due to him being on dialysis and having some abnormal blood results last week. Patient states that his primary care doctor prescribes him oxycodone 5 mg twice a day and his last prescription was end of June. He states that he has been taking it 3 times a day as he \"did not know. \"  And now his primary care doctor does not want to prescribe him any more pain medications. Patient states that he cannot sleep at night and has been having difficulty with walking with the right hip. He denies any recent injuries. Patient has been taking Tylenol which does not help with pain. He has no other concerns at this time. I reviewed patient's  and he was prescribed 60 pills of the 5 mg oxycodone on June 21, 2022. Patient states that he \"ran out of his pills\" about a week and a half ago and has not taken any since then.     REVIEW OF SYSTEMS    (2-9 systems for level 4, 10 or more for level 5)     Review of Systems Constitutional:  Negative for fatigue and fever. HENT:  Negative for congestion and sore throat. Eyes:  Negative for visual disturbance. Respiratory:  Negative for shortness of breath. Cardiovascular:  Negative for chest pain and palpitations. Gastrointestinal:  Negative for abdominal distention. Genitourinary:  Negative for dysuria. Musculoskeletal:  Negative for back pain. Right hip pain   Skin:  Negative for rash. Psychiatric/Behavioral:  Negative for suicidal ideas. Except as noted above the remainder of the review of systems was reviewed and negative.        PAST MEDICAL HISTORY     Past Medical History:   Diagnosis Date    Chronic kidney disease     Depression     Diabetes mellitus ([facility] [address]     Dialysis patient ([facility] [address]     Gout     Hypertension          SURGICAL HISTORY       Past Surgical History:   Procedure Laterality Date    ANKLE CLOSED REDUCTION Right 08/12/2019    ANKLE CLOSED REDUCTION performed by Fozia Renee MD at [address] [place] [address]. Right 08/13/2019    ANKLE OPEN REDUCTION INTERNAL FIXATION-TRIMELLAR FRACTURE performed by Lata Ruiz DO at [facility] Left     HERNIA REPAIR      SHOULDER SURGERY Left          CURRENT MEDICATIONS       Previous Medications    ALLOPURINOL (ZYLOPRIM) 300 MG TABLET    Take 300 mg by mouth daily    CARVEDILOL (COREG) 25 MG TABLET    Take 25 mg by mouth 2 times daily (with meals)    CITALOPRAM (CELEXA) 40 MG TABLET    Take 40 mg by mouth daily    CLONIDINE (CATAPRES) 0.1 MG TABLET    Take 0.1 mg by mouth nightly     FERROUS SULFATE (IRON 325) 325 (65 FE) MG TABLET    Take 325 mg by mouth daily (with breakfast)    FUROSEMIDE (LASIX) 40 MG TABLET    Take 60 mg by mouth daily     MAGNESIUM OXIDE (MAG-OX) 400 (240 MG) MG TABLET    Take 1 tablet by mouth daily    MAGNESIUM OXIDE (MAG-OX) 400 MG TABLET    Take 400 mg by mouth daily    METOLAZONE (ZAROXOLYN) 2.5 MG TABLET    Take 2.5 mg by mouth three times a week PANTOPRAZOLE (PROTONIX) 20 MG TABLET    Take 20 mg by mouth in the morning and at bedtime    VITAMIN D (ERGOCALCIFEROL) 1.25 MG (26259 UT) CAPS CAPSULE    Take 50,000 Units by mouth daily       ALLERGIES     Patient has no known allergies. FAMILY HISTORY     History reviewed. No pertinent family history. SOCIAL HISTORY       Social History     Socioeconomic History    Marital status: Single     Spouse name: None    Number of children: None    Years of education: None    Highest education level: None   Tobacco Use    Smoking status: Every Day     Packs/day: 0.50     Types: Cigarettes    Smokeless tobacco: Never   Vaping Use    Vaping Use: Never used   Substance and Sexual Activity    Alcohol use: Yes     Alcohol/week: 2.0 standard drinks     Types: 2 Standard drinks or equivalent per week     Comment: States that he cut back on alcohol 12/2021, use to drink 12 beers a night. Now drinks 2 hard seltzers a night. Patient states he had D/T 12/2021 when he limit his alcohol intake. Drug use: Never    Sexual activity: Yes             PHYSICAL EXAM    (up to 7 for level 4, 8 or more for level 5)     ED Triage Vitals [07/19/22 1855]   BP Temp Temp Source Heart Rate Resp SpO2 Height Weight   (!) 145/94 98.6 °F (37 °C) Tympanic 73 20 96 % 5' 9\" (1.753 m) 212 lb (96.2 kg)       Physical Exam  Constitutional:       General: He is not in acute distress. Appearance: Normal appearance. He is not toxic-appearing. HENT:      Head: Normocephalic and atraumatic. Mouth/Throat:      Mouth: Mucous membranes are moist.   Eyes:      Extraocular Movements: Extraocular movements intact. Pupils: Pupils are equal, round, and reactive to light. Cardiovascular:      Rate and Rhythm: Normal rate and regular rhythm. Pulses: Normal pulses. Heart sounds: Normal heart sounds. Pulmonary:      Effort: Pulmonary effort is normal.      Breath sounds: Normal breath sounds.    Abdominal:      General: Abdomen is flat. Bowel sounds are normal.      Palpations: Abdomen is soft. Musculoskeletal:         General: Normal range of motion. Comments: Pain to palpation of the right hip. Patient has good flexion/extension of the right hip however it hurts to move it. Patient has intact distal neurovascular exam.   Skin:     General: Skin is warm and dry. Capillary Refill: Capillary refill takes less than 2 seconds. Neurological:      General: No focal deficit present. Mental Status: He is alert and oriented to person, place, and time. Psychiatric:         Mood and Affect: Mood normal.       DIAGNOSTIC RESULTS       RADIOLOGY:   Non-plain film images such as CT, Ultrasound and MRI are read by the radiologist. Plain radiographic images are visualized and preliminarily interpreted by the emergency physician with the below findings:      Interpretation per the Radiologist below, if available at the time of this note:    XR HIP 2-3 VW W PELVIS RIGHT   Final Result   Avascular necrosis of the right femoral head again seen with suspected new   mild flattening suggesting subchondral collapse. This could be further   evaluated with CT or MRI. LABS:  Labs Reviewed - No data to display    All other labs were within normal range or not returned as of this dictation. EMERGENCY DEPARTMENT COURSE and DIFFERENTIAL DIAGNOSIS/MDM:   Vitals:    Vitals:    07/19/22 1855   BP: (!) 145/94   Pulse: 73   Resp: 20   Temp: 98.6 °F (37 °C)   TempSrc: Tympanic   SpO2: 96%   Weight: 212 lb (96.2 kg)   Height: 5' 9\" (1.753 m)       REASSESSMENT      Discussed results with the patient. No acute changes on the hip x-ray. Patient has chronic avascular necrosis of the right hip. I informed the patient that unfortunately since he used up his prescription earlier than he was supposed to we are unable to write him any other pain prescriptions.   I advised him to follow-up with his primary care doctor who initially prescribed the prescription to see how they can help him. In the meantime he will need to continue with Tylenol and ice and/or heat. Patient did receive a total of 10 mg of oxycodone here tonight in the emergency department. He will try to get a hold of his doctor tomorrow. FINAL IMPRESSION      1. Chronic Right hip pain          DISPOSITION/PLAN   DISPOSITION Decision To Discharge 07/19/2022 08:26:51 PM      PATIENT REFERRED TO:   [address] [facility]  [address] [facility]  [phone]    In 1 day  Please call to make an appointment for tomorrow.       (Please note that portions of this note were completed with a voice recognition program.  Efforts were made to edit the dictations but occasionally words are mis-transcribed.)    Reji Bertrand DO (electronically signed)  Attending Emergency Physician            Reji Bertrand DO  07/19/22 2031

## 2023-03-22 NOTE — PROGRESS NOTES
Initiated PAT phone call, patient states he is walking into a doctor's appointment right now and will be available this afternoon.

## 2023-03-22 NOTE — PROGRESS NOTES
Patient received NPO instructions and pre-op medication instructions to be taken on the day of the procedure with a small sip of water. Pt was also given pre-op eye drop instructions from Dr. Hossein Santana office. Patient voices understanding to pre surgical instructions and denies any questions at this time. Patient states he will bring an updated medication list on day of procedure. Patient has dialysis treatment scheduled after discharge from hospital on procedure date.

## 2023-03-31 ENCOUNTER — HOSPITAL ENCOUNTER (OUTPATIENT)
Age: 53
Setting detail: SPECIMEN
Discharge: HOME | End: 2023-03-31
Payer: MEDICARE

## 2023-03-31 ENCOUNTER — HOSPITAL ENCOUNTER (EMERGENCY)
Age: 53
Discharge: HOME | End: 2023-03-31
Payer: MEDICARE

## 2023-03-31 ENCOUNTER — ANESTHESIA EVENT (OUTPATIENT)
Dept: OPERATING ROOM | Age: 53
End: 2023-03-31
Payer: MEDICARE

## 2023-03-31 ENCOUNTER — APPOINTMENT (OUTPATIENT)
Dept: GENERAL RADIOLOGY | Age: 53
End: 2023-03-31
Payer: MEDICARE

## 2023-03-31 VITALS
HEART RATE: 61 BPM | DIASTOLIC BLOOD PRESSURE: 90 MMHG | RESPIRATION RATE: 16 BRPM | SYSTOLIC BLOOD PRESSURE: 146 MMHG | OXYGEN SATURATION: 100 %

## 2023-03-31 DIAGNOSIS — M25.551 CHRONIC HIP PAIN, RIGHT: ICD-10-CM

## 2023-03-31 DIAGNOSIS — G89.29 CHRONIC HIP PAIN, RIGHT: ICD-10-CM

## 2023-03-31 DIAGNOSIS — M25.551 RIGHT HIP PAIN: Primary | ICD-10-CM

## 2023-03-31 LAB — POTASSIUM SERPL-SCNC: 4 MMOL/L (ref 3.7–5.3)

## 2023-03-31 PROCEDURE — 6370000000 HC RX 637 (ALT 250 FOR IP): Performed by: PHYSICIAN ASSISTANT

## 2023-03-31 PROCEDURE — 73502 X-RAY EXAM HIP UNI 2-3 VIEWS: CPT

## 2023-03-31 PROCEDURE — 99283 EMERGENCY DEPT VISIT LOW MDM: CPT

## 2023-03-31 PROCEDURE — 84132 ASSAY OF SERUM POTASSIUM: CPT

## 2023-03-31 RX ORDER — OXYCODONE HYDROCHLORIDE AND ACETAMINOPHEN 5; 325 MG/1; MG/1
1 TABLET ORAL ONCE
Status: COMPLETED | OUTPATIENT
Start: 2023-03-31 | End: 2023-03-31

## 2023-03-31 RX ADMIN — OXYCODONE HYDROCHLORIDE AND ACETAMINOPHEN 1 TABLET: 5; 325 TABLET ORAL at 13:09

## 2023-03-31 RX ADMIN — OXYCODONE HYDROCHLORIDE AND ACETAMINOPHEN 1 TABLET: 5; 325 TABLET ORAL at 14:51

## 2023-03-31 ASSESSMENT — PAIN SCALES - GENERAL
PAINLEVEL_OUTOF10: 10
PAINLEVEL_OUTOF10: 10

## 2023-03-31 ASSESSMENT — PAIN DESCRIPTION - LOCATION: LOCATION: HIP

## 2023-03-31 ASSESSMENT — PAIN - FUNCTIONAL ASSESSMENT: PAIN_FUNCTIONAL_ASSESSMENT: 0-10

## 2023-03-31 ASSESSMENT — ENCOUNTER SYMPTOMS
EYES NEGATIVE: 1
GASTROINTESTINAL NEGATIVE: 1
RESPIRATORY NEGATIVE: 1

## 2023-03-31 ASSESSMENT — PAIN DESCRIPTION - ORIENTATION: ORIENTATION: RIGHT

## 2023-03-31 NOTE — ED PROVIDER NOTES
PM      PATIENT REFERRED TO:  No follow-up provider specified. DISCHARGE MEDICATIONS:  New Prescriptions    No medications on file              Summation      Patient Course:      ED Medications administered this visit:    Medications   oxyCODONE-acetaminophen (PERCOCET) 5-325 MG per tablet 1 tablet (1 tablet Oral Given 3/31/23 1309)       New Prescriptions from this visit:    New Prescriptions    No medications on file       Follow-up:  No follow-up provider specified. Final Impression:   1. Right hip pain    2.  Chronic hip pain, right               (Please note that portions of this note were completed with a voice recognition program.  Efforts were made to edit the dictations but occasionally words are mis-transcribed.)          Kathleen Leon PA-C  03/31/23 1331

## 2023-04-02 PROBLEM — H25.042 POSTERIOR SUBCAPSULAR AGE-RELATED CATARACT OF LEFT EYE: Chronic | Status: ACTIVE | Noted: 2023-04-02

## 2023-04-03 ENCOUNTER — HOSPITAL ENCOUNTER (OUTPATIENT)
Age: 53
Setting detail: OUTPATIENT SURGERY
Discharge: HOME | End: 2023-04-03
Attending: OPHTHALMOLOGY | Admitting: OPHTHALMOLOGY
Payer: MEDICARE

## 2023-04-03 ENCOUNTER — ANESTHESIA (OUTPATIENT)
Dept: OPERATING ROOM | Age: 53
End: 2023-04-03
Payer: MEDICARE

## 2023-04-03 VITALS
WEIGHT: 205 LBS | RESPIRATION RATE: 18 BRPM | HEART RATE: 54 BPM | OXYGEN SATURATION: 97 % | TEMPERATURE: 97.1 F | HEIGHT: 70 IN | SYSTOLIC BLOOD PRESSURE: 136 MMHG | DIASTOLIC BLOOD PRESSURE: 60 MMHG | BODY MASS INDEX: 29.35 KG/M2

## 2023-04-03 PROBLEM — H25.042 POSTERIOR SUBCAPSULAR AGE-RELATED CATARACT OF LEFT EYE: Chronic | Status: RESOLVED | Noted: 2023-04-02 | Resolved: 2023-04-03

## 2023-04-03 PROCEDURE — 6370000000 HC RX 637 (ALT 250 FOR IP): Performed by: OPHTHALMOLOGY

## 2023-04-03 PROCEDURE — 3600000013 HC SURGERY LEVEL 3 ADDTL 15MIN: Performed by: OPHTHALMOLOGY

## 2023-04-03 PROCEDURE — 2500000003 HC RX 250 WO HCPCS: Performed by: OPHTHALMOLOGY

## 2023-04-03 PROCEDURE — 6360000002 HC RX W HCPCS: Performed by: OPHTHALMOLOGY

## 2023-04-03 PROCEDURE — 3700000001 HC ADD 15 MINUTES (ANESTHESIA): Performed by: OPHTHALMOLOGY

## 2023-04-03 PROCEDURE — 3700000000 HC ANESTHESIA ATTENDED CARE: Performed by: OPHTHALMOLOGY

## 2023-04-03 PROCEDURE — 2709999900 HC NON-CHARGEABLE SUPPLY: Performed by: OPHTHALMOLOGY

## 2023-04-03 PROCEDURE — 7100000010 HC PHASE II RECOVERY - FIRST 15 MIN: Performed by: OPHTHALMOLOGY

## 2023-04-03 PROCEDURE — 3600000003 HC SURGERY LEVEL 3 BASE: Performed by: OPHTHALMOLOGY

## 2023-04-03 PROCEDURE — 6360000002 HC RX W HCPCS: Performed by: NURSE ANESTHETIST, CERTIFIED REGISTERED

## 2023-04-03 PROCEDURE — 2580000003 HC RX 258: Performed by: NURSE ANESTHETIST, CERTIFIED REGISTERED

## 2023-04-03 PROCEDURE — 7100000011 HC PHASE II RECOVERY - ADDTL 15 MIN: Performed by: OPHTHALMOLOGY

## 2023-04-03 PROCEDURE — V2632 POST CHMBR INTRAOCULAR LENS: HCPCS | Performed by: OPHTHALMOLOGY

## 2023-04-03 DEVICE — LENS INTRAOCULAR BCNVX 18.5+ DIOPT 6X12.5 MM ACRYL ENVISTA: Type: IMPLANTABLE DEVICE | Site: EYE | Status: FUNCTIONAL

## 2023-04-03 RX ORDER — PROPARACAINE HYDROCHLORIDE 5 MG/ML
1 SOLUTION/ DROPS OPHTHALMIC SEE ADMIN INSTRUCTIONS
Status: DISCONTINUED | OUTPATIENT
Start: 2023-04-03 | End: 2023-04-03 | Stop reason: HOSPADM

## 2023-04-03 RX ORDER — PHENYLEPHRINE HCL 2.5 %
1 DROPS OPHTHALMIC (EYE) SEE ADMIN INSTRUCTIONS
Status: DISCONTINUED | OUTPATIENT
Start: 2023-04-03 | End: 2023-04-03 | Stop reason: HOSPADM

## 2023-04-03 RX ORDER — SODIUM CHLORIDE 0.9 % (FLUSH) 0.9 %
5-40 SYRINGE (ML) INJECTION PRN
Status: CANCELLED | OUTPATIENT
Start: 2023-04-03

## 2023-04-03 RX ORDER — FENTANYL CITRATE 50 UG/ML
INJECTION, SOLUTION INTRAMUSCULAR; INTRAVENOUS PRN
Status: DISCONTINUED | OUTPATIENT
Start: 2023-04-03 | End: 2023-04-03 | Stop reason: SDUPTHER

## 2023-04-03 RX ORDER — ACETAMINOPHEN 500 MG
1000 TABLET ORAL EVERY 6 HOURS PRN
COMMUNITY

## 2023-04-03 RX ORDER — SODIUM CHLORIDE 9 MG/ML
INJECTION, SOLUTION INTRAVENOUS CONTINUOUS
Status: DISCONTINUED | OUTPATIENT
Start: 2023-04-03 | End: 2023-04-03 | Stop reason: HOSPADM

## 2023-04-03 RX ORDER — TROPICAMIDE 10 MG/ML
1 SOLUTION/ DROPS OPHTHALMIC SEE ADMIN INSTRUCTIONS
Status: DISCONTINUED | OUTPATIENT
Start: 2023-04-03 | End: 2023-04-03 | Stop reason: HOSPADM

## 2023-04-03 RX ORDER — TETRACAINE HYDROCHLORIDE 5 MG/ML
1 SOLUTION OPHTHALMIC SEE ADMIN INSTRUCTIONS
Status: DISCONTINUED | OUTPATIENT
Start: 2023-04-03 | End: 2023-04-03 | Stop reason: HOSPADM

## 2023-04-03 RX ORDER — SODIUM CHLORIDE 9 MG/ML
INJECTION, SOLUTION INTRAVENOUS PRN
Status: CANCELLED | OUTPATIENT
Start: 2023-04-03

## 2023-04-03 RX ORDER — SODIUM CHLORIDE 9 MG/ML
INJECTION, SOLUTION INTRAVENOUS PRN
Status: DISCONTINUED | OUTPATIENT
Start: 2023-04-03 | End: 2023-04-03 | Stop reason: HOSPADM

## 2023-04-03 RX ORDER — OXYCODONE HYDROCHLORIDE 5 MG/1
5 TABLET ORAL EVERY 6 HOURS PRN
COMMUNITY

## 2023-04-03 RX ORDER — TETRACAINE HYDROCHLORIDE 5 MG/ML
SOLUTION OPHTHALMIC PRN
Status: DISCONTINUED | OUTPATIENT
Start: 2023-04-03 | End: 2023-04-03 | Stop reason: ALTCHOICE

## 2023-04-03 RX ORDER — LIDOCAINE HYDROCHLORIDE 10 MG/ML
INJECTION, SOLUTION EPIDURAL; INFILTRATION; INTRACAUDAL; PERINEURAL PRN
Status: DISCONTINUED | OUTPATIENT
Start: 2023-04-03 | End: 2023-04-03 | Stop reason: ALTCHOICE

## 2023-04-03 RX ORDER — SODIUM CHLORIDE 0.9 % (FLUSH) 0.9 %
5-40 SYRINGE (ML) INJECTION EVERY 12 HOURS SCHEDULED
Status: DISCONTINUED | OUTPATIENT
Start: 2023-04-03 | End: 2023-04-03 | Stop reason: HOSPADM

## 2023-04-03 RX ORDER — SODIUM CHLORIDE 0.9 % (FLUSH) 0.9 %
5-40 SYRINGE (ML) INJECTION PRN
Status: DISCONTINUED | OUTPATIENT
Start: 2023-04-03 | End: 2023-04-03 | Stop reason: HOSPADM

## 2023-04-03 RX ORDER — SODIUM CHLORIDE 0.9 % (FLUSH) 0.9 %
5-40 SYRINGE (ML) INJECTION EVERY 12 HOURS SCHEDULED
Status: CANCELLED | OUTPATIENT
Start: 2023-04-03

## 2023-04-03 RX ORDER — SODIUM CHLORIDE, SODIUM LACTATE, POTASSIUM CHLORIDE, CALCIUM CHLORIDE 600; 310; 30; 20 MG/100ML; MG/100ML; MG/100ML; MG/100ML
INJECTION, SOLUTION INTRAVENOUS CONTINUOUS
Status: DISCONTINUED | OUTPATIENT
Start: 2023-04-03 | End: 2023-04-03 | Stop reason: HOSPADM

## 2023-04-03 RX ORDER — SODIUM CHLORIDE, SODIUM LACTATE, POTASSIUM CHLORIDE, CALCIUM CHLORIDE 600; 310; 30; 20 MG/100ML; MG/100ML; MG/100ML; MG/100ML
INJECTION, SOLUTION INTRAVENOUS CONTINUOUS
Status: CANCELLED | OUTPATIENT
Start: 2023-04-03

## 2023-04-03 RX ORDER — MIDAZOLAM HYDROCHLORIDE 1 MG/ML
INJECTION INTRAMUSCULAR; INTRAVENOUS PRN
Status: DISCONTINUED | OUTPATIENT
Start: 2023-04-03 | End: 2023-04-03 | Stop reason: SDUPTHER

## 2023-04-03 RX ADMIN — TROPICAMIDE 1 DROP: 10 SOLUTION/ DROPS OPHTHALMIC at 08:27

## 2023-04-03 RX ADMIN — FENTANYL CITRATE 50 MCG: 50 INJECTION INTRAMUSCULAR; INTRAVENOUS at 09:20

## 2023-04-03 RX ADMIN — TROPICAMIDE 1 DROP: 10 SOLUTION/ DROPS OPHTHALMIC at 08:34

## 2023-04-03 RX ADMIN — FENTANYL CITRATE 50 MCG: 50 INJECTION INTRAMUSCULAR; INTRAVENOUS at 09:06

## 2023-04-03 RX ADMIN — PHENYLEPHRINE HYDROCHLORIDE 1 DROP: 25 SOLUTION/ DROPS OPHTHALMIC at 08:26

## 2023-04-03 RX ADMIN — PROPARACAINE HYDROCHLORIDE 1 DROP: 5 SOLUTION/ DROPS OPHTHALMIC at 08:33

## 2023-04-03 RX ADMIN — MIDAZOLAM 2 MG: 1 INJECTION INTRAMUSCULAR; INTRAVENOUS at 09:12

## 2023-04-03 RX ADMIN — SODIUM CHLORIDE, POTASSIUM CHLORIDE, SODIUM LACTATE AND CALCIUM CHLORIDE: 600; 310; 30; 20 INJECTION, SOLUTION INTRAVENOUS at 08:51

## 2023-04-03 RX ADMIN — PROPARACAINE HYDROCHLORIDE 1 DROP: 5 SOLUTION/ DROPS OPHTHALMIC at 08:25

## 2023-04-03 RX ADMIN — FENTANYL CITRATE 50 MCG: 50 INJECTION INTRAMUSCULAR; INTRAVENOUS at 09:12

## 2023-04-03 RX ADMIN — TROPICAMIDE 1 DROP: 10 SOLUTION/ DROPS OPHTHALMIC at 08:41

## 2023-04-03 RX ADMIN — PHENYLEPHRINE HYDROCHLORIDE 1 DROP: 25 SOLUTION/ DROPS OPHTHALMIC at 08:41

## 2023-04-03 RX ADMIN — PHENYLEPHRINE HYDROCHLORIDE 1 DROP: 25 SOLUTION/ DROPS OPHTHALMIC at 08:33

## 2023-04-03 RX ADMIN — PROPARACAINE HYDROCHLORIDE 1 DROP: 5 SOLUTION/ DROPS OPHTHALMIC at 08:41

## 2023-04-03 RX ADMIN — FENTANYL CITRATE 50 MCG: 50 INJECTION INTRAMUSCULAR; INTRAVENOUS at 09:16

## 2023-04-03 ASSESSMENT — PAIN DESCRIPTION - PAIN TYPE
TYPE: CHRONIC PAIN
TYPE: CHRONIC PAIN

## 2023-04-03 ASSESSMENT — PAIN DESCRIPTION - LOCATION
LOCATION: HIP
LOCATION: HIP

## 2023-04-03 ASSESSMENT — PAIN SCALES - GENERAL
PAINLEVEL_OUTOF10: 10

## 2023-04-03 ASSESSMENT — PAIN DESCRIPTION - ORIENTATION
ORIENTATION: RIGHT
ORIENTATION: RIGHT

## 2023-04-03 ASSESSMENT — PAIN DESCRIPTION - DESCRIPTORS: DESCRIPTORS: SHARP;PRESSURE;STABBING

## 2023-04-03 ASSESSMENT — LIFESTYLE VARIABLES: SMOKING_STATUS: 1

## 2023-04-03 NOTE — ANESTHESIA POSTPROCEDURE EVALUATION
Department of Anesthesiology  Postprocedure Note    Patient: Chun Grande  MRN: [id number]  YOB: 1970  Date of evaluation: 4/3/2023      Procedure Summary     Date: 04/03/23 Room / Location: [address] [place] [address]    Anesthesia Start: 8291 Anesthesia Stop: 9577    Procedure: EYE CATARACT EMULSIFICATION IOL IMPLANT (Left) Diagnosis:       Posterior subcapsular polar age-related cataract of both eyes      (POSTERIOR SUBCAPSULAR AGE RELATED CAT 3+PSC, 1+NS)    Surgeons: Joselin Humphrey DO Responsible Provider: JAISON Connelly CRNA    Anesthesia Type: MAC ASA Status: 3          Anesthesia Type: No value filed.     Sade Phase I: Sade Score: 10    Sade Phase II: Sade Score: 10      Anesthesia Post Evaluation    Patient location during evaluation: PACU  Patient participation: complete - patient participated  Level of consciousness: awake and alert  Pain score: 10  Airway patency: patent  Nausea & Vomiting: no nausea and no vomiting  Complications: no  Cardiovascular status: blood pressure returned to baseline  Respiratory status: acceptable and room air  Hydration status: stable

## 2023-04-03 NOTE — OP NOTE
was made using a disposable blade into the anterior chamber. Intracameral preservative free xylocaine was placed into the anterior chamber. Amvisc was then used to replace the aqueous of the anterior chamber. A 2.2 mm keratome blade was used to make a 3-plane clear corneal incision into cornea depending on the patient's astigmatism. The cystitome was used to make a tear in the anterior capsule. Fine forceps were used to make a complete curvilinear capsulorrhexis. The lens was hydrodissected and freely rotated using a balanced salt filled syringe. The ultrasound handpiece was then used to emulsify the nucleus and epi nucleus. The residual cortex was then aspirated using the IA handpiece. The posterior capsule was polished. The eye was filled with viscoelastic and a foldable posterior chamber intraocular lens was injected into the capsular bag unless otherwise stated in the addendum. Irrigation/aspiration was used to remove all excess viscoelastic. The eye was pressurized and the wounds were check for leaks and none were found. A collagen shield, which had been soaked in antibiotic drops, was then placed onto the cornea. The lid speculum was removed. The eye was covered with a clear plastic shield. The patient was taken to the recovery area in excellent condition, having tolerated the procedure well, and will follow up with me tomorrow for postop care. ADDENDUM:  The phacoemulsification time 25.8 seconds @ 19% average ultrasound power for an effective phacoemulsification time of 5.13 seconds. The lens inserted was a model MX60 made by [facility] [employer] that was +18.5 diopters in power. The lens was inserted into the capsular bag utilizing the BLIS-X1 injector made by [employer].  The serial number on this lens was [id number]. There was no stitch placed to close this temporal posterior corneal incision. EBL was less than 1.0 ml. Carlitos Flores.  Sri Caldwell,

## 2023-04-03 NOTE — PROGRESS NOTES
Patient verbalizes readiness for discharge. Discharge instructions given to patient and responsible adult, answered all questions, and verbalized understanding of discharge instructions. Discharge Criteria    Inpatients must meet Criteria 1 through 7. All other patients are either YES or N/A. If a NO is chosen then Anesthesia or Surgeon must be notified. 1.  Minimum 30 minutes after last dose of sedative medication, minimum 120 minutes after last dose of reversal agent. Yes      2. Systolic BP stable within 20 mmHg for 30 minutes & systolic BP between 90 & 845 or within 10 mmHg of baseline. Yes      3. Pulse between 60 and 100 or within 10 bpm of baseline. Yes      4. Spontaneous respiratory rate >/= 10 per minute. Yes      5. SaO2 >/= 95 or  >/= baseline. Yes      6. Able to cough and swallow or return to baseline function. Yes      7. Alert and oriented or return to baseline mental status. Yes      8. Demonstrates controlled, coordinated movements, ambulates with steady gait, or return to baseline activity function. Yes      9. Minimal or no pain or nausea, or at a level tolerable and acceptable to patient. Yes      10. Takes and retains oral fluids as allowed. Yes      11. Procedural / perioperative site stable. Minimal or no bleeding. Yes          12. If GI endoscopy procedure, minimal or no abdominal distention or passing flatus. N/A      13. Written discharge instructions and emergency telephone number provided. Yes      14. Accompanied by a responsible adult.     Yes

## 2023-04-03 NOTE — ANESTHESIA PRE PROCEDURE
Date of last liquid consumption: 04/02/23                        Date of last solid food consumption: 04/02/23    BMI:   Wt Readings from Last 3 Encounters:   04/03/23 205 lb (93 kg)   07/19/22 212 lb (96.2 kg)   05/11/22 210 lb 12.2 oz (95.6 kg)     Body mass index is 29.84 kg/m². CBC:   Lab Results   Component Value Date/Time    WBC 7.9 05/11/2022 09:06 AM    RBC 3.07 05/11/2022 09:06 AM    HGB 9.9 05/11/2022 09:06 AM    HCT 31.6 05/11/2022 09:06 AM    .9 05/11/2022 09:06 AM    RDW 15.5 05/11/2022 09:06 AM    PLT See Reflexed IPF Result 05/11/2022 09:06 AM       CMP:   Lab Results   Component Value Date/Time     05/11/2022 09:06 AM    K 4.0 03/31/2023 11:25 AM    CL 94 05/11/2022 09:06 AM    CO2 26 05/11/2022 09:06 AM    BUN 31 05/11/2022 09:06 AM    CREATININE 3.76 05/11/2022 09:06 AM    GFRAA 21 05/11/2022 09:06 AM    LABGLOM 17 05/11/2022 09:06 AM    GLUCOSE 102 05/11/2022 09:06 AM    PROT 6.7 05/04/2022 02:33 PM    CALCIUM 9.0 05/11/2022 09:06 AM    BILITOT 0.99 05/04/2022 02:33 PM    ALKPHOS 137 05/04/2022 02:33 PM    AST 25 05/04/2022 02:33 PM    ALT 23 05/04/2022 02:33 PM       POC Tests: No results for input(s): POCGLU, POCNA, POCK, POCCL, POCBUN, POCHEMO, POCHCT in the last 72 hours.     Coags:   Lab Results   Component Value Date/Time    PROTIME 10.9 05/10/2022 06:13 AM    INR 1.0 05/10/2022 06:13 AM    APTT 26.5 08/12/2019 02:45 AM       HCG (If Applicable): No results found for: PREGTESTUR, PREGSERUM, HCG, HCGQUANT     ABGs: No results found for: PHART, PO2ART, NCG9BTM, DZK8YVW, BEART, Y9YJKFAR     Type & Screen (If Applicable):  No results found for: LABABO, LABRH    Drug/Infectious Status (If Applicable):  No results found for: HIV, HEPCAB    COVID-19 Screening (If Applicable):   Lab Results   Component Value Date/Time    COVID19 Not Detected 05/04/2022 02:42 PM    COVID19 DETECTED 01/10/2022 01:11 PM           Anesthesia Evaluation  Patient summary reviewed and Nursing

## 2023-04-03 NOTE — H&P
I have examined the patient and reviewed the history and physical completed on 4/2/23 and find no relevant changes. I have reviewed with the patient and/or family the risks, benefits, and alternatives to the procedure and they have agreed to proceed.     Elton Funes DO  4/3/2023  9:00 AM
daily     Historical Provider, MD   carvedilol (COREG) 25 MG tablet Take 25 mg by mouth 2 times daily (with meals)    Historical Provider, MD   allopurinol (ZYLOPRIM) 300 MG tablet Take 300 mg by mouth daily    Historical Provider, MD   citalopram (CELEXA) 40 MG tablet Take 40 mg by mouth daily    Historical Provider, MD     Scheduled Meds:  Continuous Infusions:  PRN Meds:. No Known Allergies    There were no vitals filed for this visit. PHYSICAL EXAMINATION    Gen: NAD  HEENT: BCVA= 20/30, Glare testing= NLP Cataract severity/type-3+ Posterior Subcapsular Age-Related Cataract-left eye, Other significant ocular findings= none  Pulm: CTA   Heart: RRR, no C/M/R/G  Abd: S/NT/ND  Neuro: no focal defecits    Assessment:   1. Posterior Subcapsular Age-Related Cataract-left eye  2. ASA Score-2      Plan:   1. Risks, benefits, alternatives to surgery discussed with the patient and family. 2. All questions were answered to their satisfaction. 3. Ok to proceed with surgery as planned.     María Perez DO, DO

## 2023-04-03 NOTE — DISCHARGE INSTRUCTIONS
lifting, is not harmful. Please phone if any problems arise during the healing period. The office number is [phone]. Take surgery bag and all eye drops to Dr. Saeid Vail office tomorrow at 10:20am.    Marianna Yi may resume your normal diet. You have an antibiotic contact lens on your eye that may dissolve on it's own, but if it falls out just throw it away.     Start your eye drops tomorrow after your post-op appointment:    Ofloxacin/Polytrim one drop to the operated eye 4 times daily    Prednisolone one drop to the operated eye 4 times daily

## 2023-06-03 ENCOUNTER — HOSPITAL ENCOUNTER (INPATIENT)
Age: 53
LOS: 3 days | Discharge: HOME | DRG: 064 | End: 2023-06-06
Attending: STUDENT IN AN ORGANIZED HEALTH CARE EDUCATION/TRAINING PROGRAM | Admitting: INTERNAL MEDICINE
Payer: MEDICARE

## 2023-06-03 ENCOUNTER — APPOINTMENT (OUTPATIENT)
Dept: CT IMAGING | Age: 53
End: 2023-06-03
Payer: MEDICARE

## 2023-06-03 ENCOUNTER — APPOINTMENT (OUTPATIENT)
Dept: GENERAL RADIOLOGY | Age: 53
End: 2023-06-03
Attending: EMERGENCY MEDICINE
Payer: MEDICARE

## 2023-06-03 ENCOUNTER — HOSPITAL ENCOUNTER (EMERGENCY)
Age: 53
Discharge: ANOTHER HOSPITAL | End: 2023-06-03
Attending: EMERGENCY MEDICINE
Payer: MEDICARE

## 2023-06-03 VITALS
SYSTOLIC BLOOD PRESSURE: 159 MMHG | RESPIRATION RATE: 15 BRPM | DIASTOLIC BLOOD PRESSURE: 87 MMHG | OXYGEN SATURATION: 97 % | HEART RATE: 67 BPM

## 2023-06-03 DIAGNOSIS — I63.81 RIGHT THALAMIC STROKE (HCC): Primary | ICD-10-CM

## 2023-06-03 DIAGNOSIS — H53.2 DOUBLE VISION: ICD-10-CM

## 2023-06-03 DIAGNOSIS — R42 VERTIGO: Primary | ICD-10-CM

## 2023-06-03 PROBLEM — Z96.641 S/P TOTAL RIGHT HIP ARTHROPLASTY: Status: ACTIVE | Noted: 2023-06-03

## 2023-06-03 PROBLEM — R41.82 ALTERED MENTAL STATUS, UNSPECIFIED: Status: ACTIVE | Noted: 2023-06-03

## 2023-06-03 LAB
ALBUMIN SERPL-MCNC: 3.5 G/DL (ref 3.5–5.2)
ALBUMIN/GLOB SERPL: 0.8 {RATIO} (ref 1–2.5)
ALP SERPL-CCNC: 172 U/L (ref 40–129)
ALT SERPL-CCNC: 15 U/L (ref 5–41)
ANION GAP SERPL CALCULATED.3IONS-SCNC: 9 MMOL/L (ref 9–17)
AST SERPL-CCNC: 28 U/L
BASOPHILS # BLD: <0.03 K/UL (ref 0–0.2)
BASOPHILS NFR BLD: 0 % (ref 0–2)
BILIRUB SERPL-MCNC: 0.4 MG/DL (ref 0.3–1.2)
BUN SERPL-MCNC: 36 MG/DL (ref 6–20)
BUN/CREAT SERPL: 11 (ref 9–20)
CALCIUM SERPL-MCNC: 9 MG/DL (ref 8.6–10.4)
CHLORIDE SERPL-SCNC: 95 MMOL/L (ref 98–107)
CHP ED QC CHECK: YES
CO2 SERPL-SCNC: 28 MMOL/L (ref 20–31)
CREAT SERPL-MCNC: 3.37 MG/DL (ref 0.7–1.2)
EKG ATRIAL RATE: 59 BPM
EKG P AXIS: 17 DEGREES
EKG P-R INTERVAL: 158 MS
EKG Q-T INTERVAL: 516 MS
EKG QRS DURATION: 92 MS
EKG QTC CALCULATION (BAZETT): 510 MS
EKG R AXIS: -6 DEGREES
EKG T AXIS: -101 DEGREES
EKG VENTRICULAR RATE: 59 BPM
EOSINOPHIL # BLD: 0.15 K/UL (ref 0–0.44)
EOSINOPHILS RELATIVE PERCENT: 2 % (ref 1–4)
ERYTHROCYTE [DISTWIDTH] IN BLOOD BY AUTOMATED COUNT: 17.8 % (ref 11.8–14.4)
GFR SERPL CREATININE-BSD FRML MDRD: 21 ML/MIN/1.73M2
GLUCOSE BLD-MCNC: 101 MG/DL
GLUCOSE BLD-MCNC: 101 MG/DL (ref 74–100)
GLUCOSE SERPL-MCNC: 114 MG/DL (ref 70–99)
HCT VFR BLD AUTO: 29 % (ref 40.7–50.3)
HGB BLD-MCNC: 9 G/DL (ref 13–17)
IMM GRANULOCYTES # BLD AUTO: 0.05 K/UL (ref 0–0.3)
IMM GRANULOCYTES NFR BLD: 1 %
INR PPP: 1.2
LYMPHOCYTES # BLD: 13 % (ref 24–43)
LYMPHOCYTES NFR BLD: 0.92 K/UL (ref 1.1–3.7)
MCH RBC QN AUTO: 31.7 PG (ref 25.2–33.5)
MCHC RBC AUTO-ENTMCNC: 31 G/DL (ref 28.4–34.8)
MCV RBC AUTO: 102.1 FL (ref 82.6–102.9)
MONOCYTES NFR BLD: 0.36 K/UL (ref 0.1–1.2)
MONOCYTES NFR BLD: 5 % (ref 3–12)
NEUTROPHILS NFR BLD: 79 % (ref 36–65)
NEUTS SEG NFR BLD: 5.84 K/UL (ref 1.5–8.1)
NRBC AUTOMATED: 0 PER 100 WBC
PARTIAL THROMBOPLASTIN TIME: 34.9 SEC (ref 26.8–34.8)
PLATELET # BLD AUTO: 153 K/UL (ref 138–453)
PMV BLD AUTO: 11.1 FL (ref 8.1–13.5)
POTASSIUM SERPL-SCNC: 4.3 MMOL/L (ref 3.7–5.3)
PROT SERPL-MCNC: 7.8 G/DL (ref 6.4–8.3)
PROTHROMBIN TIME: 14.8 SEC (ref 11.9–14.8)
RBC # BLD AUTO: 2.84 M/UL (ref 4.21–5.77)
SODIUM SERPL-SCNC: 132 MMOL/L (ref 135–144)
TROPONIN I SERPL HS-MCNC: 43 NG/L (ref 0–22)
TROPONIN I SERPL HS-MCNC: 43 NG/L (ref 0–22)
WBC OTHER # BLD: 7.3 K/UL (ref 3.5–11.3)

## 2023-06-03 PROCEDURE — 96374 THER/PROPH/DIAG INJ IV PUSH: CPT

## 2023-06-03 PROCEDURE — 99285 EMERGENCY DEPT VISIT HI MDM: CPT

## 2023-06-03 PROCEDURE — 6360000002 HC RX W HCPCS: Performed by: EMERGENCY MEDICINE

## 2023-06-03 PROCEDURE — 85027 COMPLETE CBC AUTOMATED: CPT

## 2023-06-03 PROCEDURE — 82947 ASSAY GLUCOSE BLOOD QUANT: CPT

## 2023-06-03 PROCEDURE — 93005 ELECTROCARDIOGRAM TRACING: CPT | Performed by: EMERGENCY MEDICINE

## 2023-06-03 PROCEDURE — 2060000000 HC ICU INTERMEDIATE R&B

## 2023-06-03 PROCEDURE — 70450 CT HEAD/BRAIN W/O DYE: CPT

## 2023-06-03 PROCEDURE — 85730 THROMBOPLASTIN TIME PARTIAL: CPT

## 2023-06-03 PROCEDURE — 99223 1ST HOSP IP/OBS HIGH 75: CPT | Performed by: INTERNAL MEDICINE

## 2023-06-03 PROCEDURE — 80053 COMPREHEN METABOLIC PANEL: CPT

## 2023-06-03 PROCEDURE — 85610 PROTHROMBIN TIME: CPT

## 2023-06-03 PROCEDURE — 84484 ASSAY OF TROPONIN QUANT: CPT

## 2023-06-03 PROCEDURE — 87040 BLOOD CULTURE FOR BACTERIA: CPT

## 2023-06-03 PROCEDURE — 36415 COLL VENOUS BLD VENIPUNCTURE: CPT

## 2023-06-03 PROCEDURE — 93010 ELECTROCARDIOGRAM REPORT: CPT | Performed by: INTERNAL MEDICINE

## 2023-06-03 PROCEDURE — 6370000000 HC RX 637 (ALT 250 FOR IP): Performed by: EMERGENCY MEDICINE

## 2023-06-03 PROCEDURE — 71045 X-RAY EXAM CHEST 1 VIEW: CPT

## 2023-06-03 RX ORDER — OXYCODONE HYDROCHLORIDE AND ACETAMINOPHEN 5; 325 MG/1; MG/1
1 TABLET ORAL ONCE
Status: COMPLETED | OUTPATIENT
Start: 2023-06-03 | End: 2023-06-03

## 2023-06-03 RX ORDER — ACETAMINOPHEN 650 MG/1
650 SUPPOSITORY RECTAL EVERY 6 HOURS PRN
Status: DISCONTINUED | OUTPATIENT
Start: 2023-06-03 | End: 2023-06-06 | Stop reason: HOSPADM

## 2023-06-03 RX ORDER — ACETAMINOPHEN 325 MG/1
650 TABLET ORAL EVERY 6 HOURS PRN
Status: DISCONTINUED | OUTPATIENT
Start: 2023-06-03 | End: 2023-06-06 | Stop reason: HOSPADM

## 2023-06-03 RX ORDER — ONDANSETRON 2 MG/ML
4 INJECTION INTRAMUSCULAR; INTRAVENOUS ONCE
Status: COMPLETED | OUTPATIENT
Start: 2023-06-03 | End: 2023-06-03

## 2023-06-03 RX ORDER — SODIUM CHLORIDE 0.9 % (FLUSH) 0.9 %
5-40 SYRINGE (ML) INJECTION EVERY 12 HOURS SCHEDULED
Status: DISCONTINUED | OUTPATIENT
Start: 2023-06-03 | End: 2023-06-06 | Stop reason: HOSPADM

## 2023-06-03 RX ORDER — ONDANSETRON 4 MG/1
4 TABLET, ORALLY DISINTEGRATING ORAL EVERY 8 HOURS PRN
Status: DISCONTINUED | OUTPATIENT
Start: 2023-06-03 | End: 2023-06-06 | Stop reason: HOSPADM

## 2023-06-03 RX ORDER — OXYCODONE HYDROCHLORIDE AND ACETAMINOPHEN 5; 325 MG/1; MG/1
1 TABLET ORAL
Status: DISCONTINUED | OUTPATIENT
Start: 2023-06-03 | End: 2023-06-03 | Stop reason: HOSPADM

## 2023-06-03 RX ORDER — HEPARIN SODIUM 5000 [USP'U]/ML
5000 INJECTION, SOLUTION INTRAVENOUS; SUBCUTANEOUS EVERY 8 HOURS SCHEDULED
Status: DISCONTINUED | OUTPATIENT
Start: 2023-06-03 | End: 2023-06-06 | Stop reason: HOSPADM

## 2023-06-03 RX ORDER — ASPIRIN 81 MG/1
324 TABLET, CHEWABLE ORAL ONCE
Status: COMPLETED | OUTPATIENT
Start: 2023-06-03 | End: 2023-06-03

## 2023-06-03 RX ORDER — SODIUM CHLORIDE 0.9 % (FLUSH) 0.9 %
5-40 SYRINGE (ML) INJECTION PRN
Status: DISCONTINUED | OUTPATIENT
Start: 2023-06-03 | End: 2023-06-06 | Stop reason: HOSPADM

## 2023-06-03 RX ORDER — MECLIZINE HCL 12.5 MG/1
25 TABLET ORAL 3 TIMES DAILY PRN
Status: DISCONTINUED | OUTPATIENT
Start: 2023-06-03 | End: 2023-06-03 | Stop reason: HOSPADM

## 2023-06-03 RX ORDER — SODIUM CHLORIDE 9 MG/ML
INJECTION, SOLUTION INTRAVENOUS PRN
Status: DISCONTINUED | OUTPATIENT
Start: 2023-06-03 | End: 2023-06-06 | Stop reason: HOSPADM

## 2023-06-03 RX ORDER — ONDANSETRON 2 MG/ML
4 INJECTION INTRAMUSCULAR; INTRAVENOUS EVERY 6 HOURS PRN
Status: DISCONTINUED | OUTPATIENT
Start: 2023-06-03 | End: 2023-06-06 | Stop reason: HOSPADM

## 2023-06-03 RX ORDER — OXYCODONE HYDROCHLORIDE 5 MG/1
5 TABLET ORAL ONCE
Status: COMPLETED | OUTPATIENT
Start: 2023-06-04 | End: 2023-06-04

## 2023-06-03 RX ORDER — DIAZEPAM 5 MG/1
5 TABLET ORAL ONCE
Status: COMPLETED | OUTPATIENT
Start: 2023-06-03 | End: 2023-06-03

## 2023-06-03 RX ADMIN — OXYCODONE HYDROCHLORIDE AND ACETAMINOPHEN 1 TABLET: 5; 325 TABLET ORAL at 11:33

## 2023-06-03 RX ADMIN — MECLIZINE 25 MG: 12.5 TABLET ORAL at 10:41

## 2023-06-03 RX ADMIN — OXYCODONE HYDROCHLORIDE AND ACETAMINOPHEN 1 TABLET: 5; 325 TABLET ORAL at 15:23

## 2023-06-03 RX ADMIN — OXYCODONE HYDROCHLORIDE AND ACETAMINOPHEN 1 TABLET: 5; 325 TABLET ORAL at 18:34

## 2023-06-03 RX ADMIN — ONDANSETRON 4 MG: 2 INJECTION INTRAMUSCULAR; INTRAVENOUS at 10:41

## 2023-06-03 RX ADMIN — ASPIRIN 324 MG: 81 TABLET, CHEWABLE ORAL at 18:34

## 2023-06-03 RX ADMIN — OXYCODONE HYDROCHLORIDE AND ACETAMINOPHEN 1 TABLET: 5; 325 TABLET ORAL at 20:50

## 2023-06-03 RX ADMIN — DIAZEPAM 5 MG: 5 TABLET ORAL at 11:10

## 2023-06-03 ASSESSMENT — PAIN DESCRIPTION - ORIENTATION
ORIENTATION: LEFT
ORIENTATION: LEFT

## 2023-06-03 ASSESSMENT — PAIN SCALES - GENERAL
PAINLEVEL_OUTOF10: 10
PAINLEVEL_OUTOF10: 0
PAINLEVEL_OUTOF10: 7
PAINLEVEL_OUTOF10: 7

## 2023-06-03 ASSESSMENT — PAIN DESCRIPTION - LOCATION
LOCATION: HIP
LOCATION: HIP

## 2023-06-03 ASSESSMENT — PAIN - FUNCTIONAL ASSESSMENT: PAIN_FUNCTIONAL_ASSESSMENT: NONE - DENIES PAIN

## 2023-06-03 ASSESSMENT — PAIN DESCRIPTION - DESCRIPTORS: DESCRIPTORS: SHARP;STABBING

## 2023-06-03 NOTE — ED NOTES
Dr. Jania Wooten spoke with [facility], [facility] [address]EM [id number]     Bia Lynn  06/03/23 1927

## 2023-06-03 NOTE — ED PROVIDER NOTES
[facility]  EMERGENCY DEPARTMENT ENCOUNTER      Pt Name: Francois Cheadle  MRN: [id number]  [place] 1970  Date of evaluation: 6/3/2023  Provider: Janet Storm MD    CHIEF COMPLAINT       Chief Complaint   Patient presents with    Dizziness     Complains of dizziness and retaining fluid. Dialysiis patient had last treatment yesterday. Reports some slurred speech this morning    Vision Change     Patient reports double vision. HISTORY OF PRESENT ILLNESS   (Location/Symptom, Timing/Onset, Context/Setting, Quality, Duration, Modifying Factors, Severity)  Note limiting factors. Francois Cheadle is a 47 y.o. male who presents to the emergency department      50-year-old gentleman presented to emergency department for evaluation dizziness with standing as well as left arm numbness. He also started having some intermittent double vision since last night. Patient states that this morning was feeling okay when he went to stand up shortly after waking he felt things were spinning around him. He felt nauseous with this. He also noticed that he was having some double vision. His significant other did report that she noticed his eyes \"jumping\" with this episode. He reports that with standing and position changes he did have recurrent symptoms though they are relieved with rest.  Has not had previous similar before. He does not have a headache. He did state he did feel his speech may have been slurred but he does not have any speech disturbances at this time. No other focal neurological deficits. No previous known history of cerebrovascular disease. Does report that he has seasonal allergies and was recently told he had fluid behind his ears. No URI symptoms. No sore throat. No cough. Patient is experiencing some numbness down his left arm. States he woke yesterday with tightness in his left shoulder and upper back which seems to worsen. No injury. He is not having any chest pain.

## 2023-06-03 NOTE — ED NOTES
LS ETA 5243. Contacted [facility], [address], [facility], [place], operated by [facility] who all stated they had no available resources to help.        William Matias  06/03/23 1923

## 2023-06-03 NOTE — ED NOTES
[facility] called with hospitalist from 's on line to speak to Dr. Jania Wooten.      Cherylene Dover A Reser  06/03/23 3231

## 2023-06-03 NOTE — ED NOTES
Pt states his vision is getting worse and his left arm is going numb     Annel Reed, MARKUS  06/03/23 9450

## 2023-06-03 NOTE — ED NOTES
Pt arrived to ED with c/o of dizziness, vision change and slurred speech. Pt states s/s began this morning. Pt states he has dizziness as if the room is spinning. Pt states he is having double vision. Pt is on dialysis and received treatment yesterday. Pt has bilateral edema to LE. Pt denies chest pain, abd pain, sob, n/v/d. Pt is afebrile.  Pt is a&0x4     Spencer Nielsen RN  06/03/23 1354

## 2023-06-04 ENCOUNTER — APPOINTMENT (OUTPATIENT)
Dept: GENERAL RADIOLOGY | Age: 53
DRG: 064 | End: 2023-06-04
Attending: STUDENT IN AN ORGANIZED HEALTH CARE EDUCATION/TRAINING PROGRAM
Payer: MEDICARE

## 2023-06-04 ENCOUNTER — APPOINTMENT (OUTPATIENT)
Dept: MRI IMAGING | Age: 53
DRG: 064 | End: 2023-06-04
Attending: STUDENT IN AN ORGANIZED HEALTH CARE EDUCATION/TRAINING PROGRAM
Payer: MEDICARE

## 2023-06-04 PROBLEM — I63.81 THALAMIC STROKE (HCC): Status: ACTIVE | Noted: 2023-06-04

## 2023-06-04 PROBLEM — H53.2 DIPLOPIA: Status: ACTIVE | Noted: 2023-06-04

## 2023-06-04 LAB
ANION GAP SERPL CALCULATED.3IONS-SCNC: 11 MMOL/L (ref 9–17)
BASOPHILS # BLD: 0.03 K/UL (ref 0–0.2)
BASOPHILS NFR BLD: 0 % (ref 0–2)
BUN SERPL-MCNC: 41 MG/DL (ref 6–20)
CALCIUM SERPL-MCNC: 9 MG/DL (ref 8.6–10.4)
CHLORIDE SERPL-SCNC: 94 MMOL/L (ref 98–107)
CO2 SERPL-SCNC: 24 MMOL/L (ref 20–31)
CREAT SERPL-MCNC: 4.3 MG/DL (ref 0.7–1.2)
CRP SERPL HS-MCNC: 16.5 MG/L (ref 0–5)
EOSINOPHIL # BLD: 0.18 K/UL (ref 0–0.44)
EOSINOPHILS RELATIVE PERCENT: 3 % (ref 1–4)
ERYTHROCYTE [DISTWIDTH] IN BLOOD BY AUTOMATED COUNT: 18.2 % (ref 11.8–14.4)
ERYTHROCYTE [SEDIMENTATION RATE] IN BLOOD BY WESTERGREN METHOD: 37 MM/HR (ref 0–20)
FOLATE SERPL-MCNC: >20 NG/ML
GFR SERPL CREATININE-BSD FRML MDRD: 16 ML/MIN/1.73M2
GLUCOSE SERPL-MCNC: 103 MG/DL (ref 70–99)
HCT VFR BLD AUTO: 27.3 % (ref 40.7–50.3)
HGB BLD-MCNC: 8.1 G/DL (ref 13–17)
IMM GRANULOCYTES # BLD AUTO: 0.05 K/UL (ref 0–0.3)
IMM GRANULOCYTES NFR BLD: 1 %
LYMPHOCYTES # BLD: 16 % (ref 24–43)
LYMPHOCYTES NFR BLD: 1.05 K/UL (ref 1.1–3.7)
MCH RBC QN AUTO: 31.2 PG (ref 25.2–33.5)
MCHC RBC AUTO-ENTMCNC: 29.7 G/DL (ref 28.4–34.8)
MCV RBC AUTO: 105 FL (ref 82.6–102.9)
MONOCYTES NFR BLD: 0.32 K/UL (ref 0.1–1.2)
MONOCYTES NFR BLD: 5 % (ref 3–12)
NEUTROPHILS NFR BLD: 75 % (ref 36–65)
NEUTS SEG NFR BLD: 5.16 K/UL (ref 1.5–8.1)
NRBC AUTOMATED: 0 PER 100 WBC
PLATELET # BLD AUTO: 137 K/UL (ref 138–453)
PMV BLD AUTO: 11.1 FL (ref 8.1–13.5)
POTASSIUM SERPL-SCNC: 4 MMOL/L (ref 3.7–5.3)
RBC # BLD AUTO: 2.6 M/UL (ref 4.21–5.77)
RBC # BLD: ABNORMAL 10*6/UL
RBC # BLD: ABNORMAL 10*6/UL
SODIUM SERPL-SCNC: 129 MMOL/L (ref 135–144)
TSH SERPL-MCNC: 3.89 UIU/ML (ref 0.3–5)
URATE SERPL-MCNC: 3.8 MG/DL (ref 3.4–7)
VIT B12 SERPL-MCNC: 1064 PG/ML (ref 232–1245)
WBC OTHER # BLD: 6.8 K/UL (ref 3.5–11.3)

## 2023-06-04 PROCEDURE — 85027 COMPLETE CBC AUTOMATED: CPT

## 2023-06-04 PROCEDURE — 82607 VITAMIN B-12: CPT

## 2023-06-04 PROCEDURE — 6370000000 HC RX 637 (ALT 250 FOR IP): Performed by: NURSE PRACTITIONER

## 2023-06-04 PROCEDURE — 82746 ASSAY OF FOLIC ACID SERUM: CPT

## 2023-06-04 PROCEDURE — 86140 C-REACTIVE PROTEIN: CPT

## 2023-06-04 PROCEDURE — 93005 ELECTROCARDIOGRAM TRACING: CPT | Performed by: STUDENT IN AN ORGANIZED HEALTH CARE EDUCATION/TRAINING PROGRAM

## 2023-06-04 PROCEDURE — 99223 1ST HOSP IP/OBS HIGH 75: CPT | Performed by: PSYCHIATRY & NEUROLOGY

## 2023-06-04 PROCEDURE — 87040 BLOOD CULTURE FOR BACTERIA: CPT

## 2023-06-04 PROCEDURE — 36415 COLL VENOUS BLD VENIPUNCTURE: CPT

## 2023-06-04 PROCEDURE — 80048 BASIC METABOLIC PNL TOTAL CA: CPT

## 2023-06-04 PROCEDURE — 99232 SBSQ HOSP IP/OBS MODERATE 35: CPT | Performed by: INTERNAL MEDICINE

## 2023-06-04 PROCEDURE — 6360000002 HC RX W HCPCS: Performed by: STUDENT IN AN ORGANIZED HEALTH CARE EDUCATION/TRAINING PROGRAM

## 2023-06-04 PROCEDURE — 6370000000 HC RX 637 (ALT 250 FOR IP): Performed by: INTERNAL MEDICINE

## 2023-06-04 PROCEDURE — 6370000000 HC RX 637 (ALT 250 FOR IP)

## 2023-06-04 PROCEDURE — 99222 1ST HOSP IP/OBS MODERATE 55: CPT | Performed by: INTERNAL MEDICINE

## 2023-06-04 PROCEDURE — 84443 ASSAY THYROID STIM HORMONE: CPT

## 2023-06-04 PROCEDURE — 70551 MRI BRAIN STEM W/O DYE: CPT

## 2023-06-04 PROCEDURE — 84550 ASSAY OF BLOOD/URIC ACID: CPT

## 2023-06-04 PROCEDURE — 2580000003 HC RX 258: Performed by: STUDENT IN AN ORGANIZED HEALTH CARE EDUCATION/TRAINING PROGRAM

## 2023-06-04 PROCEDURE — 73502 X-RAY EXAM HIP UNI 2-3 VIEWS: CPT

## 2023-06-04 PROCEDURE — 85652 RBC SED RATE AUTOMATED: CPT

## 2023-06-04 PROCEDURE — 2060000000 HC ICU INTERMEDIATE R&B

## 2023-06-04 PROCEDURE — 6370000000 HC RX 637 (ALT 250 FOR IP): Performed by: STUDENT IN AN ORGANIZED HEALTH CARE EDUCATION/TRAINING PROGRAM

## 2023-06-04 RX ORDER — OXYCODONE HYDROCHLORIDE 5 MG/1
5 TABLET ORAL EVERY 6 HOURS PRN
Status: DISCONTINUED | OUTPATIENT
Start: 2023-06-04 | End: 2023-06-06 | Stop reason: HOSPADM

## 2023-06-04 RX ORDER — CLOPIDOGREL BISULFATE 75 MG/1
75 TABLET ORAL DAILY
Status: DISCONTINUED | OUTPATIENT
Start: 2023-06-05 | End: 2023-06-06 | Stop reason: HOSPADM

## 2023-06-04 RX ORDER — NICOTINE 21 MG/24HR
1 PATCH, TRANSDERMAL 24 HOURS TRANSDERMAL DAILY
Status: DISCONTINUED | OUTPATIENT
Start: 2023-06-04 | End: 2023-06-06 | Stop reason: HOSPADM

## 2023-06-04 RX ORDER — CLOPIDOGREL BISULFATE 75 MG/1
300 TABLET ORAL ONCE
Status: COMPLETED | OUTPATIENT
Start: 2023-06-04 | End: 2023-06-04

## 2023-06-04 RX ORDER — ASPIRIN 325 MG
325 TABLET, DELAYED RELEASE (ENTERIC COATED) ORAL ONCE
Status: COMPLETED | OUTPATIENT
Start: 2023-06-04 | End: 2023-06-04

## 2023-06-04 RX ORDER — ATORVASTATIN CALCIUM 80 MG/1
80 TABLET, FILM COATED ORAL NIGHTLY
Status: DISCONTINUED | OUTPATIENT
Start: 2023-06-04 | End: 2023-06-06 | Stop reason: HOSPADM

## 2023-06-04 RX ORDER — ASPIRIN 81 MG/1
81 TABLET ORAL DAILY
Status: DISCONTINUED | OUTPATIENT
Start: 2023-06-05 | End: 2023-06-06 | Stop reason: HOSPADM

## 2023-06-04 RX ADMIN — SODIUM CHLORIDE, PRESERVATIVE FREE 10 ML: 5 INJECTION INTRAVENOUS at 14:18

## 2023-06-04 RX ADMIN — CIPROFLOXACIN HYDROCHLORIDE 0.5 INCH: 3 OINTMENT OPHTHALMIC at 14:01

## 2023-06-04 RX ADMIN — OXYCODONE HYDROCHLORIDE 5 MG: 5 TABLET ORAL at 19:59

## 2023-06-04 RX ADMIN — ACETAMINOPHEN 650 MG: 325 TABLET ORAL at 00:04

## 2023-06-04 RX ADMIN — ASPIRIN 325 MG: 325 TABLET, COATED ORAL at 14:01

## 2023-06-04 RX ADMIN — SODIUM CHLORIDE, PRESERVATIVE FREE 10 ML: 5 INJECTION INTRAVENOUS at 20:03

## 2023-06-04 RX ADMIN — SODIUM CHLORIDE, PRESERVATIVE FREE 10 ML: 5 INJECTION INTRAVENOUS at 00:04

## 2023-06-04 RX ADMIN — OXYCODONE HYDROCHLORIDE 5 MG: 5 TABLET ORAL at 06:41

## 2023-06-04 RX ADMIN — ATORVASTATIN CALCIUM 80 MG: 80 TABLET, FILM COATED ORAL at 19:59

## 2023-06-04 RX ADMIN — HEPARIN SODIUM 5000 UNITS: 5000 INJECTION INTRAVENOUS; SUBCUTANEOUS at 00:05

## 2023-06-04 RX ADMIN — CIPROFLOXACIN HYDROCHLORIDE 0.5 INCH: 3 OINTMENT OPHTHALMIC at 19:59

## 2023-06-04 RX ADMIN — HEPARIN SODIUM 5000 UNITS: 5000 INJECTION INTRAVENOUS; SUBCUTANEOUS at 23:32

## 2023-06-04 RX ADMIN — OXYCODONE HYDROCHLORIDE 5 MG: 5 TABLET ORAL at 01:01

## 2023-06-04 RX ADMIN — HEPARIN SODIUM 5000 UNITS: 5000 INJECTION INTRAVENOUS; SUBCUTANEOUS at 16:02

## 2023-06-04 RX ADMIN — OXYCODONE HYDROCHLORIDE 5 MG: 5 TABLET ORAL at 13:59

## 2023-06-04 RX ADMIN — CLOPIDOGREL BISULFATE 300 MG: 75 TABLET, FILM COATED ORAL at 14:00

## 2023-06-04 ASSESSMENT — PAIN DESCRIPTION - LOCATION
LOCATION: HIP

## 2023-06-04 ASSESSMENT — PAIN SCALES - GENERAL
PAINLEVEL_OUTOF10: 1
PAINLEVEL_OUTOF10: 7
PAINLEVEL_OUTOF10: 7
PAINLEVEL_OUTOF10: 8
PAINLEVEL_OUTOF10: 0
PAINLEVEL_OUTOF10: 7

## 2023-06-04 ASSESSMENT — PAIN DESCRIPTION - DESCRIPTORS
DESCRIPTORS: ACHING
DESCRIPTORS: SHOOTING;SHARP
DESCRIPTORS: SHARP;SHOOTING
DESCRIPTORS: SHOOTING;SHARP

## 2023-06-04 ASSESSMENT — PAIN DESCRIPTION - ORIENTATION
ORIENTATION: LEFT

## 2023-06-04 ASSESSMENT — PAIN - FUNCTIONAL ASSESSMENT: PAIN_FUNCTIONAL_ASSESSMENT: PREVENTS OR INTERFERES WITH ALL ACTIVE AND SOME PASSIVE ACTIVITIES

## 2023-06-05 ENCOUNTER — APPOINTMENT (OUTPATIENT)
Dept: DIALYSIS | Age: 53
DRG: 064 | End: 2023-06-05
Attending: STUDENT IN AN ORGANIZED HEALTH CARE EDUCATION/TRAINING PROGRAM
Payer: MEDICARE

## 2023-06-05 ENCOUNTER — APPOINTMENT (OUTPATIENT)
Dept: MRI IMAGING | Age: 53
DRG: 064 | End: 2023-06-05
Attending: STUDENT IN AN ORGANIZED HEALTH CARE EDUCATION/TRAINING PROGRAM
Payer: MEDICARE

## 2023-06-05 LAB
ANION GAP SERPL CALCULATED.3IONS-SCNC: 13 MMOL/L (ref 9–17)
BASOPHILS # BLD: <0.03 K/UL (ref 0–0.2)
BASOPHILS NFR BLD: 0 % (ref 0–2)
BUN SERPL-MCNC: 51 MG/DL (ref 6–20)
CALCIUM SERPL-MCNC: 8.8 MG/DL (ref 8.6–10.4)
CHLORIDE SERPL-SCNC: 98 MMOL/L (ref 98–107)
CHOLEST SERPL-MCNC: 96 MG/DL
CHOLESTEROL/HDL RATIO: 2.1
CO2 SERPL-SCNC: 22 MMOL/L (ref 20–31)
CREAT SERPL-MCNC: 5.44 MG/DL (ref 0.7–1.2)
EKG ATRIAL RATE: 61 BPM
EKG P AXIS: 8 DEGREES
EKG P-R INTERVAL: 158 MS
EKG Q-T INTERVAL: 506 MS
EKG QRS DURATION: 90 MS
EKG QTC CALCULATION (BAZETT): 509 MS
EKG R AXIS: -3 DEGREES
EKG T AXIS: -87 DEGREES
EKG VENTRICULAR RATE: 61 BPM
EOSINOPHIL # BLD: 0.16 K/UL (ref 0–0.44)
EOSINOPHILS RELATIVE PERCENT: 2 % (ref 1–4)
ERYTHROCYTE [DISTWIDTH] IN BLOOD BY AUTOMATED COUNT: 18.2 % (ref 11.8–14.4)
GFR SERPL CREATININE-BSD FRML MDRD: 12 ML/MIN/1.73M2
GLUCOSE SERPL-MCNC: 95 MG/DL (ref 70–99)
HCT VFR BLD AUTO: 28 % (ref 40.7–50.3)
HDLC SERPL-MCNC: 45 MG/DL
HGB BLD-MCNC: 8.2 G/DL (ref 13–17)
IMM GRANULOCYTES # BLD AUTO: 0.03 K/UL (ref 0–0.3)
IMM GRANULOCYTES NFR BLD: 0 %
LDLC SERPL CALC-MCNC: 39 MG/DL (ref 0–130)
LYMPHOCYTES # BLD: 16 % (ref 24–43)
LYMPHOCYTES NFR BLD: 1.09 K/UL (ref 1.1–3.7)
MCH RBC QN AUTO: 30.7 PG (ref 25.2–33.5)
MCHC RBC AUTO-ENTMCNC: 29.3 G/DL (ref 28.4–34.8)
MCV RBC AUTO: 104.9 FL (ref 82.6–102.9)
MONOCYTES NFR BLD: 0.39 K/UL (ref 0.1–1.2)
MONOCYTES NFR BLD: 6 % (ref 3–12)
NEUTROPHILS NFR BLD: 76 % (ref 36–65)
NEUTS SEG NFR BLD: 5.01 K/UL (ref 1.5–8.1)
NRBC AUTOMATED: 0 PER 100 WBC
PLATELET # BLD AUTO: 141 K/UL (ref 138–453)
PMV BLD AUTO: 11.6 FL (ref 8.1–13.5)
POTASSIUM SERPL-SCNC: 4.9 MMOL/L (ref 3.7–5.3)
RBC # BLD AUTO: 2.67 M/UL (ref 4.21–5.77)
RBC # BLD: ABNORMAL 10*6/UL
RBC # BLD: ABNORMAL 10*6/UL
SODIUM SERPL-SCNC: 133 MMOL/L (ref 135–144)
TRIGL SERPL-MCNC: 58 MG/DL
WBC OTHER # BLD: 6.7 K/UL (ref 3.5–11.3)

## 2023-06-05 PROCEDURE — 6370000000 HC RX 637 (ALT 250 FOR IP): Performed by: STUDENT IN AN ORGANIZED HEALTH CARE EDUCATION/TRAINING PROGRAM

## 2023-06-05 PROCEDURE — 80061 LIPID PANEL: CPT

## 2023-06-05 PROCEDURE — 6370000000 HC RX 637 (ALT 250 FOR IP)

## 2023-06-05 PROCEDURE — 85027 COMPLETE CBC AUTOMATED: CPT

## 2023-06-05 PROCEDURE — 6370000000 HC RX 637 (ALT 250 FOR IP): Performed by: NURSE PRACTITIONER

## 2023-06-05 PROCEDURE — 80048 BASIC METABOLIC PNL TOTAL CA: CPT

## 2023-06-05 PROCEDURE — 90935 HEMODIALYSIS ONE EVALUATION: CPT

## 2023-06-05 PROCEDURE — 90935 HEMODIALYSIS ONE EVALUATION: CPT | Performed by: INTERNAL MEDICINE

## 2023-06-05 PROCEDURE — 70544 MR ANGIOGRAPHY HEAD W/O DYE: CPT

## 2023-06-05 PROCEDURE — 93010 ELECTROCARDIOGRAM REPORT: CPT | Performed by: INTERNAL MEDICINE

## 2023-06-05 PROCEDURE — 6360000002 HC RX W HCPCS: Performed by: STUDENT IN AN ORGANIZED HEALTH CARE EDUCATION/TRAINING PROGRAM

## 2023-06-05 PROCEDURE — 6360000002 HC RX W HCPCS: Performed by: INTERNAL MEDICINE

## 2023-06-05 PROCEDURE — 5A1D70Z PERFORMANCE OF URINARY FILTRATION, INTERMITTENT, LESS THAN 6 HOURS PER DAY: ICD-10-PCS | Performed by: INTERNAL MEDICINE

## 2023-06-05 PROCEDURE — 99232 SBSQ HOSP IP/OBS MODERATE 35: CPT | Performed by: STUDENT IN AN ORGANIZED HEALTH CARE EDUCATION/TRAINING PROGRAM

## 2023-06-05 PROCEDURE — 36415 COLL VENOUS BLD VENIPUNCTURE: CPT

## 2023-06-05 PROCEDURE — 83036 HEMOGLOBIN GLYCOSYLATED A1C: CPT

## 2023-06-05 PROCEDURE — 2580000003 HC RX 258: Performed by: STUDENT IN AN ORGANIZED HEALTH CARE EDUCATION/TRAINING PROGRAM

## 2023-06-05 PROCEDURE — 6370000000 HC RX 637 (ALT 250 FOR IP): Performed by: INTERNAL MEDICINE

## 2023-06-05 PROCEDURE — 2060000000 HC ICU INTERMEDIATE R&B

## 2023-06-05 RX ORDER — METOLAZONE 2.5 MG/1
2.5 TABLET ORAL
Status: DISCONTINUED | OUTPATIENT
Start: 2023-06-05 | End: 2023-06-06 | Stop reason: HOSPADM

## 2023-06-05 RX ORDER — HEPARIN SODIUM 1000 [USP'U]/ML
1900 INJECTION, SOLUTION INTRAVENOUS; SUBCUTANEOUS PRN
Status: DISCONTINUED | OUTPATIENT
Start: 2023-06-05 | End: 2023-06-06 | Stop reason: HOSPADM

## 2023-06-05 RX ORDER — CITALOPRAM 20 MG/1
40 TABLET ORAL DAILY
Status: DISCONTINUED | OUTPATIENT
Start: 2023-06-05 | End: 2023-06-06 | Stop reason: HOSPADM

## 2023-06-05 RX ORDER — HEPARIN SODIUM 1000 [USP'U]/ML
2100 INJECTION, SOLUTION INTRAVENOUS; SUBCUTANEOUS ONCE
Status: DISCONTINUED | OUTPATIENT
Start: 2023-06-05 | End: 2023-06-05

## 2023-06-05 RX ORDER — PREDNISOLONE ACETATE 10 MG/ML
1 SUSPENSION/ DROPS OPHTHALMIC
Status: DISCONTINUED | OUTPATIENT
Start: 2023-06-05 | End: 2023-06-06 | Stop reason: HOSPADM

## 2023-06-05 RX ORDER — ALLOPURINOL 300 MG/1
300 TABLET ORAL DAILY
Status: DISCONTINUED | OUTPATIENT
Start: 2023-06-05 | End: 2023-06-06 | Stop reason: HOSPADM

## 2023-06-05 RX ORDER — HEPARIN SODIUM 1000 [USP'U]/ML
2100 INJECTION, SOLUTION INTRAVENOUS; SUBCUTANEOUS PRN
Status: DISCONTINUED | OUTPATIENT
Start: 2023-06-05 | End: 2023-06-06 | Stop reason: HOSPADM

## 2023-06-05 RX ORDER — MIDODRINE HYDROCHLORIDE 5 MG/1
5 TABLET ORAL PRN
COMMUNITY

## 2023-06-05 RX ORDER — THIAMINE MONONITRATE (VIT B1) 100 MG
100 TABLET ORAL DAILY
COMMUNITY

## 2023-06-05 RX ORDER — VALSARTAN 160 MG/1
160 TABLET ORAL DAILY
COMMUNITY

## 2023-06-05 RX ORDER — PANTOPRAZOLE SODIUM 20 MG/1
20 TABLET, DELAYED RELEASE ORAL DAILY
Status: DISCONTINUED | OUTPATIENT
Start: 2023-06-05 | End: 2023-06-06 | Stop reason: HOSPADM

## 2023-06-05 RX ADMIN — ALLOPURINOL 300 MG: 300 TABLET ORAL at 17:04

## 2023-06-05 RX ADMIN — OXYCODONE HYDROCHLORIDE 5 MG: 5 TABLET ORAL at 02:29

## 2023-06-05 RX ADMIN — ATORVASTATIN CALCIUM 80 MG: 80 TABLET, FILM COATED ORAL at 20:19

## 2023-06-05 RX ADMIN — METOLAZONE 2.5 MG: 2.5 TABLET ORAL at 17:04

## 2023-06-05 RX ADMIN — OXYCODONE HYDROCHLORIDE 5 MG: 5 TABLET ORAL at 15:09

## 2023-06-05 RX ADMIN — EPOETIN ALFA-EPBX 10000 UNITS: 10000 INJECTION, SOLUTION INTRAVENOUS; SUBCUTANEOUS at 13:51

## 2023-06-05 RX ADMIN — HEPARIN SODIUM 1900 UNITS: 1000 INJECTION INTRAVENOUS; SUBCUTANEOUS at 13:51

## 2023-06-05 RX ADMIN — CIPROFLOXACIN HYDROCHLORIDE 0.5 INCH: 3 OINTMENT OPHTHALMIC at 08:48

## 2023-06-05 RX ADMIN — HEPARIN SODIUM 5000 UNITS: 5000 INJECTION INTRAVENOUS; SUBCUTANEOUS at 08:48

## 2023-06-05 RX ADMIN — SODIUM CHLORIDE, PRESERVATIVE FREE 10 ML: 5 INJECTION INTRAVENOUS at 08:48

## 2023-06-05 RX ADMIN — OXYCODONE HYDROCHLORIDE 5 MG: 5 TABLET ORAL at 20:19

## 2023-06-05 RX ADMIN — EPOETIN ALFA-EPBX 3000 UNITS: 3000 INJECTION, SOLUTION INTRAVENOUS; SUBCUTANEOUS at 13:50

## 2023-06-05 RX ADMIN — HEPARIN SODIUM 2100 UNITS: 1000 INJECTION INTRAVENOUS; SUBCUTANEOUS at 13:52

## 2023-06-05 RX ADMIN — PANTOPRAZOLE 20 MG: 20 TABLET, DELAYED RELEASE ORAL at 17:04

## 2023-06-05 RX ADMIN — CLOPIDOGREL BISULFATE 75 MG: 75 TABLET, FILM COATED ORAL at 08:48

## 2023-06-05 RX ADMIN — SODIUM CHLORIDE, PRESERVATIVE FREE 10 ML: 5 INJECTION INTRAVENOUS at 20:20

## 2023-06-05 RX ADMIN — OXYCODONE HYDROCHLORIDE 5 MG: 5 TABLET ORAL at 08:48

## 2023-06-05 RX ADMIN — CIPROFLOXACIN HYDROCHLORIDE 0.5 INCH: 3 OINTMENT OPHTHALMIC at 20:19

## 2023-06-05 RX ADMIN — HEPARIN SODIUM 5000 UNITS: 5000 INJECTION INTRAVENOUS; SUBCUTANEOUS at 17:05

## 2023-06-05 RX ADMIN — PREDNISOLONE ACETATE 1 DROP: 10 SUSPENSION/ DROPS OPHTHALMIC at 15:09

## 2023-06-05 RX ADMIN — CITALOPRAM 40 MG: 20 TABLET, FILM COATED ORAL at 17:04

## 2023-06-05 RX ADMIN — Medication 81 MG: at 08:47

## 2023-06-05 RX ADMIN — PREDNISOLONE ACETATE 1 DROP: 10 SUSPENSION/ DROPS OPHTHALMIC at 20:19

## 2023-06-05 ASSESSMENT — PAIN DESCRIPTION - ORIENTATION
ORIENTATION: LEFT
ORIENTATION: LEFT

## 2023-06-05 ASSESSMENT — PAIN SCALES - GENERAL
PAINLEVEL_OUTOF10: 1
PAINLEVEL_OUTOF10: 1
PAINLEVEL_OUTOF10: 3
PAINLEVEL_OUTOF10: 8

## 2023-06-05 ASSESSMENT — PAIN DESCRIPTION - LOCATION
LOCATION: HIP
LOCATION: HIP

## 2023-06-05 ASSESSMENT — PAIN DESCRIPTION - DESCRIPTORS
DESCRIPTORS: ACHING
DESCRIPTORS: ACHING

## 2023-06-05 NOTE — PLAN OF CARE
Problem: Pain  Goal: Verbalizes/displays adequate comfort level or baseline comfort level  6/4/2023 2218 by April Longo RN  Outcome: Progressing    Problem: ABCDS Injury Assessment  Goal: Absence of physical injury  Outcome: Progressing     Problem: Safety - Adult  Goal: Free from fall injury  Outcome: Progressing

## 2023-06-06 VITALS
WEIGHT: 216.71 LBS | HEIGHT: 69 IN | SYSTOLIC BLOOD PRESSURE: 149 MMHG | HEART RATE: 76 BPM | RESPIRATION RATE: 15 BRPM | TEMPERATURE: 98 F | BODY MASS INDEX: 32.1 KG/M2 | DIASTOLIC BLOOD PRESSURE: 87 MMHG | OXYGEN SATURATION: 98 %

## 2023-06-06 LAB
ANION GAP SERPL CALCULATED.3IONS-SCNC: 12 MMOL/L (ref 9–17)
BASOPHILS # BLD: <0.03 K/UL (ref 0–0.2)
BASOPHILS NFR BLD: 0 % (ref 0–2)
BUN SERPL-MCNC: 25 MG/DL (ref 6–20)
CALCIUM SERPL-MCNC: 8.8 MG/DL (ref 8.6–10.4)
CHLORIDE SERPL-SCNC: 96 MMOL/L (ref 98–107)
CO2 SERPL-SCNC: 24 MMOL/L (ref 20–31)
CREAT SERPL-MCNC: 3.67 MG/DL (ref 0.7–1.2)
EOSINOPHIL # BLD: 0.14 K/UL (ref 0–0.44)
EOSINOPHILS RELATIVE PERCENT: 3 % (ref 1–4)
ERYTHROCYTE [DISTWIDTH] IN BLOOD BY AUTOMATED COUNT: 18.5 % (ref 11.8–14.4)
EST. AVERAGE GLUCOSE BLD GHB EST-MCNC: 77 MG/DL
GFR SERPL CREATININE-BSD FRML MDRD: 19 ML/MIN/1.73M2
GLUCOSE SERPL-MCNC: 95 MG/DL (ref 70–99)
HBA1C MFR BLD: 4.3 % (ref 4–6)
HCT VFR BLD AUTO: 29.8 % (ref 40.7–50.3)
HGB BLD-MCNC: 8.8 G/DL (ref 13–17)
IMM GRANULOCYTES # BLD AUTO: 0.04 K/UL (ref 0–0.3)
IMM GRANULOCYTES NFR BLD: 1 %
LV EF: 67 %
LVEF MODALITY: NORMAL
LYMPHOCYTES # BLD: 17 % (ref 24–43)
LYMPHOCYTES NFR BLD: 0.97 K/UL (ref 1.1–3.7)
MCH RBC QN AUTO: 30.9 PG (ref 25.2–33.5)
MCHC RBC AUTO-ENTMCNC: 29.5 G/DL (ref 28.4–34.8)
MCV RBC AUTO: 104.6 FL (ref 82.6–102.9)
MONOCYTES NFR BLD: 0.38 K/UL (ref 0.1–1.2)
MONOCYTES NFR BLD: 7 % (ref 3–12)
NEUTROPHILS NFR BLD: 72 % (ref 36–65)
NEUTS SEG NFR BLD: 4.15 K/UL (ref 1.5–8.1)
NRBC AUTOMATED: 0 PER 100 WBC
PLATELET # BLD AUTO: 121 K/UL (ref 138–453)
PMV BLD AUTO: 10.8 FL (ref 8.1–13.5)
POTASSIUM SERPL-SCNC: 4.4 MMOL/L (ref 3.7–5.3)
RBC # BLD AUTO: 2.85 M/UL (ref 4.21–5.77)
RBC # BLD: ABNORMAL 10*6/UL
RBC # BLD: ABNORMAL 10*6/UL
SODIUM SERPL-SCNC: 132 MMOL/L (ref 135–144)
WBC OTHER # BLD: 5.7 K/UL (ref 3.5–11.3)

## 2023-06-06 PROCEDURE — 93325 DOPPLER ECHO COLOR FLOW MAPG: CPT

## 2023-06-06 PROCEDURE — 36415 COLL VENOUS BLD VENIPUNCTURE: CPT

## 2023-06-06 PROCEDURE — 2580000003 HC RX 258: Performed by: STUDENT IN AN ORGANIZED HEALTH CARE EDUCATION/TRAINING PROGRAM

## 2023-06-06 PROCEDURE — 6370000000 HC RX 637 (ALT 250 FOR IP): Performed by: STUDENT IN AN ORGANIZED HEALTH CARE EDUCATION/TRAINING PROGRAM

## 2023-06-06 PROCEDURE — 6370000000 HC RX 637 (ALT 250 FOR IP): Performed by: INTERNAL MEDICINE

## 2023-06-06 PROCEDURE — 80048 BASIC METABOLIC PNL TOTAL CA: CPT

## 2023-06-06 PROCEDURE — 6370000000 HC RX 637 (ALT 250 FOR IP)

## 2023-06-06 PROCEDURE — 93308 TTE F-UP OR LMTD: CPT

## 2023-06-06 PROCEDURE — 97166 OT EVAL MOD COMPLEX 45 MIN: CPT

## 2023-06-06 PROCEDURE — 97535 SELF CARE MNGMENT TRAINING: CPT

## 2023-06-06 PROCEDURE — 6360000002 HC RX W HCPCS: Performed by: STUDENT IN AN ORGANIZED HEALTH CARE EDUCATION/TRAINING PROGRAM

## 2023-06-06 PROCEDURE — 6370000000 HC RX 637 (ALT 250 FOR IP): Performed by: NURSE PRACTITIONER

## 2023-06-06 PROCEDURE — 99239 HOSP IP/OBS DSCHRG MGMT >30: CPT | Performed by: INTERNAL MEDICINE

## 2023-06-06 PROCEDURE — 92523 SPEECH SOUND LANG COMPREHEN: CPT

## 2023-06-06 PROCEDURE — 85027 COMPLETE CBC AUTOMATED: CPT

## 2023-06-06 PROCEDURE — 95816 EEG AWAKE AND DROWSY: CPT

## 2023-06-06 PROCEDURE — 95819 EEG AWAKE AND ASLEEP: CPT | Performed by: PSYCHIATRY & NEUROLOGY

## 2023-06-06 PROCEDURE — 97530 THERAPEUTIC ACTIVITIES: CPT

## 2023-06-06 RX ORDER — VALSARTAN 160 MG/1
160 TABLET ORAL DAILY
Status: DISCONTINUED | OUTPATIENT
Start: 2023-06-06 | End: 2023-06-06 | Stop reason: HOSPADM

## 2023-06-06 RX ORDER — CLONIDINE HYDROCHLORIDE 0.1 MG/1
0.1 TABLET ORAL 2 TIMES DAILY
Status: DISCONTINUED | OUTPATIENT
Start: 2023-06-06 | End: 2023-06-06 | Stop reason: HOSPADM

## 2023-06-06 RX ORDER — NICOTINE 21 MG/24HR
1 PATCH, TRANSDERMAL 24 HOURS TRANSDERMAL DAILY
Qty: 30 PATCH | Refills: 3 | Status: SHIPPED | OUTPATIENT
Start: 2023-06-07

## 2023-06-06 RX ORDER — ATORVASTATIN CALCIUM 40 MG/1
40 TABLET, FILM COATED ORAL NIGHTLY
Qty: 30 TABLET | Refills: 0 | Status: SHIPPED | OUTPATIENT
Start: 2023-06-06

## 2023-06-06 RX ORDER — ASPIRIN 81 MG/1
81 TABLET ORAL DAILY
Qty: 30 TABLET | Refills: 3 | Status: SHIPPED | OUTPATIENT
Start: 2023-06-07

## 2023-06-06 RX ORDER — CLOPIDOGREL BISULFATE 75 MG/1
75 TABLET ORAL DAILY
Qty: 19 TABLET | Refills: 0 | Status: SHIPPED | OUTPATIENT
Start: 2023-06-07 | End: 2023-06-26

## 2023-06-06 RX ADMIN — PREDNISOLONE ACETATE 1 DROP: 10 SUSPENSION/ DROPS OPHTHALMIC at 04:38

## 2023-06-06 RX ADMIN — SODIUM CHLORIDE, PRESERVATIVE FREE 10 ML: 5 INJECTION INTRAVENOUS at 11:44

## 2023-06-06 RX ADMIN — ALLOPURINOL 300 MG: 300 TABLET ORAL at 11:44

## 2023-06-06 RX ADMIN — OXYCODONE HYDROCHLORIDE 5 MG: 5 TABLET ORAL at 02:37

## 2023-06-06 RX ADMIN — VALSARTAN 160 MG: 160 TABLET, FILM COATED ORAL at 13:30

## 2023-06-06 RX ADMIN — PREDNISOLONE ACETATE 1 DROP: 10 SUSPENSION/ DROPS OPHTHALMIC at 11:47

## 2023-06-06 RX ADMIN — CLONIDINE HYDROCHLORIDE 0.1 MG: 0.1 TABLET ORAL at 13:30

## 2023-06-06 RX ADMIN — PREDNISOLONE ACETATE 1 DROP: 10 SUSPENSION/ DROPS OPHTHALMIC at 00:22

## 2023-06-06 RX ADMIN — OXYCODONE HYDROCHLORIDE 5 MG: 5 TABLET ORAL at 08:22

## 2023-06-06 RX ADMIN — HEPARIN SODIUM 5000 UNITS: 5000 INJECTION INTRAVENOUS; SUBCUTANEOUS at 11:45

## 2023-06-06 RX ADMIN — PREDNISOLONE ACETATE 1 DROP: 10 SUSPENSION/ DROPS OPHTHALMIC at 08:23

## 2023-06-06 RX ADMIN — CITALOPRAM 40 MG: 20 TABLET, FILM COATED ORAL at 11:44

## 2023-06-06 RX ADMIN — Medication 81 MG: at 11:44

## 2023-06-06 RX ADMIN — CLOPIDOGREL BISULFATE 75 MG: 75 TABLET, FILM COATED ORAL at 11:44

## 2023-06-06 RX ADMIN — PANTOPRAZOLE 20 MG: 20 TABLET, DELAYED RELEASE ORAL at 11:44

## 2023-06-06 RX ADMIN — OXYCODONE HYDROCHLORIDE 5 MG: 5 TABLET ORAL at 14:36

## 2023-06-06 RX ADMIN — CIPROFLOXACIN HYDROCHLORIDE 0.5 INCH: 3 OINTMENT OPHTHALMIC at 11:45

## 2023-06-06 RX ADMIN — HEPARIN SODIUM 5000 UNITS: 5000 INJECTION INTRAVENOUS; SUBCUTANEOUS at 00:22

## 2023-06-06 ASSESSMENT — PAIN SCALES - GENERAL
PAINLEVEL_OUTOF10: 8
PAINLEVEL_OUTOF10: 8
PAINLEVEL_OUTOF10: 7
PAINLEVEL_OUTOF10: 4

## 2023-06-06 NOTE — PROGRESS NOTES
[facility]  Office: [address], DO, Faizamary Bahena, DO, Ferny Kia, DO, Kale Capps, DO, Torsten Mccabe MD, Daily Brownlee MD, Michelle Rowland MD, Blanco Regalado MD,  Catia Monsalve MD, Dax Driver MD, Regino Hickman, DO, Vanesa Baird MD,  Juan J Apple MD, Shannan Connell MD, Soila Garcia DO, Weston Franco MD, Art Walters MD, Chase Piper DO, Aidan Padilla MD, Tobias Gray MD, Lolita Shay MD, Sid Abrams MD,  Scott Hamilton DO, Magdalena Teresa MD,  Romayne Muscat, CNP,  Thalia Matamoros, CNP, Janes Palacios, CNP, Bernardo Fernandez, CNP,  Tan Austin, [facility], David Aranda, CNP, Jeff Walker, CNP, Will Gregory, CNP, Key Hughes, CNP, Azalia Parham, CNP, Esau Salguero PA-C, Steve Jordan, CNS, Kenn Wagner, CNP, Sophie Baptiste, CNP         M[address]    Progress Note    6/5/2023    9:49 AM    Name:   Marylu Rosa  MRN:     [id number]     Acct:      [de-identified]   Room:   [address] [place] [address] Day:  2  Admit Date:  6/3/2023 10:33 PM    PCP:   [facility] JAVIER [facility], DO  Code Status:  Full Code    Subjective:     C/C: dizziness     Interval History Status: not changed. Patient feels about the same today. Still having some double vision and decree sensation. No nausea, vomiting, diarrhea. No chest pain. No lower extremity weakness or numbness. Brief History: This is a 50-year-old male who presents to the hospital for evaluation of dizziness, visual changes. MRI of his brain did show an acute stroke        Medications:      Allergies:  No Known Allergies    Current Meds:   Scheduled Meds:    ciprofloxacin HCl  0.5 inch Both Eyes TID    clopidogrel  75 mg Oral Daily    aspirin  81 mg Oral Daily    atorvastatin  80 mg Oral Nightly    nicotine  1 patch TransDERmal Daily    sodium chloride flush  5-40 mL IntraVENous 2 times per day    heparin (porcine)  5,000 Units
Attempted Echo at [address]. Patient unavailable due to going to dialysis. Will try again later as time allows.
CLINICAL PHARMACY NOTE: MEDS TO BEDS    Total # of Prescriptions Filled: 3   The following medications were delivered to the patient:  Aspirin 81 mg  Atorvastatin calcium 40 mg tabs  Clopidogrel bisulfate 75 mg tabs    Additional Documentation:   Delivered to patient +2 on 6/6 at 1:55 pm  Delivered by AB  Co-pay of $2.00 paid with cash
CLINICAL PHARMACY NOTE: MEDS TO BEDS    Total # of Prescriptions Filled: 3   The following medications were delivered to the patient:  Aspirin 81 mg tabs  Atorvastatin calcium 40 mg tabs  Clopidogrel bisulfate 75 mg tabs    Additional Documentation:   Delivered to patient + 2 on 6/6 at 1:55 pm  Delivered by AB  Co-pay of $2.00 paid with cash
Chart and imaging reviewed. Patient with new likely small vessel ischemic stroke. No vascular imaging obtained this admission due to chronic kidney disease. Overall this type of stroke is less likely to be due to intracranial atherosclerosis and he does have risk factors for small vessel disease. It is very unlikely that he will be able to get a CTA given is CRI. Will order a TOF MRA of the head only and have cancelled the CTA. Regardless, the results are unlikely to alter current management. Continue DAPT and statin and smoking cessation. Collin Chappell. Miranda Santillan M.D.   Clinical Neurophysiologist  Neuromuscular Medicine
Dialysis Post Treatment Note  Vitals:    06/05/23 1348   BP: (!) 146/82   Pulse: 65   Resp: 17   Temp: 97 °F (36.1 °C)   SpO2:      Pre-Weight = 101.5kg  Post-weight = Weight - Scale: 216 lb 11.4 oz (98.3 kg)  Total Liters Processed = Blood Volume Processed (Liters): 83.24 l/min  Rinseback Volume (mL) = Rinseback Volume (ml): 270 ml  Net Removal (mL) =  2000mL  Patient's dry weight= 2L off as tolerated  Type of access used= CVC  Length of treatment=210    Pt tolerated treatment well. No adverse events pre, intra, or post treatment. Dressing changed and catheter was hep locked.   Pt tratnsported to MRI at their request.
Dialysis Time Out  To be done by RN and tech or 2 RNs  Staff Names Hemalatha XIONG RN and Jose Velarde ODCT    [x]  Identity of the patient using 2 patient identifiers  [x]  Consent for treatment  [x]  Equipment-proper machine and dialyzer  [x]  B-Hep B status  [x]  Orders- to include bath, blood flow, dialyzer, time and fluid removal  [x]  Access-Correct site and in working order  [x]  Time for patient to ask questions.
Echo completed in the Echo Lab.
MRA reviewed and without any intracranial atherosclerosis. As such, previous recommendations on antiplatelet therapy should be continued for 21 days. ASA 81mg and plavix 75mg PO QD for a total of 21 days and then monotherapy with ASA alone at 162-325mg daily thereafter. Will sign off. Yobany Calderón. Mylene Leslie M.D.   Clinical Neurophysiologist  Neuromuscular Medicine
Occupational [address] [facility] [employer]  Occupational Therapy Not Seen Note    DATE: 2023    NAME: Cristian Sigala  MRN: [id number]   : 1970      Patient not seen this date for Occupational Therapy due to:    Hemodialysis:    Next Scheduled Treatment: 23    Electronically signed by JEANNINE Yanez on 2023 at 4:18 PM
Patient PIV taken out. Discharge instructions read through with patient and family. All questions/concerns answered at this time.
Physical Therapy        Physical Therapy Cancel Note      DATE: 2023    NAME: Delmi Aquino  MRN: [id number]   : 1970      Patient not seen this date for Physical Therapy due to:    Hemodialysis: ck in PM or       Electronically signed by Rukhsana Corbett PT on 2023 at 11:48 AM
Routine EEG completed
SLP ALL NOTES  [facility]  Speech Language Pathology    Date: 6/5/2023  Patient Name: Senia Subramanian  YOB: 1970   AGE: 47 y.o. MRN: [id number]        Patient Not Available for Speech Therapy     Due to:  [] Testing  [x] Hemodialysis  [] Cancelled by RN  [] Surgery   [] Intubation/Sedation/Pain Medication  [] Medical instability  [] Other:    Next scheduled treatment: 6/6/23  Completed by:  Jamse Dubin, SLP, [facility]
SLP ALL NOTES  Facility/Department: [address] NEURO  Initial Speech/Language/Cognitive Assessment    NAME: Sohan Fraser  : 1970   MRN: [id number]  ADMISSION DATE: 6/3/2023  ADMITTING DIAGNOSIS: has Closed trimalleolar fracture of right ankle; Alcohol abuse; Closed right trimalleolar fracture, initial encounter; Hyponatremia; Hypertension, essential; Closed fracture dislocation of right ankle; Depression; Tobacco abuse; CKD vs MORGAN; Acute blood loss as cause of postoperative anemia; Dizziness; Migraine without status migrainosus, not intractable; Iron deficiency anemia; ESRD (end stage renal disease) ([facility] Type 2 diabetes mellitus with diabetic chronic kidney disease ([facility] Severe anxiety; Obesity with serious comorbidity; Nicotine dependence, cigarettes, uncomplicated; Gout; End-stage renal disease on hemodialysis ([facility] Anemia of chronic disease; AVN (avascular necrosis of bone) ([facility] Altered mental status, unspecified; S/P total right hip arthroplasty; Diplopia; and Right thalamic stroke [facility]) on their problem list.    Date of Eval: 2023   Evaluating Therapist: Jose E Kelley      Primary Complaint: Sohan Fraser is a 47 y.o. male with history of end-stage renal disease on hemodialysis, type 2 diabetes, hypertension, gout, anemia chronic kidney disease, chronic diastolic heart failure, chronic pain who initially presented with dizziness with suspected nystagmus, diplopia at [facility].   patient recommended for transfer to [facility] for the management of Altered mental status, unspecified. Patient does have multiple somatic complaints. He does state over the past several weeks he has been waking up with acute dizziness, notes symptoms when he first sits up at the edge of the bed. Been going on for the past few weeks. Usually transient, improved after patient states for a minute or 2 before getting out of bed.    However, yesterday morning symptoms persisted with recurrent episodes
patient has an order for CTA HEAD NECK W CONTRAST, but his GFR is 12 today. CT staff is saying that they need a consent from nephrology to proceed with this order for a GFR below 30. Secure message sent to nephrology    Nephrology sent approval via secure message.    CT informed
9.0 8.8        Phosphorus:  No results for input(s): PHOS in the last 72 hours. Magnesium: No results for input(s): MG in the last 72 hours. Albumin:   Recent Labs     06/03/23  0931   LABALBU 3.5       Dialysis bath: Dialysis Bath  K+ (Potassium): 2  Ca+ (Calcium): 2.25  Na+ (Sodium): 138  HCO3 (Bicarb): 35    Radiology:  Reviewed as available. Assessment:  1 ESRD:dialysis bath reviewed with nurse. 2.essential hypertension with some orthostatic issues  3 likely stroke syndrome  4 no significant volume overload  5. ANEMIA OF CHRONIC DISEASE:   6.SECONDARY HYPERPARATHYROIDISM:   7. Immature left upper extremity AV access    Plan:  1. Weight removal and dialysis orders reviewed. 2.  We will consider using the post dialysis weight today as the patient's new dry weight  3. Continue Monday Wednesday and Friday hemodialysis schedule  4. Follow up labs ordered. Please do not hesitate to call with questions.     Electronically signed by Sushma Veliz MD on 6/5/2023 at 11:56 AM
intracranial abnormality. Critical results were called by Dr. Jada Snyder MD to [facility] on 6/3/2023 at 10:11 a.m. Coy Colon XR CHEST PORTABLE    Result Date: 6/3/2023  1. Possible bibasilar airspace opacities potentially due to atelectasis, aspiration, or pneumonia. Summation of overlying tissues could result in a similar appearance. 2. Interstitial prominence suggestive of pulmonary vascular congestion, although chronic changes could appear similar. 3. Trace bilateral pleural effusions. Physical Examination:        General appearance:  alert, cooperative and no distress  Mental Status:  oriented to person, place and time and normal affect  HEENT: No conjunctival redness noted  Lungs: diminished at bases bilaterally, normal effort otherwise good air movement no accessory muscle use. Heart:  regular rate and rhythm, no murmur  Abdomen:  soft, nontender, nondistended, normal bowel sounds, no masses, hepatomegaly, splenomegaly  Extremities:  no edema, redness, tenderness in the calves no nystagmus   Skin:  no gross lesions, rashes, induration    Assessment:        Hospital Problems             Last Modified POA    * (Principal) Right thalamic stroke ([address] 6/4/2023 Yes    Type 2 diabetes mellitus with diabetic chronic kidney disease ([address] 6/3/2023 Yes    Severe anxiety 6/3/2023 Yes    Gout 6/3/2023 Yes    End-stage renal disease on hemodialysis ([address] 6/3/2023 Yes    Anemia of chronic disease 6/3/2023 Yes    Alcohol abuse 6/3/2023 Yes    Hyponatremia 6/3/2023 Yes    Tobacco abuse 6/3/2023 Yes    Dizziness 6/3/2023 Yes    Iron deficiency anemia 6/3/2023 Yes    Altered mental status, unspecified 6/4/2023 Yes    S/P total right hip arthroplasty 6/3/2023 Yes    Diplopia 6/4/2023 Yes     Plan:        Acute lacunar infarct involving the dorsal medial right thalamus   Causing rotary nystagmus, diplopia and left jacob sensory deficit   Neurology signed off recommended DAPT for 21 days followed by ASA 81 mg after.
elbows 4-/5, B wrist 4/5; B  5/5)  Coordination: Within functional limits  Tone: Normal  Sensation: Impaired (pt c/o numbness/tingling L hand constant from neuropathy)  ADL  Feeding: Independent;Setup  Grooming: Independent;Setup  UE Bathing: Minimal assistance (assist for back)  LE Bathing: Minimal assistance; Increased time to complete;Setup;Verbal cueing (due to decreased functional reach and increased pain)  UE Dressing: Minimal assistance (pt able to thread BUE's in and out of hosp gown, assist to tie gown in back)  LE Dressing: Minimal assistance;Setup; Increased time to complete;Verbal cueing (due to decreased functional reach and increased pain)  Toileting: Contact guard assistance (for safety)  Additional Comments: Pt has increased pain in L hip throughout     Bed mobility  Supine to Sit: Minimal assistance  Sit to Supine: Supervision  Scooting: Supervision  Bed Mobility Comments: Bed ~30 degrees upon supine to sit. Bed flattened for sit to supine with supervision without use of bed rails  Transfers  Sit to stand: Minimal assistance  Stand to sit: Contact guard assistance  Transfer Comments: pt ed on proper hand placement for transfers with good return  Vision - Basic Assessment  Patient Visual Report: No visual complaint reported. Vision  Vision: Within Functional Limits  Hearing  Hearing: Within functional limits  Cognition  Overall Cognitive Status: WFL  Orientation  Overall Orientation Status: Within Functional Limits  Orientation Level: Oriented X4;Oriented to place;Oriented to time;Oriented to situation   Education Given To: Patient  Education Provided: Role of Therapy;Plan of Care  Education Provided Comments: Pt ed on OT POC, safety awareness tech, proper hand placement for transfers, with fair return.   Education Method: Demonstration;Verbal  Barriers to Learning: None  Education Outcome: Verbalized understanding;Continued education needed  LUE PROM (degrees)  LUE General PROM: L shoulder flexion

## 2023-06-06 NOTE — DISCHARGE INSTRUCTIONS
-Make an appointment with your primary care physician within 1 week of discharge for evaluation of your symptoms.  -Recommend smoking cessation  -Continue aspirin and Plavix for 21 days followed by aspirin alone.   Continue Lipitor  -Continue follow-up with nephrology for management of dialysis  -Follow-up with neurology outpatient The patient is a 61y Male complaining of urinary catheter complications.

## 2023-06-06 NOTE — DISCHARGE SUMMARY
Medication List        START taking these medications      aspirin 81 MG EC tablet  Take 1 tablet by mouth daily  Start taking on: June 7, 2023     atorvastatin 40 MG tablet  Commonly known as: LIPITOR  Take 1 tablet by mouth nightly     ciprofloxacin HCl 0.3 % ophthalmic ointment  Commonly known as: CILOXAN  Place 0.5 inches into both eyes 3 times daily for 5 days 3 times daily.      clopidogrel 75 MG tablet  Commonly known as: PLAVIX  Take 1 tablet by mouth daily for 19 doses  Start taking on: June 7, 2023     nicotine 21 MG/24HR  Commonly known as: [address] [facility] 1 patch onto the skin daily  Start taking on: June 7, 2023            CONTINUE taking these medications      acetaminophen 500 MG tablet  Commonly known as: TYLENOL     allopurinol 300 MG tablet  Commonly known as: ZYLOPRIM     carvedilol 25 MG tablet  Commonly known as: COREG     citalopram 40 MG tablet  Commonly known as: CELEXA     cloNIDine 0.1 MG tablet  Commonly known as: CATAPRES     ferrous sulfate 325 (65 Fe) MG tablet  Commonly known as: IRON 325     furosemide 40 MG tablet  Commonly known as: LASIX     magnesium oxide 400 (240 Mg) MG tablet  Commonly known as: MAG-OX  Take 1 tablet by mouth daily     magnesium oxide 400 MG tablet  Commonly known as: MAG-OX     metOLazone 2.5 MG tablet  Commonly known as: ZAROXOLYN     midodrine 5 MG tablet  Commonly known as: PROAMATINE     oxyCODONE 5 MG immediate release tablet  Commonly known as: ROXICODONE     pantoprazole 20 MG tablet  Commonly known as: PROTONIX     valsartan 160 MG tablet  Commonly known as: DIOVAN     vitamin B-1 100 MG tablet  Commonly known as: THIAMINE     vitamin D 125 MCG (5000 UT) Caps capsule  Commonly known as: CHOLECALCIFEROL            STOP taking these medications      vitamin D 1.25 MG (39960 UT) Caps capsule  Commonly known as: ERGOCALCIFEROL               Where to Get Your Medications        These medications were sent to [address] - [address]

## 2023-06-06 NOTE — CARE COORDINATION
Met with pt to complete a PHQ assessment. Pt lives in [facility] [place] with his girlfriend. He states that he has a great deal of family support. Pt states that he has been diagnosed with depression and anxiety and has been taking Celexa for a number of years. He states that the medication controls his depression and anxiety and he has not been experiencing any issues lately. Patient Health Questionnaire-9 (PHQ-9)    Over the last 2 weeks, how often have you been bothered by any of the following problems? 1. Little Interest or pleasure in doing things? [x] Not at all  [] Several Days  [] More than half the day  []  Nearly every day    2. Feeling down, depressed or hopeless? [x] Not at all  [] Several Days  [] More than half the day  []  Nearly every day    3. Trouble falling or staying asleep, or sleeping too much? [x] Not at all  [] Several Days  [] More than half the day  []  Nearly every day    4. Feeling tired or having little energy? [x] Not at all  [] Several Days  [] More than half the day  []  Nearly every day    5. Poor apettite or overeating? [x] Not at all  [] Several Days  [] More than half the day  []  Nearly every day    6. Feeling bad about yourself-or that you are a failure or have let yourself or your family down? [x] Not at all  [] Several Days  [] More than half the day  []  Nearly every day    7. Trouble concentrating on things, such as reading the newspaper or watching television? [x] Not at all  [] Several Days  [] More than half the day  []  Nearly every day    8. Moving or speaking so slowly that other people could have noticed? Or the opposite-being so fidgety or restless that you have been moving around a lot more than usual?   [x] Not at all  [] Several Days  [] More than half the day  []  Nearly every day    9. Thoughts that you would be better off dead or of hurting yourself in some way?    [x] Not at all  [] Several Days  [] More than half the day  []  Nearly every

## 2023-06-06 NOTE — PROCEDURES
Date: 6/6/2023  Referring physician: Marissa Ellison MD    Indication  Patient aged 47 y with encephalopathy. EEG done to assess for epileptiform activity. Introduction  This routine 30-minute EEG was recorded using the International 10-20 System on a [employer] workstation at 256 samples/s. Automated spike and seizure detection algorithms were applied. Description  During the maximal alert state, a well-regulated, symmetric, and reactive 8-9 Hz posterior dominant rhythm was seen. No consistent focal slowing or interhemispheric asymmetry was noted. Stage I and stage II sleep were observed. There were few right temporal sharps. Activations  Hyperventilation was not performed. Intermittent photic stimulation was performed and demonstrated no posterior driving response. Impression  Abnormal awake EEG. The presence of right temporal sharps increases patient risk for focal seizures. Buzz Cunningham MD  Epilepsy Board Certified. Neurology Board Certified.     Electronically Signed

## 2023-06-06 NOTE — DISCHARGE INSTR - COC
E66.9    Nicotine dependence, cigarettes, uncomplicated H24.831    Gout M10.9    End-stage renal disease on hemodialysis ([facility]) N18.6, Z99.2    Anemia of chronic disease D63.8    AVN (avascular necrosis of bone) ([facility]) M87.00    Altered mental status, unspecified R41.82    S/P total right hip arthroplasty Z96.641    Diplopia H53.2    Right thalamic stroke ([facility]) I63.81       Isolation/Infection:   Isolation            No Isolation          Patient Infection Status       Infection Onset Added Last Indicated Last Indicated By Review Planned Expiration Resolved Resolved By    None active    Resolved    COVID-19 (Rule Out) 22 COVID-19, Rapid (Ordered)   22 Rule-Out Test Resulted    COVID-19 01/10/22 01/11/22 01/10/22 COVID-19   22 Infection     COVID-19 (Rule Out) 01/10/22 01/10/22 01/10/22 COVID-19 (Ordered)   22 Rule-Out Test Resulted            Nurse Assessment:  Last Vital Signs: BP (!) 165/95 Comment: MD aware; restarting home meds  Pulse 72   Temp 98.1 °F (36.7 °C) (Oral)   Resp 13   Ht 5' 9\" (1.753 m)   Wt 216 lb 11.4 oz (98.3 kg)   SpO2 98%   BMI 32.00 kg/m²     Last documented pain score (0-10 scale): Pain Level: 4  Last Weight:   Wt Readings from Last 1 Encounters:   23 216 lb 11.4 oz (98.3 kg)     Mental Status:  oriented and alert    IV Access:  - None    Nursing Mobility/ADLs:  Walking   Independent  Transfer  Independent  Bathing  Independent  Dressing  Independent  Toileting  Independent  Feeding  Independent  Med Admin  Independent  Med Delivery   none    Wound Care Documentation and Therapy:        Elimination:  Continence: Bowel: Yes  Bladder: Yes  Urinary Catheter: None   Colostomy/Ileostomy/Ileal Conduit: No       Date of Last BM: 2023    Intake/Output Summary (Last 24 hours) at 2023 1302  Last data filed at 2023 0931  Gross per 24 hour   Intake 850 ml   Output 2300 ml   Net -1450 ml     I/O last 3 completed shifts:   In: 850

## 2023-06-06 NOTE — CARE COORDINATION
Call received from [facility] stating that patient was discharged from their services on . Rebecca states that they are able to accept patient back at discharge. 1340- Discharge orders placed in Epic. CM spoke with patient to discuss transitional plan. Patient states that he will be discharging home w/  and [facility]. Patient to resume current dialysis schedule with [facility] dialysis [facility]. Patient  verbalizes having no additional transitional needs at this time.     Discharge [address] Case Management Department  Written by: Brent Chang RN    Patient Name: Millicent Cage  Attending Provider: Rollo Bumpers, DO  Admit Date: 6/3/2023 10:33 PM  MRN: [id number]  Account: [de-identified]                     : 1970  Discharge Date: 23      Disposition: home nikki/ Carlos Enrique Bautista RN

## 2023-06-08 LAB
MICROORGANISM SPEC CULT: NORMAL
SPECIMEN DESCRIPTION: NORMAL

## 2023-06-09 LAB
MICROORGANISM SPEC CULT: NORMAL
SPECIMEN DESCRIPTION: NORMAL

## 2023-07-23 NOTE — ED NOTES
Dr Olvin Cortes did not accept patient, access is going to reach out to hospitalist     Tonya Garrett  05/08/22 8516 Patient/Spouse

## 2023-07-25 ENCOUNTER — APPOINTMENT (OUTPATIENT)
Dept: GENERAL RADIOLOGY | Age: 53
End: 2023-07-25
Payer: MEDICARE

## 2023-07-25 ENCOUNTER — HOSPITAL ENCOUNTER (EMERGENCY)
Age: 53
Discharge: HOME | End: 2023-07-25
Attending: STUDENT IN AN ORGANIZED HEALTH CARE EDUCATION/TRAINING PROGRAM
Payer: MEDICARE

## 2023-07-25 ENCOUNTER — APPOINTMENT (OUTPATIENT)
Dept: CT IMAGING | Age: 53
End: 2023-07-25
Attending: STUDENT IN AN ORGANIZED HEALTH CARE EDUCATION/TRAINING PROGRAM
Payer: MEDICARE

## 2023-07-25 VITALS
TEMPERATURE: 98.4 F | SYSTOLIC BLOOD PRESSURE: 132 MMHG | BODY MASS INDEX: 31.45 KG/M2 | HEART RATE: 66 BPM | DIASTOLIC BLOOD PRESSURE: 68 MMHG | OXYGEN SATURATION: 97 % | RESPIRATION RATE: 16 BRPM | WEIGHT: 213 LBS

## 2023-07-25 DIAGNOSIS — M25.552 LEFT HIP PAIN: Primary | ICD-10-CM

## 2023-07-25 LAB
ANION GAP SERPL CALCULATED.3IONS-SCNC: 15 MMOL/L (ref 9–17)
BASOPHILS # BLD: <0.03 K/UL (ref 0–0.2)
BASOPHILS NFR BLD: 0 % (ref 0–2)
BUN SERPL-MCNC: 63 MG/DL (ref 6–20)
BUN/CREAT SERPL: 16 (ref 9–20)
CALCIUM SERPL-MCNC: 9.3 MG/DL (ref 8.6–10.4)
CHLORIDE SERPL-SCNC: 93 MMOL/L (ref 98–107)
CO2 SERPL-SCNC: 21 MMOL/L (ref 20–31)
CREAT SERPL-MCNC: 4 MG/DL (ref 0.7–1.2)
EOSINOPHIL # BLD: 0.15 K/UL (ref 0–0.44)
EOSINOPHILS RELATIVE PERCENT: 2 % (ref 1–4)
ERYTHROCYTE [DISTWIDTH] IN BLOOD BY AUTOMATED COUNT: 16.1 % (ref 11.8–14.4)
GFR SERPL CREATININE-BSD FRML MDRD: 17 ML/MIN/1.73M2
GLUCOSE SERPL-MCNC: 77 MG/DL (ref 70–99)
HCT VFR BLD AUTO: 38.7 % (ref 40.7–50.3)
HGB BLD-MCNC: 12.3 G/DL (ref 13–17)
IMM GRANULOCYTES # BLD AUTO: <0.03 K/UL (ref 0–0.3)
IMM GRANULOCYTES NFR BLD: 0 %
INR PPP: 1
LYMPHOCYTES NFR BLD: 1.05 K/UL (ref 1.1–3.7)
LYMPHOCYTES RELATIVE PERCENT: 17 % (ref 24–43)
MCH RBC QN AUTO: 31.2 PG (ref 25.2–33.5)
MCHC RBC AUTO-ENTMCNC: 31.8 G/DL (ref 28.4–34.8)
MCV RBC AUTO: 98.2 FL (ref 82.6–102.9)
MONOCYTES NFR BLD: 0.46 K/UL (ref 0.1–1.2)
MONOCYTES NFR BLD: 7 % (ref 3–12)
NEUTROPHILS NFR BLD: 73 % (ref 36–65)
NEUTS SEG NFR BLD: 4.53 K/UL (ref 1.5–8.1)
NRBC BLD-RTO: 0 PER 100 WBC
PLATELET # BLD AUTO: 113 K/UL (ref 138–453)
PMV BLD AUTO: 11.2 FL (ref 8.1–13.5)
POTASSIUM SERPL-SCNC: 4.2 MMOL/L (ref 3.7–5.3)
PROTHROMBIN TIME: 13.4 SEC (ref 11.9–14.8)
RBC # BLD AUTO: 3.94 M/UL (ref 4.21–5.77)
SODIUM SERPL-SCNC: 129 MMOL/L (ref 135–144)
WBC OTHER # BLD: 6.2 K/UL (ref 3.5–11.3)

## 2023-07-25 PROCEDURE — 80048 BASIC METABOLIC PNL TOTAL CA: CPT

## 2023-07-25 PROCEDURE — 73700 CT LOWER EXTREMITY W/O DYE: CPT

## 2023-07-25 PROCEDURE — 85027 COMPLETE CBC AUTOMATED: CPT

## 2023-07-25 PROCEDURE — 85610 PROTHROMBIN TIME: CPT

## 2023-07-25 PROCEDURE — 73502 X-RAY EXAM HIP UNI 2-3 VIEWS: CPT

## 2023-07-25 PROCEDURE — 99284 EMERGENCY DEPT VISIT MOD MDM: CPT

## 2023-07-25 PROCEDURE — 96374 THER/PROPH/DIAG INJ IV PUSH: CPT

## 2023-07-25 PROCEDURE — 96376 TX/PRO/DX INJ SAME DRUG ADON: CPT

## 2023-07-25 PROCEDURE — 36415 COLL VENOUS BLD VENIPUNCTURE: CPT

## 2023-07-25 PROCEDURE — 96375 TX/PRO/DX INJ NEW DRUG ADDON: CPT

## 2023-07-25 PROCEDURE — 6360000002 HC RX W HCPCS: Performed by: STUDENT IN AN ORGANIZED HEALTH CARE EDUCATION/TRAINING PROGRAM

## 2023-07-25 RX ORDER — FENTANYL CITRATE 50 UG/ML
50 INJECTION, SOLUTION INTRAMUSCULAR; INTRAVENOUS ONCE
Status: COMPLETED | OUTPATIENT
Start: 2023-07-25 | End: 2023-07-25

## 2023-07-25 RX ORDER — MORPHINE SULFATE 4 MG/ML
4 INJECTION, SOLUTION INTRAMUSCULAR; INTRAVENOUS ONCE
Status: COMPLETED | OUTPATIENT
Start: 2023-07-25 | End: 2023-07-25

## 2023-07-25 RX ORDER — OXYCODONE HYDROCHLORIDE 5 MG/1
5 TABLET ORAL EVERY 6 HOURS PRN
Qty: 12 TABLET | Refills: 0 | Status: SHIPPED | OUTPATIENT
Start: 2023-07-25 | End: 2023-07-28

## 2023-07-25 RX ORDER — HYDROMORPHONE HYDROCHLORIDE 1 MG/ML
1 INJECTION, SOLUTION INTRAMUSCULAR; INTRAVENOUS; SUBCUTANEOUS ONCE
Status: COMPLETED | OUTPATIENT
Start: 2023-07-25 | End: 2023-07-25

## 2023-07-25 RX ORDER — HYDROMORPHONE HYDROCHLORIDE 1 MG/ML
0.5 INJECTION, SOLUTION INTRAMUSCULAR; INTRAVENOUS; SUBCUTANEOUS ONCE
Status: COMPLETED | OUTPATIENT
Start: 2023-07-25 | End: 2023-07-25

## 2023-07-25 RX ADMIN — FENTANYL CITRATE 50 MCG: 50 INJECTION, SOLUTION INTRAMUSCULAR; INTRAVENOUS at 15:08

## 2023-07-25 RX ADMIN — HYDROMORPHONE HYDROCHLORIDE 1 MG: 1 INJECTION, SOLUTION INTRAMUSCULAR; INTRAVENOUS; SUBCUTANEOUS at 18:12

## 2023-07-25 RX ADMIN — HYDROMORPHONE HYDROCHLORIDE 0.5 MG: 1 INJECTION, SOLUTION INTRAMUSCULAR; INTRAVENOUS; SUBCUTANEOUS at 16:14

## 2023-07-25 RX ADMIN — MORPHINE SULFATE 4 MG: 4 INJECTION, SOLUTION INTRAMUSCULAR; INTRAVENOUS at 14:18

## 2023-07-25 ASSESSMENT — PAIN SCALES - GENERAL
PAINLEVEL_OUTOF10: 4
PAINLEVEL_OUTOF10: 6
PAINLEVEL_OUTOF10: 9
PAINLEVEL_OUTOF10: 10
PAINLEVEL_OUTOF10: 10
PAINLEVEL_OUTOF10: 9

## 2023-07-25 ASSESSMENT — PAIN DESCRIPTION - ORIENTATION
ORIENTATION: LEFT

## 2023-07-25 ASSESSMENT — PAIN - FUNCTIONAL ASSESSMENT: PAIN_FUNCTIONAL_ASSESSMENT: 0-10

## 2023-07-25 ASSESSMENT — PAIN DESCRIPTION - LOCATION
LOCATION: HIP

## 2023-07-25 NOTE — ED PROVIDER NOTES
[facility] ED      EMERGENCY MEDICINE     Pt Name: Bernice Funez  MRN: [id number]  [address] [facility] 1970  Date of evaluation: 7/25/2023  Provider: Bev Dudley MD    CHIEF COMPLAINT       Chief Complaint   Patient presents with    Misha Infante approx. 30 minutes ago at home. Leaned over and fell. Did not hit head. Chronic pain to left hip, but worse today after fall. HISTORY OF PRESENT ILLNESS   Bernice Funez is a 47 y.o. male who presents to the emergency department for left hip pain after sliding off his bed and landing on his left hip. No head or neck injury no loss of consciousness. He states has been having chronic issues and is scheduled potentially for a hip replacement on the left side in the next few months likely in the clinic as he is got his right hip replaced already. PASTMEDICAL HISTORY     Past Medical History:   Diagnosis Date    Chronic kidney disease     Depression     Diabetes mellitus ([address] [facility])     BORDERLINE    Dialysis patient [facility])     Gout     Hypertension        Patient Active Problem List   Diagnosis Code    Closed trimalleolar fracture of right ankle S82.851A    Alcohol abuse F10.10    Closed right trimalleolar fracture, initial encounter S82.851A    Hyponatremia E87.1    Hypertension, essential I10    Closed fracture dislocation of right ankle S82.891A    Depression F32. A    Tobacco abuse Z72.0    CKD vs MORGAN N18.9    Acute blood loss as cause of postoperative anemia D62    Dizziness R42    Migraine without status migrainosus, not intractable G43.909    Iron deficiency anemia D50.9    ESRD (end stage renal disease) (HCC) N18.6    Type 2 diabetes mellitus with diabetic chronic kidney disease (HCC) E11.22    Severe anxiety F41.9    Obesity with serious comorbidity E66.9    Nicotine dependence, cigarettes, uncomplicated C97.038    Gout M10.9    End-stage renal disease on hemodialysis (HCC) N18.6, Z99.2    Anemia of chronic disease D63.8    AVN (avascular

## 2023-07-25 NOTE — ED NOTES
Pt advised that he needs to stay 1 hr after IV narcotics that he received. States he has a  and does not want to wait.       Daren Wang RN  07/25/23 8759

## 2023-08-11 ENCOUNTER — HOSPITAL ENCOUNTER (EMERGENCY)
Age: 53
Discharge: HOME | End: 2023-08-11
Attending: EMERGENCY MEDICINE
Payer: MEDICARE

## 2023-08-11 VITALS
HEART RATE: 58 BPM | TEMPERATURE: 98 F | SYSTOLIC BLOOD PRESSURE: 127 MMHG | OXYGEN SATURATION: 99 % | DIASTOLIC BLOOD PRESSURE: 58 MMHG | RESPIRATION RATE: 16 BRPM

## 2023-08-11 DIAGNOSIS — M25.552 LEFT HIP PAIN: Primary | ICD-10-CM

## 2023-08-11 PROCEDURE — 6370000000 HC RX 637 (ALT 250 FOR IP): Performed by: EMERGENCY MEDICINE

## 2023-08-11 PROCEDURE — 99283 EMERGENCY DEPT VISIT LOW MDM: CPT

## 2023-08-11 RX ORDER — OXYCODONE HYDROCHLORIDE AND ACETAMINOPHEN 5; 325 MG/1; MG/1
1 TABLET ORAL EVERY 6 HOURS PRN
Qty: 12 TABLET | Refills: 0 | Status: SHIPPED | OUTPATIENT
Start: 2023-08-11 | End: 2023-08-14

## 2023-08-11 RX ORDER — OXYCODONE HYDROCHLORIDE AND ACETAMINOPHEN 5; 325 MG/1; MG/1
2 TABLET ORAL ONCE
Status: COMPLETED | OUTPATIENT
Start: 2023-08-11 | End: 2023-08-11

## 2023-08-11 RX ADMIN — OXYCODONE HYDROCHLORIDE AND ACETAMINOPHEN 2 TABLET: 5; 325 TABLET ORAL at 15:25

## 2023-08-11 ASSESSMENT — PAIN DESCRIPTION - ORIENTATION
ORIENTATION: LEFT

## 2023-08-11 ASSESSMENT — PAIN DESCRIPTION - PAIN TYPE
TYPE: CHRONIC PAIN
TYPE: CHRONIC PAIN;ACUTE PAIN

## 2023-08-11 ASSESSMENT — PAIN - FUNCTIONAL ASSESSMENT: PAIN_FUNCTIONAL_ASSESSMENT: 0-10

## 2023-08-11 ASSESSMENT — PAIN SCALES - GENERAL
PAINLEVEL_OUTOF10: 10

## 2023-08-11 ASSESSMENT — PAIN DESCRIPTION - DESCRIPTORS
DESCRIPTORS: ACHING
DESCRIPTORS: ACHING;THROBBING;SHOOTING

## 2023-08-11 ASSESSMENT — PAIN DESCRIPTION - LOCATION
LOCATION: HIP

## 2023-08-11 NOTE — DISCHARGE INSTRUCTIONS
Continue current medications as prescribed. Use cane or walker at all times for standing and walking. Tylenol as needed for pain. Percocet in place of Tylenol for pain not controlled with Tylenol. Follow-up with primary care provider soon as possible.   Please return immediately for any acute concerns

## 2023-08-11 NOTE — ED PROVIDER NOTES
[address] [facility] ED  EMERGENCY DEPARTMENT ENCOUNTER      Pt Name: Binh Aguilera  MRN: [id number]  [address] [facility] 1970  Date of evaluation: 8/11/2023  Provider: Christen Arevalo MD    CHIEF COMPLAINT       Chief Complaint   Patient presents with    Hip Pain     Left hip, chronic, ran out of oxycodone 5mg approx 1 week ago. HISTORY OF PRESENT ILLNESS   (Location/Symptom, Timing/Onset, Context/Setting, Quality, Duration, Modifying Factors, Severity)  Note limiting factors. Binh Aguilera is a 47 y.o. male who presents to the emergency department      59-year-old male chronic hip pain presents emergency department for evaluation of worsening pain. He is currently out of his Percocet and has been out for the past few days. States that this generally does control his pain. He has not fallen or had any other injuries. He does use a walker at home. States his primary care provider moved to a different and has been unable to contact the office. He is following up with orthopedics for his chronic hip pain. Denies any dysuria urinary frequency hematuria back pain lower extremity weakness numbness or tingling or any other acute concerns. Nursing Notes were reviewed. REVIEW OF SYSTEMS    (2-9 systems for level 4, 10 or more for level 5)     Review of Systems   All other systems reviewed and are negative. Except as noted above the remainder of the review of systems was reviewed and negative.        PAST MEDICAL HISTORY     Past Medical History:   Diagnosis Date    Chronic kidney disease     Depression     Diabetes mellitus ([facility])     BORDERLINE    Dialysis patient ([facility])     Gout     Hypertension          SURGICAL HISTORY       Past Surgical History:   Procedure Laterality Date    ANKLE CLOSED REDUCTION Right 08/12/2019    ANKLE CLOSED REDUCTION performed by Eliot Hutchinson MD at [address] [place] [address] Drive Right 08/13/2019    ANKLE OPEN REDUCTION INTERNAL FIXATION-TRIMELLAR FRACTURE performed by Ultrasound and MRI are read by the radiologist. Plain radiographic images are visualized and preliminarily interpreted by the emergency physician with the below findings:        Interpretation per the Radiologist below, if available at the time of this note:    No orders to display         ED BEDSIDE ULTRASOUND:   Performed by ED Physician - none    LABS:  Labs Reviewed - No data to display    All other labs were within normal range or not returned as of this dictation. EMERGENCY DEPARTMENT COURSE and DIFFERENTIAL DIAGNOSIS/MDM:   Vitals:    Vitals:    08/11/23 1517 08/11/23 1532 08/11/23 1547 08/11/23 1602   BP: (!) 110/54 115/65 (!) 113/49 (!) 127/58   Pulse:       Resp:       Temp:       SpO2: 96% 99% 99% 99%           MDM  Number of Diagnoses or Management Options  Left hip pain  Diagnosis management comments: Patient feeling significantly better after receiving Percocet. Percocet prescription for 12 tablets was given. He is instructed follow-up with primary care provider soon as possible. Stable for discharge home. Home care instructions ED return and follow-up discussed with patient. Patient stable for discharge home. Patient is in agreement with [address]   Total Critical Care time was  minutes, excluding separately reportable procedures. There was a high probability of clinically significant/life threatening deterioration in the patient's condition which required my urgent intervention. CONSULTS:  None    PROCEDURES:  Unless otherwise noted below, none     Procedures        FINAL IMPRESSION      1.  Left hip pain          DISPOSITION/PLAN   DISPOSITION Decision To Discharge 08/11/2023 04:02:20 PM      PATIENT REFERRED TO:  Alise Acharya MD  [address]  [phone]      Call to see about scheduling a new patient appointment      DISCHARGE MEDICATIONS:  New Prescriptions    OXYCODONE-ACETAMINOPHEN

## 2023-09-11 ENCOUNTER — TELEPHONE (OUTPATIENT)
Dept: PREADMISSION TESTING | Age: 53
End: 2023-09-11

## 2023-09-11 NOTE — TELEPHONE ENCOUNTER
Anesthesia, please review chart. Pt is on dialysis and will be arriving to surgery post dialysis. Procedure with Dr. Efra Woodson on 9/25/23.

## 2023-09-11 NOTE — PROGRESS NOTES
Patient received NPO instructions and pre-op medication instructions to be taken on the day of the procedure with a small sip of water. Pt was also given pre-op eye drop instructions from Dr. Marie Kaur office. Instructed pt to take celexa, coreg, proamatine, and protonix with a small sip of water prior to arriving to the hospital the day of surgery.

## 2023-09-22 ENCOUNTER — ANESTHESIA EVENT (OUTPATIENT)
Dept: OPERATING ROOM | Age: 53
End: 2023-09-22
Payer: MEDICARE

## 2023-09-24 PROBLEM — H25.041 POSTERIOR SUBCAPSULAR AGE-RELATED CATARACT OF RIGHT EYE: Chronic | Status: ACTIVE | Noted: 2023-09-24

## 2023-09-24 NOTE — H&P
Calista Kenyon is a 47y.o. year old who presents for elective outpatient ophthalmic surgery with Ab Menjivar DO. The patient complains of decreased vision, glare and halos around lights, and trouble with vision for activities of daily living. Review of systems  Positive for decreased vision, glare and halos around lights, and trouble with activities of daily living. All other review of systems were negative. Past Medical History:   Diagnosis Date    Chronic kidney disease     Depression     Diabetes mellitus ([address])     BORDERLINE    Dialysis patient ([address])     Gout     Hypertension        Past Surgical History:   Procedure Laterality Date    ANKLE CLOSED REDUCTION Right 08/12/2019    ANKLE CLOSED REDUCTION performed by Bryan Michael MD at [address] Right 08/13/2019    ANKLE OPEN REDUCTION INTERNAL FIXATION-TRIMELLAR FRACTURE performed by Yohan La DO at [address] Left     EYE SURGERY Left 4/3/2023    EYE CATARACT EMULSIFICATION IOL IMPLANT performed by Ab Menjivar DO at [address] [facility] Left        Home meds:   Prior to Admission medications    Medication Sig Start Date End Date Taking?  Authorizing Provider   aspirin 81 MG EC tablet Take 1 tablet by mouth daily 6/7/23   Dylan Robledo DO   atorvastatin (LIPITOR) 40 MG tablet Take 1 tablet by mouth nightly 6/6/23   Dylan Robledo DO   nicotine (NICODERM CQ) 21 MG/24HR Place 1 patch onto the skin daily 6/7/23   Dylan Robledo DO   clopidogrel (PLAVIX) 75 MG tablet Take 1 tablet by mouth daily for 19 doses 6/7/23 6/26/23  Nabil Heard DO   valsartan (DIOVAN) 160 MG tablet Take 1 tablet by mouth daily    Historical Provider, MD   vitamin B-1 (THIAMINE) 100 MG tablet Take 1 tablet by mouth daily    Historical Provider, MD   vitamin D (CHOLECALCIFEROL) 125 MCG (5000 UT) CAPS capsule Take 1 capsule by mouth daily    Historical

## 2023-09-25 ENCOUNTER — ANESTHESIA (OUTPATIENT)
Dept: OPERATING ROOM | Age: 53
End: 2023-09-25
Payer: MEDICARE

## 2023-09-25 ENCOUNTER — HOSPITAL ENCOUNTER (OUTPATIENT)
Age: 53
Setting detail: OUTPATIENT SURGERY
Discharge: HOME | End: 2023-09-25
Attending: OPHTHALMOLOGY | Admitting: OPHTHALMOLOGY
Payer: MEDICARE

## 2023-09-25 VITALS
BODY MASS INDEX: 31.55 KG/M2 | HEART RATE: 76 BPM | RESPIRATION RATE: 18 BRPM | WEIGHT: 213 LBS | SYSTOLIC BLOOD PRESSURE: 159 MMHG | HEIGHT: 69 IN | DIASTOLIC BLOOD PRESSURE: 77 MMHG | TEMPERATURE: 97.7 F | OXYGEN SATURATION: 99 %

## 2023-09-25 PROBLEM — H25.041 POSTERIOR SUBCAPSULAR AGE-RELATED CATARACT OF RIGHT EYE: Chronic | Status: RESOLVED | Noted: 2023-09-24 | Resolved: 2023-09-25

## 2023-09-25 PROCEDURE — 6370000000 HC RX 637 (ALT 250 FOR IP): Performed by: OPHTHALMOLOGY

## 2023-09-25 PROCEDURE — 2500000003 HC RX 250 WO HCPCS: Performed by: OPHTHALMOLOGY

## 2023-09-25 PROCEDURE — V2632 POST CHMBR INTRAOCULAR LENS: HCPCS | Performed by: OPHTHALMOLOGY

## 2023-09-25 PROCEDURE — 2580000003 HC RX 258: Performed by: OPHTHALMOLOGY

## 2023-09-25 PROCEDURE — 6360000002 HC RX W HCPCS: Performed by: NURSE ANESTHETIST, CERTIFIED REGISTERED

## 2023-09-25 PROCEDURE — 3700000000 HC ANESTHESIA ATTENDED CARE: Performed by: OPHTHALMOLOGY

## 2023-09-25 PROCEDURE — 3600000003 HC SURGERY LEVEL 3 BASE: Performed by: OPHTHALMOLOGY

## 2023-09-25 PROCEDURE — 6360000002 HC RX W HCPCS: Performed by: OPHTHALMOLOGY

## 2023-09-25 PROCEDURE — 2709999900 HC NON-CHARGEABLE SUPPLY: Performed by: OPHTHALMOLOGY

## 2023-09-25 PROCEDURE — 7100000011 HC PHASE II RECOVERY - ADDTL 15 MIN: Performed by: OPHTHALMOLOGY

## 2023-09-25 PROCEDURE — 3600000013 HC SURGERY LEVEL 3 ADDTL 15MIN: Performed by: OPHTHALMOLOGY

## 2023-09-25 PROCEDURE — 3700000001 HC ADD 15 MINUTES (ANESTHESIA): Performed by: OPHTHALMOLOGY

## 2023-09-25 PROCEDURE — 7100000010 HC PHASE II RECOVERY - FIRST 15 MIN: Performed by: OPHTHALMOLOGY

## 2023-09-25 DEVICE — LENS INTRAOCULAR BCNVX 19+ DIOPT 6X12.5 MM ACRYL ENVISTA: Type: IMPLANTABLE DEVICE | Site: EYE | Status: FUNCTIONAL

## 2023-09-25 RX ORDER — TROPICAMIDE 10 MG/ML
1 SOLUTION/ DROPS OPHTHALMIC SEE ADMIN INSTRUCTIONS
Status: DISCONTINUED | OUTPATIENT
Start: 2023-09-25 | End: 2023-09-25 | Stop reason: HOSPADM

## 2023-09-25 RX ORDER — LIDOCAINE HYDROCHLORIDE 10 MG/ML
INJECTION, SOLUTION EPIDURAL; INFILTRATION; INTRACAUDAL; PERINEURAL PRN
Status: DISCONTINUED | OUTPATIENT
Start: 2023-09-25 | End: 2023-09-25 | Stop reason: ALTCHOICE

## 2023-09-25 RX ORDER — SODIUM CHLORIDE 9 MG/ML
INJECTION, SOLUTION INTRAVENOUS CONTINUOUS
Status: DISCONTINUED | OUTPATIENT
Start: 2023-09-25 | End: 2023-09-25 | Stop reason: HOSPADM

## 2023-09-25 RX ORDER — SODIUM CHLORIDE 0.9 % (FLUSH) 0.9 %
5-40 SYRINGE (ML) INJECTION EVERY 12 HOURS SCHEDULED
Status: CANCELLED | OUTPATIENT
Start: 2023-09-25

## 2023-09-25 RX ORDER — FENTANYL CITRATE 50 UG/ML
INJECTION, SOLUTION INTRAMUSCULAR; INTRAVENOUS PRN
Status: DISCONTINUED | OUTPATIENT
Start: 2023-09-25 | End: 2023-09-25 | Stop reason: SDUPTHER

## 2023-09-25 RX ORDER — PHENYLEPHRINE HYDROCHLORIDE 25 MG/ML
1 SOLUTION/ DROPS OPHTHALMIC SEE ADMIN INSTRUCTIONS
Status: DISCONTINUED | OUTPATIENT
Start: 2023-09-25 | End: 2023-09-25 | Stop reason: HOSPADM

## 2023-09-25 RX ORDER — SODIUM CHLORIDE, SODIUM LACTATE, POTASSIUM CHLORIDE, CALCIUM CHLORIDE 600; 310; 30; 20 MG/100ML; MG/100ML; MG/100ML; MG/100ML
INJECTION, SOLUTION INTRAVENOUS CONTINUOUS PRN
Status: DISCONTINUED | OUTPATIENT
Start: 2023-09-25 | End: 2023-09-25

## 2023-09-25 RX ORDER — SODIUM CHLORIDE 0.9 % (FLUSH) 0.9 %
5-40 SYRINGE (ML) INJECTION EVERY 12 HOURS SCHEDULED
Status: DISCONTINUED | OUTPATIENT
Start: 2023-09-25 | End: 2023-09-25 | Stop reason: HOSPADM

## 2023-09-25 RX ORDER — PROPARACAINE HYDROCHLORIDE 5 MG/ML
1 SOLUTION/ DROPS OPHTHALMIC SEE ADMIN INSTRUCTIONS
Status: DISCONTINUED | OUTPATIENT
Start: 2023-09-25 | End: 2023-09-25 | Stop reason: HOSPADM

## 2023-09-25 RX ORDER — TETRACAINE HYDROCHLORIDE 5 MG/ML
1 SOLUTION OPHTHALMIC SEE ADMIN INSTRUCTIONS
Status: DISCONTINUED | OUTPATIENT
Start: 2023-09-25 | End: 2023-09-25 | Stop reason: HOSPADM

## 2023-09-25 RX ORDER — MIDAZOLAM HYDROCHLORIDE 1 MG/ML
INJECTION INTRAMUSCULAR; INTRAVENOUS PRN
Status: DISCONTINUED | OUTPATIENT
Start: 2023-09-25 | End: 2023-09-25 | Stop reason: SDUPTHER

## 2023-09-25 RX ORDER — SODIUM CHLORIDE 9 MG/ML
INJECTION, SOLUTION INTRAVENOUS PRN
Status: CANCELLED | OUTPATIENT
Start: 2023-09-25

## 2023-09-25 RX ORDER — SODIUM CHLORIDE 0.9 % (FLUSH) 0.9 %
5-40 SYRINGE (ML) INJECTION PRN
Status: DISCONTINUED | OUTPATIENT
Start: 2023-09-25 | End: 2023-09-25 | Stop reason: HOSPADM

## 2023-09-25 RX ORDER — TETRACAINE HYDROCHLORIDE 5 MG/ML
SOLUTION OPHTHALMIC PRN
Status: DISCONTINUED | OUTPATIENT
Start: 2023-09-25 | End: 2023-09-25 | Stop reason: ALTCHOICE

## 2023-09-25 RX ORDER — SODIUM CHLORIDE 9 MG/ML
INJECTION, SOLUTION INTRAVENOUS PRN
Status: DISCONTINUED | OUTPATIENT
Start: 2023-09-25 | End: 2023-09-25 | Stop reason: HOSPADM

## 2023-09-25 RX ORDER — SODIUM CHLORIDE 0.9 % (FLUSH) 0.9 %
5-40 SYRINGE (ML) INJECTION PRN
Status: CANCELLED | OUTPATIENT
Start: 2023-09-25

## 2023-09-25 RX ORDER — SODIUM CHLORIDE, SODIUM LACTATE, POTASSIUM CHLORIDE, CALCIUM CHLORIDE 600; 310; 30; 20 MG/100ML; MG/100ML; MG/100ML; MG/100ML
INJECTION, SOLUTION INTRAVENOUS CONTINUOUS
Status: CANCELLED | OUTPATIENT
Start: 2023-09-25

## 2023-09-25 RX ADMIN — PHENYLEPHRINE HYDROCHLORIDE 1 DROP: 2.5 SOLUTION/ DROPS OPHTHALMIC at 14:03

## 2023-09-25 RX ADMIN — PHENYLEPHRINE HYDROCHLORIDE 1 DROP: 2.5 SOLUTION/ DROPS OPHTHALMIC at 13:55

## 2023-09-25 RX ADMIN — PROPARACAINE HYDROCHLORIDE 1 DROP: 5 SOLUTION/ DROPS OPHTHALMIC at 14:17

## 2023-09-25 RX ADMIN — PROPARACAINE HYDROCHLORIDE 1 DROP: 5 SOLUTION/ DROPS OPHTHALMIC at 14:03

## 2023-09-25 RX ADMIN — MIDAZOLAM 1 MG: 1 INJECTION INTRAMUSCULAR; INTRAVENOUS at 14:32

## 2023-09-25 RX ADMIN — SODIUM CHLORIDE: 9 INJECTION, SOLUTION INTRAVENOUS at 14:10

## 2023-09-25 RX ADMIN — TROPICAMIDE 1 DROP: 10 SOLUTION/ DROPS OPHTHALMIC at 14:17

## 2023-09-25 RX ADMIN — FENTANYL CITRATE 50 MCG: 50 INJECTION INTRAMUSCULAR; INTRAVENOUS at 14:32

## 2023-09-25 RX ADMIN — PHENYLEPHRINE HYDROCHLORIDE 1 DROP: 2.5 SOLUTION/ DROPS OPHTHALMIC at 14:17

## 2023-09-25 RX ADMIN — MIDAZOLAM 1 MG: 1 INJECTION INTRAMUSCULAR; INTRAVENOUS at 14:26

## 2023-09-25 RX ADMIN — TROPICAMIDE 1 DROP: 10 SOLUTION/ DROPS OPHTHALMIC at 14:03

## 2023-09-25 RX ADMIN — TETRACAINE HYDROCHLORIDE 1 DROP: 5 SOLUTION OPHTHALMIC at 14:22

## 2023-09-25 RX ADMIN — PROPARACAINE HYDROCHLORIDE 1 DROP: 5 SOLUTION/ DROPS OPHTHALMIC at 13:55

## 2023-09-25 RX ADMIN — TROPICAMIDE 1 DROP: 10 SOLUTION/ DROPS OPHTHALMIC at 13:55

## 2023-09-25 RX ADMIN — FENTANYL CITRATE 50 MCG: 50 INJECTION INTRAMUSCULAR; INTRAVENOUS at 14:26

## 2023-09-25 ASSESSMENT — PAIN SCALES - GENERAL: PAINLEVEL_OUTOF10: 10

## 2023-09-25 ASSESSMENT — PAIN - FUNCTIONAL ASSESSMENT: PAIN_FUNCTIONAL_ASSESSMENT: PREVENTS OR INTERFERES SOME ACTIVE ACTIVITIES AND ADLS

## 2023-09-25 ASSESSMENT — PAIN DESCRIPTION - LOCATION: LOCATION: HIP

## 2023-09-25 ASSESSMENT — PAIN DESCRIPTION - FREQUENCY: FREQUENCY: CONTINUOUS

## 2023-09-25 ASSESSMENT — LIFESTYLE VARIABLES: SMOKING_STATUS: 1

## 2023-09-25 ASSESSMENT — PAIN DESCRIPTION - DESCRIPTORS: DESCRIPTORS: SHARP

## 2023-09-25 ASSESSMENT — PAIN DESCRIPTION - ORIENTATION: ORIENTATION: LEFT

## 2023-09-25 ASSESSMENT — PAIN DESCRIPTION - PAIN TYPE: TYPE: CHRONIC PAIN

## 2023-09-25 ASSESSMENT — PAIN DESCRIPTION - ONSET: ONSET: ON-GOING

## 2023-09-25 NOTE — OP NOTE
OPERATIVE NOTE      Patient:  Nancy Acharya    YOB: 1970    Account #:  [de-identified]    Date:  9/25/2023    Surgeon:  Guille Driscoll. Mayi Zabala DO    Primary Care Physician:No primary care provider on file. Preoperative Diagnosis: Posterior Subcapsular Age-  related Cataract- right eye    Postoperative Diagnosis: Same    Procedure: Phacoemulsification with intraocular lens implantation, right eye    Anesthesia: Topical and intracameral anesthesia with intravenous sedation    Complications: none    Specimens: none    Indications for procedure: The patient is a 47y.o. year old with decreased vision, glare and halos around lights, and trouble with activities of daily living. Examination revealed a visually significant cataract in the right eye. Other eye diseases have been ruled out as the primary cause of decreased visual function. Significant improvement is expected in the patient's visual acuity and/or visual function from the removal of the cataract. Risks, benefits, and alternatives to surgery were discussed with the patient and the patient elected to proceed with phacoemulsification with lens implantation. Details of the procedure: Following informed consent, the patient was identified by name and identification tag in the holding area,taken to the operating room and placed in the supine position. A time out was performed by all present in the room to identify the patient, the eye to be operated on, the correct operative procedure, and the correct intraocular lens. Monitoring leads were placed. The patient was positioned. An eyelid prep of half-strength Betadine was performed. The patient was administered intravenous sedation if desired and topical anesthetic was placed in the operative eye. The eye prepped a second time and draped in the usual sterile fashion using aseptic technique for cataract surgery. A lid speculum was then inserted. [place] was placed onto the corneal surface.   A

## 2023-09-25 NOTE — H&P
I have examined the patient and reviewed the history and physical completed on 9/24/23 and find no relevant changes. I have reviewed with the patient and/or family the risks, benefits, and alternatives to the procedure and they have agreed to proceed.     Ivon Martino,   9/25/2023  2:21 PM

## 2023-09-25 NOTE — ANESTHESIA POSTPROCEDURE EVALUATION
Department of Anesthesiology  Postprocedure Note    Patient: Kiara Nichols  MRN: [id number]  YOB: 1970  Date of evaluation: 9/25/2023      Procedure Summary     Date: 09/25/23 Room / Location: [address] [place] [address]    Anesthesia Start: 7263 Anesthesia Stop: 1449    Procedure: EYE CATARACT EMULSIFICATION IOL IMPLANT (Right: Eye) Diagnosis:       Posterior subcapsular polar age-related cataract of right eye      (Posterior subcapsular polar age-related cataract of right eye [H25.041])    Surgeons: Dariel Quintero DO Responsible Provider: JAISON Washington CRNA    Anesthesia Type: MAC ASA Status: 3          Anesthesia Type: No value filed.     Sade Phase I: Sade Score: 10    Sade Phase II: Sade Score: 10      Anesthesia Post Evaluation    Patient location during evaluation: PACU  Patient participation: complete - patient participated  Level of consciousness: awake and alert  Pain score: 0  Airway patency: patent  Nausea & Vomiting: no nausea and no vomiting  Complications: no  Cardiovascular status: blood pressure returned to baseline  Respiratory status: acceptable and room air  Hydration status: stable  Pain management: adequate and satisfactory to patient

## 2023-09-25 NOTE — PROGRESS NOTES
Patient states ready to be discharged at this time. Discharge instructions given to pt and spouse. Both verbalize understanding,deny any questions and/or concerns. Pt transfer off unit in wheelchair w/ spouse and all belongings. Discharge Criteria    Inpatients must meet Criteria 1 through 7. All other patients are either YES or N/A. If a NO is chosen then Anesthesia or Surgeon must be notified. 1.  Minimum 30 minutes after last dose of sedative medication. Yes      2. Systolic BP between 90 - 041. Diastolic BP between 60 - 90. Yes      3. Pulse between 60 - 120    Yes      4. Respirations between 8 - 25. Yes      5. SpO2 92% - 100%. Yes      6. Able to cough and swallow or return to baseline function. Yes      7. Alert and oriented or return to baseline mental status. Yes      8. Demonstrates controlled, coordinated movements, ambulates with steady gait, or return to baseline activity function. Yes      9. Minimal or no pain or nausea, or at a level tolerable and acceptable to patient. Yes      10. Takes and retains oral fluids as allowed. Yes      11. Procedural / perioperative site stable. Minimal or no bleeding. Yes          12. If GI endoscopy procedure, minimal or no abdominal distention or passing flatus. N/A      13. Written discharge instructions and emergency telephone number provided. Yes      14. Accompanied by a responsible adult.     Yes

## 2023-09-26 ENCOUNTER — APPOINTMENT (OUTPATIENT)
Dept: GENERAL RADIOLOGY | Age: 53
End: 2023-09-26
Payer: MEDICARE

## 2023-09-26 ENCOUNTER — HOSPITAL ENCOUNTER (EMERGENCY)
Age: 53
Discharge: ANOTHER HOSPITAL | End: 2023-09-26
Attending: EMERGENCY MEDICINE
Payer: MEDICARE

## 2023-09-26 ENCOUNTER — APPOINTMENT (OUTPATIENT)
Dept: CT IMAGING | Age: 53
End: 2023-09-26
Payer: MEDICARE

## 2023-09-26 VITALS
BODY MASS INDEX: 30.49 KG/M2 | SYSTOLIC BLOOD PRESSURE: 183 MMHG | HEART RATE: 59 BPM | OXYGEN SATURATION: 99 % | DIASTOLIC BLOOD PRESSURE: 72 MMHG | WEIGHT: 213 LBS | TEMPERATURE: 98.3 F | RESPIRATION RATE: 20 BRPM | HEIGHT: 70 IN

## 2023-09-26 DIAGNOSIS — S72.002A CLOSED FRACTURE OF LEFT HIP, INITIAL ENCOUNTER (HCC): Primary | ICD-10-CM

## 2023-09-26 LAB
ALBUMIN SERPL-MCNC: 4.2 G/DL (ref 3.5–5.2)
ALBUMIN/GLOB SERPL: 1.1 {RATIO} (ref 1–2.5)
ALP SERPL-CCNC: 187 U/L (ref 40–129)
ALT SERPL-CCNC: 31 U/L (ref 5–41)
ANION GAP SERPL CALCULATED.3IONS-SCNC: 12 MMOL/L (ref 9–17)
AST SERPL-CCNC: 61 U/L
BASOPHILS # BLD: 0.03 K/UL (ref 0–0.2)
BASOPHILS NFR BLD: 1 % (ref 0–2)
BILIRUB SERPL-MCNC: 0.5 MG/DL (ref 0.3–1.2)
BUN SERPL-MCNC: 44 MG/DL (ref 6–20)
BUN/CREAT SERPL: 10 (ref 9–20)
CALCIUM SERPL-MCNC: 9.7 MG/DL (ref 8.6–10.4)
CHLORIDE SERPL-SCNC: 96 MMOL/L (ref 98–107)
CO2 SERPL-SCNC: 26 MMOL/L (ref 20–31)
CREAT SERPL-MCNC: 4.2 MG/DL (ref 0.7–1.2)
EOSINOPHIL # BLD: 0.19 K/UL (ref 0–0.44)
EOSINOPHILS RELATIVE PERCENT: 3 % (ref 1–4)
ERYTHROCYTE [DISTWIDTH] IN BLOOD BY AUTOMATED COUNT: 16.7 % (ref 11.8–14.4)
GFR SERPL CREATININE-BSD FRML MDRD: 16 ML/MIN/1.73M2
GLUCOSE SERPL-MCNC: 107 MG/DL (ref 70–99)
HCT VFR BLD AUTO: 35.3 % (ref 40.7–50.3)
HGB BLD-MCNC: 11.6 G/DL (ref 13–17)
IMM GRANULOCYTES # BLD AUTO: 0.04 K/UL (ref 0–0.3)
IMM GRANULOCYTES NFR BLD: 1 %
INR PPP: 1.1
LYMPHOCYTES NFR BLD: 0.9 K/UL (ref 1.1–3.7)
LYMPHOCYTES RELATIVE PERCENT: 15 % (ref 24–43)
MCH RBC QN AUTO: 32.9 PG (ref 25.2–33.5)
MCHC RBC AUTO-ENTMCNC: 32.9 G/DL (ref 28.4–34.8)
MCV RBC AUTO: 100 FL (ref 82.6–102.9)
MONOCYTES NFR BLD: 0.3 K/UL (ref 0.1–1.2)
MONOCYTES NFR BLD: 5 % (ref 3–12)
NEUTROPHILS NFR BLD: 75 % (ref 36–65)
NEUTS SEG NFR BLD: 4.7 K/UL (ref 1.5–8.1)
NRBC BLD-RTO: 0 PER 100 WBC
PLATELET # BLD AUTO: 108 K/UL (ref 138–453)
PMV BLD AUTO: 10.6 FL (ref 8.1–13.5)
POTASSIUM SERPL-SCNC: 5 MMOL/L (ref 3.7–5.3)
PROT SERPL-MCNC: 8.1 G/DL (ref 6.4–8.3)
PROTHROMBIN TIME: 14.3 SEC (ref 11.9–14.8)
RBC # BLD AUTO: 3.53 M/UL (ref 4.21–5.77)
SODIUM SERPL-SCNC: 134 MMOL/L (ref 135–144)
WBC OTHER # BLD: 6.2 K/UL (ref 3.5–11.3)

## 2023-09-26 PROCEDURE — 36415 COLL VENOUS BLD VENIPUNCTURE: CPT

## 2023-09-26 PROCEDURE — 80053 COMPREHEN METABOLIC PANEL: CPT

## 2023-09-26 PROCEDURE — 6360000002 HC RX W HCPCS: Performed by: EMERGENCY MEDICINE

## 2023-09-26 PROCEDURE — 85610 PROTHROMBIN TIME: CPT

## 2023-09-26 PROCEDURE — 96374 THER/PROPH/DIAG INJ IV PUSH: CPT

## 2023-09-26 PROCEDURE — 99285 EMERGENCY DEPT VISIT HI MDM: CPT

## 2023-09-26 PROCEDURE — 73700 CT LOWER EXTREMITY W/O DYE: CPT

## 2023-09-26 PROCEDURE — 93005 ELECTROCARDIOGRAM TRACING: CPT | Performed by: EMERGENCY MEDICINE

## 2023-09-26 PROCEDURE — 96376 TX/PRO/DX INJ SAME DRUG ADON: CPT

## 2023-09-26 PROCEDURE — 96372 THER/PROPH/DIAG INJ SC/IM: CPT

## 2023-09-26 PROCEDURE — 71045 X-RAY EXAM CHEST 1 VIEW: CPT

## 2023-09-26 PROCEDURE — 85025 COMPLETE CBC W/AUTO DIFF WBC: CPT

## 2023-09-26 RX ORDER — HYDROMORPHONE HYDROCHLORIDE 1 MG/ML
1 INJECTION, SOLUTION INTRAMUSCULAR; INTRAVENOUS; SUBCUTANEOUS ONCE
Status: COMPLETED | OUTPATIENT
Start: 2023-09-26 | End: 2023-09-26

## 2023-09-26 RX ORDER — HYDROMORPHONE HYDROCHLORIDE 1 MG/ML
1 INJECTION, SOLUTION INTRAMUSCULAR; INTRAVENOUS; SUBCUTANEOUS
Status: DISCONTINUED | OUTPATIENT
Start: 2023-09-26 | End: 2023-09-26 | Stop reason: HOSPADM

## 2023-09-26 RX ORDER — MORPHINE SULFATE 4 MG/ML
4 INJECTION, SOLUTION INTRAMUSCULAR; INTRAVENOUS ONCE
Status: COMPLETED | OUTPATIENT
Start: 2023-09-26 | End: 2023-09-26

## 2023-09-26 RX ADMIN — HYDROMORPHONE HYDROCHLORIDE 1 MG: 1 INJECTION, SOLUTION INTRAMUSCULAR; INTRAVENOUS; SUBCUTANEOUS at 15:30

## 2023-09-26 RX ADMIN — MORPHINE SULFATE 4 MG: 4 INJECTION, SOLUTION INTRAMUSCULAR; INTRAVENOUS at 10:01

## 2023-09-26 RX ADMIN — HYDROMORPHONE HYDROCHLORIDE 1 MG: 1 INJECTION, SOLUTION INTRAMUSCULAR; INTRAVENOUS; SUBCUTANEOUS at 12:37

## 2023-09-26 ASSESSMENT — PAIN - FUNCTIONAL ASSESSMENT: PAIN_FUNCTIONAL_ASSESSMENT: 0-10

## 2023-09-26 ASSESSMENT — PAIN DESCRIPTION - LOCATION
LOCATION: HIP
LOCATION: HIP
LOCATION: HIP;GROIN
LOCATION: HIP;GROIN
LOCATION: HIP
LOCATION: HIP

## 2023-09-26 ASSESSMENT — PAIN DESCRIPTION - DESCRIPTORS
DESCRIPTORS: SHARP
DESCRIPTORS: ACHING
DESCRIPTORS: SHARP
DESCRIPTORS: SHARP

## 2023-09-26 ASSESSMENT — PAIN SCALES - GENERAL
PAINLEVEL_OUTOF10: 10
PAINLEVEL_OUTOF10: 6

## 2023-09-26 ASSESSMENT — PAIN DESCRIPTION - ORIENTATION
ORIENTATION: LEFT

## 2023-09-26 NOTE — ED NOTES
IV left in patient for transport to [facility]. Dr. Tahmina Gonzalez gave approval to leave IV in.      [facility] DIVISION MARKUS Kuhn  09/26/23 0092

## 2023-09-26 NOTE — ED PROVIDER NOTES
125 MCG (5000 UT) CAPS CAPSULE    Take 1 capsule by mouth daily       ALLERGIES     Patient has no known allergies. FAMILY HISTORY     No family history on file. SOCIAL HISTORY       Social History     Socioeconomic History    Marital status: Single   Tobacco Use    Smoking status: Former     Packs/day: .5     Types: Cigarettes    Smokeless tobacco: Never   Vaping Use    Vaping Use: Never used   Substance and Sexual Activity    Alcohol use: Yes     Alcohol/week: 2.0 standard drinks of alcohol     Types: 2 Standard drinks or equivalent per week     Comment: States that he cut back on alcohol 12/2021, use to drink 12 beers a night. Now drinks 2 hard seltzers a night. Patient states he had D/T 12/2021 when he limit his alcohol intake. Drug use: Never    Sexual activity: Yes       SCREENINGS        [place] Coma Scale  Eye Opening: Spontaneous  Best Verbal Response: Oriented  Best Motor Response: Obeys commands  Anat Coma Scale Score: 15               PHYSICAL EXAM    (up to 7 for level 4, 8 or more for level 5)     ED Triage Vitals [09/26/23 0913]   BP Temp Temp src Pulse Respirations SpO2 Height Weight - Scale   (!) 177/95 -- -- 59 18 99 % 5' 9.5\" (1.765 m) 213 lb (96.6 kg)       Physical Exam  Vitals and nursing note reviewed. Constitutional:       Comments: Uncomfortable but in no acute distress   HENT:      Head: Normocephalic and atraumatic. Cardiovascular:      Rate and Rhythm: Normal rate and regular rhythm. Pulmonary:      Effort: Pulmonary effort is normal. No respiratory distress. Breath sounds: Normal breath sounds. Musculoskeletal:      Comments: Left hip tenderness. Pelvis stable to exam.  Patient is holding his left hip externally rotated at rest.   Skin:     General: Skin is warm and dry. Neurological:      General: No focal deficit present. Mental Status: He is alert and oriented to person, place, and time.          DIAGNOSTIC RESULTS     EKG: All EKG's are interpreted

## 2023-09-28 LAB
EKG ATRIAL RATE: 63 BPM
EKG P AXIS: 6 DEGREES
EKG P-R INTERVAL: 160 MS
EKG Q-T INTERVAL: 440 MS
EKG QRS DURATION: 94 MS
EKG QTC CALCULATION (BAZETT): 450 MS
EKG R AXIS: -10 DEGREES
EKG T AXIS: -6 DEGREES
EKG VENTRICULAR RATE: 63 BPM

## 2023-09-28 PROCEDURE — 93010 ELECTROCARDIOGRAM REPORT: CPT | Performed by: INTERNAL MEDICINE

## 2023-10-07 ENCOUNTER — HOSPITAL ENCOUNTER (OUTPATIENT)
Age: 53
Setting detail: OBSERVATION
Discharge: HOME | End: 2023-10-08
Attending: EMERGENCY MEDICINE | Admitting: INTERNAL MEDICINE
Payer: MEDICARE

## 2023-10-07 ENCOUNTER — APPOINTMENT (OUTPATIENT)
Dept: GENERAL RADIOLOGY | Age: 53
End: 2023-10-07
Payer: MEDICARE

## 2023-10-07 ENCOUNTER — APPOINTMENT (OUTPATIENT)
Dept: CT IMAGING | Age: 53
End: 2023-10-07
Payer: MEDICARE

## 2023-10-07 DIAGNOSIS — E11.9 DIABETES MELLITUS TYPE 2, NONINSULIN DEPENDENT (HCC): ICD-10-CM

## 2023-10-07 DIAGNOSIS — N18.6 END STAGE RENAL DISEASE ON DIALYSIS (HCC): ICD-10-CM

## 2023-10-07 DIAGNOSIS — R52 INTRACTABLE PAIN: ICD-10-CM

## 2023-10-07 DIAGNOSIS — S70.12XA THIGH HEMATOMA, LEFT, INITIAL ENCOUNTER: Primary | ICD-10-CM

## 2023-10-07 DIAGNOSIS — Z99.2 END STAGE RENAL DISEASE ON DIALYSIS (HCC): ICD-10-CM

## 2023-10-07 PROBLEM — S70.02XA HEMATOMA OF HIP, LEFT, INITIAL ENCOUNTER: Status: ACTIVE | Noted: 2023-10-07

## 2023-10-07 LAB
BASOPHILS # BLD: <0.03 K/UL (ref 0–0.2)
BASOPHILS NFR BLD: 0 % (ref 0–2)
EOSINOPHIL # BLD: 0.22 K/UL (ref 0–0.44)
EOSINOPHILS RELATIVE PERCENT: 3 % (ref 1–4)
ERYTHROCYTE [DISTWIDTH] IN BLOOD BY AUTOMATED COUNT: 18.4 % (ref 11.8–14.4)
HCT VFR BLD AUTO: 28.9 % (ref 40.7–50.3)
HGB BLD-MCNC: 9.2 G/DL (ref 13–17)
IMM GRANULOCYTES # BLD AUTO: 0.06 K/UL (ref 0–0.3)
IMM GRANULOCYTES NFR BLD: 1 %
INR PPP: 1
LACTATE BLDV-SCNC: 1.5 MMOL/L (ref 0.5–2.2)
LYMPHOCYTES NFR BLD: 0.92 K/UL (ref 1.1–3.7)
LYMPHOCYTES RELATIVE PERCENT: 14 % (ref 24–43)
MCH RBC QN AUTO: 31.4 PG (ref 25.2–33.5)
MCHC RBC AUTO-ENTMCNC: 31.8 G/DL (ref 28.4–34.8)
MCV RBC AUTO: 98.6 FL (ref 82.6–102.9)
MONOCYTES NFR BLD: 0.39 K/UL (ref 0.1–1.2)
MONOCYTES NFR BLD: 6 % (ref 3–12)
NEUTROPHILS NFR BLD: 76 % (ref 36–65)
NEUTS SEG NFR BLD: 4.79 K/UL (ref 1.5–8.1)
NRBC BLD-RTO: 0 PER 100 WBC
PARTIAL THROMBOPLASTIN TIME: 39.2 SEC (ref 26.8–34.8)
PLATELET # BLD AUTO: 145 K/UL (ref 138–453)
PMV BLD AUTO: 10 FL (ref 8.1–13.5)
PROTHROMBIN TIME: 13.7 SEC (ref 11.9–14.8)
RBC # BLD AUTO: 2.93 M/UL (ref 4.21–5.77)
WBC OTHER # BLD: 6.4 K/UL (ref 3.5–11.3)

## 2023-10-07 PROCEDURE — 83605 ASSAY OF LACTIC ACID: CPT

## 2023-10-07 PROCEDURE — G0378 HOSPITAL OBSERVATION PER HR: HCPCS

## 2023-10-07 PROCEDURE — 85025 COMPLETE CBC W/AUTO DIFF WBC: CPT

## 2023-10-07 PROCEDURE — 96375 TX/PRO/DX INJ NEW DRUG ADDON: CPT

## 2023-10-07 PROCEDURE — 96376 TX/PRO/DX INJ SAME DRUG ADON: CPT

## 2023-10-07 PROCEDURE — 85730 THROMBOPLASTIN TIME PARTIAL: CPT

## 2023-10-07 PROCEDURE — 94761 N-INVAS EAR/PLS OXIMETRY MLT: CPT

## 2023-10-07 PROCEDURE — 99285 EMERGENCY DEPT VISIT HI MDM: CPT

## 2023-10-07 PROCEDURE — 6370000000 HC RX 637 (ALT 250 FOR IP): Performed by: INTERNAL MEDICINE

## 2023-10-07 PROCEDURE — 73700 CT LOWER EXTREMITY W/O DYE: CPT

## 2023-10-07 PROCEDURE — 96360 HYDRATION IV INFUSION INIT: CPT

## 2023-10-07 PROCEDURE — 96374 THER/PROPH/DIAG INJ IV PUSH: CPT

## 2023-10-07 PROCEDURE — 6360000002 HC RX W HCPCS: Performed by: EMERGENCY MEDICINE

## 2023-10-07 PROCEDURE — 2580000003 HC RX 258: Performed by: INTERNAL MEDICINE

## 2023-10-07 PROCEDURE — 73502 X-RAY EXAM HIP UNI 2-3 VIEWS: CPT

## 2023-10-07 PROCEDURE — 36415 COLL VENOUS BLD VENIPUNCTURE: CPT

## 2023-10-07 PROCEDURE — 85610 PROTHROMBIN TIME: CPT

## 2023-10-07 RX ORDER — ACETAMINOPHEN 500 MG
1000 TABLET ORAL EVERY 6 HOURS PRN
Status: DISCONTINUED | OUTPATIENT
Start: 2023-10-07 | End: 2023-10-07

## 2023-10-07 RX ORDER — LANOLIN ALCOHOL/MO/W.PET/CERES
400 CREAM (GRAM) TOPICAL DAILY
Status: DISCONTINUED | OUTPATIENT
Start: 2023-10-08 | End: 2023-10-08 | Stop reason: HOSPADM

## 2023-10-07 RX ORDER — ACETAMINOPHEN 325 MG/1
650 TABLET ORAL EVERY 6 HOURS PRN
Status: DISCONTINUED | OUTPATIENT
Start: 2023-10-07 | End: 2023-10-08 | Stop reason: HOSPADM

## 2023-10-07 RX ORDER — METOLAZONE 2.5 MG/1
2.5 TABLET ORAL
Status: DISCONTINUED | OUTPATIENT
Start: 2023-10-09 | End: 2023-10-08 | Stop reason: HOSPADM

## 2023-10-07 RX ORDER — CLONIDINE HYDROCHLORIDE 0.1 MG/1
0.1 TABLET ORAL NIGHTLY
Status: DISCONTINUED | OUTPATIENT
Start: 2023-10-07 | End: 2023-10-08 | Stop reason: HOSPADM

## 2023-10-07 RX ORDER — HYDROMORPHONE HYDROCHLORIDE 1 MG/ML
1 INJECTION, SOLUTION INTRAMUSCULAR; INTRAVENOUS; SUBCUTANEOUS ONCE
Status: COMPLETED | OUTPATIENT
Start: 2023-10-07 | End: 2023-10-07

## 2023-10-07 RX ORDER — ALLOPURINOL 300 MG/1
300 TABLET ORAL DAILY
Status: DISCONTINUED | OUTPATIENT
Start: 2023-10-08 | End: 2023-10-08 | Stop reason: HOSPADM

## 2023-10-07 RX ORDER — SODIUM CHLORIDE 9 MG/ML
INJECTION, SOLUTION INTRAVENOUS CONTINUOUS
Status: DISCONTINUED | OUTPATIENT
Start: 2023-10-07 | End: 2023-10-08 | Stop reason: HOSPADM

## 2023-10-07 RX ORDER — CITALOPRAM 20 MG/1
40 TABLET ORAL DAILY
Status: DISCONTINUED | OUTPATIENT
Start: 2023-10-08 | End: 2023-10-08 | Stop reason: HOSPADM

## 2023-10-07 RX ORDER — CARVEDILOL 25 MG/1
25 TABLET ORAL 2 TIMES DAILY WITH MEALS
Status: DISCONTINUED | OUTPATIENT
Start: 2023-10-08 | End: 2023-10-08 | Stop reason: HOSPADM

## 2023-10-07 RX ORDER — ATORVASTATIN CALCIUM 40 MG/1
40 TABLET, FILM COATED ORAL NIGHTLY
Status: DISCONTINUED | OUTPATIENT
Start: 2023-10-07 | End: 2023-10-08 | Stop reason: HOSPADM

## 2023-10-07 RX ORDER — ONDANSETRON 2 MG/ML
4 INJECTION INTRAMUSCULAR; INTRAVENOUS ONCE
Status: COMPLETED | OUTPATIENT
Start: 2023-10-07 | End: 2023-10-07

## 2023-10-07 RX ORDER — PANTOPRAZOLE SODIUM 20 MG/1
20 TABLET, DELAYED RELEASE ORAL DAILY
Status: DISCONTINUED | OUTPATIENT
Start: 2023-10-08 | End: 2023-10-08 | Stop reason: HOSPADM

## 2023-10-07 RX ORDER — GAUZE BANDAGE 2" X 2"
100 BANDAGE TOPICAL DAILY
Status: DISCONTINUED | OUTPATIENT
Start: 2023-10-08 | End: 2023-10-08 | Stop reason: HOSPADM

## 2023-10-07 RX ORDER — MIDODRINE HYDROCHLORIDE 5 MG/1
5 TABLET ORAL PRN
Status: DISCONTINUED | OUTPATIENT
Start: 2023-10-07 | End: 2023-10-08

## 2023-10-07 RX ORDER — VALSARTAN 80 MG/1
160 TABLET ORAL DAILY
Status: DISCONTINUED | OUTPATIENT
Start: 2023-10-08 | End: 2023-10-08 | Stop reason: HOSPADM

## 2023-10-07 RX ORDER — ONDANSETRON 2 MG/ML
4 INJECTION INTRAMUSCULAR; INTRAVENOUS EVERY 6 HOURS PRN
Status: DISCONTINUED | OUTPATIENT
Start: 2023-10-07 | End: 2023-10-08 | Stop reason: HOSPADM

## 2023-10-07 RX ORDER — OXYCODONE HYDROCHLORIDE 5 MG/1
5 TABLET ORAL EVERY 6 HOURS PRN
Status: DISCONTINUED | OUTPATIENT
Start: 2023-10-07 | End: 2023-10-08 | Stop reason: HOSPADM

## 2023-10-07 RX ORDER — ONDANSETRON 4 MG/1
4 TABLET, ORALLY DISINTEGRATING ORAL EVERY 8 HOURS PRN
Status: DISCONTINUED | OUTPATIENT
Start: 2023-10-07 | End: 2023-10-08 | Stop reason: HOSPADM

## 2023-10-07 RX ORDER — NICOTINE 21 MG/24HR
1 PATCH, TRANSDERMAL 24 HOURS TRANSDERMAL DAILY
Status: DISCONTINUED | OUTPATIENT
Start: 2023-10-08 | End: 2023-10-08 | Stop reason: HOSPADM

## 2023-10-07 RX ORDER — FERROUS SULFATE 325(65) MG
325 TABLET ORAL
Status: DISCONTINUED | OUTPATIENT
Start: 2023-10-08 | End: 2023-10-08 | Stop reason: HOSPADM

## 2023-10-07 RX ORDER — ACETAMINOPHEN 650 MG/1
650 SUPPOSITORY RECTAL EVERY 6 HOURS PRN
Status: DISCONTINUED | OUTPATIENT
Start: 2023-10-07 | End: 2023-10-08 | Stop reason: HOSPADM

## 2023-10-07 RX ORDER — POLYETHYLENE GLYCOL 3350 17 G/17G
17 POWDER, FOR SOLUTION ORAL DAILY PRN
Status: DISCONTINUED | OUTPATIENT
Start: 2023-10-07 | End: 2023-10-08 | Stop reason: HOSPADM

## 2023-10-07 RX ORDER — SODIUM CHLORIDE 0.9 % (FLUSH) 0.9 %
10 SYRINGE (ML) INJECTION PRN
Status: DISCONTINUED | OUTPATIENT
Start: 2023-10-07 | End: 2023-10-08 | Stop reason: HOSPADM

## 2023-10-07 RX ORDER — SODIUM CHLORIDE 0.9 % (FLUSH) 0.9 %
5-40 SYRINGE (ML) INJECTION EVERY 12 HOURS SCHEDULED
Status: DISCONTINUED | OUTPATIENT
Start: 2023-10-07 | End: 2023-10-08 | Stop reason: HOSPADM

## 2023-10-07 RX ORDER — SODIUM CHLORIDE 9 MG/ML
INJECTION, SOLUTION INTRAVENOUS PRN
Status: DISCONTINUED | OUTPATIENT
Start: 2023-10-07 | End: 2023-10-08 | Stop reason: HOSPADM

## 2023-10-07 RX ADMIN — HYDROMORPHONE HYDROCHLORIDE 1 MG: 1 INJECTION, SOLUTION INTRAMUSCULAR; INTRAVENOUS; SUBCUTANEOUS at 21:40

## 2023-10-07 RX ADMIN — SODIUM CHLORIDE, PRESERVATIVE FREE 10 ML: 5 INJECTION INTRAVENOUS at 23:12

## 2023-10-07 RX ADMIN — OXYCODONE HYDROCHLORIDE 5 MG: 5 TABLET ORAL at 23:12

## 2023-10-07 RX ADMIN — HYDROMORPHONE HYDROCHLORIDE 1 MG: 1 INJECTION, SOLUTION INTRAMUSCULAR; INTRAVENOUS; SUBCUTANEOUS at 20:16

## 2023-10-07 RX ADMIN — CLONIDINE HYDROCHLORIDE 0.1 MG: 0.1 TABLET ORAL at 23:12

## 2023-10-07 RX ADMIN — ONDANSETRON 4 MG: 2 INJECTION INTRAMUSCULAR; INTRAVENOUS at 19:19

## 2023-10-07 RX ADMIN — SODIUM CHLORIDE: 9 INJECTION, SOLUTION INTRAVENOUS at 23:19

## 2023-10-07 RX ADMIN — HYDROMORPHONE HYDROCHLORIDE 1 MG: 1 INJECTION, SOLUTION INTRAMUSCULAR; INTRAVENOUS; SUBCUTANEOUS at 19:20

## 2023-10-07 ASSESSMENT — PAIN SCALES - GENERAL
PAINLEVEL_OUTOF10: 9
PAINLEVEL_OUTOF10: 0
PAINLEVEL_OUTOF10: 9
PAINLEVEL_OUTOF10: 9
PAINLEVEL_OUTOF10: 6
PAINLEVEL_OUTOF10: 7
PAINLEVEL_OUTOF10: 5
PAINLEVEL_OUTOF10: 8

## 2023-10-07 ASSESSMENT — ENCOUNTER SYMPTOMS
ABDOMINAL PAIN: 0
COUGH: 0
SHORTNESS OF BREATH: 0
SORE THROAT: 0
VOMITING: 0
NAUSEA: 0

## 2023-10-07 ASSESSMENT — PAIN - FUNCTIONAL ASSESSMENT
PAIN_FUNCTIONAL_ASSESSMENT: PREVENTS OR INTERFERES SOME ACTIVE ACTIVITIES AND ADLS
PAIN_FUNCTIONAL_ASSESSMENT: PREVENTS OR INTERFERES WITH MANY ACTIVE NOT PASSIVE ACTIVITIES
PAIN_FUNCTIONAL_ASSESSMENT: 0-10

## 2023-10-07 ASSESSMENT — PAIN DESCRIPTION - LOCATION
LOCATION: HIP

## 2023-10-07 ASSESSMENT — PAIN DESCRIPTION - PAIN TYPE: TYPE: ACUTE PAIN

## 2023-10-07 ASSESSMENT — PAIN DESCRIPTION - ORIENTATION
ORIENTATION: LEFT

## 2023-10-07 ASSESSMENT — PAIN DESCRIPTION - ONSET: ONSET: ON-GOING

## 2023-10-07 ASSESSMENT — PAIN DESCRIPTION - FREQUENCY: FREQUENCY: CONTINUOUS

## 2023-10-07 ASSESSMENT — PAIN DESCRIPTION - DESCRIPTORS
DESCRIPTORS: SHARP
DESCRIPTORS: SHARP;PENETRATING

## 2023-10-07 NOTE — ED PROVIDER NOTES
admitted to the hospital for pain control and that he can evaluate the patient in the morning. As the patient has history of dialysis and hypertension and diabetes he does recommend medical admission. The case was discussed with the medicine physician on-call who agrees to accept the patient and therefore he will be admitted to the hospital at this time for further care. REVAL:         CRITICAL CARE TIME   Total Critical Care time was 0 minutes, excluding separately reportable procedures. There was a high probability of clinically significant/life threatening deterioration in the patient's condition which required my urgent intervention. CONSULTS:  IP CONSULT TO CASE MANAGEMENT  IP CONSULT TO ORTHOPEDIC SURGERY    PROCEDURES:  Unless otherwise noted below, none     Procedures    FINAL IMPRESSION      1. Thigh hematoma, left, initial encounter    2. End stage renal disease on dialysis ([address])    3. Diabetes mellitus type 2, noninsulin dependent ([address])    4. Intractable pain          DISPOSITION/PLAN   DISPOSITION Admitted 10/07/2023 09:45:37 PM      PATIENT REFERRED TO:  No follow-up provider specified. DISCHARGE MEDICATIONS:  Current Discharge Medication List             (Please note:  Portions of this note were completed with a voice recognition program.  Efforts were made to edit the dictations but occasionally words and phrases are mis-transcribed.)  Form v2016. J.5-cn    Dori Kennedy DO (electronically signed)  Emergency Medicine Provider        Hazel CrestOsiel DO  10/07/23 6015

## 2023-10-08 VITALS
SYSTOLIC BLOOD PRESSURE: 125 MMHG | DIASTOLIC BLOOD PRESSURE: 77 MMHG | WEIGHT: 221.3 LBS | TEMPERATURE: 97.4 F | BODY MASS INDEX: 31.68 KG/M2 | RESPIRATION RATE: 18 BRPM | OXYGEN SATURATION: 96 % | HEART RATE: 67 BPM | HEIGHT: 70 IN

## 2023-10-08 LAB
ALBUMIN SERPL-MCNC: 3.3 G/DL (ref 3.5–5.2)
ALBUMIN/GLOB SERPL: 1.2 {RATIO} (ref 1–2.5)
ALP SERPL-CCNC: 174 U/L (ref 40–129)
ALT SERPL-CCNC: 12 U/L (ref 5–41)
ANION GAP SERPL CALCULATED.3IONS-SCNC: 13 MMOL/L (ref 9–17)
AST SERPL-CCNC: 25 U/L
BASOPHILS # BLD: 0 K/UL (ref 0–0.2)
BASOPHILS NFR BLD: 0 % (ref 0–2)
BILIRUB SERPL-MCNC: 0.8 MG/DL (ref 0.3–1.2)
BUN SERPL-MCNC: 52 MG/DL (ref 6–20)
BUN/CREAT SERPL: 11 (ref 9–20)
CALCIUM SERPL-MCNC: 8.9 MG/DL (ref 8.6–10.4)
CHLORIDE SERPL-SCNC: 98 MMOL/L (ref 98–107)
CO2 SERPL-SCNC: 22 MMOL/L (ref 20–31)
CREAT SERPL-MCNC: 4.9 MG/DL (ref 0.7–1.2)
EOSINOPHIL # BLD: 0.25 K/UL (ref 0–0.44)
EOSINOPHILS RELATIVE PERCENT: 4 % (ref 1–4)
ERYTHROCYTE [DISTWIDTH] IN BLOOD BY AUTOMATED COUNT: 18.3 % (ref 11.8–14.4)
GFR SERPL CREATININE-BSD FRML MDRD: 13 ML/MIN/1.73M2
GLUCOSE SERPL-MCNC: 108 MG/DL (ref 70–99)
HCT VFR BLD AUTO: 27 % (ref 40.7–50.3)
HGB BLD-MCNC: 8.6 G/DL (ref 13–17)
IMM GRANULOCYTES # BLD AUTO: 0.06 K/UL (ref 0–0.3)
IMM GRANULOCYTES NFR BLD: 1 %
LYMPHOCYTES NFR BLD: 0.99 K/UL (ref 1.1–3.7)
LYMPHOCYTES RELATIVE PERCENT: 16 % (ref 24–43)
MCH RBC QN AUTO: 31.6 PG (ref 25.2–33.5)
MCHC RBC AUTO-ENTMCNC: 31.9 G/DL (ref 28.4–34.8)
MCV RBC AUTO: 99.3 FL (ref 82.6–102.9)
MONOCYTES NFR BLD: 0.37 K/UL (ref 0.1–1.2)
MONOCYTES NFR BLD: 6 % (ref 3–12)
MORPHOLOGY: ABNORMAL
NEUTROPHILS NFR BLD: 73 % (ref 36–65)
NEUTS SEG NFR BLD: 4.53 K/UL (ref 1.5–8.1)
NRBC BLD-RTO: 0 PER 100 WBC
PLATELET # BLD AUTO: 119 K/UL (ref 138–453)
PMV BLD AUTO: 10.6 FL (ref 8.1–13.5)
POTASSIUM SERPL-SCNC: 4.5 MMOL/L (ref 3.7–5.3)
PROT SERPL-MCNC: 6.1 G/DL (ref 6.4–8.3)
RBC # BLD AUTO: 2.72 M/UL (ref 4.21–5.77)
SODIUM SERPL-SCNC: 133 MMOL/L (ref 135–144)
WBC OTHER # BLD: 6.2 K/UL (ref 3.5–11.3)

## 2023-10-08 PROCEDURE — 96361 HYDRATE IV INFUSION ADD-ON: CPT

## 2023-10-08 PROCEDURE — 85025 COMPLETE CBC W/AUTO DIFF WBC: CPT

## 2023-10-08 PROCEDURE — 80053 COMPREHEN METABOLIC PANEL: CPT

## 2023-10-08 PROCEDURE — G0378 HOSPITAL OBSERVATION PER HR: HCPCS

## 2023-10-08 PROCEDURE — 36416 COLLJ CAPILLARY BLOOD SPEC: CPT

## 2023-10-08 PROCEDURE — 6370000000 HC RX 637 (ALT 250 FOR IP): Performed by: INTERNAL MEDICINE

## 2023-10-08 PROCEDURE — 2580000003 HC RX 258: Performed by: INTERNAL MEDICINE

## 2023-10-08 RX ORDER — METOPROLOL TARTRATE 5 MG/5ML
5 INJECTION INTRAVENOUS ONCE
Status: DISCONTINUED | OUTPATIENT
Start: 2023-10-08 | End: 2023-10-08

## 2023-10-08 RX ORDER — SULFAMETHOXAZOLE AND TRIMETHOPRIM 800; 160 MG/1; MG/1
1 TABLET ORAL 2 TIMES DAILY
Qty: 20 TABLET | Refills: 0 | Status: SHIPPED | OUTPATIENT
Start: 2023-10-08 | End: 2023-10-18

## 2023-10-08 RX ADMIN — PANTOPRAZOLE SODIUM 20 MG: 20 TABLET, DELAYED RELEASE ORAL at 10:58

## 2023-10-08 RX ADMIN — FUROSEMIDE 60 MG: 40 TABLET ORAL at 10:58

## 2023-10-08 RX ADMIN — VALSARTAN 160 MG: 80 TABLET, COATED ORAL at 10:57

## 2023-10-08 RX ADMIN — CARVEDILOL 25 MG: 25 TABLET, FILM COATED ORAL at 17:16

## 2023-10-08 RX ADMIN — OXYCODONE HYDROCHLORIDE 5 MG: 5 TABLET ORAL at 17:06

## 2023-10-08 RX ADMIN — FERROUS SULFATE TAB 325 MG (65 MG ELEMENTAL FE) 325 MG: 325 (65 FE) TAB at 10:57

## 2023-10-08 RX ADMIN — Medication 400 MG: at 10:58

## 2023-10-08 RX ADMIN — OXYCODONE HYDROCHLORIDE 5 MG: 5 TABLET ORAL at 05:17

## 2023-10-08 RX ADMIN — CITALOPRAM HYDROBROMIDE 40 MG: 20 TABLET ORAL at 10:58

## 2023-10-08 RX ADMIN — Medication 5000 UNITS: at 10:57

## 2023-10-08 RX ADMIN — SODIUM CHLORIDE: 9 INJECTION, SOLUTION INTRAVENOUS at 12:57

## 2023-10-08 RX ADMIN — ALLOPURINOL 300 MG: 300 TABLET ORAL at 10:58

## 2023-10-08 RX ADMIN — OXYCODONE HYDROCHLORIDE 5 MG: 5 TABLET ORAL at 10:56

## 2023-10-08 RX ADMIN — CARVEDILOL 25 MG: 25 TABLET, FILM COATED ORAL at 10:58

## 2023-10-08 RX ADMIN — Medication 100 MG: at 10:58

## 2023-10-08 ASSESSMENT — PAIN DESCRIPTION - PAIN TYPE: TYPE: ACUTE PAIN;SURGICAL PAIN

## 2023-10-08 ASSESSMENT — PAIN SCALES - GENERAL
PAINLEVEL_OUTOF10: 6
PAINLEVEL_OUTOF10: 7
PAINLEVEL_OUTOF10: 7
PAINLEVEL_OUTOF10: 6

## 2023-10-08 ASSESSMENT — PAIN DESCRIPTION - DESCRIPTORS
DESCRIPTORS: PRESSURE;SHARP
DESCRIPTORS: STABBING
DESCRIPTORS: PRESSURE;SHARP
DESCRIPTORS: STABBING

## 2023-10-08 ASSESSMENT — PAIN DESCRIPTION - ORIENTATION
ORIENTATION: LEFT;OUTER
ORIENTATION: LEFT
ORIENTATION: LEFT;OUTER

## 2023-10-08 ASSESSMENT — PAIN DESCRIPTION - LOCATION
LOCATION: HIP

## 2023-10-08 ASSESSMENT — PAIN - FUNCTIONAL ASSESSMENT
PAIN_FUNCTIONAL_ASSESSMENT: PREVENTS OR INTERFERES SOME ACTIVE ACTIVITIES AND ADLS

## 2023-10-08 ASSESSMENT — PAIN DESCRIPTION - FREQUENCY: FREQUENCY: CONTINUOUS

## 2023-10-08 ASSESSMENT — PAIN DESCRIPTION - ONSET: ONSET: ON-GOING

## 2023-10-08 NOTE — PROGRESS NOTES
[facility]. Received report from Jhon Landon, [address]. Patient moved from stretcher to bed via staff assist. Noted left hip to have ace brace on with dressing underneath. Belongings in tow. Weight obtained. Navigator completed. Med list completed. Belongings placed in locker. Patient oriented to call light and room. Patient educated on medication to be given tonight and physician's orders to be completed. Patient stated understanding. Patient encouraged to ask questions. Patient denies of any questions at this time. Vitals taken and documented. See flow sheet for details. Assessment completed and documented. Patient alert, oriented x4. Calm, pleasant. Speech clear. Patient states he has numbness and tingling in bilateral lower extremities that comes and goes, normal per patient. Patient complains of pain in left hip. Lung sounds clear throughout. No cough noted. Abdomen round, soft, non tender to palpation. Bowel sounds active in all four quadrants. Non-pitting edema noted in left lower extremity. Hematoma noted to left outer hip. Patient denies of chest pain, or shortness of breath. Patient denies needs or concerns at this time. Call light and over bed table in reach. Side rails up times two.

## 2023-10-08 NOTE — PROGRESS NOTES
Writer called into patient's room regarding IV site pain. Patient's arm was swollen. Fluids were stopped, IV was removed, Dr. Kanika Hester was notified regarding stopping fluid due to patient's dialysis and diet status. He is no longer NPO.

## 2023-10-08 NOTE — PROGRESS NOTES
Patient in bed, resting with eyes closed. Respirations noted to be greater than 12, even and unlabored. Call light and over bed table within reach. Side rails up times two.

## 2023-10-08 NOTE — PROGRESS NOTES
Left voicemail for Dr. Santy Gutierrez to return call regarding patient's NPO status and plan of care

## 2023-10-08 NOTE — PLAN OF CARE
Problem: Discharge Planning  Goal: Discharge to home or other facility with appropriate resources  Outcome: Progressing  Flowsheets (Taken 10/7/2023 2333)  Discharge to home or other facility with appropriate resources: Identify barriers to discharge with patient and caregiver     Problem: Pain  Goal: Verbalizes/displays adequate comfort level or baseline comfort level  Outcome: Progressing  Flowsheets (Taken 10/7/2023 2333)  Verbalizes/displays adequate comfort level or baseline comfort level:   Encourage patient to monitor pain and request assistance   Administer analgesics based on type and severity of pain and evaluate response   Consider cultural and social influences on pain and pain management   Assess pain using appropriate pain scale   Implement non-pharmacological measures as appropriate and evaluate response   Notify Licensed Independent Practitioner if interventions unsuccessful or patient reports new pain     Problem: Safety - Adult  Goal: Free from fall injury  Outcome: Progressing  Flowsheets (Taken 10/7/2023 2333)  Free From Fall Injury: Instruct family/caregiver on patient safety     Problem: ABCDS Injury Assessment  Goal: Absence of physical injury  Outcome: Progressing  Flowsheets (Taken 10/7/2023 2333)  Absence of Physical Injury: Implement safety measures based on patient assessment     Problem: Skin/Tissue Integrity  Goal: Absence of new skin breakdown  Description: 1. Monitor for areas of redness and/or skin breakdown  2. Assess vascular access sites hourly  3. Every 4-6 hours minimum:  Change oxygen saturation probe site  4. Every 4-6 hours:  If on nasal continuous positive airway pressure, respiratory therapy assess nares and determine need for appliance change or resting period. Outcome: Progressing  Note: See flow sheet for details.

## 2023-10-08 NOTE — DISCHARGE INSTR - DIET

## 2023-10-08 NOTE — PROGRESS NOTES
Writer spoke with Dr. Mariana Koo regarding NPO status, new orders to regular diet and can take morning medications.

## 2023-10-08 NOTE — PROGRESS NOTES
Writer notified Dr. Eleuterio Maria of pt's IV infiltrating. Per Dr. Eleuterio Maria, no IV access needed at this time due to pt being a hard stick and being dialysis pt.

## 2023-10-08 NOTE — PROGRESS NOTES
Writer to bedside to complete morning assessment. Upon entry to room, pt resting in bed, respirations regular while on room air. Vitals obtained and assessment completed, see flow sheet for details. Pt denies needs from writer at this time. Call light in reach. Care ongoing.

## 2023-10-08 NOTE — PROGRESS NOTES
Writer went over discharge instructions with pt and wife at bedside. Writer educated pt on post-op infection and wound care as well as provided written instructions. Pt educated on Antibiotic treatment and educated to complete the full course. Pt and Wife verbalized understanding. Pt dressed and taken down via wheelchair to car for discharge. Pt belongings in hand.

## 2023-10-08 NOTE — PROGRESS NOTES
Writer at bedside with Dr. Beverly Morales to put on pt's portable wound vac. Pt instructed on wound vac care and how to use machine. Pt verbalized understanding and preformed repeat demonstration on using device. Per Dr. Beverly Morales pt is to be discharged.

## 2023-10-09 NOTE — CONSULTS
[facility], [address] [place] [address]                                  CONSULTATION    PATIENT NAME: Dawna Mcclendon                   :        1970  MED REC NO:   [id number]                              ROOM:       9923  ACCOUNT NO:   [de-identified]                           ADMIT DATE: 10/07/2023  PROVIDER:     Jaime Romeo. 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 He  was brought in to the hospital last evening through the emergency room  because of bleeding at the incisional line. X-rays at that time showed  that he had an avulsion fracture of the tip of the greater trochanter,  which is new since the time of his surgery. Prosthesis itself appeared  to be in good position. PHYSICAL EXAMINATION:  Circulation and sensation in the left lower  extremity are intact. The wound was inspected. The patient still has  some weeping along the incision line of serosanguineous fluid. A  Prevena wound dressing was applied. PLAN:  To discharge the patient home. Bactrim DS one tablet b.i.d. x10  days. Return to the office in nine days for a wound check. The patient  is going to continue with his hemodialysis. GUTIERREZ THAYER    D: 10/08/2023 18:29:57       T: 10/08/2023 21:10:55     [id number]  Job#: [id number]     Doc#: [id number]    CC:

## 2023-10-28 ENCOUNTER — HOSPITAL ENCOUNTER (EMERGENCY)
Age: 53
Discharge: HOME | End: 2023-10-28
Payer: MEDICARE

## 2023-10-28 VITALS
SYSTOLIC BLOOD PRESSURE: 136 MMHG | DIASTOLIC BLOOD PRESSURE: 63 MMHG | HEART RATE: 67 BPM | OXYGEN SATURATION: 100 % | RESPIRATION RATE: 18 BRPM | TEMPERATURE: 98.1 F

## 2023-10-28 DIAGNOSIS — Z96.642 STATUS POST LEFT HIP REPLACEMENT: ICD-10-CM

## 2023-10-28 DIAGNOSIS — Z76.0 ENCOUNTER FOR MEDICATION REFILL: Primary | ICD-10-CM

## 2023-10-28 PROCEDURE — 6370000000 HC RX 637 (ALT 250 FOR IP): Performed by: PHYSICIAN ASSISTANT

## 2023-10-28 PROCEDURE — 99283 EMERGENCY DEPT VISIT LOW MDM: CPT

## 2023-10-28 RX ORDER — OXYCODONE HYDROCHLORIDE 5 MG/1
10 TABLET ORAL ONCE
Status: COMPLETED | OUTPATIENT
Start: 2023-10-28 | End: 2023-10-28

## 2023-10-28 RX ORDER — OXYCODONE HYDROCHLORIDE 5 MG/1
5 TABLET ORAL EVERY 6 HOURS PRN
Qty: 12 TABLET | Refills: 0 | Status: SHIPPED | OUTPATIENT
Start: 2023-10-28 | End: 2023-10-31

## 2023-10-28 RX ADMIN — OXYCODONE HYDROCHLORIDE 10 MG: 5 TABLET ORAL at 15:16

## 2023-10-28 ASSESSMENT — ENCOUNTER SYMPTOMS
SHORTNESS OF BREATH: 0
COUGH: 0

## 2023-10-28 ASSESSMENT — PAIN SCALES - GENERAL: PAINLEVEL_OUTOF10: 10

## 2023-10-28 ASSESSMENT — PAIN - FUNCTIONAL ASSESSMENT: PAIN_FUNCTIONAL_ASSESSMENT: 0-10

## 2023-10-28 NOTE — DISCHARGE INSTRUCTIONS
You need to discuss the healing of your hip and plan to go forward with your orthopedic Dr. Ashlyn Ott. Call the office Monday morning. Discussed the possibility of physical therapy. Further pain medication refills need to come from your established physician.

## 2024-02-18 ENCOUNTER — HOSPITAL ENCOUNTER (EMERGENCY)
Age: 54
Discharge: HOME | End: 2024-02-18
Attending: EMERGENCY MEDICINE
Payer: MEDICARE

## 2024-02-18 VITALS
WEIGHT: 222 LBS | BODY MASS INDEX: 32.31 KG/M2 | OXYGEN SATURATION: 97 % | HEART RATE: 61 BPM | DIASTOLIC BLOOD PRESSURE: 88 MMHG | TEMPERATURE: 98.5 F | SYSTOLIC BLOOD PRESSURE: 160 MMHG | RESPIRATION RATE: 16 BRPM

## 2024-02-18 DIAGNOSIS — M79.602 PAIN OF LEFT UPPER EXTREMITY: Primary | ICD-10-CM

## 2024-02-18 PROCEDURE — 99283 EMERGENCY DEPT VISIT LOW MDM: CPT

## 2024-02-18 PROCEDURE — 6370000000 HC RX 637 (ALT 250 FOR IP): Performed by: EMERGENCY MEDICINE

## 2024-02-18 RX ORDER — DULOXETIN HYDROCHLORIDE 60 MG/1
60 CAPSULE, DELAYED RELEASE ORAL DAILY
COMMUNITY

## 2024-02-18 RX ORDER — HYDROCODONE BITARTRATE AND ACETAMINOPHEN 5; 325 MG/1; MG/1
1 TABLET ORAL ONCE
Status: COMPLETED | OUTPATIENT
Start: 2024-02-18 | End: 2024-02-18

## 2024-02-18 RX ADMIN — HYDROCODONE BITARTRATE AND ACETAMINOPHEN 1 TABLET: 5; 325 TABLET ORAL at 17:48

## 2024-02-18 ASSESSMENT — PAIN DESCRIPTION - ORIENTATION
ORIENTATION: LEFT
ORIENTATION: LEFT

## 2024-02-18 ASSESSMENT — PAIN DESCRIPTION - LOCATION
LOCATION: ARM
LOCATION: ARM

## 2024-02-18 ASSESSMENT — PAIN - FUNCTIONAL ASSESSMENT: PAIN_FUNCTIONAL_ASSESSMENT: 0-10

## 2024-02-18 ASSESSMENT — PAIN DESCRIPTION - DESCRIPTORS
DESCRIPTORS: ACHING
DESCRIPTORS: ACHING

## 2024-02-18 ASSESSMENT — PAIN DESCRIPTION - PAIN TYPE: TYPE: ACUTE PAIN

## 2024-02-18 ASSESSMENT — PAIN SCALES - GENERAL
PAINLEVEL_OUTOF10: 6
PAINLEVEL_OUTOF10: 6

## 2024-02-18 ASSESSMENT — LIFESTYLE VARIABLES
HOW OFTEN DO YOU HAVE A DRINK CONTAINING ALCOHOL: NEVER
HOW MANY STANDARD DRINKS CONTAINING ALCOHOL DO YOU HAVE ON A TYPICAL DAY: PATIENT DOES NOT DRINK

## 2024-02-18 NOTE — DISCHARGE INSTRUCTIONS
Continue current medications as prescribed.  Have outpatient Doppler ultrasound performed tomorrow.  Attend dialysis as scheduled tomorrow.  Please return immediately should develop any worsening symptoms or any other acute concerns

## 2024-02-18 NOTE — ED PROVIDER NOTES
[facility] ED  EMERGENCY DEPARTMENT ENCOUNTER      Pt Name: Flo Mccoy  MRN: [id number]  Birthdate 1970  Date of evaluation: 2/18/2024  Provider: Rosana Lord MD    CHIEF COMPLAINT       Chief Complaint   Patient presents with    Arm Swelling     Left arm pain, states on 2/9 dialysis left his tourniquet on x6 hours on accident and since then he has had pain and swelling, states he received all dialysis appointments this week (M,W,F) with out complications         HISTORY OF PRESENT ILLNESS   (Location/Symptom, Timing/Onset, Context/Setting, Quality, Duration, Modifying Factors, Severity)  Note limiting factors.   Flo Mccoy is a 53 y.o. male who presents to the emergency department      53-year-old gentleman well-known to me from previous visits presented to the emergency department for evaluation of pain and swelling in his left upper extremity.  This has been going on for a little over a week.  1 week ago Friday at dialysis he reports that the tourniquet on his left upper extremity was left in place throughout the full course of his dialysis.  He did not realize till he got home that the tourniquet was still in place.  He did remove it.  Since then he has noticed some swelling of his left upper extremity.  He has continued to have his dialysis sessions without difficulty.  No numbness or tingling of his left upper extremity.  He denies any other acute concerns.  He is not taking anything at home for relief.  He is concerned that he has venous clot as a result of the tourniquet.          Nursing Notes were reviewed.    REVIEW OF SYSTEMS    (2-9 systems for level 4, 10 or more for level 5)     Review of Systems   All other systems reviewed and are negative.      Except as noted above the remainder of the review of systems was reviewed and negative.       PAST MEDICAL HISTORY     Past Medical History:   Diagnosis Date    Chronic kidney disease     Depression     Diabetes mellitus (HCC)

## 2024-02-18 NOTE — ED NOTES
Patient scheduled for venous duplex tomorrow at 0830. Patient verbalized understanding to arrive early for registration. Discussed home care and s/s to return to ED. Patient denies questions/concerns at this time.

## 2024-02-19 ENCOUNTER — HOSPITAL ENCOUNTER (OUTPATIENT)
Dept: VASCULAR LAB | Age: 54
Discharge: HOME | End: 2024-02-21
Attending: EMERGENCY MEDICINE
Payer: MEDICARE

## 2024-02-19 DIAGNOSIS — M79.602 PAIN OF LEFT UPPER EXTREMITY: ICD-10-CM

## 2024-02-19 PROCEDURE — 93971 EXTREMITY STUDY: CPT | Performed by: SURGERY

## 2024-02-19 PROCEDURE — 93971 EXTREMITY STUDY: CPT

## 2024-02-28 ENCOUNTER — APPOINTMENT (OUTPATIENT)
Dept: VASCULAR LAB | Age: 54
End: 2024-02-28
Attending: EMERGENCY MEDICINE
Payer: MEDICARE

## 2024-02-28 ENCOUNTER — HOSPITAL ENCOUNTER (EMERGENCY)
Age: 54
Discharge: HOME | End: 2024-02-28
Attending: EMERGENCY MEDICINE
Payer: MEDICARE

## 2024-02-28 VITALS
RESPIRATION RATE: 16 BRPM | DIASTOLIC BLOOD PRESSURE: 68 MMHG | SYSTOLIC BLOOD PRESSURE: 148 MMHG | OXYGEN SATURATION: 96 % | TEMPERATURE: 97.8 F | HEART RATE: 80 BPM

## 2024-02-28 DIAGNOSIS — M79.601 RIGHT ARM PAIN: Primary | ICD-10-CM

## 2024-02-28 LAB
ANION GAP SERPL CALCULATED.3IONS-SCNC: 12 MMOL/L (ref 9–17)
BASOPHILS # BLD: <0.03 K/UL (ref 0–0.2)
BASOPHILS NFR BLD: 0 % (ref 0–2)
BUN SERPL-MCNC: 25 MG/DL (ref 6–20)
BUN/CREAT SERPL: 6 (ref 9–20)
CALCIUM SERPL-MCNC: 9 MG/DL (ref 8.6–10.4)
CHLORIDE SERPL-SCNC: 98 MMOL/L (ref 98–107)
CO2 SERPL-SCNC: 26 MMOL/L (ref 20–31)
CREAT SERPL-MCNC: 4.3 MG/DL (ref 0.7–1.2)
CRP SERPL HS-MCNC: 13.4 MG/L (ref 0–5)
EOSINOPHIL # BLD: 0.19 K/UL (ref 0–0.44)
EOSINOPHILS RELATIVE PERCENT: 3 % (ref 1–4)
ERYTHROCYTE [DISTWIDTH] IN BLOOD BY AUTOMATED COUNT: 14.4 % (ref 11.8–14.4)
GFR SERPL CREATININE-BSD FRML MDRD: 16 ML/MIN/1.73M2
GLUCOSE SERPL-MCNC: 113 MG/DL (ref 70–99)
HCT VFR BLD AUTO: 32.9 % (ref 40.7–50.3)
HGB BLD-MCNC: 10.6 G/DL (ref 13–17)
IMM GRANULOCYTES # BLD AUTO: <0.03 K/UL (ref 0–0.3)
IMM GRANULOCYTES NFR BLD: 0 %
LYMPHOCYTES NFR BLD: 0.84 K/UL (ref 1.1–3.7)
LYMPHOCYTES RELATIVE PERCENT: 15 % (ref 24–43)
MCH RBC QN AUTO: 33.4 PG (ref 25.2–33.5)
MCHC RBC AUTO-ENTMCNC: 32.2 G/DL (ref 28.4–34.8)
MCV RBC AUTO: 103.8 FL (ref 82.6–102.9)
MONOCYTES NFR BLD: 0.24 K/UL (ref 0.1–1.2)
MONOCYTES NFR BLD: 4 % (ref 3–12)
NEUTROPHILS NFR BLD: 78 % (ref 36–65)
NEUTS SEG NFR BLD: 4.27 K/UL (ref 1.5–8.1)
NRBC BLD-RTO: 0 PER 100 WBC
PLATELET # BLD AUTO: 112 K/UL (ref 138–453)
PMV BLD AUTO: 10.4 FL (ref 8.1–13.5)
POTASSIUM SERPL-SCNC: 4.3 MMOL/L (ref 3.7–5.3)
RBC # BLD AUTO: 3.17 M/UL (ref 4.21–5.77)
SODIUM SERPL-SCNC: 136 MMOL/L (ref 135–144)
WBC OTHER # BLD: 5.6 K/UL (ref 3.5–11.3)

## 2024-02-28 PROCEDURE — 99283 EMERGENCY DEPT VISIT LOW MDM: CPT

## 2024-02-28 PROCEDURE — 80048 BASIC METABOLIC PNL TOTAL CA: CPT

## 2024-02-28 PROCEDURE — 36415 COLL VENOUS BLD VENIPUNCTURE: CPT

## 2024-02-28 PROCEDURE — 85025 COMPLETE CBC W/AUTO DIFF WBC: CPT

## 2024-02-28 PROCEDURE — 86140 C-REACTIVE PROTEIN: CPT

## 2024-02-28 PROCEDURE — 6370000000 HC RX 637 (ALT 250 FOR IP): Performed by: EMERGENCY MEDICINE

## 2024-02-28 PROCEDURE — 93971 EXTREMITY STUDY: CPT

## 2024-02-28 RX ORDER — CEPHALEXIN 500 MG/1
500 CAPSULE ORAL 4 TIMES DAILY
Qty: 28 CAPSULE | Refills: 0 | Status: SHIPPED | OUTPATIENT
Start: 2024-02-28 | End: 2024-03-06

## 2024-02-28 RX ORDER — OXYCODONE HYDROCHLORIDE AND ACETAMINOPHEN 5; 325 MG/1; MG/1
1 TABLET ORAL ONCE
Status: COMPLETED | OUTPATIENT
Start: 2024-02-28 | End: 2024-02-28

## 2024-02-28 RX ADMIN — OXYCODONE HYDROCHLORIDE AND ACETAMINOPHEN 1 TABLET: 5; 325 TABLET ORAL at 13:51

## 2024-02-28 ASSESSMENT — ENCOUNTER SYMPTOMS
RHINORRHEA: 0
DIARRHEA: 0
CHEST TIGHTNESS: 0
NAUSEA: 0
COUGH: 0
TROUBLE SWALLOWING: 0
FACIAL SWELLING: 0
WHEEZING: 0
BLOOD IN STOOL: 0
EYE REDNESS: 0
CONSTIPATION: 0
SHORTNESS OF BREATH: 0
EYE DISCHARGE: 0
ABDOMINAL PAIN: 0
BACK PAIN: 0
SINUS PRESSURE: 0
COLOR CHANGE: 0
SORE THROAT: 0
EYE PAIN: 0
VOMITING: 0

## 2024-02-28 ASSESSMENT — PAIN SCALES - GENERAL
PAINLEVEL_OUTOF10: 7
PAINLEVEL_OUTOF10: 7

## 2024-02-28 ASSESSMENT — PAIN - FUNCTIONAL ASSESSMENT: PAIN_FUNCTIONAL_ASSESSMENT: 0-10

## 2024-02-28 ASSESSMENT — PAIN DESCRIPTION - ORIENTATION: ORIENTATION: RIGHT

## 2024-02-28 ASSESSMENT — PAIN DESCRIPTION - LOCATION: LOCATION: ARM

## 2024-02-28 NOTE — ED PROVIDER NOTES
friction rub. No gallop.   Pulmonary:      Effort: Pulmonary effort is normal. No respiratory distress.      Breath sounds: Normal breath sounds. No wheezing or rales.   Chest:      Chest wall: No tenderness.   Abdominal:      General: Bowel sounds are normal. There is no distension.      Palpations: Abdomen is soft. There is no mass.      Tenderness: There is no abdominal tenderness. There is no guarding or rebound.   Musculoskeletal:         General: Swelling (Right forearm and hand) and tenderness (Right forearm) present. Normal range of motion.   Skin:     General: Skin is warm and dry.      Coloration: Skin is not pale.      Findings: No erythema or rash.   Neurological:      Mental Status: He is alert and oriented to person, place, and time.      Cranial Nerves: No cranial nerve deficit.      Sensory: No sensory deficit.      Motor: No abnormal muscle tone.      Coordination: Coordination normal.      Deep Tendon Reflexes: Reflexes normal.   Psychiatric:         Behavior: Behavior normal.         Thought Content: Thought content normal.         Judgment: Judgment normal.           MEDICAL DECISION MAKIN)  Number and Complexity of Problems  Problem List This Visit:  Arm pain, swelling    Differential Diagnosis:  Edema, DVT, cellulitis    Diagnoses Considered but Do Not Suspect:  None    Pertinent Comorbid Conditions:  End-stage renal disease on dialysis    2)  Data Reviewed  Decision Rules/Scores/MIPS utilized:  None    EKG Interpretation:  None    External Documents Reviewed:  None    Imaging that is independently reviewed and interpreted by me as:  None    See more data below for the lab and radiology tests and orders.    3)  Treatment and Disposition      \"ED Course\" Notes From Epic Narrator:  ED Course as of 24 1435      143 Patient was updated on all the results.  I think patient is okay to wait until Friday to get his next dialysis treatment.  Otherwise since he had this on

## 2024-03-05 ENCOUNTER — HOSPITAL ENCOUNTER (EMERGENCY)
Age: 54
Discharge: HOME | End: 2024-03-05
Payer: MEDICARE

## 2024-03-05 ENCOUNTER — APPOINTMENT (OUTPATIENT)
Dept: GENERAL RADIOLOGY | Age: 54
End: 2024-03-05
Payer: MEDICARE

## 2024-03-05 VITALS
OXYGEN SATURATION: 100 % | TEMPERATURE: 97.7 F | SYSTOLIC BLOOD PRESSURE: 145 MMHG | HEART RATE: 65 BPM | BODY MASS INDEX: 32.88 KG/M2 | WEIGHT: 222 LBS | RESPIRATION RATE: 20 BRPM | DIASTOLIC BLOOD PRESSURE: 83 MMHG | HEIGHT: 69 IN

## 2024-03-05 DIAGNOSIS — M25.511 CHRONIC PAIN OF BOTH SHOULDERS: Primary | ICD-10-CM

## 2024-03-05 DIAGNOSIS — M25.512 CHRONIC PAIN OF BOTH SHOULDERS: Primary | ICD-10-CM

## 2024-03-05 DIAGNOSIS — G89.29 CHRONIC PAIN OF BOTH SHOULDERS: Primary | ICD-10-CM

## 2024-03-05 PROCEDURE — 73030 X-RAY EXAM OF SHOULDER: CPT

## 2024-03-05 PROCEDURE — 6370000000 HC RX 637 (ALT 250 FOR IP): Performed by: PHYSICIAN ASSISTANT

## 2024-03-05 PROCEDURE — 99283 EMERGENCY DEPT VISIT LOW MDM: CPT

## 2024-03-05 RX ORDER — ACETAMINOPHEN 325 MG/1
650 TABLET ORAL ONCE
Status: COMPLETED | OUTPATIENT
Start: 2024-03-05 | End: 2024-03-05

## 2024-03-05 RX ORDER — LIDOCAINE 4 G/G
2 PATCH TOPICAL DAILY
Status: DISCONTINUED | OUTPATIENT
Start: 2024-03-05 | End: 2024-03-05 | Stop reason: HOSPADM

## 2024-03-05 RX ADMIN — ACETAMINOPHEN 650 MG: 325 TABLET ORAL at 18:29

## 2024-03-05 ASSESSMENT — ENCOUNTER SYMPTOMS
SHORTNESS OF BREATH: 0
COUGH: 0

## 2024-03-05 ASSESSMENT — PAIN - FUNCTIONAL ASSESSMENT: PAIN_FUNCTIONAL_ASSESSMENT: 0-10

## 2024-03-05 ASSESSMENT — PAIN DESCRIPTION - PAIN TYPE: TYPE: ACUTE PAIN

## 2024-03-05 ASSESSMENT — PAIN DESCRIPTION - LOCATION: LOCATION: SHOULDER

## 2024-03-05 ASSESSMENT — PAIN DESCRIPTION - ORIENTATION: ORIENTATION: RIGHT

## 2024-03-05 ASSESSMENT — PAIN SCALES - GENERAL: PAINLEVEL_OUTOF10: 8

## 2024-03-05 NOTE — DISCHARGE INSTRUCTIONS
Both your shoulders have arthritis present.  Continue to work with Dr. Schmitt regarding your shoulder pain.  Have the MRI performed on your shoulder as scheduled.  Further narcotic pain medicine needs to come from your pain management doctor.

## 2024-03-07 ENCOUNTER — HOSPITAL ENCOUNTER (EMERGENCY)
Age: 54
Discharge: HOME | End: 2024-03-08
Attending: STUDENT IN AN ORGANIZED HEALTH CARE EDUCATION/TRAINING PROGRAM
Payer: MEDICARE

## 2024-03-07 ENCOUNTER — APPOINTMENT (OUTPATIENT)
Dept: GENERAL RADIOLOGY | Age: 54
End: 2024-03-07
Payer: MEDICARE

## 2024-03-07 DIAGNOSIS — E87.79 OTHER HYPERVOLEMIA: Primary | ICD-10-CM

## 2024-03-07 DIAGNOSIS — R79.89 ELEVATED SERUM CREATININE: ICD-10-CM

## 2024-03-07 LAB
BASOPHILS # BLD: <0.03 K/UL (ref 0–0.2)
BASOPHILS NFR BLD: 0 % (ref 0–2)
EOSINOPHIL # BLD: 0.28 K/UL (ref 0–0.44)
EOSINOPHILS RELATIVE PERCENT: 4 % (ref 1–4)
ERYTHROCYTE [DISTWIDTH] IN BLOOD BY AUTOMATED COUNT: 14.6 % (ref 11.8–14.4)
HCT VFR BLD AUTO: 31.1 % (ref 40.7–50.3)
HGB BLD-MCNC: 10.1 G/DL (ref 13–17)
IMM GRANULOCYTES # BLD AUTO: 0.03 K/UL (ref 0–0.3)
IMM GRANULOCYTES NFR BLD: 0 %
LYMPHOCYTES NFR BLD: 0.87 K/UL (ref 1.1–3.7)
LYMPHOCYTES RELATIVE PERCENT: 12 % (ref 24–43)
MCH RBC QN AUTO: 33.9 PG (ref 25.2–33.5)
MCHC RBC AUTO-ENTMCNC: 32.5 G/DL (ref 28.4–34.8)
MCV RBC AUTO: 104.4 FL (ref 82.6–102.9)
MONOCYTES NFR BLD: 0.35 K/UL (ref 0.1–1.2)
MONOCYTES NFR BLD: 5 % (ref 3–12)
NEUTROPHILS NFR BLD: 79 % (ref 36–65)
NEUTS SEG NFR BLD: 5.96 K/UL (ref 1.5–8.1)
NRBC BLD-RTO: 0 PER 100 WBC
PLATELET # BLD AUTO: 105 K/UL (ref 138–453)
PMV BLD AUTO: 10.8 FL (ref 8.1–13.5)
RBC # BLD AUTO: 2.98 M/UL (ref 4.21–5.77)
WBC OTHER # BLD: 7.5 K/UL (ref 3.5–11.3)

## 2024-03-07 PROCEDURE — 6370000000 HC RX 637 (ALT 250 FOR IP): Performed by: STUDENT IN AN ORGANIZED HEALTH CARE EDUCATION/TRAINING PROGRAM

## 2024-03-07 PROCEDURE — 99285 EMERGENCY DEPT VISIT HI MDM: CPT

## 2024-03-07 PROCEDURE — 83735 ASSAY OF MAGNESIUM: CPT

## 2024-03-07 PROCEDURE — 71046 X-RAY EXAM CHEST 2 VIEWS: CPT

## 2024-03-07 PROCEDURE — 83880 ASSAY OF NATRIURETIC PEPTIDE: CPT

## 2024-03-07 PROCEDURE — 36415 COLL VENOUS BLD VENIPUNCTURE: CPT

## 2024-03-07 PROCEDURE — 80048 BASIC METABOLIC PNL TOTAL CA: CPT

## 2024-03-07 PROCEDURE — 94664 DEMO&/EVAL PT USE INHALER: CPT

## 2024-03-07 PROCEDURE — 85025 COMPLETE CBC W/AUTO DIFF WBC: CPT

## 2024-03-07 RX ORDER — IPRATROPIUM BROMIDE AND ALBUTEROL SULFATE 2.5; .5 MG/3ML; MG/3ML
1 SOLUTION RESPIRATORY (INHALATION)
Status: DISCONTINUED | OUTPATIENT
Start: 2024-03-07 | End: 2024-03-08 | Stop reason: HOSPADM

## 2024-03-07 RX ORDER — ALBUTEROL SULFATE 2.5 MG/3ML
2.5 SOLUTION RESPIRATORY (INHALATION)
Status: DISCONTINUED | OUTPATIENT
Start: 2024-03-07 | End: 2024-03-08 | Stop reason: HOSPADM

## 2024-03-07 RX ORDER — ALBUTEROL SULFATE 2.5 MG/3ML
15 SOLUTION RESPIRATORY (INHALATION)
Status: DISCONTINUED | OUTPATIENT
Start: 2024-03-07 | End: 2024-03-08 | Stop reason: HOSPADM

## 2024-03-07 RX ADMIN — IPRATROPIUM BROMIDE AND ALBUTEROL SULFATE 1 DOSE: 2.5; .5 SOLUTION RESPIRATORY (INHALATION) at 23:56

## 2024-03-08 VITALS
OXYGEN SATURATION: 98 % | RESPIRATION RATE: 16 BRPM | SYSTOLIC BLOOD PRESSURE: 188 MMHG | HEART RATE: 70 BPM | TEMPERATURE: 98.3 F | DIASTOLIC BLOOD PRESSURE: 89 MMHG

## 2024-03-08 LAB
ANION GAP SERPL CALCULATED.3IONS-SCNC: 13 MMOL/L (ref 9–17)
BNP SERPL-MCNC: ABNORMAL PG/ML
BUN SERPL-MCNC: 60 MG/DL (ref 6–20)
BUN/CREAT SERPL: 9 (ref 9–20)
CALCIUM SERPL-MCNC: 8.8 MG/DL (ref 8.6–10.4)
CHLORIDE SERPL-SCNC: 94 MMOL/L (ref 98–107)
CO2 SERPL-SCNC: 23 MMOL/L (ref 20–31)
CREAT SERPL-MCNC: 6.6 MG/DL (ref 0.7–1.2)
EKG ATRIAL RATE: 69 BPM
EKG P AXIS: 70 DEGREES
EKG P-R INTERVAL: 158 MS
EKG Q-T INTERVAL: 396 MS
EKG QRS DURATION: 86 MS
EKG QTC CALCULATION (BAZETT): 424 MS
EKG R AXIS: -21 DEGREES
EKG T AXIS: -5 DEGREES
EKG VENTRICULAR RATE: 69 BPM
GFR SERPL CREATININE-BSD FRML MDRD: 9 ML/MIN/1.73M2
GLUCOSE SERPL-MCNC: 118 MG/DL (ref 70–99)
MAGNESIUM SERPL-MCNC: 2.1 MG/DL (ref 1.6–2.6)
POTASSIUM SERPL-SCNC: 5.4 MMOL/L (ref 3.7–5.3)
SODIUM SERPL-SCNC: 130 MMOL/L (ref 135–144)

## 2024-03-08 PROCEDURE — 93005 ELECTROCARDIOGRAM TRACING: CPT | Performed by: STUDENT IN AN ORGANIZED HEALTH CARE EDUCATION/TRAINING PROGRAM

## 2024-03-08 PROCEDURE — 93010 ELECTROCARDIOGRAM REPORT: CPT | Performed by: INTERNAL MEDICINE

## 2024-03-08 PROCEDURE — 6370000000 HC RX 637 (ALT 250 FOR IP): Performed by: STUDENT IN AN ORGANIZED HEALTH CARE EDUCATION/TRAINING PROGRAM

## 2024-03-08 RX ORDER — ALBUTEROL SULFATE 2.5 MG/3ML
2.5 SOLUTION RESPIRATORY (INHALATION) EVERY 6 HOURS PRN
Status: DISCONTINUED | OUTPATIENT
Start: 2024-03-08 | End: 2024-03-08 | Stop reason: HOSPADM

## 2024-03-08 RX ORDER — BUMETANIDE 1 MG/1
2 TABLET ORAL ONCE
Status: COMPLETED | OUTPATIENT
Start: 2024-03-08 | End: 2024-03-08

## 2024-03-08 RX ADMIN — BUMETANIDE 2 MG: 1 TABLET ORAL at 00:40

## 2024-03-08 ASSESSMENT — ENCOUNTER SYMPTOMS
STRIDOR: 0
WHEEZING: 0
DIARRHEA: 0
SHORTNESS OF BREATH: 1
NAUSEA: 0
ABDOMINAL DISTENTION: 0
EYES NEGATIVE: 1
VOMITING: 0

## 2024-03-08 NOTE — DISCHARGE INSTRUCTIONS
You were evaluated in the emergency room for shortness of breath.  You are found to have a grossly elevated BNP which is a measure of how much fluid volume is being pushed through your vessels.  You had missed dialysis earlier this week which is plated to this greatly.  You felt significantly better after receiving treatment from RT.  Please do as you stated that you would and go to your dialysis appointment early tomorrow morning and receive the entirety of the treatment.  If you begin having any worsening shortness of breath or other symptoms please return to the ER.

## 2024-03-08 NOTE — ED PROVIDER NOTES
[facility] ED  Emergency Department Encounter  Emergency Medicine Attending     Pt Name:Flo Mccoy  MRN: [id number]  Birthdate 1970  Date of evaluation: 3/7/24  PCP:  No primary care provider on file.  Note Started: 11:39 PM EST      CHIEF COMPLAINT       Chief Complaint   Patient presents with    Shortness of Breath     Patient was seen at urgent care today was given zpak and inhaler. Patient is a dialysis patient missed last appointment. (M/W/F)        HISTORY OF PRESENT ILLNESS  (Location/Symptom, Timing/Onset, Context/Setting, Quality, Duration, Modifying Factors, Severity.)      Flo Mccoy is a 53 y.o. male who presents with patient is here with some difficulty breathing in addition with continuation of some left shoulder pain and right hip pain.  Patient missed his most recent dialysis appointment and has been struggling to breathe since.  Patient has a left-sided fistula that is palpable in addition he still makes urine.  Patient denies changes in mentation chest pain or abdominal discomfort    PAST MEDICAL / SURGICAL / SOCIAL / FAMILY HISTORY      has a past medical history of Chronic kidney disease, Depression, Diabetes mellitus (HCC), Dialysis patient (HCC), Gout, and Hypertension.       has a past surgical history that includes hernia repair; shoulder surgery (Left); Ankle Closed Reduction (Right, 08/12/2019); Ankle fracture surgery (Right, 08/13/2019); AV fistula creation (Left); Eye surgery (Left, 04/03/2023); Cataract removal (Right, 09/25/2023); Eye surgery (Right, 09/25/2023); and joint replacement.      Social History     Socioeconomic History    Marital status: Single     Spouse name: Not on file    Number of children: Not on file    Years of education: Not on file    Highest education level: Not on file   Occupational History    Not on file   Tobacco Use    Smoking status: Every Day     Current packs/day: 0.25     Types: Cigarettes    Smokeless tobacco: Never   Vaping

## 2024-04-08 ENCOUNTER — HOSPITAL ENCOUNTER (EMERGENCY)
Age: 54
Discharge: PSYCHIATRIC OR SUBSTANCE USE FACILITY | End: 2024-04-09
Payer: MEDICARE

## 2024-04-08 VITALS
SYSTOLIC BLOOD PRESSURE: 132 MMHG | HEART RATE: 66 BPM | OXYGEN SATURATION: 95 % | HEIGHT: 69 IN | BODY MASS INDEX: 32.14 KG/M2 | WEIGHT: 217 LBS | DIASTOLIC BLOOD PRESSURE: 63 MMHG | TEMPERATURE: 98.2 F | RESPIRATION RATE: 16 BRPM

## 2024-04-08 DIAGNOSIS — F14.90 COCAINE USE: ICD-10-CM

## 2024-04-08 DIAGNOSIS — Z99.2 ESRD (END STAGE RENAL DISEASE) ON DIALYSIS (HCC): ICD-10-CM

## 2024-04-08 DIAGNOSIS — F10.920 ACUTE ALCOHOLIC INTOXICATION WITHOUT COMPLICATION (HCC): ICD-10-CM

## 2024-04-08 DIAGNOSIS — R45.851 SUICIDAL IDEATION: Primary | ICD-10-CM

## 2024-04-08 DIAGNOSIS — N18.6 ESRD (END STAGE RENAL DISEASE) ON DIALYSIS (HCC): ICD-10-CM

## 2024-04-08 LAB
ALBUMIN SERPL-MCNC: 4 G/DL (ref 3.5–5.2)
ALBUMIN/GLOB SERPL: 1 {RATIO} (ref 1–2.5)
ALP SERPL-CCNC: 259 U/L (ref 40–129)
ALT SERPL-CCNC: 28 U/L (ref 5–41)
AMPHET UR QL SCN: NEGATIVE
ANION GAP SERPL CALCULATED.3IONS-SCNC: 13 MMOL/L (ref 9–17)
APAP SERPL-MCNC: <5 UG/ML (ref 10–30)
AST SERPL-CCNC: 35 U/L
BARBITURATES UR QL SCN: NEGATIVE
BASOPHILS # BLD: 0.03 K/UL (ref 0–0.2)
BASOPHILS NFR BLD: 0 % (ref 0–2)
BENZODIAZ UR QL: NEGATIVE
BILIRUB SERPL-MCNC: 0.5 MG/DL (ref 0.3–1.2)
BUN SERPL-MCNC: 33 MG/DL (ref 6–20)
BUN/CREAT SERPL: 7 (ref 9–20)
BUPRENORPHINE UR QL: NEGATIVE
CALCIUM SERPL-MCNC: 9.1 MG/DL (ref 8.6–10.4)
CANNABINOIDS UR QL SCN: NEGATIVE
CHLORIDE SERPL-SCNC: 93 MMOL/L (ref 98–107)
CO2 SERPL-SCNC: 25 MMOL/L (ref 20–31)
COCAINE UR QL SCN: POSITIVE
CREAT SERPL-MCNC: 4.9 MG/DL (ref 0.7–1.2)
EOSINOPHIL # BLD: 0.26 K/UL (ref 0–0.44)
EOSINOPHILS RELATIVE PERCENT: 4 % (ref 1–4)
ERYTHROCYTE [DISTWIDTH] IN BLOOD BY AUTOMATED COUNT: 14.2 % (ref 11.8–14.4)
ETHANOL PERCENT: 0.17 %
ETHANOLAMINE SERPL-MCNC: 174 MG/DL
FENTANYL UR QL: NEGATIVE
GFR SERPL CREATININE-BSD FRML MDRD: 13 ML/MIN/1.73M2
GLUCOSE SERPL-MCNC: 99 MG/DL (ref 70–99)
HCT VFR BLD AUTO: 34.8 % (ref 40.7–50.3)
HGB BLD-MCNC: 11.5 G/DL (ref 13–17)
IMM GRANULOCYTES # BLD AUTO: 0.03 K/UL (ref 0–0.3)
IMM GRANULOCYTES NFR BLD: 0 %
LYMPHOCYTES NFR BLD: 1.07 K/UL (ref 1.1–3.7)
LYMPHOCYTES RELATIVE PERCENT: 15 % (ref 24–43)
MCH RBC QN AUTO: 33.9 PG (ref 25.2–33.5)
MCHC RBC AUTO-ENTMCNC: 33 G/DL (ref 28.4–34.8)
MCV RBC AUTO: 102.7 FL (ref 82.6–102.9)
METHADONE UR QL: NEGATIVE
MONOCYTES NFR BLD: 0.5 K/UL (ref 0.1–1.2)
MONOCYTES NFR BLD: 7 % (ref 3–12)
NEUTROPHILS NFR BLD: 74 % (ref 36–65)
NEUTS SEG NFR BLD: 5.4 K/UL (ref 1.5–8.1)
NRBC BLD-RTO: 0 PER 100 WBC
OPIATES UR QL SCN: NEGATIVE
OXYCODONE UR QL SCN: POSITIVE
PCP UR QL SCN: NEGATIVE
PLATELET # BLD AUTO: 138 K/UL (ref 138–453)
PMV BLD AUTO: 10.6 FL (ref 8.1–13.5)
POTASSIUM SERPL-SCNC: 4 MMOL/L (ref 3.7–5.3)
PROT SERPL-MCNC: 8.2 G/DL (ref 6.4–8.3)
RBC # BLD AUTO: 3.39 M/UL (ref 4.21–5.77)
SALICYLATES SERPL-MCNC: <1 MG/DL (ref 3–10)
SODIUM SERPL-SCNC: 131 MMOL/L (ref 135–144)
TEST INFORMATION: ABNORMAL
WBC OTHER # BLD: 7.3 K/UL (ref 3.5–11.3)

## 2024-04-08 PROCEDURE — 6360000002 HC RX W HCPCS: Performed by: PHYSICIAN ASSISTANT

## 2024-04-08 PROCEDURE — 80307 DRUG TEST PRSMV CHEM ANLYZR: CPT

## 2024-04-08 PROCEDURE — 80053 COMPREHEN METABOLIC PANEL: CPT

## 2024-04-08 PROCEDURE — 80143 DRUG ASSAY ACETAMINOPHEN: CPT

## 2024-04-08 PROCEDURE — 2580000003 HC RX 258

## 2024-04-08 PROCEDURE — 36415 COLL VENOUS BLD VENIPUNCTURE: CPT

## 2024-04-08 PROCEDURE — G0480 DRUG TEST DEF 1-7 CLASSES: HCPCS

## 2024-04-08 PROCEDURE — 80179 DRUG ASSAY SALICYLATE: CPT

## 2024-04-08 PROCEDURE — 99285 EMERGENCY DEPT VISIT HI MDM: CPT

## 2024-04-08 PROCEDURE — 93005 ELECTROCARDIOGRAM TRACING: CPT | Performed by: PHYSICIAN ASSISTANT

## 2024-04-08 PROCEDURE — 85025 COMPLETE CBC W/AUTO DIFF WBC: CPT

## 2024-04-08 PROCEDURE — 96372 THER/PROPH/DIAG INJ SC/IM: CPT

## 2024-04-08 RX ORDER — ZIPRASIDONE MESYLATE 20 MG/ML
20 INJECTION, POWDER, LYOPHILIZED, FOR SOLUTION INTRAMUSCULAR ONCE
Status: COMPLETED | OUTPATIENT
Start: 2024-04-08 | End: 2024-04-08

## 2024-04-08 RX ORDER — WATER 10 ML/10ML
INJECTION INTRAMUSCULAR; INTRAVENOUS; SUBCUTANEOUS
Status: COMPLETED
Start: 2024-04-08 | End: 2024-04-08

## 2024-04-08 RX ADMIN — WATER 10 ML: 1 INJECTION, SOLUTION INTRAMUSCULAR; INTRAVENOUS; SUBCUTANEOUS at 18:36

## 2024-04-08 RX ADMIN — ZIPRASIDONE MESYLATE 20 MG: 20 INJECTION, POWDER, LYOPHILIZED, FOR SOLUTION INTRAMUSCULAR at 18:36

## 2024-04-08 ASSESSMENT — LIFESTYLE VARIABLES
HOW MANY STANDARD DRINKS CONTAINING ALCOHOL DO YOU HAVE ON A TYPICAL DAY: 10 OR MORE
HOW OFTEN DO YOU HAVE A DRINK CONTAINING ALCOHOL: NEVER
HOW OFTEN DO YOU HAVE A DRINK CONTAINING ALCOHOL: MONTHLY OR LESS
HOW MANY STANDARD DRINKS CONTAINING ALCOHOL DO YOU HAVE ON A TYPICAL DAY: PATIENT DOES NOT DRINK

## 2024-04-08 ASSESSMENT — PAIN SCALES - GENERAL: PAINLEVEL_OUTOF10: 9

## 2024-04-08 ASSESSMENT — PAIN DESCRIPTION - PAIN TYPE: TYPE: ACUTE PAIN

## 2024-04-08 ASSESSMENT — PAIN DESCRIPTION - LOCATION: LOCATION: BACK

## 2024-04-08 ASSESSMENT — PAIN - FUNCTIONAL ASSESSMENT: PAIN_FUNCTIONAL_ASSESSMENT: 0-10

## 2024-04-08 ASSESSMENT — ENCOUNTER SYMPTOMS
SHORTNESS OF BREATH: 0
COUGH: 0

## 2024-04-08 ASSESSMENT — PAIN DESCRIPTION - DESCRIPTORS: DESCRIPTORS: SHARP
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
## 2024-04-08 NOTE — ED NOTES
Pt requesting this RN to call [facility] at this time to bring pt his cell phone. MARKUS Garza and security Kong, present at time of request. [facility] notified at this time and states will bring phone and shoes for pt at this time.
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
## 2024-04-09 ENCOUNTER — HOSPITAL ENCOUNTER (OUTPATIENT)
Age: 54
Setting detail: OBSERVATION
LOS: 1 days | Discharge: HOME | End: 2024-04-09
Attending: PSYCHIATRY & NEUROLOGY | Admitting: PSYCHIATRY & NEUROLOGY
Payer: MEDICARE

## 2024-04-09 VITALS
HEART RATE: 72 BPM | TEMPERATURE: 98.2 F | RESPIRATION RATE: 14 BRPM | BODY MASS INDEX: 32.14 KG/M2 | SYSTOLIC BLOOD PRESSURE: 157 MMHG | WEIGHT: 217 LBS | DIASTOLIC BLOOD PRESSURE: 83 MMHG | HEIGHT: 69 IN

## 2024-04-09 PROBLEM — F23 ACUTE PSYCHOSIS (HCC): Status: ACTIVE | Noted: 2024-04-09

## 2024-04-09 LAB
EKG ATRIAL RATE: 72 BPM
EKG P AXIS: -2 DEGREES
EKG P-R INTERVAL: 152 MS
EKG Q-T INTERVAL: 410 MS
EKG QRS DURATION: 96 MS
EKG QTC CALCULATION (BAZETT): 448 MS
EKG R AXIS: -8 DEGREES
EKG T AXIS: -16 DEGREES
EKG VENTRICULAR RATE: 72 BPM

## 2024-04-09 PROCEDURE — APPSS60 APP SPLIT SHARED TIME 46-60 MINUTES

## 2024-04-09 PROCEDURE — G0378 HOSPITAL OBSERVATION PER HR: HCPCS

## 2024-04-09 PROCEDURE — 99235 HOSP IP/OBS SAME DATE MOD 70: CPT | Performed by: PSYCHIATRY & NEUROLOGY

## 2024-04-09 PROCEDURE — 6370000000 HC RX 637 (ALT 250 FOR IP): Performed by: PSYCHIATRY & NEUROLOGY

## 2024-04-09 PROCEDURE — 93010 ELECTROCARDIOGRAM REPORT: CPT | Performed by: FAMILY MEDICINE

## 2024-04-09 RX ORDER — MAGNESIUM HYDROXIDE/ALUMINUM HYDROXICE/SIMETHICONE 120; 1200; 1200 MG/30ML; MG/30ML; MG/30ML
30 SUSPENSION ORAL EVERY 6 HOURS PRN
Status: DISCONTINUED | OUTPATIENT
Start: 2024-04-09 | End: 2024-04-09 | Stop reason: HOSPADM

## 2024-04-09 RX ORDER — PANTOPRAZOLE SODIUM 40 MG/1
40 TABLET, DELAYED RELEASE ORAL DAILY
Status: DISCONTINUED | OUTPATIENT
Start: 2024-04-09 | End: 2024-04-09 | Stop reason: HOSPADM

## 2024-04-09 RX ORDER — METOLAZONE 5 MG/1
5 TABLET ORAL DAILY
Status: DISCONTINUED | OUTPATIENT
Start: 2024-04-09 | End: 2024-04-09 | Stop reason: HOSPADM

## 2024-04-09 RX ORDER — M-VIT,TX,IRON,MINS/CALC/FOLIC 27MG-0.4MG
1 TABLET ORAL DAILY
Status: DISCONTINUED | OUTPATIENT
Start: 2024-04-09 | End: 2024-04-09 | Stop reason: HOSPADM

## 2024-04-09 RX ORDER — ALBUTEROL SULFATE 90 UG/1
2 AEROSOL, METERED RESPIRATORY (INHALATION) EVERY 6 HOURS PRN
Status: DISCONTINUED | OUTPATIENT
Start: 2024-04-09 | End: 2024-04-09 | Stop reason: HOSPADM

## 2024-04-09 RX ORDER — LANOLIN ALCOHOL/MO/W.PET/CERES
400 CREAM (GRAM) TOPICAL DAILY
Status: ON HOLD | COMMUNITY
End: 2024-04-09 | Stop reason: HOSPADM

## 2024-04-09 RX ORDER — POLYETHYLENE GLYCOL 3350 17 G/17G
17 POWDER, FOR SOLUTION ORAL DAILY PRN
Status: DISCONTINUED | OUTPATIENT
Start: 2024-04-09 | End: 2024-04-09 | Stop reason: HOSPADM

## 2024-04-09 RX ORDER — VITAMIN B COMPLEX
5000 TABLET ORAL DAILY
Status: DISCONTINUED | OUTPATIENT
Start: 2024-04-09 | End: 2024-04-09 | Stop reason: HOSPADM

## 2024-04-09 RX ORDER — HALOPERIDOL 5 MG/1
5 TABLET ORAL EVERY 6 HOURS PRN
Status: DISCONTINUED | OUTPATIENT
Start: 2024-04-09 | End: 2024-04-09 | Stop reason: HOSPADM

## 2024-04-09 RX ORDER — CLONIDINE HYDROCHLORIDE 0.1 MG/1
0.1 TABLET ORAL NIGHTLY
Status: DISCONTINUED | OUTPATIENT
Start: 2024-04-09 | End: 2024-04-09 | Stop reason: HOSPADM

## 2024-04-09 RX ORDER — TRAZODONE HYDROCHLORIDE 50 MG/1
50 TABLET ORAL NIGHTLY PRN
Status: DISCONTINUED | OUTPATIENT
Start: 2024-04-09 | End: 2024-04-09 | Stop reason: HOSPADM

## 2024-04-09 RX ORDER — ACETAMINOPHEN 325 MG/1
650 TABLET ORAL EVERY 6 HOURS PRN
Status: DISCONTINUED | OUTPATIENT
Start: 2024-04-09 | End: 2024-04-09 | Stop reason: HOSPADM

## 2024-04-09 RX ORDER — HYDROXYZINE 50 MG/1
50 TABLET, FILM COATED ORAL 3 TIMES DAILY PRN
Status: DISCONTINUED | OUTPATIENT
Start: 2024-04-09 | End: 2024-04-09 | Stop reason: HOSPADM

## 2024-04-09 RX ORDER — POLYETHYLENE GLYCOL 3350 17 G
2 POWDER IN PACKET (EA) ORAL
Status: DISCONTINUED | OUTPATIENT
Start: 2024-04-09 | End: 2024-04-09 | Stop reason: HOSPADM

## 2024-04-09 RX ORDER — ALBUTEROL SULFATE 1.25 MG/3ML
1 SOLUTION RESPIRATORY (INHALATION) EVERY 6 HOURS PRN
Status: DISCONTINUED | OUTPATIENT
Start: 2024-04-09 | End: 2024-04-09 | Stop reason: CLARIF

## 2024-04-09 RX ORDER — CEPHALEXIN 500 MG/1
500 CAPSULE ORAL EVERY 12 HOURS
Status: DISCONTINUED | OUTPATIENT
Start: 2024-04-09 | End: 2024-04-09 | Stop reason: HOSPADM

## 2024-04-09 RX ORDER — VALSARTAN 320 MG/1
160 TABLET ORAL DAILY
Status: DISCONTINUED | OUTPATIENT
Start: 2024-04-09 | End: 2024-04-09 | Stop reason: HOSPADM

## 2024-04-09 RX ORDER — HALOPERIDOL 5 MG/ML
5 INJECTION INTRAMUSCULAR EVERY 6 HOURS PRN
Status: DISCONTINUED | OUTPATIENT
Start: 2024-04-09 | End: 2024-04-09 | Stop reason: HOSPADM

## 2024-04-09 RX ORDER — PREDNISONE 20 MG/1
TABLET ORAL
COMMUNITY

## 2024-04-09 RX ORDER — M-VIT,TX,IRON,MINS/CALC/FOLIC 27MG-0.4MG
1 TABLET ORAL DAILY
COMMUNITY

## 2024-04-09 RX ORDER — DIPHENHYDRAMINE HYDROCHLORIDE 50 MG/ML
50 INJECTION INTRAMUSCULAR; INTRAVENOUS EVERY 6 HOURS PRN
Status: DISCONTINUED | OUTPATIENT
Start: 2024-04-09 | End: 2024-04-09 | Stop reason: HOSPADM

## 2024-04-09 RX ORDER — CEPHALEXIN 500 MG/1
500 CAPSULE ORAL 4 TIMES DAILY
COMMUNITY
Start: 2024-04-08 | End: 2024-04-18

## 2024-04-09 RX ORDER — ALBUTEROL SULFATE 2.5 MG/3ML
2.5 SOLUTION RESPIRATORY (INHALATION) EVERY 6 HOURS PRN
Status: DISCONTINUED | OUTPATIENT
Start: 2024-04-09 | End: 2024-04-09 | Stop reason: HOSPADM

## 2024-04-09 RX ORDER — CARVEDILOL 25 MG/1
25 TABLET ORAL 2 TIMES DAILY WITH MEALS
Status: DISCONTINUED | OUTPATIENT
Start: 2024-04-09 | End: 2024-04-09 | Stop reason: HOSPADM

## 2024-04-09 RX ORDER — FLUOCINOLONE ACETONIDE 0.11 MG/ML
OIL TOPICAL 2 TIMES DAILY PRN
COMMUNITY

## 2024-04-09 RX ORDER — LANOLIN ALCOHOL/MO/W.PET/CERES
400 CREAM (GRAM) TOPICAL DAILY
Status: DISCONTINUED | OUTPATIENT
Start: 2024-04-09 | End: 2024-04-09 | Stop reason: HOSPADM

## 2024-04-09 RX ORDER — GAUZE BANDAGE 2" X 2"
100 BANDAGE TOPICAL DAILY
Status: DISCONTINUED | OUTPATIENT
Start: 2024-04-09 | End: 2024-04-09 | Stop reason: HOSPADM

## 2024-04-09 RX ORDER — ASPIRIN 81 MG/1
81 TABLET ORAL DAILY
Status: DISCONTINUED | OUTPATIENT
Start: 2024-04-09 | End: 2024-04-09 | Stop reason: HOSPADM

## 2024-04-09 RX ORDER — ALBUTEROL SULFATE 90 UG/1
2 AEROSOL, METERED RESPIRATORY (INHALATION) EVERY 6 HOURS PRN
COMMUNITY

## 2024-04-09 RX ORDER — ALBUTEROL SULFATE 1.25 MG/3ML
1 SOLUTION RESPIRATORY (INHALATION) EVERY 6 HOURS PRN
COMMUNITY

## 2024-04-09 RX ORDER — DULOXETIN HYDROCHLORIDE 60 MG/1
60 CAPSULE, DELAYED RELEASE ORAL DAILY
Status: DISCONTINUED | OUTPATIENT
Start: 2024-04-09 | End: 2024-04-09 | Stop reason: HOSPADM

## 2024-04-09 RX ORDER — ALLOPURINOL 300 MG/1
300 TABLET ORAL DAILY
Status: DISCONTINUED | OUTPATIENT
Start: 2024-04-09 | End: 2024-04-09 | Stop reason: HOSPADM

## 2024-04-09 RX ORDER — TRAZODONE HYDROCHLORIDE 50 MG/1
50 TABLET ORAL NIGHTLY PRN
Status: DISCONTINUED | OUTPATIENT
Start: 2024-04-09 | End: 2024-04-09

## 2024-04-09 RX ADMIN — METOLAZONE 5 MG: 5 TABLET ORAL at 11:06

## 2024-04-09 RX ADMIN — Medication 1 TABLET: at 11:05

## 2024-04-09 RX ADMIN — DULOXETINE HYDROCHLORIDE 60 MG: 60 CAPSULE, DELAYED RELEASE ORAL at 11:05

## 2024-04-09 RX ADMIN — CEPHALEXIN 500 MG: 500 CAPSULE ORAL at 11:05

## 2024-04-09 RX ADMIN — ALLOPURINOL 300 MG: 300 TABLET ORAL at 11:06

## 2024-04-09 RX ADMIN — Medication 5000 UNITS: at 11:12

## 2024-04-09 RX ADMIN — Medication 400 MG: at 11:05

## 2024-04-09 RX ADMIN — VALSARTAN 160 MG: 320 TABLET ORAL at 11:11

## 2024-04-09 RX ADMIN — TRAZODONE HYDROCHLORIDE 50 MG: 50 TABLET ORAL at 03:15

## 2024-04-09 RX ADMIN — ASPIRIN 81 MG: 81 TABLET, COATED ORAL at 11:05

## 2024-04-09 RX ADMIN — THIAMINE HCL TAB 100 MG 100 MG: 100 TAB at 11:06

## 2024-04-09 RX ADMIN — CARVEDILOL 25 MG: 25 TABLET, FILM COATED ORAL at 11:05

## 2024-04-09 RX ADMIN — PANTOPRAZOLE SODIUM 40 MG: 40 TABLET, DELAYED RELEASE ORAL at 11:06

## 2024-04-09 RX ADMIN — HYDROXYZINE HYDROCHLORIDE 50 MG: 50 TABLET, FILM COATED ORAL at 03:15

## 2024-04-09 ASSESSMENT — LIFESTYLE VARIABLES
HOW MANY STANDARD DRINKS CONTAINING ALCOHOL DO YOU HAVE ON A TYPICAL DAY: 10 OR MORE
HOW OFTEN DO YOU HAVE A DRINK CONTAINING ALCOHOL: MONTHLY OR LESS

## 2024-04-09 ASSESSMENT — PATIENT HEALTH QUESTIONNAIRE - PHQ9
SUM OF ALL RESPONSES TO PHQ QUESTIONS 1-9: 2
1. LITTLE INTEREST OR PLEASURE IN DOING THINGS: SEVERAL DAYS
SUM OF ALL RESPONSES TO PHQ QUESTIONS 1-9: 2
SUM OF ALL RESPONSES TO PHQ9 QUESTIONS 1 & 2: 2
2. FEELING DOWN, DEPRESSED OR HOPELESS: SEVERAL DAYS
SUM OF ALL RESPONSES TO PHQ QUESTIONS 1-9: 2
SUM OF ALL RESPONSES TO PHQ QUESTIONS 1-9: 2

## 2024-04-09 ASSESSMENT — SLEEP AND FATIGUE QUESTIONNAIRES
DO YOU HAVE DIFFICULTY SLEEPING: NO
DO YOU USE A SLEEP AID: NO
AVERAGE NUMBER OF SLEEP HOURS: 8

## 2024-04-09 ASSESSMENT — PAIN SCALES - GENERAL: PAINLEVEL_OUTOF10: 3

## 2024-04-09 ASSESSMENT — PAIN DESCRIPTION - LOCATION: LOCATION: GENERALIZED

## 2024-04-09 NOTE — CARE COORDINATION
Mr. Mccoy does not require a psychosocial assessment as he has been discharged; followup appointment scheduled at [facility].

## 2024-04-09 NOTE — DISCHARGE INSTRUCTIONS
[phone]      Recovery Help line- [phone]      To obtain results of pending studies call Medical Records at: [phone]     For emergencies and 24 hour/7 days a week contact information:  [phone]

## 2024-04-09 NOTE — PROGRESS NOTES
Behavioral Services  Medicare Certification Upon Admission    I certify that this patient's inpatient psychiatric hospital admission is medically necessary for:    [] (1) Treatment which could reasonably be expected to improve this patient's condition,       [] (2) Or for diagnostic study;     AND     [](2) The inpatient psychiatric services are provided while the individual is under the care of a physician and are included in the individualized plan of care.    Estimated length of stay/service patient to be a same day admission/discharge    Plan for post-hospital care outpatient [facility] f/u    Electronically signed by HARSH CABELLO MD on 4/9/2024 at 9:44 AM
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
## 2024-04-09 NOTE — BH NOTE
Patient given tobacco quitline number 38898664626 at this time, refusing to call at this time, states \" I just dont want to quit now\"- patient given information as to the dangers of long term tobacco use. Continue to reinforce the importance of tobacco cessation.    
Patient given tobacco quitline number 70391293200 at this time, refusing to call at this time, states \" I just dont want to quit now\"- patient given information as to the dangers of long term tobacco use. Continue to reinforce the importance of tobacco cessation.     
Provider notified of best practice advisory suggesting to place patient on suicide precautions. Provider to discontinue the order as patient does not meet criteria for suicide precautions at this time. Continue to observe patient on every 15 minute checks.    
                                                                                            ( ) Patient refused counseling  (x) Patient refused referral  ( ) Patient refused prescription upon discharge  ( ) Patient has not smoked in the last 30 days    Metabolic Screening:    Lab Results   Component Value Date    LABA1C 4.3 06/05/2023       Lab Results   Component Value Date    CHOL 96 06/05/2023     Lab Results   Component Value Date    TRIG 58 06/05/2023     Lab Results   Component Value Date    HDL 45 06/05/2023     No components found for: \"LDLCAL\"  No components found for: \"LABVLDL\"    Patient was picked up by a black and white cab and taken to his home.     Blake Fierro, MARKUS

## 2024-04-09 NOTE — PROGRESS NOTES
RN writer received a call from Nikunj Fierro RN regarding patient will be discharged today instead of receiving dialysis.

## 2024-04-09 NOTE — CARE COORDINATION
Mr. Mccoy states he receives dialysis care at [facility] in [place], [place]; phone number shown online is [phone]; writer contacted that phone number and left voice mail with request for return call, no information was offered on hours of this location; writer also used [phone] found on [facility] [employer] website and [phone] found at the  [facility] website, spoke with Sarina,  who states according to her information,  last was treated at their [facility] dialysis center in 2021, was treated in LTAC, [facility] in February. Writer spoke with Mr. Mccoy who states he has been up to date in his treatment, gave verbal consent to speak with his girlfriend who is supportive in getting him to treatment. Writer spoke with RANJIT Salomon, she confirms dialysis schedule of MWF at 10am at  [facility] [place]. Writer updated MD and unit staff.

## 2024-04-09 NOTE — PROGRESS NOTES
Pharmacy Medication History Note      List of current medications patient is taking is complete.    Source of information: [facility] ([place], [place]); [facility]; [employer] dispense report; Epic; PDMP    Changes made to medication list:  Medications removed (include reason, ex. therapy complete or physician discontinued, noncompliance):  Ferrous sulfate (alternate therapy - patient on IV iron during dialysis)    Medications flagged for provider review:  Atorvastatin - no recent fill history found    Medications added/doses adjusted:  Added Cephalexin 500 mg four times daily for 10 days  Added Prednisone taper: 60 mg daily x 3 days, 40 mg daily x 3 days, 20 mg daily x 3 days  Added multivitamin daily  Added Albuterol HFA 2 puffs every 6 hours as needed  Added Fluocinolone 0.01% oil topically twice daily as needed to groin area  Adjusted Metolazone to 5 mg daily    Other notes (ex. Recent course of antibiotics, Coumadin dosing):  Patient is currently filling at both [facility] and [facility].  The patient was prescribed Cephalexin and Prednisone outpatient yesterday.  Oxycodone IR 5 mg was last filled 3/14/24 for qty 120.   The patient was previously on PhosLo 667 mg three times daily with meals - last filled 11/21/23 for 30 day supply.      Current Home Medication List at Time of Admission:  Prior to Admission medications    Medication Sig   Multiple Vitamins-Minerals (THERAPEUTIC MULTIVITAMIN-MINERALS) tablet Take 1 tablet by mouth daily   albuterol sulfate HFA (VENTOLIN HFA) 108 (90 Base) MCG/ACT inhaler Inhale 2 puffs into the lungs every 6 hours as needed for Wheezing   fluocinolone (DERMA-SMOOTHE) 0.01 % OIL external oil Apply topically 2 times daily as needed One to two times daily as needed to groin area   albuterol (ACCUNEB) 1.25 MG/3ML nebulizer solution Inhale 3 mLs into the lungs every 6 hours as needed for Wheezing   cephALEXin (KEFLEX) 500 MG capsule Take 1 capsule by mouth

## 2024-04-09 NOTE — ED NOTES
[facility] in dept. Report provided. Security notified and patient belongings handed to transport staff.

## 2024-04-09 NOTE — TRANSITION OF CARE
Behavioral Health Transition Record to Provider    Patient Name: Flo Mccoy  YOB: 1970   Medical Record Number: [id number]  Date of Admission: 4/9/2024  3:03 AM   Date of Discharge: 4/9/24    Attending Provider: Anand Powell MD   Discharging Provider: Sherry  To contact this individual call  [phone] and ask the  to page.  If unavailable, ask to be transferred to Behavioral Health Provider on call.  A Behavioral Health Provider will be available on call 24/7 and during holidays.    Primary Care Provider: No primary care provider on file.    Allergies   Allergen Reactions    Nsaids        Reason for Admission: Suicidial ideation, homicidal ideation    Admission Diagnosis: Acute psychosis (HCC) [F23]    * No surgery found *    No results found for this visit on 04/09/24.    Immunizations administered during this encounter:   Immunization History   Administered Date(s) Administered    COVID-19, J&J, (age 18y+), IM, 0.5 mL 03/19/2021     Documentation of patient's or caregiver's refusal of influenza vaccine.    Screening for Metabolic Disorders for Patients on Antipsychotic Medications  (Data obtained from the EMR)    Estimated Body Mass Index  Body mass index is 32.05 kg/m².      Vital Signs/Blood Pressure  BP (!) 157/83   Pulse 72   Temp 98.2 °F (36.8 °C) (Oral)   Resp 14   Ht 1.753 m (5' 9\")   Wt 98.4 kg (217 lb)   BMI 32.05 kg/m²      Fasting Blood Glucose or Hemoglobin A1c  No results found for: \"GLU\", \"GLUCPOC\"    Hemoglobin A1C   Date Value Ref Range Status   06/05/2023 4.3 4.0 - 6.0 % Final       Discharge Diagnosis: Acute psychosis    Discharge Plan/Destination: Home    Discharge Medication List and Instructions:      Medication List        CONTINUE taking these medications      acetaminophen 500 MG tablet  Commonly known as: TYLENOL  Notes to patient: Pain     allopurinol 300 MG tablet  Commonly known as: ZYLOPRIM  Notes to patient: Uric acids     aspirin 81 MG EC

## 2024-04-09 NOTE — GROUP NOTE
Group Therapy Note    Date: 4/9/2024    Group Start Time: 1035  Group End Time: 1110  Group Topic: Cognitive Skills    [facility]    Pavithra Duffy CTRS        Group Therapy Note    Attendees: 2/7       Topic: To increase social interaction, practice decision making, and follow task steps .      Patient did not participate in Cognitive Skills Group at 1035, despite staff encouragement   and explanation of benefits.      Patient is seclusive to self and room during group but pleasant on approach. Pt states he   prefers to rest before his discharge today.     Q15 minute safety checks maintained for patient safety and will continue to encourage   patient to attend unit programming.       Discipline Responsible: Psychoeducational Specialist  Signature:  FERNANDA WILLIS

## 2024-04-09 NOTE — H&P
examiner:  Cooperative, attentive, good eye contact  Speech: Normal rate, volume, and tone.  Mood: \"Good\"  Affect: Congruent with mood  Thought processes:  Goal directed, linear  Thought content: Denies suicidal ideations, without current plan or intent, contracts for safety on the unit.               Denies homicidal ideations               Denies hallucinations              Denies delusions              Denies paranoia  Cognition:  Oriented to self, location, time, situation  Concentration: Clinically adequate  Memory: Intact  Insight &Judgment: Poor         DSM-5 Diagnosis    Principal Problem: Acute psychosis (HCC)      Psychosocial and Contextual factors:  Financial   Occupational   Relationship x  Legal   Living situation   Educational     Past Medical History:   Diagnosis Date    Chronic kidney disease     Depression     Diabetes mellitus (HCC)     BORDERLINE    Dialysis patient (HCC)     Gout     Hypertension         PATIENT HANDOFF:  Home medications reviewed.   Medication management per attending  Monitor need and frequency of PRN medications.    CONSULT:  [x] Yes [] No  Internal medicine for medical management/medical H&P      Risk Management: close watch per standard protocol      Psychotherapy: participation in milieu and group and individual sessions with Attending Physician,  and Physician Assistant/CNP      Estimated length of stay:  2-14 days      GENERAL PATIENT/FAMILY EDUCATION  Patient will understand basic signs and symptoms, patient will understand benefits/risks and potential side effects from proposed medications, and patient will understand their role in recovery.  Family is active in patient's care.   Patient assets that may be helpful during treatment include: Intent to participate and engage in treatment, sufficient fund of knowledge and intellect to understand and utilize treatments.      Goals:    1) Remission of SI.  2) Stabilization of symptoms prior to discharge.  3)

## 2024-04-09 NOTE — GROUP NOTE
Group Therapy Note    Date: 4/9/2024    Group Start Time: 1000  Group End Time: 1025  Group Topic: Psychoeducation    Shasha Woody MSW, DANTE        Group Therapy Note    Attendees: 2/7       Patient was offered group therapy today but declined to participate despite encouragement from staff.  1:1 was offered.       Signature:  ANTONINA Segovia, DANTE

## 2024-04-10 NOTE — DISCHARGE SUMMARY
Provider Discharge Summary     Patient ID:  Flo Mccoy  [id number]  53 y.o.  1970    Admit date: 4/9/2024    Discharge date and time: 4/10/2024  5:51 PM     Admitting Physician: Anand Powell MD     Discharge Physician: Anand Powell MD    Admission Diagnoses: Acute psychosis (HCC) [F23]    Discharge Diagnoses:      Acute psychosis (HCC)     Patient Active Problem List   Diagnosis Code    Closed trimalleolar fracture of right ankle S82.851A    Alcohol abuse F10.10    Closed right trimalleolar fracture, initial encounter S82.851A    Hyponatremia E87.1    Hypertension, essential I10    Closed fracture dislocation of right ankle S82.891A    Depression F32.A    Tobacco abuse Z72.0    CKD vs MORGAN N18.9    Acute blood loss as cause of postoperative anemia D62    Dizziness R42    Migraine without status migrainosus, not intractable G43.909    Iron deficiency anemia D50.9    ESRD (end stage renal disease) ([facility]) N18.6    Type 2 diabetes mellitus with diabetic chronic kidney disease ([facility]) E11.22    Severe anxiety F41.9    Obesity with serious comorbidity E66.9    Nicotine dependence, cigarettes, uncomplicated F17.210    Gout M10.9    End-stage renal disease on hemodialysis ([facility]) N18.6, Z99.2    Anemia of chronic disease D63.8    AVN (avascular necrosis of bone) ([facility]) M87.00    Altered mental status, unspecified R41.82    S/P total right hip arthroplasty Z96.641    Diplopia H53.2    Right thalamic stroke ([facility]) I63.81    Hematoma of hip, left, initial encounter S70.02XA    Acute psychosis ([facility]) F23        Admission Condition: poor    Discharged Condition: stable    Indication for Admission: threat to self    History of Present Illnes (present tense wording is of findings from admission exam and are not necessarily indicative of current findings):   Flo Mccoy is a 53 y.o. male who has a past medical history of chronic kidney disease, diabetes, gout, and hypertension. Patient presented to the ED with suicidal

## 2024-04-16 ENCOUNTER — HOSPITAL ENCOUNTER (OUTPATIENT)
Dept: PHYSICAL THERAPY | Age: 54
Setting detail: THERAPIES SERIES
Discharge: HOME | End: 2024-04-16
Payer: MEDICARE

## 2024-04-16 PROCEDURE — 97110 THERAPEUTIC EXERCISES: CPT

## 2024-04-16 PROCEDURE — 97162 PT EVAL MOD COMPLEX 30 MIN: CPT

## 2024-04-16 ASSESSMENT — PAIN SCALES - GENERAL: PAINLEVEL_OUTOF10: 4

## 2024-04-16 NOTE — PROGRESS NOTES
Goal 2: Pt will increase L hip strength to >/=4 to 4+/5 in order to increase ambulation tolerance  Long Term Goal 3: Pt will increase dynamic standing balance to Good in order to reduce fall risk  Long Term Goal 4: Pt will ambulate >/=350 to 500 feet with LRAD Mod I with good heel to toe gait pattern in order to increase independence  Long Term Goal 5: Pt will report pain no greater than 3/10 with all functional mobility      Patient Goals : \"to get stronger and get rid of L hip pain\"        Minutes Tracking:  Time In: 1048  Time Out: 1140  Minutes: 52  Timed Code Treatment Minutes: 50 Minutes        Olivia Miranda PT, DPT    4/16/2024  Electronically signed by Olivia Miranda PT on 4/16/2024 at 1:23 PM

## 2024-04-16 NOTE — PLAN OF CARE
[facility]           Phone: [phone]             Outpatient Physical Therapy  Fax: [phone]                                           Date: 2024  Patient: Flo Mccoy : 1970 CSN #: [id number]   Referring Physician: Bahman Schmitt MD      [x] Plan of Care   [] Updated Plan of Care    Dates of Service to Include: 2024 to 24    Diagnosis:  S72.115D nondisplaced fracture of greater trochanter of L femur, subsequent encounter for closed fracture with routine healing     Rehab (Treatment) Diagnosis:  L hip pain         Onset Date:  10/09/23    Attendance  Total # of Visits to Date: 1        Assessment  Assessment: Pt is a 53 year old male that presents with chronic hip pain post L CHRISTOPHER. Pt presents with significant L hip weakness, antalgic gait with decreased heel to toe gait pattern. Pt requires use of FWW and presents with fair - dynamic standing balance. Pt will benfit from skilled PT in order to address these deficits.      Goals  Short Term Goals  Time Frame for Short Term Goals: 3 weeks  Short Term Goal 1: Pt will be educated on his POC and HEP-met  Short Term Goal 2: Pt will initiate L hip strengthening with good tolerance in order to increase functional strength  Long Term Goals  Time Frame for Long Term Goals : 6 weeks  Long Term Goal 1: Pt will be safe and independent with his HEP  Long Term Goal 2: Pt will increase L hip strength to >/=4 to 4+/5 in order to increase ambulation tolerance  Long Term Goal 3: Pt will increase dynamic standing balance to Good in order to reduce fall risk  Long Term Goal 4: Pt will ambulate >/=350 to 500 feet with LRAD Mod I with good heel to toe gait pattern in order to increase independence  Long Term Goal 5: Pt will report pain no greater than 3/10 with all functional mobility     Prognosis  Therapy Prognosis: Fair    Treatment Plan   Plan Frequency:

## 2024-04-18 ENCOUNTER — HOSPITAL ENCOUNTER (OUTPATIENT)
Dept: PHYSICAL THERAPY | Age: 54
Setting detail: THERAPIES SERIES
Discharge: HOME | End: 2024-04-18
Payer: MEDICARE

## 2024-04-18 PROCEDURE — 97110 THERAPEUTIC EXERCISES: CPT

## 2024-04-18 PROCEDURE — 97116 GAIT TRAINING THERAPY: CPT

## 2024-04-18 NOTE — PROGRESS NOTES
Physical Therapy  [facility]  Inpatient/Observation/Outpatient Rehabilitation    Date: 2024  Patient Name: Flo Mccoy       [] Inpatient Acute/Observation       [x]  Outpatient  : 1970     Plan of Care/Recert ends    [x] Pt no showed for scheduled appointment    [] Pt refused/declined therapy at this time due to:           [] Pt cancelled due to:  [] No Reason Given   [] Sick/ill   [] Other:    [] Evaluation held by RN/Provider due to:    [] High Heart Rate   [] High Blood Pressure   [] Orthopedic Consult   [] Hgb < 7   [] Other:    [] Pt does not require skilled services due to:      Therapist/Assistant will attempt to see this patient, at our earliest opportunity.       Lyubov Cowart Date: 2024

## 2024-04-18 NOTE — PROGRESS NOTES
.Phone: [phone]                 [facility]           Fax: [phone]                           Outpatient Physical Therapy                                                                            Daily Note    Patient: Flo Mccoy : 1970  [facility] #: [id number]   Referring Physician: Bahman Schmitt MD  Date: 2024    Diagnosis: S72.115D nondisplaced fracture of greater trochanter of L femur, subsequent encounter for closed fracture with routine healing  Treatment Diagnosis: L hip pain    Onset Date: 10/09/23  PT Insurance Information: Medicare UHC  Total # of Visits Approved: 12 Per Physician Order  Total # of Visits to Date: 2    24 Plan of Care/Recert Due    Pre-Treatment Pain:  5/10  Subjective: Pt reports 5/10 pain today in L hip upon arrival to therapy today.    Exercises:  Exercise 1: HEP quad set, heel slides, SAQ, hip abd  Exercise 2: quad set, heel slide x10  Exercise 3: hip abd, SAQ x10  Exercise 4: SciFit x10 min  Exercise 5: Side stepping at counter x3 laps  Exercise 6: flat joy step over/weight shifting x5 BLE  Exercise 7: 1 large lap around clinic with RW    Modality:   CP applied post session x10 min to L hip to reduce discomfort    Assessment  Assessment: Pt with fair tolerance to session today, focusing on strengthening and ROM with pt reporting increased discomfort towards end of reps. Pt continues to demonstrate antalgic gait with decreased heel to toe sequencing, verbal cues and demonstration provided to correct gait abnormalities with fair carry over noted. Applied CP post session to L hip to reduce discomfort with relief noted.    Activity Tolerance  Activity Tolerance: Patient tolerated treatment well, Patient limited by pain    Patient Education  Patient Education: HEP  Pt verbalized/demonstrated good understanding:     [x] Yes         [] No, pt required further clarification.    Post Treatment Pain:  4/10      Plan  Plan Frequency: 2x/wk  Plan

## 2024-04-23 ENCOUNTER — HOSPITAL ENCOUNTER (OUTPATIENT)
Dept: PHYSICAL THERAPY | Age: 54
Setting detail: THERAPIES SERIES
Discharge: HOME | End: 2024-04-23
Payer: MEDICARE

## 2024-04-23 NOTE — PROGRESS NOTES
Physical Therapy  [facility]  Inpatient/Observation/Outpatient Rehabilitation    Date: 2024  Patient Name: Flo Mccoy       [] Inpatient Acute/Observation       [x]  Outpatient  : 1970     Plan of Care/Recert ends    [] Pt no showed for scheduled appointment    [] Pt refused/declined therapy at this time due to:           [x] Pt cancelled due to:  [] No Reason Given   [x] Sick/ill   [] Other:    [] Evaluation held by RN/Provider due to:    [] High Heart Rate   [] High Blood Pressure   [] Orthopedic Consult   [] Hgb < 7   [] Other:    [] Pt does not require skilled services due to:      Therapist/Assistant will attempt to see this patient, at our earliest opportunity.       Lyubov Cowart Date: 2024

## 2024-04-24 ENCOUNTER — APPOINTMENT (OUTPATIENT)
Dept: PHYSICAL THERAPY | Age: 54
End: 2024-04-24
Payer: MEDICARE

## 2024-04-25 ENCOUNTER — HOSPITAL ENCOUNTER (OUTPATIENT)
Dept: PHYSICAL THERAPY | Age: 54
Setting detail: THERAPIES SERIES
Discharge: HOME | End: 2024-04-25
Payer: MEDICARE

## 2024-04-25 PROCEDURE — 97110 THERAPEUTIC EXERCISES: CPT

## 2024-04-25 NOTE — PROGRESS NOTES
Phone: [phone]                 [facility]           Fax: [phone]                           Outpatient Physical Therapy                                                                            Daily Note    Patient: Flo Mccoy : 1970  [facility] #: [id number]   Referring Physician: Bahman Schmitt MD  Date: 2024       Treatment Diagnosis: L hip pain    Onset Date: 10/09/23  PT Insurance Information: Medicare UHC  Total # of Visits Approved: 12 Per Physician Order  Total # of Visits to Date: 3  No Show: 0  Canceled Appointment: 1    24 Plan of Care/Recert Due    Pre-Treatment Pain:  /10  Subjective: Pt reports 5/10 pain today in L hip upon arrival to therapy today.    Exercises:  Exercise 1: HEP quad set, heel slides, SAQ, hip abd  Exercise 2: quad set, heel slide x10  Exercise 3: hip abd, SAQ x10  Exercise 4: SciFit x10 min  Exercise 5: Side stepping at counter x3 laps  Exercise 6: flat joy step over/weight shifting x5 BLE  Exercise 7: 1 large lap around clinic with RW  Exercise 8: Standing sink exericses B LE x10  Exercise 9: step ups B LE x5 each      Modality:     Modality Flow Sheet:   Performed (X) Tx Modality    Electrical Stim:      Ultrasound: ___ W/cm2 x ___ mins  Duty factor: __100%  __50%  __20% __10%  Head size: 10 mm      ______ Other:   MHz: __1mHz __2 mHz  __3mHz  Location:                                          Hot Pack:   x Cold Pack:x10' L hip for decreased pain and swelling       Assessment  Assessment: Pt. tolerated treatment well, does have noted increased pain with joy step over. Noted increased pain with L standing sink exercises but no increased pain with WB. Will progress per pt. tolerance.    Activity Tolerance  Activity Tolerance: Patient tolerated treatment well    Patient Education  Patient Education: HEP  Pt verbalized/demonstrated good understanding:     [x] Yes         [] No, pt required further clarification.       Post Treatment Pain:

## 2024-04-30 ENCOUNTER — HOSPITAL ENCOUNTER (OUTPATIENT)
Dept: PHYSICAL THERAPY | Age: 54
Setting detail: THERAPIES SERIES
Discharge: HOME | End: 2024-04-30
Payer: MEDICARE

## 2024-04-30 PROCEDURE — 97110 THERAPEUTIC EXERCISES: CPT

## 2024-05-01 ENCOUNTER — APPOINTMENT (OUTPATIENT)
Dept: PHYSICAL THERAPY | Age: 54
End: 2024-05-01
Payer: MEDICARE

## 2024-05-01 NOTE — PROGRESS NOTES
Phone: [phone]                 [facility]           Fax: [phone]                           Outpatient Physical Therapy                                                                            Daily Note    Patient: Flo Mccoy : 1970  [facility] #: [id number]   Referring Physician: Bahman Schmitt MD  Date: 2024       Treatment Diagnosis: L hip pain    Onset Date: 10/09/23  PT Insurance Information: Medicare UHC  Total # of Visits Approved: 12 Per Physician Order  Total # of Visits to Date: 4  No Show: 0  Canceled Appointment: 1    24 Plan of Care/Recert Due    Pre-Treatment Pain:  4/10  Subjective: Pt states his current pain is a 4/10.  Pt states he is having a good day today.    Exercises:  Exercise 4: SciFit x10 min  Exercise 5: Side stepping at counter x3 laps  Exercise 6: flat joy step over/weight shifting x5 BLE  Exercise 7: 1 large lap around clinic with sc  Exercise 9: step ups B LE x5 each  Exercise 10: sit<>stands 10x2    Assessment  Assessment: Pt continues to display antalgic gait cycle d/t glute weakness.  Continued to advance exercises aimed at strength and functional ROM.  Will continue.    Activity Tolerance  Activity Tolerance: Patient tolerated treatment well    Patient Education  Patient Education: HEP  Pt verbalized/demonstrated good understanding:     [x] Yes         [] No, pt required further clarification.     Post Treatment Pain:  4/10    Plan  Plan Frequency: 2x/wk  Plan weeks: 4 weeks       Goals  (Total # of Visits to Date: 4)      Short Term Goals  Time Frame for Short Term Goals: 3 weeks  Short Term Goal 1: Pt will be educated on his POC and HEP-met  Short Term Goal 2: Pt will initiate L hip strengthening with good tolerance in order to increase functional strength    Long Term Goals  Time Frame for Long Term Goals : 6 weeks  Long Term Goal 1: Pt will be safe and independent with his HEP  Long Term Goal 2: Pt will increase L hip strength to

## 2024-05-02 ENCOUNTER — HOSPITAL ENCOUNTER (OUTPATIENT)
Dept: PHYSICAL THERAPY | Age: 54
Setting detail: THERAPIES SERIES
Discharge: HOME | End: 2024-05-02

## 2024-05-02 NOTE — PROGRESS NOTES
Physical Therapy  [facility]  Inpatient/Observation/Outpatient Rehabilitation    Date: 2024  Patient Name: Flo Mccoy       [] Inpatient Acute/Observation       [x]  Outpatient  : 1970     Plan of Care/Recert ends    [] Pt no showed for scheduled appointment    [] Pt refused/declined therapy at this time due to:           [x] Pt cancelled due to:  [] No Reason Given   [] Sick/ill   [x] Other: Other Dr. Butler.    [] Evaluation held by RN/Provider due to:    [] High Heart Rate   [] High Blood Pressure   [] Orthopedic Consult   [] Hgb < 7   [] Other:    [] Pt does not require skilled services due to:      Therapist/Assistant will attempt to see this patient, at our earliest opportunity.       Lyubov Cowart Date: 2024

## 2024-05-03 ENCOUNTER — APPOINTMENT (OUTPATIENT)
Dept: PHYSICAL THERAPY | Age: 54
End: 2024-05-03
Payer: MEDICARE

## 2024-05-07 ENCOUNTER — HOSPITAL ENCOUNTER (OUTPATIENT)
Dept: OCCUPATIONAL THERAPY | Age: 54
Setting detail: THERAPIES SERIES
Discharge: HOME | End: 2024-05-07
Payer: MEDICARE

## 2024-05-07 ENCOUNTER — HOSPITAL ENCOUNTER (OUTPATIENT)
Dept: PHYSICAL THERAPY | Age: 54
Setting detail: THERAPIES SERIES
Discharge: HOME | End: 2024-05-07
Payer: MEDICARE

## 2024-05-07 PROCEDURE — 97110 THERAPEUTIC EXERCISES: CPT

## 2024-05-07 PROCEDURE — 97166 OT EVAL MOD COMPLEX 45 MIN: CPT

## 2024-05-07 NOTE — PROGRESS NOTES
Phone: [phone]                 [facility]           Fax: [phone]                           Outpatient Physical Therapy                                                                            Daily Note    Patient: Flo Mccoy : 1970  [facility] #: [id number]   Referring Physician: Bahman Schmitt MD  Date: 2024    Diagnosis: S72.115D nondisplaced fracture of greater trochanter of L femur, subsequent encounter for closed fracture with routine healing  Treatment Diagnosis: L hip pain    Onset Date: 10/09/23  PT Insurance Information: Medicare UHC  Total # of Visits Approved: 12 Per Physician Order  Total # of Visits to Date: 24 Plan of Care/Recert Due    Pre-Treatment Pain:  6/10  Subjective: Pt reports pain of 6/10 in L hip. Pt states he isn't seeing a ton of improvement yet    Exercises:  Exercise 1: HEP quad set, heel slides, SAQ, hip abd  Exercise 4: SciFit x10 min  Exercise 5: Side stepping at counter x3 laps  Exercise 6: flat joy step over/weight shifting x5 BLE  Exercise 7: 1 large lap around clinic with sc  Exercise 8: Standing sink exericses B LE x10  Exercise 9: step ups B LE x5 each  Exercise 10: sit<>stands 10x2      Assessment  Assessment: Pt fatigued easily this date with exercise. Pt reported increased L hip pain and weakness. Pt was able to ambulate with SPC with glut med weakness.    Activity Tolerance  Activity Tolerance: Patient tolerated treatment well    Patient Education  Patient Education: increasing activity at home  Pt verbalized/demonstrated good understanding:     [x] Yes         [] No, pt required further clarification.       Post Treatment Pain:  6/10      Plan  Plan Frequency: 2x/wk  Plan weeks: 4 weeks       Goals  (Total # of Visits to Date: 5)      Short Term Goals  Time Frame for Short Term Goals: 3 weeks  Short Term Goal 1: Pt will be educated on his POC and HEP-met  Short Term Goal 2: Pt will initiate L hip strengthening with good

## 2024-05-07 NOTE — PLAN OF CARE
significant loss of ROM B shoulder d/t pain levels. Patient limited in all directions. Patient also with decreased strength. Patient would benefit from OT services to address deficits to improve functional ROM and overall independence with I/ADL.    Treatment this date:  ROM BUE followed by pulleys x 3 min x flexion and abduction. VC for compensatory techniques. Patient issued HEP c education and completed with min A for return demo and proper techniques. Please see below for exercises.     Evaluation complexity / Serverity  []Low      [x] Moderate       [] High    Home Program Initiated:   [ x ] Written    [  x] Verbal    [  x] Demo   Comprehension of Education: [x] Yes [] No [] Needs Review  [x]Plans /[x] Goals, [x]Risks/ [x] Benefits discussed with [x] Patient []Family    ______________________________________________________________________    Plan of Care dates of services to include:  5/7/2024 to 06/04/24       Treatment Plan:  Frequency/Duration:   2    Times a week, for   4   Weeks.    [x] Therapeutic Exercise 51199 [x] Electrical Stim /84194    [x] Manual Therapy 18093                   [x] Dry Needling 20560 / 20561  [x] Therapeutic Activities 95874          [x] Parrafin Bath 83222  [x] Written Home Program                  [x] Hot Pack/Cold Pack 94813  [] Iontophoresis 00668  [x] Ultrasound 92357  [x]Neuro Re-Ed  14796  [x] Fluidotherapy/Whirlpool 43574   [x] AROM                                          [x] Ortho Fit/Train 30114  [x] PROM/Stretching                     [x] Ortho Re-Fit/Check  60078  ______________________________________________________________________       HEP Weight/  Level Reps/Time Comments    Cane exercises x 2x/day 10 reps with 5 second hold x 2    Pendulums  x 1' each x 2 - 3/day     Scap clocks x 2x/day 10 reps x 2 sets                           Electronically signed by TIMOTHY Sales/L, T 5/7/2024 3:08 PM    Minutes Tracking:  Time In: 1300  Time Out: 1358  Minutes:

## 2024-05-08 ENCOUNTER — APPOINTMENT (OUTPATIENT)
Dept: PHYSICAL THERAPY | Age: 54
End: 2024-05-08
Payer: MEDICARE

## 2024-05-09 ENCOUNTER — HOSPITAL ENCOUNTER (OUTPATIENT)
Dept: PHYSICAL THERAPY | Age: 54
Setting detail: THERAPIES SERIES
Discharge: HOME | End: 2024-05-09
Payer: MEDICARE

## 2024-05-10 ENCOUNTER — APPOINTMENT (OUTPATIENT)
Dept: PHYSICAL THERAPY | Age: 54
End: 2024-05-10
Payer: MEDICARE

## 2024-05-14 ENCOUNTER — APPOINTMENT (OUTPATIENT)
Dept: OCCUPATIONAL THERAPY | Age: 54
End: 2024-05-14
Payer: MEDICARE

## 2024-05-14 ENCOUNTER — APPOINTMENT (OUTPATIENT)
Dept: PHYSICAL THERAPY | Age: 54
End: 2024-05-14
Payer: MEDICARE

## 2024-05-16 ENCOUNTER — APPOINTMENT (OUTPATIENT)
Dept: OCCUPATIONAL THERAPY | Age: 54
End: 2024-05-16
Payer: MEDICARE

## 2024-05-16 ENCOUNTER — APPOINTMENT (OUTPATIENT)
Dept: PHYSICAL THERAPY | Age: 54
End: 2024-05-16
Payer: MEDICARE

## 2024-05-21 ENCOUNTER — APPOINTMENT (OUTPATIENT)
Dept: PHYSICAL THERAPY | Age: 54
End: 2024-05-21
Payer: MEDICARE

## 2024-05-21 ENCOUNTER — APPOINTMENT (OUTPATIENT)
Dept: OCCUPATIONAL THERAPY | Age: 54
End: 2024-05-21
Payer: MEDICARE

## 2024-05-23 ENCOUNTER — APPOINTMENT (OUTPATIENT)
Dept: PHYSICAL THERAPY | Age: 54
End: 2024-05-23
Payer: MEDICARE

## 2024-05-23 ENCOUNTER — APPOINTMENT (OUTPATIENT)
Dept: OCCUPATIONAL THERAPY | Age: 54
End: 2024-05-23
Payer: MEDICARE

## 2024-05-28 ENCOUNTER — APPOINTMENT (OUTPATIENT)
Dept: GENERAL RADIOLOGY | Age: 54
End: 2024-05-28
Payer: MEDICARE

## 2024-05-28 ENCOUNTER — HOSPITAL ENCOUNTER (EMERGENCY)
Age: 54
Discharge: HOME | End: 2024-05-28
Payer: MEDICARE

## 2024-05-28 ENCOUNTER — APPOINTMENT (OUTPATIENT)
Dept: OCCUPATIONAL THERAPY | Age: 54
End: 2024-05-28
Payer: MEDICARE

## 2024-05-28 ENCOUNTER — APPOINTMENT (OUTPATIENT)
Dept: PHYSICAL THERAPY | Age: 54
End: 2024-05-28
Payer: MEDICARE

## 2024-05-28 VITALS
OXYGEN SATURATION: 96 % | TEMPERATURE: 97 F | DIASTOLIC BLOOD PRESSURE: 77 MMHG | RESPIRATION RATE: 20 BRPM | SYSTOLIC BLOOD PRESSURE: 147 MMHG | HEART RATE: 73 BPM

## 2024-05-28 DIAGNOSIS — M25.551 RIGHT HIP PAIN: Primary | ICD-10-CM
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
## 2024-05-28 PROCEDURE — 99285 EMERGENCY DEPT VISIT HI MDM: CPT

## 2024-05-28 PROCEDURE — 73502 X-RAY EXAM HIP UNI 2-3 VIEWS: CPT

## 2024-05-28 PROCEDURE — 71045 X-RAY EXAM CHEST 1 VIEW: CPT

## 2024-05-28 PROCEDURE — 6370000000 HC RX 637 (ALT 250 FOR IP)

## 2024-05-28 RX ORDER — HYDROCODONE BITARTRATE AND ACETAMINOPHEN 5; 325 MG/1; MG/1
1 TABLET ORAL ONCE
Status: COMPLETED | OUTPATIENT
Start: 2024-05-28 | End: 2024-05-28

## 2024-05-28 RX ADMIN — HYDROCODONE BITARTRATE AND ACETAMINOPHEN 1 TABLET: 5; 325 TABLET ORAL at 17:39

## 2024-05-28 ASSESSMENT — ENCOUNTER SYMPTOMS
VOMITING: 0
NAUSEA: 0

## 2024-05-28 ASSESSMENT — PAIN SCALES - GENERAL: PAINLEVEL_OUTOF10: 8

## 2024-05-28 NOTE — ED PROVIDER NOTES
[facility] ED  Emergency Department Encounter  Emergency Medicine Attending     Pt Name:Flo Mccoy  MRN: [id number]  Birthdate 1970  Date of evaluation: 5/28/24  PCP:  No primary care provider on file.  Note Started: 5:15 PM EDT      CHIEF COMPLAINT       Chief Complaint   Patient presents with    Fall     Patient states that he tripped over a tree root and fell onto the right side. Patient does state his head hit the ground but denies any pain or LOC.     Hip Pain     Right sided.        HISTORY OF PRESENT ILLNESS  (Location/Symptom, Timing/Onset, Context/Setting, Quality, Duration, Modifying Factors, Severity.)      Flo Mccoy is a 53 y.o. male who presents with history of hypertension, CKD, diabetes presents for evaluation of ground-level fall.  Patient states that he was outdoors grilling and excellently tripped over a tree root and fell onto his right side.  Denies head trauma or LOC.  He states that his right hip is in severe pain.  He does have history of right hip arthroplasty.  He did take Tylenol prior to arrival.  Denies any numbness.    PAST MEDICAL / SURGICAL / SOCIAL / FAMILY HISTORY      has a past medical history of Chronic kidney disease, Depression, Diabetes mellitus (HCC), Dialysis patient (HCC), Gout, and Hypertension.     has a past surgical history that includes hernia repair; shoulder surgery (Left); Ankle Closed Reduction (Right, 08/12/2019); Ankle fracture surgery (Right, 08/13/2019); AV fistula creation (Left); Eye surgery (Left, 04/03/2023); Cataract removal (Right, 09/25/2023); Eye surgery (Right, 09/25/2023); and joint replacement.    Social History     Socioeconomic History    Marital status: Single     Spouse name: Not on file    Number of children: Not on file    Years of education: Not on file    Highest education level: Not on file   Occupational History    Not on file   Tobacco Use    Smoking status: Every Day     Current packs/day: 0.25     Types:

## 2024-05-30 ENCOUNTER — APPOINTMENT (OUTPATIENT)
Dept: PHYSICAL THERAPY | Age: 54
End: 2024-05-30
Payer: MEDICARE

## 2024-05-30 ENCOUNTER — APPOINTMENT (OUTPATIENT)
Dept: OCCUPATIONAL THERAPY | Age: 54
End: 2024-05-30
Payer: MEDICARE

## 2024-06-20 ENCOUNTER — APPOINTMENT (OUTPATIENT)
Dept: CT IMAGING | Age: 54
End: 2024-06-20
Payer: MEDICARE

## 2024-06-20 ENCOUNTER — HOSPITAL ENCOUNTER (EMERGENCY)
Age: 54
Discharge: HOME | End: 2024-06-20
Attending: EMERGENCY MEDICINE
Payer: MEDICARE

## 2024-06-20 VITALS
RESPIRATION RATE: 18 BRPM | SYSTOLIC BLOOD PRESSURE: 155 MMHG | HEART RATE: 61 BPM | TEMPERATURE: 98.3 F | DIASTOLIC BLOOD PRESSURE: 64 MMHG | OXYGEN SATURATION: 96 %

## 2024-06-20 DIAGNOSIS — R09.1 PLEURISY: ICD-10-CM

## 2024-06-20 DIAGNOSIS — J90 PLEURAL EFFUSION: Primary | ICD-10-CM

## 2024-06-20 LAB
ALBUMIN SERPL-MCNC: 4 G/DL (ref 3.5–5.2)
ALBUMIN/GLOB SERPL: 1.1 {RATIO} (ref 1–2.5)
ALP SERPL-CCNC: 288 U/L (ref 40–129)
ALT SERPL-CCNC: 28 U/L (ref 5–41)
ANION GAP SERPL CALCULATED.3IONS-SCNC: 13 MMOL/L (ref 9–17)
AST SERPL-CCNC: 38 U/L
BASOPHILS # BLD: 0.03 K/UL (ref 0–0.2)
BASOPHILS NFR BLD: 0 % (ref 0–2)
BILIRUB SERPL-MCNC: 0.4 MG/DL (ref 0.3–1.2)
BILIRUB UR QL STRIP: NEGATIVE
BUN SERPL-MCNC: 39 MG/DL (ref 6–20)
BUN/CREAT SERPL: 7 (ref 9–20)
CALCIUM SERPL-MCNC: 8.8 MG/DL (ref 8.6–10.4)
CHLORIDE SERPL-SCNC: 96 MMOL/L (ref 98–107)
CLARITY UR: CLEAR
CO2 SERPL-SCNC: 28 MMOL/L (ref 20–31)
COLOR UR: YELLOW
CREAT SERPL-MCNC: 5.3 MG/DL (ref 0.7–1.2)
EOSINOPHIL # BLD: 0.29 K/UL (ref 0–0.44)
EOSINOPHILS RELATIVE PERCENT: 4 % (ref 1–4)
EPI CELLS #/AREA URNS HPF: NORMAL /HPF (ref 0–5)
ERYTHROCYTE [DISTWIDTH] IN BLOOD BY AUTOMATED COUNT: 14.2 % (ref 11.8–14.4)
GFR, ESTIMATED: 12 ML/MIN/1.73M2
GLUCOSE SERPL-MCNC: 119 MG/DL (ref 70–99)
GLUCOSE UR STRIP-MCNC: NEGATIVE MG/DL
HCT VFR BLD AUTO: 33.2 % (ref 40.7–50.3)
HGB BLD-MCNC: 10.9 G/DL (ref 13–17)
HGB UR QL STRIP.AUTO: ABNORMAL
IMM GRANULOCYTES # BLD AUTO: <0.03 K/UL (ref 0–0.3)
IMM GRANULOCYTES NFR BLD: 0 %
KETONES UR STRIP-MCNC: NEGATIVE MG/DL
LEUKOCYTE ESTERASE UR QL STRIP: NEGATIVE
LYMPHOCYTES NFR BLD: 1.29 K/UL (ref 1.1–3.7)
LYMPHOCYTES RELATIVE PERCENT: 18 % (ref 24–43)
MCH RBC QN AUTO: 34.4 PG (ref 25.2–33.5)
MCHC RBC AUTO-ENTMCNC: 32.8 G/DL (ref 28.4–34.8)
MCV RBC AUTO: 104.7 FL (ref 82.6–102.9)
MONOCYTES NFR BLD: 0.47 K/UL (ref 0.1–1.2)
MONOCYTES NFR BLD: 6 % (ref 3–12)
NEUTROPHILS NFR BLD: 72 % (ref 36–65)
NEUTS SEG NFR BLD: 5.2 K/UL (ref 1.5–8.1)
NITRITE UR QL STRIP: NEGATIVE
NRBC BLD-RTO: 0 PER 100 WBC
PH UR STRIP: 8 [PH] (ref 5–9)
PLATELET # BLD AUTO: 142 K/UL (ref 138–453)
PMV BLD AUTO: 10.5 FL (ref 8.1–13.5)
POTASSIUM SERPL-SCNC: 5.2 MMOL/L (ref 3.7–5.3)
PROT SERPL-MCNC: 7.7 G/DL (ref 6.4–8.3)
PROT UR STRIP-MCNC: ABNORMAL MG/DL
RBC # BLD AUTO: 3.17 M/UL (ref 4.21–5.77)
RBC #/AREA URNS HPF: NORMAL /HPF (ref 0–2)
SODIUM SERPL-SCNC: 137 MMOL/L (ref 135–144)
SP GR UR STRIP: 1.01 (ref 1.01–1.02)
UROBILINOGEN UR STRIP-ACNC: NORMAL EU/DL (ref 0–1)
WBC #/AREA URNS HPF: NORMAL /HPF (ref 0–5)
WBC OTHER # BLD: 7.3 K/UL (ref 3.5–11.3)

## 2024-06-20 PROCEDURE — 71260 CT THORAX DX C+: CPT

## 2024-06-20 PROCEDURE — 6370000000 HC RX 637 (ALT 250 FOR IP): Performed by: EMERGENCY MEDICINE

## 2024-06-20 PROCEDURE — 96375 TX/PRO/DX INJ NEW DRUG ADDON: CPT

## 2024-06-20 PROCEDURE — 96374 THER/PROPH/DIAG INJ IV PUSH: CPT

## 2024-06-20 PROCEDURE — 6360000004 HC RX CONTRAST MEDICATION: Performed by: EMERGENCY MEDICINE

## 2024-06-20 PROCEDURE — 80053 COMPREHEN METABOLIC PANEL: CPT

## 2024-06-20 PROCEDURE — 81001 URINALYSIS AUTO W/SCOPE: CPT

## 2024-06-20 PROCEDURE — 99285 EMERGENCY DEPT VISIT HI MDM: CPT

## 2024-06-20 PROCEDURE — 87086 URINE CULTURE/COLONY COUNT: CPT

## 2024-06-20 PROCEDURE — 85025 COMPLETE CBC W/AUTO DIFF WBC: CPT

## 2024-06-20 PROCEDURE — 6360000002 HC RX W HCPCS: Performed by: EMERGENCY MEDICINE

## 2024-06-20 RX ORDER — LIDOCAINE 50 MG/G
1 PATCH TOPICAL DAILY
Qty: 10 PATCH | Refills: 0 | Status: SHIPPED | OUTPATIENT
Start: 2024-06-20 | End: 2024-06-30

## 2024-06-20 RX ORDER — HYDROCODONE BITARTRATE AND ACETAMINOPHEN 5; 325 MG/1; MG/1
1 TABLET ORAL EVERY 6 HOURS PRN
Qty: 8 TABLET | Refills: 0 | Status: SHIPPED | OUTPATIENT
Start: 2024-06-20 | End: 2024-06-23

## 2024-06-20 RX ORDER — FENTANYL CITRATE 50 UG/ML
75 INJECTION, SOLUTION INTRAMUSCULAR; INTRAVENOUS ONCE
Status: COMPLETED | OUTPATIENT
Start: 2024-06-20 | End: 2024-06-20

## 2024-06-20 RX ORDER — MORPHINE SULFATE 4 MG/ML
4 INJECTION, SOLUTION INTRAMUSCULAR; INTRAVENOUS ONCE
Status: COMPLETED | OUTPATIENT
Start: 2024-06-20 | End: 2024-06-20

## 2024-06-20 RX ORDER — LIDOCAINE 4 G/G
1 PATCH TOPICAL DAILY
Status: DISCONTINUED | OUTPATIENT
Start: 2024-06-20 | End: 2024-06-21 | Stop reason: HOSPADM

## 2024-06-20 RX ORDER — HYDROCODONE BITARTRATE AND ACETAMINOPHEN 5; 325 MG/1; MG/1
1 TABLET ORAL ONCE
Status: COMPLETED | OUTPATIENT
Start: 2024-06-20 | End: 2024-06-20

## 2024-06-20 RX ADMIN — IOPAMIDOL 75 ML: 755 INJECTION, SOLUTION INTRAVENOUS at 20:10

## 2024-06-20 RX ADMIN — HYDROCODONE BITARTRATE AND ACETAMINOPHEN 1 TABLET: 5; 325 TABLET ORAL at 21:53

## 2024-06-20 RX ADMIN — FENTANYL CITRATE 75 MCG: 50 INJECTION INTRAMUSCULAR; INTRAVENOUS at 20:41

## 2024-06-20 RX ADMIN — MORPHINE SULFATE 4 MG: 4 INJECTION, SOLUTION INTRAMUSCULAR; INTRAVENOUS at 19:55

## 2024-06-20 ASSESSMENT — PAIN SCALES - GENERAL
PAINLEVEL_OUTOF10: 8
PAINLEVEL_OUTOF10: 9
PAINLEVEL_OUTOF10: 7

## 2024-06-20 ASSESSMENT — PAIN DESCRIPTION - LOCATION
LOCATION: BACK;FLANK
LOCATION: FLANK
LOCATION: BACK

## 2024-06-20 ASSESSMENT — PAIN DESCRIPTION - ORIENTATION
ORIENTATION: LOWER
ORIENTATION: RIGHT

## 2024-06-20 ASSESSMENT — PAIN DESCRIPTION - PAIN TYPE: TYPE: ACUTE PAIN

## 2024-06-20 ASSESSMENT — PAIN - FUNCTIONAL ASSESSMENT: PAIN_FUNCTIONAL_ASSESSMENT: 0-10

## 2024-06-20 ASSESSMENT — PAIN DESCRIPTION - FREQUENCY: FREQUENCY: CONTINUOUS

## 2024-06-20 ASSESSMENT — PAIN DESCRIPTION - DESCRIPTORS
DESCRIPTORS: SHARP
DESCRIPTORS: ACHING

## 2024-06-21 NOTE — DISCHARGE INSTRUCTIONS
Expect a call from the pulmonology clinic to schedule an appointment.    Return to the ED for worsening pain, fever, difficulty breathing or any other concerns.    I recommend initial attempt at pain control using the lidocaine patches as prescribed.  For breakthrough pain you may use the hydrocodone.    Use caution while taking Norco (hydrocodone).  It may cause drowsiness.  Do not drive or perform dangerous activity while taking this medication and do not take with alcohol or other sedatives.

## 2024-06-22 LAB
MICROORGANISM SPEC CULT: NORMAL
SERVICE CMNT-IMP: NORMAL
SPECIMEN DESCRIPTION: NORMAL

## 2024-06-23 ENCOUNTER — HOSPITAL ENCOUNTER (EMERGENCY)
Age: 54
Discharge: HOME | End: 2024-06-24
Attending: EMERGENCY MEDICINE
Payer: MEDICARE

## 2024-06-23 ENCOUNTER — APPOINTMENT (OUTPATIENT)
Dept: GENERAL RADIOLOGY | Age: 54
End: 2024-06-23
Payer: MEDICARE

## 2024-06-23 DIAGNOSIS — R07.81 RIB PAIN ON RIGHT SIDE: Primary | ICD-10-CM

## 2024-06-23 LAB
ANION GAP SERPL CALCULATED.3IONS-SCNC: 14 MMOL/L (ref 9–17)
BASOPHILS # BLD: 0.03 K/UL (ref 0–0.2)
BASOPHILS NFR BLD: 0 % (ref 0–2)
BUN SERPL-MCNC: 48 MG/DL (ref 6–20)
BUN/CREAT SERPL: 8 (ref 9–20)
CALCIUM SERPL-MCNC: 8.2 MG/DL (ref 8.6–10.4)
CHLORIDE SERPL-SCNC: 91 MMOL/L (ref 98–107)
CO2 SERPL-SCNC: 25 MMOL/L (ref 20–31)
CREAT SERPL-MCNC: 6 MG/DL (ref 0.7–1.2)
EOSINOPHIL # BLD: 0.33 K/UL (ref 0–0.44)
EOSINOPHILS RELATIVE PERCENT: 5 % (ref 1–4)
ERYTHROCYTE [DISTWIDTH] IN BLOOD BY AUTOMATED COUNT: 14.2 % (ref 11.8–14.4)
GFR, ESTIMATED: 10 ML/MIN/1.73M2
GLUCOSE SERPL-MCNC: 94 MG/DL (ref 70–99)
HCT VFR BLD AUTO: 31.3 % (ref 40.7–50.3)
HGB BLD-MCNC: 10.2 G/DL (ref 13–17)
IMM GRANULOCYTES # BLD AUTO: 0.03 K/UL (ref 0–0.3)
IMM GRANULOCYTES NFR BLD: 0 %
LYMPHOCYTES NFR BLD: 1.13 K/UL (ref 1.1–3.7)
LYMPHOCYTES RELATIVE PERCENT: 17 % (ref 24–43)
MCH RBC QN AUTO: 33.7 PG (ref 25.2–33.5)
MCHC RBC AUTO-ENTMCNC: 32.6 G/DL (ref 28.4–34.8)
MCV RBC AUTO: 103.3 FL (ref 82.6–102.9)
MONOCYTES NFR BLD: 0.43 K/UL (ref 0.1–1.2)
MONOCYTES NFR BLD: 6 % (ref 3–12)
NEUTROPHILS NFR BLD: 72 % (ref 36–65)
NEUTS SEG NFR BLD: 4.87 K/UL (ref 1.5–8.1)
NRBC BLD-RTO: 0 PER 100 WBC
PLATELET # BLD AUTO: 124 K/UL (ref 138–453)
PMV BLD AUTO: 10.5 FL (ref 8.1–13.5)
POTASSIUM SERPL-SCNC: 4.8 MMOL/L (ref 3.7–5.3)
RBC # BLD AUTO: 3.03 M/UL (ref 4.21–5.77)
SODIUM SERPL-SCNC: 130 MMOL/L (ref 135–144)
TROPONIN I SERPL HS-MCNC: 28 NG/L (ref 0–22)
TROPONIN I SERPL HS-MCNC: 29 NG/L (ref 0–22)
WBC OTHER # BLD: 6.8 K/UL (ref 3.5–11.3)

## 2024-06-23 PROCEDURE — 93005 ELECTROCARDIOGRAM TRACING: CPT | Performed by: EMERGENCY MEDICINE

## 2024-06-23 PROCEDURE — 6360000002 HC RX W HCPCS: Performed by: EMERGENCY MEDICINE

## 2024-06-23 PROCEDURE — 85025 COMPLETE CBC W/AUTO DIFF WBC: CPT

## 2024-06-23 PROCEDURE — 84484 ASSAY OF TROPONIN QUANT: CPT

## 2024-06-23 PROCEDURE — 6370000000 HC RX 637 (ALT 250 FOR IP): Performed by: EMERGENCY MEDICINE

## 2024-06-23 PROCEDURE — 71046 X-RAY EXAM CHEST 2 VIEWS: CPT

## 2024-06-23 PROCEDURE — 99285 EMERGENCY DEPT VISIT HI MDM: CPT

## 2024-06-23 PROCEDURE — 96372 THER/PROPH/DIAG INJ SC/IM: CPT

## 2024-06-23 PROCEDURE — 80048 BASIC METABOLIC PNL TOTAL CA: CPT

## 2024-06-23 RX ORDER — OXYCODONE HYDROCHLORIDE AND ACETAMINOPHEN 5; 325 MG/1; MG/1
2 TABLET ORAL ONCE
Status: COMPLETED | OUTPATIENT
Start: 2024-06-23 | End: 2024-06-23

## 2024-06-23 RX ORDER — MORPHINE SULFATE 4 MG/ML
4 INJECTION, SOLUTION INTRAMUSCULAR; INTRAVENOUS ONCE
Status: COMPLETED | OUTPATIENT
Start: 2024-06-23 | End: 2024-06-23

## 2024-06-23 RX ORDER — LIDOCAINE 4 G/G
1 PATCH TOPICAL DAILY
Status: DISCONTINUED | OUTPATIENT
Start: 2024-06-23 | End: 2024-06-24 | Stop reason: HOSPADM

## 2024-06-23 RX ORDER — MORPHINE SULFATE 4 MG/ML
4 INJECTION, SOLUTION INTRAMUSCULAR; INTRAVENOUS ONCE
Status: DISCONTINUED | OUTPATIENT
Start: 2024-06-23 | End: 2024-06-23

## 2024-06-23 RX ADMIN — MORPHINE SULFATE 4 MG: 4 INJECTION, SOLUTION INTRAMUSCULAR; INTRAVENOUS at 23:46

## 2024-06-23 RX ADMIN — OXYCODONE HYDROCHLORIDE AND ACETAMINOPHEN 2 TABLET: 5; 325 TABLET ORAL at 20:44

## 2024-06-23 ASSESSMENT — PAIN DESCRIPTION - DESCRIPTORS
DESCRIPTORS: ACHING
DESCRIPTORS: ACHING;SHARP
DESCRIPTORS: SHARP

## 2024-06-23 ASSESSMENT — PAIN SCALES - GENERAL
PAINLEVEL_OUTOF10: 9

## 2024-06-23 ASSESSMENT — PAIN DESCRIPTION - LOCATION
LOCATION: CHEST

## 2024-06-23 ASSESSMENT — PAIN DESCRIPTION - FREQUENCY: FREQUENCY: CONTINUOUS

## 2024-06-23 ASSESSMENT — LIFESTYLE VARIABLES
HOW MANY STANDARD DRINKS CONTAINING ALCOHOL DO YOU HAVE ON A TYPICAL DAY: 1 OR 2
HOW OFTEN DO YOU HAVE A DRINK CONTAINING ALCOHOL: MONTHLY OR LESS

## 2024-06-23 ASSESSMENT — PAIN DESCRIPTION - ORIENTATION
ORIENTATION: RIGHT

## 2024-06-23 ASSESSMENT — PAIN DESCRIPTION - PAIN TYPE: TYPE: ACUTE PAIN

## 2024-06-23 ASSESSMENT — PAIN - FUNCTIONAL ASSESSMENT: PAIN_FUNCTIONAL_ASSESSMENT: 0-10

## 2024-06-24 VITALS
TEMPERATURE: 98.5 F | HEIGHT: 69 IN | SYSTOLIC BLOOD PRESSURE: 116 MMHG | RESPIRATION RATE: 16 BRPM | BODY MASS INDEX: 33.47 KG/M2 | HEART RATE: 60 BPM | WEIGHT: 226 LBS | OXYGEN SATURATION: 97 % | DIASTOLIC BLOOD PRESSURE: 81 MMHG

## 2024-06-24 LAB
EKG ATRIAL RATE: 67 BPM
EKG P AXIS: 6 DEGREES
EKG P-R INTERVAL: 160 MS
EKG Q-T INTERVAL: 448 MS
EKG QRS DURATION: 90 MS
EKG QTC CALCULATION (BAZETT): 473 MS
EKG R AXIS: -20 DEGREES
EKG T AXIS: -8 DEGREES
EKG VENTRICULAR RATE: 67 BPM

## 2024-06-24 PROCEDURE — 93010 ELECTROCARDIOGRAM REPORT: CPT | Performed by: INTERNAL MEDICINE

## 2024-06-24 ASSESSMENT — PAIN SCALES - GENERAL: PAINLEVEL_OUTOF10: 6

## 2024-06-24 ASSESSMENT — PAIN DESCRIPTION - DESCRIPTORS: DESCRIPTORS: ACHING

## 2024-06-24 ASSESSMENT — PAIN DESCRIPTION - ORIENTATION: ORIENTATION: RIGHT

## 2024-06-24 ASSESSMENT — PAIN - FUNCTIONAL ASSESSMENT: PAIN_FUNCTIONAL_ASSESSMENT: 0-10

## 2024-06-24 ASSESSMENT — PAIN DESCRIPTION - LOCATION: LOCATION: CHEST

## 2024-06-24 NOTE — DISCHARGE INSTRUCTIONS
Continue to take your pain medication as prescribed, follow-up with your primary care doctor, go to your dialysis tomorrow for your regularly scheduled appointment.  Return to ER for worsening chest pain shortness of breath difficulty breathing

## 2024-06-24 NOTE — ED PROVIDER NOTES
Historical, MD     REVIEW OF SYSTEMS       See hpi    PHYSICAL EXAM      INITIAL VITALS:   /81   Pulse 60   Temp 98.5 °F (36.9 °C) (Oral)   Resp 16   Ht 1.753 m (5' 9\")   Wt 102.5 kg (226 lb)   SpO2 97%   BMI 33.37 kg/m²     Physical Exam  Constitutional:       Comments: Patient awake alert speaking full sentences no respiratory distress, fistula noted to the left forearm with palpable thrill.   Chest:      Comments: Patient with tenderness to palpation to the right lower posterior rib cage with reproducible tenderness, no rash or skin changes noted to this area.  No crepitus palpated.  He had equal breath sounds bilaterally, and no rales, rhonchi are noted.          DDX/DIAGNOSTIC RESULTS / EMERGENCY DEPARTMENT COURSE / MDM     Medical Decision Making  Patient with continued pain to the right posterior back very tender over the rib cage.  No concern for shingles.  He has got equal breath sounds recent CT of his chest that did show some possible pleural effusion to the right posterior lobe.  Patient is not showing any infectious etiology.  He did have a fall about a week prior to the onset of this pain and he does have some rib fractures on the left side.  The pain is only on the right we will plan on pain medication, check cardiac stuff and x-ray.  Do not feel repeat CT is warranted at this time.    Amount and/or Complexity of Data Reviewed  Labs: ordered.  Radiology: ordered.  ECG/medicine tests: ordered.    Risk  Prescription drug management.        EKG  EKG shows normal sinus rhythm there is a leftward axis deviation no ST elevations.  Ventricular rate of 67 MS interval 160 QRS of 90 and a QT corrected 473.    All EKG's are interpreted by the Emergency Department Physician who either signs or Co-signs this chart in the absence of a cardiologist.    EMERGENCY DEPARTMENT COURSE:      ED Course as of 06/24/24 0230   Sun Jun 23, 2024   5171 Patient continues to describe pain.  He seems like his pain is

## 2024-08-22 ENCOUNTER — OFFICE VISIT (OUTPATIENT)
Dept: PULMONOLOGY | Age: 54
End: 2024-08-22
Payer: MEDICARE

## 2024-08-22 VITALS
TEMPERATURE: 96.7 F | DIASTOLIC BLOOD PRESSURE: 79 MMHG | HEART RATE: 58 BPM | BODY MASS INDEX: 31.82 KG/M2 | HEIGHT: 70 IN | SYSTOLIC BLOOD PRESSURE: 141 MMHG | OXYGEN SATURATION: 99 % | RESPIRATION RATE: 16 BRPM | WEIGHT: 222.3 LBS

## 2024-08-22 DIAGNOSIS — R09.1 PLEURISY: ICD-10-CM

## 2024-08-22 DIAGNOSIS — F17.210 SMOKING GREATER THAN 30 PACK YEARS: ICD-10-CM

## 2024-08-22 DIAGNOSIS — R06.09 DOE (DYSPNEA ON EXERTION): Primary | ICD-10-CM

## 2024-08-22 DIAGNOSIS — J18.9 COMMUNITY ACQUIRED PNEUMONIA OF RIGHT LOWER LOBE OF LUNG: ICD-10-CM

## 2024-08-22 PROCEDURE — 3017F COLORECTAL CA SCREEN DOC REV: CPT | Performed by: STUDENT IN AN ORGANIZED HEALTH CARE EDUCATION/TRAINING PROGRAM

## 2024-08-22 PROCEDURE — 3077F SYST BP >= 140 MM HG: CPT | Performed by: STUDENT IN AN ORGANIZED HEALTH CARE EDUCATION/TRAINING PROGRAM

## 2024-08-22 PROCEDURE — 4004F PT TOBACCO SCREEN RCVD TLK: CPT | Performed by: STUDENT IN AN ORGANIZED HEALTH CARE EDUCATION/TRAINING PROGRAM

## 2024-08-22 PROCEDURE — 3078F DIAST BP <80 MM HG: CPT | Performed by: STUDENT IN AN ORGANIZED HEALTH CARE EDUCATION/TRAINING PROGRAM

## 2024-08-22 PROCEDURE — 99203 OFFICE O/P NEW LOW 30 MIN: CPT | Performed by: STUDENT IN AN ORGANIZED HEALTH CARE EDUCATION/TRAINING PROGRAM

## 2024-08-22 PROCEDURE — G8417 CALC BMI ABV UP PARAM F/U: HCPCS | Performed by: STUDENT IN AN ORGANIZED HEALTH CARE EDUCATION/TRAINING PROGRAM

## 2024-08-22 PROCEDURE — G8427 DOCREV CUR MEDS BY ELIG CLIN: HCPCS | Performed by: STUDENT IN AN ORGANIZED HEALTH CARE EDUCATION/TRAINING PROGRAM

## 2024-08-22 NOTE — PATIENT INSTRUCTIONS
SURVEY:    You may be receiving a survey from [employer] regarding your visit today.    Please complete the survey to enable us to provide the highest quality of care to you and your family.    If you cannot score us a very good on any question, please call the office to discuss how we could have made your experience a very good one.    Thank you.    Please recheck your blood pressure when you go home and make sure you take your prescribed medication if applicable .  Please follow up with your PCP or ER if needed.

## 2024-08-22 NOTE — PROGRESS NOTES
to have severe pleurisy.  Patient already treated with antibiotic course.  -I would repeat the CT chest in about 3 months from last CT chest from June 2024  -Afterward the patient will be started on LDCT for lung cancer screening  -I would obtain PFT to evaluate the baseline of his lung function.  -Patient was educated about the importance of deep breathing exercise.  -The patient was counseled about smoking cessation 5 to 7 minutes all the help and support was offered.  -Encouraged respiratory vaccine intake.  -Return to clinic in 3 months        KAYLEY COLUNGA MD  Pulmonary critical care attending physician  [facility]  8/22/2024, 1:13 PM    This note is created with the assistance of a speech recognition program.  While intent was to generate a document that actually reflects the content of the visit, the document can still have some errors including those of syntax and sound-alike substitutions which may escape proof reading.  It such instances, actual meaning can be extrapolated by contextual diversion.

## 2024-08-22 NOTE — PROGRESS NOTES
I have reviewed the progress note serving as history and physical dated August/15/2024 and examined the patient and find no relevant changes.    I have reviewed with the patient and/or family the risks, benefits, and alternatives to the procedure.          Physical Therapy  [facility]  Inpatient/Observation/Outpatient Rehabilitation    Date: 2024  Patient Name: Flo Mccoy       [] Inpatient Acute/Observation       []  Outpatient  : 1970       [] Pt no showed for scheduled appointment    [] Pt refused/declined therapy at this time due to:           [x] Pt cancelled due to:  [] No Reason Given   [] Sick/ill   [x] Other: Pt requesting to cancel all PT and OT appointments due to him leaving town. Pt said he will not be coming back.     [] Evaluation held by RN/Provider due to:    [] High Heart Rate   [] High Blood Pressure   [] Orthopedic Consult   [] Hgb < 7   [] Other:    [] Pt does not require skilled services due to:      Therapist/Assistant will attempt to see this patient, at our earliest opportunity.       Lawyer Salazar Date: 2024

## 2024-09-08 ENCOUNTER — HOSPITAL ENCOUNTER (EMERGENCY)
Dept: HOSPITAL 90 - EDH | Age: 54
Discharge: LEFT BEFORE DISCHARGE | End: 2024-09-08
Payer: MEDICARE

## 2024-09-08 VITALS — WEIGHT: 222.01 LBS | BODY MASS INDEX: 32.88 KG/M2 | HEIGHT: 69 IN

## 2024-09-08 VITALS
SYSTOLIC BLOOD PRESSURE: 154 MMHG | RESPIRATION RATE: 20 BRPM | DIASTOLIC BLOOD PRESSURE: 78 MMHG | OXYGEN SATURATION: 96 % | HEART RATE: 69 BPM

## 2024-09-08 VITALS — RESPIRATION RATE: 16 BRPM | HEART RATE: 68 BPM

## 2024-09-08 VITALS — TEMPERATURE: 98.9 F

## 2024-09-08 DIAGNOSIS — I12.0: ICD-10-CM

## 2024-09-08 DIAGNOSIS — Z79.51: ICD-10-CM

## 2024-09-08 DIAGNOSIS — Z20.822: ICD-10-CM

## 2024-09-08 DIAGNOSIS — J96.00: Primary | ICD-10-CM

## 2024-09-08 DIAGNOSIS — Z99.2: ICD-10-CM

## 2024-09-08 DIAGNOSIS — N18.6: ICD-10-CM

## 2024-09-08 DIAGNOSIS — E11.22: ICD-10-CM

## 2024-09-08 LAB
BASOPHILS # BLD AUTO: 0.03 K/UL (ref 0–0.2)
BASOPHILS NFR BLD AUTO: 0.4 % (ref 0–5)
BNP SERPL-MCNC: 983 PG/ML (ref 0–100)
BUN SERPL-MCNC: 49 MG/DL (ref 7–18)
CHLORIDE SERPL-SCNC: 98 MMOL/L (ref 101–111)
CO2 SERPL-SCNC: 28 MMOL/L (ref 21–32)
CREAT SERPL-MCNC: 6 MG/DL (ref 0.5–1.3)
EOSINOPHIL # BLD AUTO: 0.18 K/UL (ref 0–0.7)
EOSINOPHIL NFR BLD AUTO: 2.6 % (ref 0–8)
ERYTHROCYTE [DISTWIDTH] IN BLOOD BY AUTOMATED COUNT: 14 % (ref 11–15.5)
GFR SERPL CREATININE-BSD FRML MDRD: 10 ML/MIN (ref 90–?)
GLUCOSE SERPL-MCNC: 100 MG/DL (ref 70–105)
HCT VFR BLD AUTO: 29.2 % (ref 42–54)
IMM GRANULOCYTES # BLD: 0.02 K/UL (ref 0–1)
LYMPHOCYTES # SPEC AUTO: 1.3 K/UL (ref 1–4.8)
LYMPHOCYTES NFR SPEC AUTO: 18.9 % (ref 21–51)
MCH RBC QN AUTO: 32.3 PG (ref 27–33)
MCHC RBC AUTO-ENTMCNC: 32.9 G/DL (ref 32–36)
MCV RBC AUTO: 98.3 FL (ref 79–99)
MONOCYTES # BLD AUTO: 0.5 K/UL (ref 0.1–1)
MONOCYTES NFR BLD AUTO: 7 % (ref 3–13)
NEUTROPHILS # BLD AUTO: 5 K/UL (ref 1.8–7.7)
NEUTROPHILS NFR BLD AUTO: 70.8 % (ref 40–77)
NRBC BLD MANUAL-RTO: 0 % (ref 0–0.19)
PLATELET # BLD AUTO: 140 K/UL (ref 130–400)
POTASSIUM SERPL-SCNC: 3.7 MMOL/L (ref 3.5–5.1)
RBC # BLD AUTO: 2.97 MIL/UL (ref 4.5–6.2)
SARS-COV-2 RNA SPEC QL NAA+PROBE: (no result)
SODIUM SERPL-SCNC: 137 MMOL/L (ref 136–145)
WBC # BLD AUTO: 7.1 K/UL (ref 4.8–10.8)

## 2024-09-08 PROCEDURE — 99285 EMERGENCY DEPT VISIT HI MDM: CPT

## 2024-09-08 PROCEDURE — 85025 COMPLETE CBC W/AUTO DIFF WBC: CPT

## 2024-09-08 PROCEDURE — 83605 ASSAY OF LACTIC ACID: CPT

## 2024-09-08 PROCEDURE — 80048 BASIC METABOLIC PNL TOTAL CA: CPT

## 2024-09-08 PROCEDURE — 96374 THER/PROPH/DIAG INJ IV PUSH: CPT

## 2024-09-08 PROCEDURE — 83880 ASSAY OF NATRIURETIC PEPTIDE: CPT

## 2024-09-08 PROCEDURE — 87635 SARS-COV-2 COVID-19 AMP PRB: CPT

## 2024-09-08 PROCEDURE — 87804 INFLUENZA ASSAY W/OPTIC: CPT

## 2024-09-08 PROCEDURE — 71045 X-RAY EXAM CHEST 1 VIEW: CPT

## 2024-09-08 PROCEDURE — 93005 ELECTROCARDIOGRAM TRACING: CPT

## 2024-09-08 PROCEDURE — 84484 ASSAY OF TROPONIN QUANT: CPT

## 2024-09-08 PROCEDURE — 36415 COLL VENOUS BLD VENIPUNCTURE: CPT

## 2024-09-08 PROCEDURE — 94640 AIRWAY INHALATION TREATMENT: CPT

## 2024-09-08 RX ADMIN — BUDESONIDE ONE MG: 0.5 SUSPENSION RESPIRATORY (INHALATION) at 22:28

## 2024-10-03 ENCOUNTER — HOSPITAL ENCOUNTER (EMERGENCY)
Age: 54
Discharge: HOME | End: 2024-10-04
Attending: STUDENT IN AN ORGANIZED HEALTH CARE EDUCATION/TRAINING PROGRAM
Payer: MEDICARE

## 2024-10-03 VITALS
HEART RATE: 66 BPM | TEMPERATURE: 97.9 F | SYSTOLIC BLOOD PRESSURE: 179 MMHG | OXYGEN SATURATION: 97 % | BODY MASS INDEX: 31.73 KG/M2 | DIASTOLIC BLOOD PRESSURE: 100 MMHG | WEIGHT: 218 LBS | RESPIRATION RATE: 18 BRPM

## 2024-10-03 DIAGNOSIS — L76.34 POSTPROCEDURAL SEROMA OF SKIN AND SUBCUTANEOUS TISSUE FOLLOWING OTHER PROCEDURE: ICD-10-CM

## 2024-10-03 DIAGNOSIS — G89.18 POST-OP PAIN: Primary | ICD-10-CM

## 2024-10-03 PROCEDURE — 99283 EMERGENCY DEPT VISIT LOW MDM: CPT

## 2024-10-03 PROCEDURE — 6370000000 HC RX 637 (ALT 250 FOR IP): Performed by: STUDENT IN AN ORGANIZED HEALTH CARE EDUCATION/TRAINING PROGRAM

## 2024-10-03 RX ORDER — OXYCODONE AND ACETAMINOPHEN 5; 325 MG/1; MG/1
1 TABLET ORAL ONCE
Status: COMPLETED | OUTPATIENT
Start: 2024-10-04 | End: 2024-10-03

## 2024-10-03 RX ADMIN — OXYCODONE HYDROCHLORIDE AND ACETAMINOPHEN 1 TABLET: 5; 325 TABLET ORAL at 23:56

## 2024-10-03 ASSESSMENT — PAIN DESCRIPTION - ORIENTATION: ORIENTATION: RIGHT

## 2024-10-03 ASSESSMENT — PAIN DESCRIPTION - LOCATION: LOCATION: SHOULDER

## 2024-10-03 ASSESSMENT — PAIN SCALES - GENERAL: PAINLEVEL_OUTOF10: 9

## 2024-10-04 NOTE — ED NOTES
Patient expressed to writer that he was unable to  his pain medications upon discharge at [facility] today. Patient noted to have bloody 2x2 under a tegaderm that was not sticking. Small incision not currently bleeding and covered with new dressing at this time.

## 2024-10-04 NOTE — ED PROVIDER NOTES
Social History     Tobacco Use    Smoking status: Every Day     Current packs/day: 0.25     Types: Cigarettes    Smokeless tobacco: Never   Vaping Use    Vaping status: Never Used   Substance Use Topics    Alcohol use: Not Currently     Alcohol/week: 2.0 standard drinks of alcohol     Types: 2 Standard drinks or equivalent per week     Comment: limit his alcohol intake.    Drug use: Never         PHYSICAL EXAM       ED Triage Vitals   BP Systolic BP Percentile Diastolic BP Percentile Temp Temp Source Pulse Respirations SpO2   10/03/24 2227 -- -- 10/03/24 2222 10/03/24 2222 10/03/24 2222 10/03/24 2222 10/03/24 2222   (!) 179/100   97.9 °F (36.6 °C) Oral 66 18 97 %      Height Weight - Scale         -- 10/03/24 2222          98.9 kg (218 lb)             Physical Exam  Constitutional:       General: He is not in acute distress.     Appearance: Normal appearance.   HENT:      Head: Normocephalic.   Cardiovascular:      Comments: Strong radial pulse right upper extremity.  Fistula with palpable thrill in left upper extremity.  Musculoskeletal:         General: Tenderness present.      Comments: Tender throughout right shoulder.  Did not attempt to perform range of motion testing due to recent surgery.   Skin:     Comments: Small amount of serosanguineous fluid noted from the most inferior site of the surgical field.  No active bleeding noted at this time.  No purulent discharge.  Some ecchymosis surrounding the site.   Neurological:      Mental Status: He is alert.         DIAGNOSTIC RESULTS     RADIOLOGY:     Interpretation per the Radiologist below, if available at the time of this note:    No orders to display         LABS:  Labs Reviewed - No data to display    All other labs were within normal range or not returned as of this dictation.    EMERGENCY DEPARTMENT COURSE and DIFFERENTIAL DIAGNOSIS/MDM:     Medical Decision Making  53-year-old male presents emergency department for evaluation of bleeding from  surgical site from right shoulder replacement performed yesterday.  Serosanguineous material noted.  No active bleeding.  Strong pulses in the right upper extremity.  Suspect seroma at this site.  Pressure dressing applied.  Discussed strict return precautions including signs of infection and provided with other material to help dress the site at home.  Recommended to contact his orthopedic surgeon for [facility] in the morning to discuss ED visit and to schedule follow-up appointment.  Patient also stated that his prescription for pain medication was not ready at pharmacy finding the closing time today and has not had any pain medication.  Will give dose of pain medication while here in the emergency department.    Risk  Prescription drug management.               REASSESSMENT            CRITICAL CARE TIME     Total Critical Care time was 0 minutes, excluding separately reportable procedures.  There was a high probability of clinically significant/life threatening deterioration in the patient's condition which required my urgent intervention.      PROCEDURES:  Unless otherwise noted below, none     Procedures    FINAL IMPRESSION      1. Post-op pain    2. Postprocedural seroma of skin and subcutaneous tissue following other procedure          DISPOSITION/PLAN     DISPOSITION Decision To Discharge 10/03/2024 11:54:14 PM  Condition at Disposition: Data Unavailable      PATIENT REFERRED TO:  Bahman Schmitt MD  [address] [facility] [address] [phone]    Call on 10/4/2024  to schedule follow up appointment    [facility]  [address] [id number] [phone]    As needed, If symptoms worsen      DISCHARGE MEDICATIONS:  Discharge Medication List as of 10/3/2024 11:57 PM          (Please note that portions of this note were completed with a voice recognition program.  Efforts were made to edit the dictations but occasionally words are mis-transcribed.)    Nando Winters MD

## 2024-10-04 NOTE — ED NOTES
Reinforced new dressing to patient right shoulder. Replaced patient sling and swath due to old one being bloody. Discharge instructions reviewed with patient and spouse at this time, they stated understanding at this time.

## 2024-10-04 NOTE — DISCHARGE INSTRUCTIONS
The bleeding appears to be serosanguineous which can be a normal reaction after operation.    The dressing site was reinforced while in the emergency department.    If you notice purulent discharge/puslike discharge, increased bleeding, fever etc. please return to the emergency department immediately for reassessment.    Please call your orthopedic surgeon first thing in the morning tomorrow to discuss follow-up appointment and reassessment.

## 2025-03-08 ENCOUNTER — HOSPITAL ENCOUNTER (EMERGENCY)
Age: 55
Discharge: HOME | End: 2025-03-08
Attending: EMERGENCY MEDICINE
Payer: MEDICARE

## 2025-03-08 VITALS
BODY MASS INDEX: 31.7 KG/M2 | RESPIRATION RATE: 16 BRPM | TEMPERATURE: 98 F | HEART RATE: 65 BPM | WEIGHT: 214 LBS | HEIGHT: 69 IN | SYSTOLIC BLOOD PRESSURE: 128 MMHG | DIASTOLIC BLOOD PRESSURE: 76 MMHG | OXYGEN SATURATION: 95 %

## 2025-03-08 DIAGNOSIS — Z48.89 ENCOUNTER FOR POSTOPERATIVE WOUND CHECK: Primary | ICD-10-CM

## 2025-03-08 PROCEDURE — 99283 EMERGENCY DEPT VISIT LOW MDM: CPT

## 2025-03-08 PROCEDURE — 6370000000 HC RX 637 (ALT 250 FOR IP): Performed by: EMERGENCY MEDICINE

## 2025-03-08 RX ORDER — CHLORHEXIDINE GLUCONATE 40 MG/ML
SOLUTION TOPICAL
Qty: 473 ML | Refills: 0 | Status: SHIPPED | OUTPATIENT
Start: 2025-03-08

## 2025-03-08 RX ORDER — DOXYCYCLINE HYCLATE 100 MG
100 TABLET ORAL 2 TIMES DAILY
Qty: 14 TABLET | Refills: 0 | Status: SHIPPED | OUTPATIENT
Start: 2025-03-08 | End: 2025-03-15

## 2025-03-08 RX ORDER — OXYCODONE AND ACETAMINOPHEN 5; 325 MG/1; MG/1
1 TABLET ORAL ONCE
Refills: 0 | Status: COMPLETED | OUTPATIENT
Start: 2025-03-08 | End: 2025-03-08

## 2025-03-08 RX ADMIN — OXYCODONE HYDROCHLORIDE AND ACETAMINOPHEN 1 TABLET: 5; 325 TABLET ORAL at 13:16

## 2025-03-08 ASSESSMENT — PAIN DESCRIPTION - LOCATION: LOCATION: SHOULDER

## 2025-03-08 ASSESSMENT — PAIN SCALES - GENERAL: PAINLEVEL_OUTOF10: 7

## 2025-03-08 ASSESSMENT — PAIN DESCRIPTION - ORIENTATION: ORIENTATION: LEFT

## 2025-03-08 NOTE — DISCHARGE INSTRUCTIONS
Keep clean with soap and water.  Clean daily with Hibiclens as directed.  Apply a small amount of your triple antibiotic ointment 1-2 times a day.  Leave current dressing on for 24 hours.  Steri-Strips may fall off on their own.  Take doxycycline as directed until complete.  Call your orthopedic surgeon Monday morning to be seen Monday or Tuesday.  You must seek medical attention immediately for any signs of infection such as worsening redness drainage of pus or any other acute concerns.  Call back to the emergency department should you have any issues scheduling appointment on Monday

## 2025-03-08 NOTE — ED PROVIDER NOTES
[facility] EMERGENCY DEPARTMENT  EMERGENCY DEPARTMENT ENCOUNTER      Pt Name: Flo Mccoy  MRN: [id number]  Birthdate 1970  Date of evaluation: 3/8/2025  Provider: Rosana Lord MD    CHIEF COMPLAINT       Chief Complaint   Patient presents with    Wound Check     Left shoulder replacement on 02/20/25, patient had stitches removed 03/04/25 and the wound opened last night.          HISTORY OF PRESENT ILLNESS   (Location/Symptom, Timing/Onset, Context/Setting, Quality, Duration, Modifying Factors, Severity)  Note limiting factors.   Flo Mccoy is a 54 y.o. male who presents to the emergency department      Very pleasant 54-year-old female present to the emergency department for evaluation of left shoulder surgical incision.  Patient states that the wound has popped open.  He was having some bleeding at the site but bleeding has been controlled.  He has been keeping it clean and using topical antibiotic ointment.  He did call his orthopedic surgeon yesterday but the office was closed for the weekend.  Did notice that the edges appeared a little bit more red today.  No other acute concerns        Nursing Notes were reviewed.    REVIEW OF SYSTEMS    (2-9 systems for level 4, 10 or more for level 5)     Review of Systems   All other systems reviewed and are negative.      Except as noted above the remainder of the review of systems was reviewed and negative.       PAST MEDICAL HISTORY     Past Medical History:   Diagnosis Date    Chronic kidney disease     Depression     Diabetes mellitus (HCC)     BORDERLINE    Dialysis patient     Gout     Hypertension          SURGICAL HISTORY       Past Surgical History:   Procedure Laterality Date    ANKLE CLOSED REDUCTION Right 08/12/2019    ANKLE CLOSED REDUCTION performed by Hector Melendez MD at [facility] OR    ANKLE FRACTURE SURGERY Right 08/13/2019    ANKLE OPEN REDUCTION INTERNAL FIXATION-TRIMELLAR FRACTURE performed by Boone Vincent DO at [facility] OR    AV FISTULA CREATION

## 2025-04-09 ENCOUNTER — HOSPITAL ENCOUNTER (OUTPATIENT)
Age: 55
Discharge: HOME | End: 2025-04-11
Payer: MEDICARE

## 2025-04-09 ENCOUNTER — HOSPITAL ENCOUNTER (OUTPATIENT)
Dept: GENERAL RADIOLOGY | Age: 55
Discharge: HOME | End: 2025-04-11
Payer: MEDICARE

## 2025-04-09 DIAGNOSIS — J18.9 UNRESOLVED PNEUMONIA: ICD-10-CM

## 2025-04-09 PROCEDURE — 71046 X-RAY EXAM CHEST 2 VIEWS: CPT

## 2025-07-06 ENCOUNTER — APPOINTMENT (OUTPATIENT)
Dept: GENERAL RADIOLOGY | Age: 55
End: 2025-07-06
Payer: MEDICARE

## 2025-07-06 ENCOUNTER — HOSPITAL ENCOUNTER (EMERGENCY)
Age: 55
Discharge: HOME | End: 2025-07-06
Payer: MEDICARE

## 2025-07-06 VITALS
DIASTOLIC BLOOD PRESSURE: 65 MMHG | TEMPERATURE: 101.4 F | RESPIRATION RATE: 20 BRPM | OXYGEN SATURATION: 94 % | WEIGHT: 207 LBS | BODY MASS INDEX: 30.57 KG/M2 | HEART RATE: 70 BPM | SYSTOLIC BLOOD PRESSURE: 128 MMHG

## 2025-07-06 DIAGNOSIS — R50.9 FEVER OF UNKNOWN ORIGIN: ICD-10-CM

## 2025-07-06 DIAGNOSIS — E16.2 HYPOGLYCEMIA: Primary | ICD-10-CM

## 2025-07-06 LAB
ALBUMIN SERPL-MCNC: 3.1 G/DL (ref 3.5–5.2)
ALBUMIN/GLOB SERPL: 0.9 {RATIO} (ref 1–2.5)
ALP SERPL-CCNC: 175 U/L (ref 40–129)
ALT SERPL-CCNC: 13 U/L (ref 10–50)
ANION GAP SERPL CALCULATED.3IONS-SCNC: 14 MMOL/L (ref 9–16)
AST SERPL-CCNC: 31 U/L (ref 10–50)
BASOPHILS # BLD: 0.03 K/UL (ref 0–0.2)
BASOPHILS NFR BLD: 0 % (ref 0–2)
BILIRUB SERPL-MCNC: 0.6 MG/DL (ref 0–1.2)
BILIRUB UR QL STRIP: NEGATIVE
BUN SERPL-MCNC: 38 MG/DL (ref 6–20)
BUN/CREAT SERPL: 8 (ref 9–20)
CALCIUM SERPL-MCNC: 8.4 MG/DL (ref 8.6–10.4)
CHLORIDE SERPL-SCNC: 87 MMOL/L (ref 98–107)
CHP ED QC CHECK: NORMAL
CLARITY UR: CLEAR
CO2 SERPL-SCNC: 22 MMOL/L (ref 20–31)
COLOR UR: YELLOW
CREAT SERPL-MCNC: 4.9 MG/DL (ref 0.7–1.2)
EOSINOPHIL # BLD: <0.03 K/UL (ref 0–0.44)
EOSINOPHILS RELATIVE PERCENT: 0 % (ref 1–4)
EPI CELLS #/AREA URNS HPF: NORMAL /HPF (ref 0–5)
ERYTHROCYTE [DISTWIDTH] IN BLOOD BY AUTOMATED COUNT: 14.8 % (ref 11.8–14.4)
FLUAV AG SPEC QL: NEGATIVE
FLUBV AG SPEC QL: NEGATIVE
GFR, ESTIMATED: 13 ML/MIN/1.73M2
GLUCOSE BLD-MCNC: 199 MG/DL
GLUCOSE BLD-MCNC: 199 MG/DL (ref 74–100)
GLUCOSE SERPL-MCNC: 202 MG/DL (ref 74–99)
GLUCOSE UR STRIP-MCNC: NEGATIVE MG/DL
HCT VFR BLD AUTO: 29.7 % (ref 40.7–50.3)
HGB BLD-MCNC: 9.7 G/DL (ref 13–17)
HGB UR QL STRIP.AUTO: ABNORMAL
IMM GRANULOCYTES # BLD AUTO: 0.16 K/UL (ref 0–0.3)
IMM GRANULOCYTES NFR BLD: 1 %
KETONES UR STRIP-MCNC: NEGATIVE MG/DL
LACTATE BLDV-SCNC: 1.4 MMOL/L (ref 0.5–1.9)
LEUKOCYTE ESTERASE UR QL STRIP: NEGATIVE
LYMPHOCYTES NFR BLD: 0.59 K/UL (ref 1.1–3.7)
LYMPHOCYTES RELATIVE PERCENT: 3 % (ref 24–43)
MCH RBC QN AUTO: 32 PG (ref 25.2–33.5)
MCHC RBC AUTO-ENTMCNC: 32.7 G/DL (ref 28.4–34.8)
MCV RBC AUTO: 98 FL (ref 82.6–102.9)
MONOCYTES NFR BLD: 0.18 K/UL (ref 0.1–1.2)
MONOCYTES NFR BLD: 1 % (ref 3–12)
NEUTROPHILS NFR BLD: 95 % (ref 36–65)
NEUTS SEG NFR BLD: 16.41 K/UL (ref 1.5–8.1)
NITRITE UR QL STRIP: NEGATIVE
NRBC BLD-RTO: 0 PER 100 WBC
PH UR STRIP: 8 [PH] (ref 5–9)
PLATELET # BLD AUTO: 158 K/UL (ref 138–453)
PMV BLD AUTO: 11.2 FL (ref 8.1–13.5)
POTASSIUM SERPL-SCNC: 4.6 MMOL/L (ref 3.7–5.3)
PROT SERPL-MCNC: 6.6 G/DL (ref 6.6–8.7)
PROT UR STRIP-MCNC: ABNORMAL MG/DL
RBC # BLD AUTO: 3.03 M/UL (ref 4.21–5.77)
RBC #/AREA URNS HPF: NORMAL /HPF (ref 0–2)
SARS-COV-2 RDRP RESP QL NAA+PROBE: NOT DETECTED
SODIUM SERPL-SCNC: 123 MMOL/L (ref 136–145)
SP GR UR STRIP: 1.01 (ref 1.01–1.02)
SPECIMEN DESCRIPTION: NORMAL
UROBILINOGEN UR STRIP-ACNC: NORMAL EU/DL (ref 0–1)
WBC #/AREA URNS HPF: NORMAL /HPF (ref 0–5)
WBC OTHER # BLD: 17.4 K/UL (ref 3.5–11.3)

## 2025-07-06 PROCEDURE — 93005 ELECTROCARDIOGRAM TRACING: CPT | Performed by: PHYSICIAN ASSISTANT

## 2025-07-06 PROCEDURE — 83605 ASSAY OF LACTIC ACID: CPT

## 2025-07-06 PROCEDURE — 84145 PROCALCITONIN (PCT): CPT

## 2025-07-06 PROCEDURE — 82947 ASSAY GLUCOSE BLOOD QUANT: CPT

## 2025-07-06 PROCEDURE — 71045 X-RAY EXAM CHEST 1 VIEW: CPT

## 2025-07-06 PROCEDURE — 2500000003 HC RX 250 WO HCPCS: Performed by: PHYSICIAN ASSISTANT

## 2025-07-06 PROCEDURE — 80053 COMPREHEN METABOLIC PANEL: CPT

## 2025-07-06 PROCEDURE — 87804 INFLUENZA ASSAY W/OPTIC: CPT

## 2025-07-06 PROCEDURE — 0202U NFCT DS 22 TRGT SARS-COV-2: CPT

## 2025-07-06 PROCEDURE — 6360000002 HC RX W HCPCS: Performed by: PHYSICIAN ASSISTANT

## 2025-07-06 PROCEDURE — 87040 BLOOD CULTURE FOR BACTERIA: CPT

## 2025-07-06 PROCEDURE — 99285 EMERGENCY DEPT VISIT HI MDM: CPT

## 2025-07-06 PROCEDURE — 85025 COMPLETE CBC W/AUTO DIFF WBC: CPT

## 2025-07-06 PROCEDURE — 36415 COLL VENOUS BLD VENIPUNCTURE: CPT

## 2025-07-06 PROCEDURE — 81001 URINALYSIS AUTO W/SCOPE: CPT

## 2025-07-06 PROCEDURE — 6370000000 HC RX 637 (ALT 250 FOR IP): Performed by: PHYSICIAN ASSISTANT

## 2025-07-06 PROCEDURE — 87635 SARS-COV-2 COVID-19 AMP PRB: CPT

## 2025-07-06 PROCEDURE — 96374 THER/PROPH/DIAG INJ IV PUSH: CPT

## 2025-07-06 RX ORDER — 0.9 % SODIUM CHLORIDE 0.9 %
30 INTRAVENOUS SOLUTION INTRAVENOUS ONCE
Status: DISCONTINUED | OUTPATIENT
Start: 2025-07-06 | End: 2025-07-06

## 2025-07-06 RX ORDER — ACETAMINOPHEN 500 MG
1000 TABLET ORAL ONCE
Status: COMPLETED | OUTPATIENT
Start: 2025-07-06 | End: 2025-07-06

## 2025-07-06 RX ORDER — CEPHALEXIN 500 MG/1
500 CAPSULE ORAL 3 TIMES DAILY
Qty: 21 CAPSULE | Refills: 0 | Status: SHIPPED | OUTPATIENT
Start: 2025-07-06 | End: 2025-07-13

## 2025-07-06 RX ADMIN — CEFTRIAXONE SODIUM 1000 MG: 1 INJECTION, POWDER, FOR SOLUTION INTRAMUSCULAR; INTRAVENOUS at 22:10

## 2025-07-06 RX ADMIN — ACETAMINOPHEN 1000 MG: 500 TABLET ORAL at 22:10

## 2025-07-06 ASSESSMENT — PAIN - FUNCTIONAL ASSESSMENT
PAIN_FUNCTIONAL_ASSESSMENT: NONE - DENIES PAIN
PAIN_FUNCTIONAL_ASSESSMENT: 0-10

## 2025-07-06 ASSESSMENT — PAIN SCALES - GENERAL: PAINLEVEL_OUTOF10: 0

## 2025-07-07 LAB
B PARAP IS1001 DNA NPH QL NAA+NON-PROBE: NOT DETECTED
B PERT DNA SPEC QL NAA+PROBE: NOT DETECTED
C PNEUM DNA NPH QL NAA+NON-PROBE: NOT DETECTED
EKG ATRIAL RATE: 75 BPM
EKG P AXIS: -16 DEGREES
EKG P-R INTERVAL: 146 MS
EKG Q-T INTERVAL: 418 MS
EKG QRS DURATION: 92 MS
EKG QTC CALCULATION (BAZETT): 466 MS
EKG R AXIS: -35 DEGREES
EKG T AXIS: 8 DEGREES
EKG VENTRICULAR RATE: 75 BPM
FLUAV RNA NPH QL NAA+NON-PROBE: NOT DETECTED
FLUBV RNA NPH QL NAA+NON-PROBE: NOT DETECTED
HADV DNA NPH QL NAA+NON-PROBE: NOT DETECTED
HCOV 229E RNA NPH QL NAA+NON-PROBE: NOT DETECTED
HCOV HKU1 RNA NPH QL NAA+NON-PROBE: NOT DETECTED
HCOV NL63 RNA NPH QL NAA+NON-PROBE: NOT DETECTED
HCOV OC43 RNA NPH QL NAA+NON-PROBE: NOT DETECTED
HMPV RNA NPH QL NAA+NON-PROBE: NOT DETECTED
HPIV1 RNA NPH QL NAA+NON-PROBE: NOT DETECTED
HPIV2 RNA NPH QL NAA+NON-PROBE: NOT DETECTED
HPIV3 RNA NPH QL NAA+NON-PROBE: NOT DETECTED
HPIV4 RNA NPH QL NAA+NON-PROBE: NOT DETECTED
M PNEUMO DNA NPH QL NAA+NON-PROBE: NOT DETECTED
PROCALCITONIN SERPL-MCNC: 2.59 NG/ML (ref 0–0.09)
RSV RNA NPH QL NAA+NON-PROBE: NOT DETECTED
RV+EV RNA NPH QL NAA+NON-PROBE: NOT DETECTED
SARS-COV-2 RNA NPH QL NAA+NON-PROBE: NOT DETECTED
SPECIMEN DESCRIPTION: NORMAL

## 2025-07-07 PROCEDURE — 93010 ELECTROCARDIOGRAM REPORT: CPT | Performed by: INTERNAL MEDICINE

## 2025-07-07 NOTE — DISCHARGE INSTRUCTIONS
If you feel any more ill, sick or have any other symptoms of any kind please do not hesitate to return back to the emergency department immediately for reevaluation.  Please take the Keflex as is prescribed to you in addition with taking Tylenol for your pain.  Please follow-up with your nephrology specialist for your dialysis appointments in addition with your primary doctor.

## 2025-07-07 NOTE — ED PROVIDER NOTES
to display                (Please note that portions of this note were completed with a voice recognition program.  Efforts were made to edit the dictations but occasionally words are mis-transcribed.)    Alden Louis MD (electronically signed)  Attending Emergency Physician    Supervising Attending Physician: Alden Marquis MD  07/06/25 1400

## 2025-07-11 LAB
MICROORGANISM SPEC CULT: NORMAL
MICROORGANISM SPEC CULT: NORMAL
SERVICE CMNT-IMP: NORMAL
SERVICE CMNT-IMP: NORMAL
SPECIMEN DESCRIPTION: NORMAL
SPECIMEN DESCRIPTION: NORMAL

## 2025-08-12 ENCOUNTER — APPOINTMENT (OUTPATIENT)
Dept: CT IMAGING | Age: 55
End: 2025-08-12
Payer: MEDICARE

## 2025-08-12 ENCOUNTER — HOSPITAL ENCOUNTER (EMERGENCY)
Age: 55
Discharge: ANOTHER HOSPITAL | End: 2025-08-13
Attending: STUDENT IN AN ORGANIZED HEALTH CARE EDUCATION/TRAINING PROGRAM
Payer: MEDICARE

## 2025-08-12 DIAGNOSIS — A41.9 SEPSIS, DUE TO UNSPECIFIED ORGANISM, UNSPECIFIED WHETHER ACUTE ORGAN DYSFUNCTION PRESENT (HCC): ICD-10-CM

## 2025-08-12 DIAGNOSIS — R41.82 ALTERED MENTAL STATUS, UNSPECIFIED ALTERED MENTAL STATUS TYPE: ICD-10-CM

## 2025-08-12 DIAGNOSIS — K76.9 LIVER LESION: ICD-10-CM

## 2025-08-12 DIAGNOSIS — E87.1 HYPONATREMIA: Primary | ICD-10-CM

## 2025-08-12 LAB
ALBUMIN SERPL-MCNC: 3.3 G/DL (ref 3.5–5.2)
ALBUMIN/GLOB SERPL: 0.8 {RATIO} (ref 1–2.5)
ALP SERPL-CCNC: 210 U/L (ref 40–129)
ALT SERPL-CCNC: 14 U/L (ref 10–50)
AMMONIA PLAS-SCNC: 30 UMOL/L (ref 11–51)
ANION GAP SERPL CALCULATED.3IONS-SCNC: 13 MMOL/L (ref 9–16)
ARTERIAL PATENCY WRIST A: ABNORMAL
AST SERPL-CCNC: 24 U/L (ref 10–50)
B-OH-BUTYR SERPL-MCNC: 0.13 MMOL/L (ref 0.02–0.27)
BASOPHILS # BLD: 0 K/UL (ref 0–0.2)
BASOPHILS NFR BLD: 0 % (ref 0–2)
BILIRUB SERPL-MCNC: 0.9 MG/DL (ref 0–1.2)
BODY TEMPERATURE: 37
BUN SERPL-MCNC: 40 MG/DL (ref 6–20)
BUN/CREAT SERPL: 10 (ref 9–20)
CALCIUM SERPL-MCNC: 9.2 MG/DL (ref 8.6–10.4)
CHLORIDE SERPL-SCNC: 90 MMOL/L (ref 98–107)
CO2 SERPL-SCNC: 24 MMOL/L (ref 20–31)
CREAT SERPL-MCNC: 4 MG/DL (ref 0.7–1.2)
EOSINOPHIL # BLD: 0 K/UL (ref 0–0.44)
EOSINOPHILS RELATIVE PERCENT: 0 % (ref 1–4)
ERYTHROCYTE [DISTWIDTH] IN BLOOD BY AUTOMATED COUNT: 15.8 % (ref 11.8–14.4)
ETHANOL PERCENT: NORMAL %
ETHANOLAMINE SERPL-MCNC: <10 MG/DL (ref 0–0.08)
FIO2 ON VENT: 21 %
GAS FLOW.O2 O2 DELIVERY SYS: ABNORMAL L/MIN
GFR, ESTIMATED: 17 ML/MIN/1.73M2
GLUCOSE BLD-MCNC: 112 MG/DL (ref 74–100)
GLUCOSE SERPL-MCNC: 113 MG/DL (ref 74–99)
HCO3 VENOUS: 28.4 MMOL/L (ref 24–30)
HCT VFR BLD AUTO: 32.1 % (ref 40.7–50.3)
HGB BLD-MCNC: 9.9 G/DL (ref 13–17)
IMM GRANULOCYTES # BLD AUTO: 0 K/UL (ref 0–0.3)
IMM GRANULOCYTES NFR BLD: 0 %
LACTATE BLDV-SCNC: 1.2 MMOL/L (ref 0.5–2.2)
LIPASE SERPL-CCNC: 15 U/L (ref 13–60)
LYMPHOCYTES NFR BLD: 1.15 K/UL (ref 1.1–3.7)
LYMPHOCYTES RELATIVE PERCENT: 5 % (ref 24–43)
MCH RBC QN AUTO: 30.7 PG (ref 25.2–33.5)
MCHC RBC AUTO-ENTMCNC: 30.8 G/DL (ref 28.4–34.8)
MCV RBC AUTO: 99.4 FL (ref 82.6–102.9)
MONOCYTES NFR BLD: 1.15 K/UL (ref 0.1–1.2)
MONOCYTES NFR BLD: 5 % (ref 3–12)
MORPHOLOGY: NORMAL
NEUTROPHILS NFR BLD: 90 % (ref 36–65)
NEUTS SEG NFR BLD: 20.6 K/UL (ref 1.5–8.1)
NRBC BLD-RTO: 0 PER 100 WBC
O2 SAT, VEN: 69.8 % (ref 60–85)
PCO2 VENOUS: 42.7 MM HG (ref 39–55)
PCO2, VEN, TEMP ADJ: 42.7 MMHG (ref 39–55)
PH VENOUS: 7.44 (ref 7.32–7.42)
PH, VEN, TEMP ADJ: 7.44 (ref 7.32–7.42)
PLATELET # BLD AUTO: 122 K/UL (ref 138–453)
PMV BLD AUTO: 11.1 FL (ref 8.1–13.5)
PO2 VENOUS: 35.3 MM HG (ref 30–50)
PO2, VEN, TEMP ADJ: 35.3 MMHG (ref 30–50)
POSITIVE BASE EXCESS, VEN: 3.8 MMOL/L (ref 0–2)
POTASSIUM SERPL-SCNC: 4.8 MMOL/L (ref 3.7–5.3)
PROT SERPL-MCNC: 7.6 G/DL (ref 6.6–8.7)
RBC # BLD AUTO: 3.23 M/UL (ref 4.21–5.77)
SODIUM SERPL-SCNC: 127 MMOL/L (ref 136–145)
TROPONIN I SERPL HS-MCNC: 48 NG/L (ref 0–22)
WBC OTHER # BLD: 22.9 K/UL (ref 3.5–11.3)

## 2025-08-12 PROCEDURE — 70450 CT HEAD/BRAIN W/O DYE: CPT

## 2025-08-12 PROCEDURE — 93005 ELECTROCARDIOGRAM TRACING: CPT | Performed by: STUDENT IN AN ORGANIZED HEALTH CARE EDUCATION/TRAINING PROGRAM

## 2025-08-12 PROCEDURE — 82805 BLOOD GASES W/O2 SATURATION: CPT

## 2025-08-12 PROCEDURE — 80053 COMPREHEN METABOLIC PANEL: CPT

## 2025-08-12 PROCEDURE — 80202 ASSAY OF VANCOMYCIN: CPT

## 2025-08-12 PROCEDURE — 6360000004 HC RX CONTRAST MEDICATION: Performed by: STUDENT IN AN ORGANIZED HEALTH CARE EDUCATION/TRAINING PROGRAM

## 2025-08-12 PROCEDURE — 71260 CT THORAX DX C+: CPT

## 2025-08-12 PROCEDURE — 82140 ASSAY OF AMMONIA: CPT

## 2025-08-12 PROCEDURE — 99285 EMERGENCY DEPT VISIT HI MDM: CPT

## 2025-08-12 PROCEDURE — 87040 BLOOD CULTURE FOR BACTERIA: CPT

## 2025-08-12 PROCEDURE — 2580000003 HC RX 258: Performed by: STUDENT IN AN ORGANIZED HEALTH CARE EDUCATION/TRAINING PROGRAM

## 2025-08-12 PROCEDURE — G0480 DRUG TEST DEF 1-7 CLASSES: HCPCS

## 2025-08-12 PROCEDURE — 82010 KETONE BODYS QUAN: CPT

## 2025-08-12 PROCEDURE — 85025 COMPLETE CBC W/AUTO DIFF WBC: CPT

## 2025-08-12 PROCEDURE — 83880 ASSAY OF NATRIURETIC PEPTIDE: CPT

## 2025-08-12 PROCEDURE — 96367 TX/PROPH/DG ADDL SEQ IV INF: CPT

## 2025-08-12 PROCEDURE — 83605 ASSAY OF LACTIC ACID: CPT

## 2025-08-12 PROCEDURE — 96365 THER/PROPH/DIAG IV INF INIT: CPT

## 2025-08-12 PROCEDURE — 36415 COLL VENOUS BLD VENIPUNCTURE: CPT

## 2025-08-12 PROCEDURE — 6360000002 HC RX W HCPCS: Performed by: STUDENT IN AN ORGANIZED HEALTH CARE EDUCATION/TRAINING PROGRAM

## 2025-08-12 PROCEDURE — 84484 ASSAY OF TROPONIN QUANT: CPT

## 2025-08-12 PROCEDURE — 83690 ASSAY OF LIPASE: CPT

## 2025-08-12 PROCEDURE — 82947 ASSAY GLUCOSE BLOOD QUANT: CPT

## 2025-08-12 RX ORDER — IOPAMIDOL 755 MG/ML
75 INJECTION, SOLUTION INTRAVASCULAR
Status: COMPLETED | OUTPATIENT
Start: 2025-08-12 | End: 2025-08-12

## 2025-08-12 RX ORDER — VANCOMYCIN 2 G/400ML
2000 INJECTION, SOLUTION INTRAVENOUS ONCE
Status: COMPLETED | OUTPATIENT
Start: 2025-08-12 | End: 2025-08-13

## 2025-08-12 RX ADMIN — IOPAMIDOL 75 ML: 755 INJECTION, SOLUTION INTRAVENOUS at 22:14

## 2025-08-12 RX ADMIN — VANCOMYCIN 2000 MG: 2 INJECTION, SOLUTION INTRAVENOUS at 23:25

## 2025-08-12 RX ADMIN — PIPERACILLIN AND TAZOBACTAM 3375 MG: 3; .375 INJECTION, POWDER, LYOPHILIZED, FOR SOLUTION INTRAVENOUS; PARENTERAL at 22:52

## 2025-08-13 ENCOUNTER — APPOINTMENT (OUTPATIENT)
Dept: MRI IMAGING | Age: 55
DRG: 441 | End: 2025-08-13
Attending: INTERNAL MEDICINE
Payer: MEDICARE

## 2025-08-13 ENCOUNTER — HOSPITAL ENCOUNTER (INPATIENT)
Age: 55
LOS: 5 days | Discharge: HOME | DRG: 441 | End: 2025-08-18
Attending: INTERNAL MEDICINE | Admitting: INTERNAL MEDICINE
Payer: MEDICARE

## 2025-08-13 ENCOUNTER — APPOINTMENT (OUTPATIENT)
Dept: DIALYSIS | Age: 55
DRG: 441 | End: 2025-08-13
Attending: INTERNAL MEDICINE
Payer: MEDICARE

## 2025-08-13 VITALS
RESPIRATION RATE: 18 BRPM | HEART RATE: 68 BPM | BODY MASS INDEX: 30.66 KG/M2 | OXYGEN SATURATION: 92 % | WEIGHT: 207 LBS | HEIGHT: 69 IN | DIASTOLIC BLOOD PRESSURE: 71 MMHG | SYSTOLIC BLOOD PRESSURE: 148 MMHG | TEMPERATURE: 99.6 F

## 2025-08-13 DIAGNOSIS — R16.0 LIVER MASS: Primary | ICD-10-CM

## 2025-08-13 PROBLEM — R16.1 SPLENOMEGALY: Status: ACTIVE | Noted: 2025-08-13

## 2025-08-13 PROBLEM — D69.6 THROMBOCYTOPENIA: Status: ACTIVE | Noted: 2025-08-13

## 2025-08-13 LAB
A1AT SERPL-MCNC: 275 MG/DL (ref 90–200)
AFP SERPL-MCNC: 8.8 UG/L
ALBUMIN SERPL-MCNC: 2.7 G/DL (ref 3.5–5.2)
ALBUMIN SERPL-MCNC: 2.8 G/DL (ref 3.5–5.2)
ALBUMIN/GLOB SERPL: 0.7 {RATIO} (ref 1–2.5)
ALBUMIN/GLOB SERPL: 0.8 {RATIO} (ref 1–2.5)
ALP SERPL-CCNC: 156 U/L (ref 40–129)
ALP SERPL-CCNC: 164 U/L (ref 40–129)
ALT SERPL-CCNC: 12 U/L (ref 10–50)
ALT SERPL-CCNC: 9 U/L (ref 10–50)
AMMONIA PLAS-SCNC: 30 UMOL/L (ref 16–60)
AMPHET UR QL SCN: NEGATIVE
ANION GAP SERPL CALCULATED.3IONS-SCNC: 14 MMOL/L (ref 9–16)
ANION GAP SERPL CALCULATED.3IONS-SCNC: 15 MMOL/L (ref 9–16)
AST SERPL-CCNC: 19 U/L (ref 10–50)
AST SERPL-CCNC: 20 U/L (ref 10–50)
BACTERIA URNS QL MICRO: NORMAL
BARBITURATES UR QL SCN: NEGATIVE
BASOPHILS # BLD: 0 K/UL (ref 0–0.2)
BASOPHILS # BLD: <0.03 K/UL (ref 0–0.2)
BASOPHILS NFR BLD: 0 % (ref 0–2)
BASOPHILS NFR BLD: 0 % (ref 0–2)
BENZODIAZ UR QL: NEGATIVE
BILIRUB SERPL-MCNC: 1 MG/DL (ref 0–1.2)
BILIRUB SERPL-MCNC: 1 MG/DL (ref 0–1.2)
BILIRUB UR QL STRIP: NEGATIVE
BNP SERPL-MCNC: ABNORMAL PG/ML (ref 0–125)
BUN SERPL-MCNC: 44 MG/DL (ref 6–20)
BUN SERPL-MCNC: 47 MG/DL (ref 6–20)
BUN/CREAT SERPL: 10 (ref 9–20)
CALCIUM SERPL-MCNC: 8.6 MG/DL (ref 8.6–10.4)
CALCIUM SERPL-MCNC: 9.2 MG/DL (ref 8.6–10.4)
CANNABINOIDS UR QL SCN: NEGATIVE
CASTS #/AREA URNS LPF: NORMAL /LPF (ref 0–8)
CERULOPLASMIN SERPL-MCNC: 32 MG/DL (ref 15–30)
CHLORIDE SERPL-SCNC: 88 MMOL/L (ref 98–107)
CHLORIDE SERPL-SCNC: 89 MMOL/L (ref 98–107)
CHOLEST SERPL-MCNC: 82 MG/DL (ref 0–199)
CHOLESTEROL/HDL RATIO: 2.6
CLARITY UR: CLEAR
CO2 SERPL-SCNC: 22 MMOL/L (ref 20–31)
CO2 SERPL-SCNC: 23 MMOL/L (ref 20–31)
COCAINE UR QL SCN: NEGATIVE
COLOR UR: YELLOW
CREAT SERPL-MCNC: 4.3 MG/DL (ref 0.7–1.2)
CREAT SERPL-MCNC: 4.6 MG/DL (ref 0.7–1.2)
EKG ATRIAL RATE: 87 BPM
EKG P AXIS: 39 DEGREES
EKG P-R INTERVAL: 150 MS
EKG Q-T INTERVAL: 382 MS
EKG QRS DURATION: 96 MS
EKG QTC CALCULATION (BAZETT): 459 MS
EKG R AXIS: -41 DEGREES
EKG T AXIS: 85 DEGREES
EKG VENTRICULAR RATE: 87 BPM
EOSINOPHIL # BLD: 0 K/UL (ref 0–0.44)
EOSINOPHIL # BLD: <0.03 K/UL (ref 0–0.44)
EOSINOPHILS RELATIVE PERCENT: 0 % (ref 1–4)
EOSINOPHILS RELATIVE PERCENT: 0 % (ref 1–4)
EPI CELLS #/AREA URNS HPF: NORMAL /HPF (ref 0–5)
ERYTHROCYTE [DISTWIDTH] IN BLOOD BY AUTOMATED COUNT: 16 % (ref 11.8–14.4)
ERYTHROCYTE [DISTWIDTH] IN BLOOD BY AUTOMATED COUNT: 16.1 % (ref 11.8–14.4)
FENTANYL UR QL: NEGATIVE
GFR, ESTIMATED: 14 ML/MIN/1.73M2
GFR, ESTIMATED: 15 ML/MIN/1.73M2
GLUCOSE BLD-MCNC: 108 MG/DL (ref 74–100)
GLUCOSE BLD-MCNC: 158 MG/DL (ref 74–100)
GLUCOSE SERPL-MCNC: 105 MG/DL (ref 74–99)
GLUCOSE SERPL-MCNC: 94 MG/DL (ref 74–99)
GLUCOSE UR STRIP-MCNC: NEGATIVE MG/DL
HAV IGM SERPL QL IA: NONREACTIVE
HBV CORE IGM SERPL QL IA: NONREACTIVE
HBV SURFACE AG SERPL QL IA: NONREACTIVE
HCT VFR BLD AUTO: 26.9 % (ref 40.7–50.3)
HCT VFR BLD AUTO: 28.8 % (ref 40.7–50.3)
HCV AB SERPL QL IA: REACTIVE
HDLC SERPL-MCNC: 32 MG/DL
HGB BLD-MCNC: 8.6 G/DL (ref 13–17)
HGB BLD-MCNC: 9 G/DL (ref 13–17)
HGB UR QL STRIP.AUTO: ABNORMAL
IMM GRANULOCYTES # BLD AUTO: 0 K/UL (ref 0–0.3)
IMM GRANULOCYTES # BLD AUTO: 0.23 K/UL (ref 0–0.3)
IMM GRANULOCYTES NFR BLD: 0 %
IMM GRANULOCYTES NFR BLD: 1 %
INR PPP: 1.3
IRON SATN MFR SERPL: 18 % (ref 20–55)
IRON SERPL-MCNC: 26 UG/DL (ref 61–157)
KETONES UR STRIP-MCNC: NEGATIVE MG/DL
LDLC SERPL CALC-MCNC: 36 MG/DL (ref 0–100)
LEUKOCYTE ESTERASE UR QL STRIP: NEGATIVE
LYMPHOCYTES NFR BLD: 0.54 K/UL (ref 1.1–3.7)
LYMPHOCYTES NFR BLD: 0.79 K/UL (ref 1.1–3.7)
LYMPHOCYTES RELATIVE PERCENT: 2 % (ref 24–43)
LYMPHOCYTES RELATIVE PERCENT: 4 % (ref 24–43)
MCH RBC QN AUTO: 31 PG (ref 25.2–33.5)
MCH RBC QN AUTO: 31.9 PG (ref 25.2–33.5)
MCHC RBC AUTO-ENTMCNC: 31.3 G/DL (ref 28.4–34.8)
MCHC RBC AUTO-ENTMCNC: 32 G/DL (ref 28.4–34.8)
MCV RBC AUTO: 99.3 FL (ref 82.6–102.9)
MCV RBC AUTO: 99.6 FL (ref 82.6–102.9)
METHADONE UR QL: NEGATIVE
MONOCYTES NFR BLD: 0.79 K/UL (ref 0.1–1.2)
MONOCYTES NFR BLD: 1.34 K/UL (ref 0.1–1.2)
MONOCYTES NFR BLD: 4 % (ref 3–12)
MONOCYTES NFR BLD: 5 % (ref 3–12)
MORPHOLOGY: NORMAL
NEUTROPHILS NFR BLD: 91 % (ref 36–65)
NEUTROPHILS NFR BLD: 93 % (ref 36–65)
NEUTS SEG NFR BLD: 17.85 K/UL (ref 1.5–8.1)
NEUTS SEG NFR BLD: 24.92 K/UL (ref 1.5–8.1)
NITRITE UR QL STRIP: NEGATIVE
NRBC BLD-RTO: 0 PER 100 WBC
NRBC BLD-RTO: 0 PER 100 WBC
OPIATES UR QL SCN: POSITIVE
OXYCODONE UR QL SCN: POSITIVE
PCP UR QL SCN: NEGATIVE
PH UR STRIP: 8 [PH] (ref 5–8)
PLATELET # BLD AUTO: 103 K/UL (ref 138–453)
PLATELET # BLD AUTO: 92 K/UL (ref 138–453)
PMV BLD AUTO: 11 FL (ref 8.1–13.5)
PMV BLD AUTO: 11.1 FL (ref 8.1–13.5)
POTASSIUM SERPL-SCNC: 4.6 MMOL/L (ref 3.7–5.3)
POTASSIUM SERPL-SCNC: 5.6 MMOL/L (ref 3.7–5.3)
PROT SERPL-MCNC: 6.4 G/DL (ref 6.6–8.7)
PROT SERPL-MCNC: 6.5 G/DL (ref 6.6–8.7)
PROT UR STRIP-MCNC: ABNORMAL MG/DL
PROTHROMBIN TIME: 16.2 SEC (ref 11.7–14.9)
RBC # BLD AUTO: 2.7 M/UL (ref 4.21–5.77)
RBC # BLD AUTO: 2.9 M/UL (ref 4.21–5.77)
RBC # BLD: ABNORMAL 10*6/UL
RBC #/AREA URNS HPF: NORMAL /HPF (ref 0–4)
SODIUM SERPL-SCNC: 125 MMOL/L (ref 136–145)
SODIUM SERPL-SCNC: 126 MMOL/L (ref 136–145)
SP GR UR STRIP: 1.01 (ref 1–1.03)
TEST INFORMATION: ABNORMAL
TIBC SERPL-MCNC: 141 UG/DL (ref 250–450)
TRIGL SERPL-MCNC: 72 MG/DL
TROPONIN I SERPL HS-MCNC: 42 NG/L (ref 0–22)
TROPONIN I SERPL HS-MCNC: 47 NG/L (ref 0–22)
UNSATURATED IRON BINDING CAPACITY: 115 UG/DL (ref 112–347)
UROBILINOGEN UR STRIP-ACNC: NORMAL EU/DL (ref 0–1)
VANCOMYCIN SERPL-MCNC: <4 UG/ML (ref 5–40)
VLDLC SERPL CALC-MCNC: 14 MG/DL (ref 1–30)
WBC #/AREA URNS HPF: NORMAL /HPF (ref 0–5)
WBC OTHER # BLD: 19.7 K/UL (ref 3.5–11.3)
WBC OTHER # BLD: 26.8 K/UL (ref 3.5–11.3)

## 2025-08-13 PROCEDURE — 87641 MR-STAPH DNA AMP PROBE: CPT

## 2025-08-13 PROCEDURE — 86038 ANTINUCLEAR ANTIBODIES: CPT

## 2025-08-13 PROCEDURE — 83516 IMMUNOASSAY NONANTIBODY: CPT

## 2025-08-13 PROCEDURE — 2060000000 HC ICU INTERMEDIATE R&B

## 2025-08-13 PROCEDURE — 6370000000 HC RX 637 (ALT 250 FOR IP): Performed by: NURSE PRACTITIONER

## 2025-08-13 PROCEDURE — 2580000003 HC RX 258: Performed by: NURSE PRACTITIONER

## 2025-08-13 PROCEDURE — 86376 MICROSOMAL ANTIBODY EACH: CPT

## 2025-08-13 PROCEDURE — 83540 ASSAY OF IRON: CPT

## 2025-08-13 PROCEDURE — 80053 COMPREHEN METABOLIC PANEL: CPT

## 2025-08-13 PROCEDURE — 6360000002 HC RX W HCPCS: Performed by: NURSE PRACTITIONER

## 2025-08-13 PROCEDURE — 5A1D70Z PERFORMANCE OF URINARY FILTRATION, INTERMITTENT, LESS THAN 6 HOURS PER DAY: ICD-10-PCS | Performed by: STUDENT IN AN ORGANIZED HEALTH CARE EDUCATION/TRAINING PROGRAM

## 2025-08-13 PROCEDURE — 82042 OTHER SOURCE ALBUMIN QUAN EA: CPT

## 2025-08-13 PROCEDURE — 6360000002 HC RX W HCPCS: Performed by: STUDENT IN AN ORGANIZED HEALTH CARE EDUCATION/TRAINING PROGRAM

## 2025-08-13 PROCEDURE — 85025 COMPLETE CBC W/AUTO DIFF WBC: CPT

## 2025-08-13 PROCEDURE — 99222 1ST HOSP IP/OBS MODERATE 55: CPT | Performed by: NURSE PRACTITIONER

## 2025-08-13 PROCEDURE — 89051 BODY FLUID CELL COUNT: CPT

## 2025-08-13 PROCEDURE — 84484 ASSAY OF TROPONIN QUANT: CPT

## 2025-08-13 PROCEDURE — 82390 ASSAY OF CERULOPLASMIN: CPT

## 2025-08-13 PROCEDURE — 86256 FLUORESCENT ANTIBODY TITER: CPT

## 2025-08-13 PROCEDURE — 6370000000 HC RX 637 (ALT 250 FOR IP): Performed by: STUDENT IN AN ORGANIZED HEALTH CARE EDUCATION/TRAINING PROGRAM

## 2025-08-13 PROCEDURE — 36415 COLL VENOUS BLD VENIPUNCTURE: CPT

## 2025-08-13 PROCEDURE — 2500000003 HC RX 250 WO HCPCS: Performed by: NURSE PRACTITIONER

## 2025-08-13 PROCEDURE — 96375 TX/PRO/DX INJ NEW DRUG ADDON: CPT

## 2025-08-13 PROCEDURE — 80074 ACUTE HEPATITIS PANEL: CPT

## 2025-08-13 PROCEDURE — 82140 ASSAY OF AMMONIA: CPT

## 2025-08-13 PROCEDURE — 86225 DNA ANTIBODY NATIVE: CPT

## 2025-08-13 PROCEDURE — 94664 DEMO&/EVAL PT USE INHALER: CPT

## 2025-08-13 PROCEDURE — 90935 HEMODIALYSIS ONE EVALUATION: CPT

## 2025-08-13 PROCEDURE — 85610 PROTHROMBIN TIME: CPT

## 2025-08-13 PROCEDURE — 83550 IRON BINDING TEST: CPT

## 2025-08-13 PROCEDURE — 82103 ALPHA-1-ANTITRYPSIN TOTAL: CPT

## 2025-08-13 PROCEDURE — 82947 ASSAY GLUCOSE BLOOD QUANT: CPT

## 2025-08-13 PROCEDURE — 74181 MRI ABDOMEN W/O CONTRAST: CPT

## 2025-08-13 PROCEDURE — 82105 ALPHA-FETOPROTEIN SERUM: CPT

## 2025-08-13 PROCEDURE — 84157 ASSAY OF PROTEIN OTHER: CPT

## 2025-08-13 PROCEDURE — 2580000003 HC RX 258: Performed by: STUDENT IN AN ORGANIZED HEALTH CARE EDUCATION/TRAINING PROGRAM

## 2025-08-13 PROCEDURE — 81001 URINALYSIS AUTO W/SCOPE: CPT

## 2025-08-13 PROCEDURE — 80061 LIPID PANEL: CPT

## 2025-08-13 PROCEDURE — 93010 ELECTROCARDIOGRAM REPORT: CPT | Performed by: INTERNAL MEDICINE

## 2025-08-13 PROCEDURE — 80307 DRUG TEST PRSMV CHEM ANLYZR: CPT

## 2025-08-13 RX ORDER — METRONIDAZOLE 500 MG/100ML
500 INJECTION, SOLUTION INTRAVENOUS EVERY 8 HOURS
Status: DISCONTINUED | OUTPATIENT
Start: 2025-08-13 | End: 2025-08-18 | Stop reason: HOSPADM

## 2025-08-13 RX ORDER — IPRATROPIUM BROMIDE AND ALBUTEROL SULFATE 2.5; .5 MG/3ML; MG/3ML
1 SOLUTION RESPIRATORY (INHALATION) EVERY 4 HOURS PRN
Status: DISCONTINUED | OUTPATIENT
Start: 2025-08-13 | End: 2025-08-13 | Stop reason: HOSPADM

## 2025-08-13 RX ORDER — DULOXETIN HYDROCHLORIDE 30 MG/1
60 CAPSULE, DELAYED RELEASE ORAL DAILY
Status: DISCONTINUED | OUTPATIENT
Start: 2025-08-13 | End: 2025-08-18 | Stop reason: HOSPADM

## 2025-08-13 RX ORDER — CALCIUM GLUCONATE 20 MG/ML
1000 INJECTION, SOLUTION INTRAVENOUS ONCE
Status: COMPLETED | OUTPATIENT
Start: 2025-08-13 | End: 2025-08-13

## 2025-08-13 RX ORDER — ALBUTEROL SULFATE 0.83 MG/ML
2.5 SOLUTION RESPIRATORY (INHALATION) EVERY 6 HOURS PRN
Status: DISCONTINUED | OUTPATIENT
Start: 2025-08-13 | End: 2025-08-18 | Stop reason: HOSPADM

## 2025-08-13 RX ORDER — GLUCAGON 1 MG/ML
1 KIT INJECTION PRN
Status: DISCONTINUED | OUTPATIENT
Start: 2025-08-13 | End: 2025-08-13 | Stop reason: HOSPADM

## 2025-08-13 RX ORDER — OXYCODONE HYDROCHLORIDE 5 MG/1
10 TABLET ORAL EVERY 4 HOURS PRN
Status: DISCONTINUED | OUTPATIENT
Start: 2025-08-13 | End: 2025-08-18 | Stop reason: HOSPADM

## 2025-08-13 RX ORDER — ASPIRIN 81 MG/1
81 TABLET ORAL DAILY
Status: DISCONTINUED | OUTPATIENT
Start: 2025-08-13 | End: 2025-08-18 | Stop reason: HOSPADM

## 2025-08-13 RX ORDER — SODIUM CHLORIDE 0.9 % (FLUSH) 0.9 %
5-40 SYRINGE (ML) INJECTION PRN
Status: DISCONTINUED | OUTPATIENT
Start: 2025-08-13 | End: 2025-08-18 | Stop reason: HOSPADM

## 2025-08-13 RX ORDER — SODIUM CHLORIDE 9 MG/ML
INJECTION, SOLUTION INTRAVENOUS PRN
Status: DISCONTINUED | OUTPATIENT
Start: 2025-08-13 | End: 2025-08-18 | Stop reason: HOSPADM

## 2025-08-13 RX ORDER — PANTOPRAZOLE SODIUM 40 MG/1
40 TABLET, DELAYED RELEASE ORAL DAILY
Status: DISCONTINUED | OUTPATIENT
Start: 2025-08-13 | End: 2025-08-18 | Stop reason: HOSPADM

## 2025-08-13 RX ORDER — METOLAZONE 5 MG/1
5 TABLET ORAL DAILY
Status: DISCONTINUED | OUTPATIENT
Start: 2025-08-13 | End: 2025-08-18 | Stop reason: HOSPADM

## 2025-08-13 RX ORDER — MIDODRINE HYDROCHLORIDE 10 MG/1
10 TABLET ORAL DAILY PRN
Status: DISCONTINUED | OUTPATIENT
Start: 2025-08-13 | End: 2025-08-18 | Stop reason: HOSPADM

## 2025-08-13 RX ORDER — ALLOPURINOL 300 MG/1
300 TABLET ORAL DAILY
Status: DISCONTINUED | OUTPATIENT
Start: 2025-08-13 | End: 2025-08-18 | Stop reason: HOSPADM

## 2025-08-13 RX ORDER — CLONIDINE HYDROCHLORIDE 0.1 MG/1
0.1 TABLET ORAL NIGHTLY
Status: DISCONTINUED | OUTPATIENT
Start: 2025-08-13 | End: 2025-08-18 | Stop reason: HOSPADM

## 2025-08-13 RX ORDER — DEXTROSE MONOHYDRATE 100 MG/ML
INJECTION, SOLUTION INTRAVENOUS CONTINUOUS PRN
Status: DISCONTINUED | OUTPATIENT
Start: 2025-08-13 | End: 2025-08-13 | Stop reason: HOSPADM

## 2025-08-13 RX ORDER — LANOLIN ALCOHOL/MO/W.PET/CERES
100 CREAM (GRAM) TOPICAL DAILY
Status: DISCONTINUED | OUTPATIENT
Start: 2025-08-13 | End: 2025-08-18 | Stop reason: HOSPADM

## 2025-08-13 RX ORDER — VALSARTAN 160 MG/1
160 TABLET ORAL DAILY
Status: DISCONTINUED | OUTPATIENT
Start: 2025-08-13 | End: 2025-08-18 | Stop reason: HOSPADM

## 2025-08-13 RX ORDER — ACETAMINOPHEN 650 MG/1
650 SUPPOSITORY RECTAL EVERY 6 HOURS PRN
Status: DISCONTINUED | OUTPATIENT
Start: 2025-08-13 | End: 2025-08-18 | Stop reason: HOSPADM

## 2025-08-13 RX ORDER — VITAMIN B COMPLEX
5000 TABLET ORAL DAILY
Status: DISCONTINUED | OUTPATIENT
Start: 2025-08-13 | End: 2025-08-18 | Stop reason: HOSPADM

## 2025-08-13 RX ORDER — OXYCODONE HYDROCHLORIDE 5 MG/1
5 CAPSULE ORAL EVERY 6 HOURS PRN
COMMUNITY

## 2025-08-13 RX ORDER — HEPARIN SODIUM 5000 [USP'U]/ML
5000 INJECTION, SOLUTION INTRAVENOUS; SUBCUTANEOUS EVERY 8 HOURS SCHEDULED
Status: DISCONTINUED | OUTPATIENT
Start: 2025-08-13 | End: 2025-08-18 | Stop reason: HOSPADM

## 2025-08-13 RX ORDER — ACETAMINOPHEN 325 MG/1
650 TABLET ORAL EVERY 6 HOURS PRN
Status: DISCONTINUED | OUTPATIENT
Start: 2025-08-13 | End: 2025-08-18 | Stop reason: HOSPADM

## 2025-08-13 RX ORDER — FUROSEMIDE 10 MG/ML
20 INJECTION INTRAMUSCULAR; INTRAVENOUS ONCE
Status: COMPLETED | OUTPATIENT
Start: 2025-08-13 | End: 2025-08-13

## 2025-08-13 RX ORDER — CARVEDILOL 25 MG/1
25 TABLET ORAL 2 TIMES DAILY WITH MEALS
Status: DISCONTINUED | OUTPATIENT
Start: 2025-08-13 | End: 2025-08-18

## 2025-08-13 RX ORDER — LANOLIN ALCOHOL/MO/W.PET/CERES
400 CREAM (GRAM) TOPICAL DAILY
Status: DISCONTINUED | OUTPATIENT
Start: 2025-08-13 | End: 2025-08-18 | Stop reason: HOSPADM

## 2025-08-13 RX ORDER — OXYCODONE HYDROCHLORIDE 5 MG/1
5 TABLET ORAL EVERY 4 HOURS PRN
Status: DISCONTINUED | OUTPATIENT
Start: 2025-08-13 | End: 2025-08-18 | Stop reason: HOSPADM

## 2025-08-13 RX ORDER — SODIUM CHLORIDE 0.9 % (FLUSH) 0.9 %
5-40 SYRINGE (ML) INJECTION EVERY 12 HOURS SCHEDULED
Status: DISCONTINUED | OUTPATIENT
Start: 2025-08-13 | End: 2025-08-18 | Stop reason: HOSPADM

## 2025-08-13 RX ADMIN — VALSARTAN 160 MG: 160 TABLET, FILM COATED ORAL at 17:51

## 2025-08-13 RX ADMIN — IPRATROPIUM BROMIDE AND ALBUTEROL SULFATE 1 DOSE: .5; 2.5 SOLUTION RESPIRATORY (INHALATION) at 01:44

## 2025-08-13 RX ADMIN — CALCIUM GLUCONATE 1000 MG: 20 INJECTION, SOLUTION INTRAVENOUS at 07:37

## 2025-08-13 RX ADMIN — ALLOPURINOL 300 MG: 300 TABLET ORAL at 17:51

## 2025-08-13 RX ADMIN — METRONIDAZOLE 500 MG: 500 INJECTION, SOLUTION INTRAVENOUS at 21:19

## 2025-08-13 RX ADMIN — DULOXETINE 60 MG: 30 CAPSULE, DELAYED RELEASE ORAL at 17:49

## 2025-08-13 RX ADMIN — Medication 5000 UNITS: at 17:50

## 2025-08-13 RX ADMIN — OXYCODONE 10 MG: 5 TABLET ORAL at 21:59

## 2025-08-13 RX ADMIN — FUROSEMIDE 20 MG: 10 INJECTION, SOLUTION INTRAMUSCULAR; INTRAVENOUS at 00:34

## 2025-08-13 RX ADMIN — SODIUM CHLORIDE, PRESERVATIVE FREE 10 ML: 5 INJECTION INTRAVENOUS at 21:02

## 2025-08-13 RX ADMIN — DEXTROSE MONOHYDRATE 250 ML: 100 INJECTION, SOLUTION INTRAVENOUS at 07:18

## 2025-08-13 RX ADMIN — OXYCODONE HYDROCHLORIDE 5 MG: 5 TABLET ORAL at 17:49

## 2025-08-13 RX ADMIN — SODIUM ZIRCONIUM CYCLOSILICATE 10 G: 10 POWDER, FOR SUSPENSION ORAL at 07:19

## 2025-08-13 RX ADMIN — CLONIDINE HYDROCHLORIDE 0.1 MG: 0.1 TABLET ORAL at 21:02

## 2025-08-13 RX ADMIN — HEPARIN SODIUM 5000 UNITS: 5000 INJECTION INTRAVENOUS; SUBCUTANEOUS at 17:50

## 2025-08-13 RX ADMIN — IRON SUCROSE 200 MG: 20 INJECTION, SOLUTION INTRAVENOUS at 17:56

## 2025-08-13 RX ADMIN — METRONIDAZOLE 500 MG: 500 INJECTION, SOLUTION INTRAVENOUS at 12:17

## 2025-08-13 RX ADMIN — WATER 2000 MG: 1 INJECTION INTRAMUSCULAR; INTRAVENOUS; SUBCUTANEOUS at 12:09

## 2025-08-13 RX ADMIN — INSULIN HUMAN 10 UNITS: 100 INJECTION, SOLUTION PARENTERAL at 07:18

## 2025-08-13 RX ADMIN — ASPIRIN 81 MG: 81 TABLET, COATED ORAL at 17:48

## 2025-08-13 RX ADMIN — PANTOPRAZOLE SODIUM 40 MG: 40 TABLET, DELAYED RELEASE ORAL at 17:49

## 2025-08-13 RX ADMIN — SODIUM CHLORIDE, PRESERVATIVE FREE 5 ML: 5 INJECTION INTRAVENOUS at 12:17

## 2025-08-13 RX ADMIN — CARVEDILOL 25 MG: 25 TABLET, FILM COATED ORAL at 17:50

## 2025-08-13 RX ADMIN — Medication 400 MG: at 17:48

## 2025-08-13 RX ADMIN — Medication 100 MG: at 17:50

## 2025-08-13 ASSESSMENT — PAIN DESCRIPTION - DESCRIPTORS
DESCRIPTORS: ACHING
DESCRIPTORS: DISCOMFORT;ACHING

## 2025-08-13 ASSESSMENT — PAIN SCALES - GENERAL
PAINLEVEL_OUTOF10: 6
PAINLEVEL_OUTOF10: 0
PAINLEVEL_OUTOF10: 4
PAINLEVEL_OUTOF10: 5
PAINLEVEL_OUTOF10: 0
PAINLEVEL_OUTOF10: 6
PAINLEVEL_OUTOF10: 7

## 2025-08-13 ASSESSMENT — PAIN DESCRIPTION - ORIENTATION
ORIENTATION: LEFT;LOWER
ORIENTATION: LEFT;LOWER

## 2025-08-13 ASSESSMENT — PAIN - FUNCTIONAL ASSESSMENT
PAIN_FUNCTIONAL_ASSESSMENT: 0-10
PAIN_FUNCTIONAL_ASSESSMENT: 0-10
PAIN_FUNCTIONAL_ASSESSMENT: ACTIVITIES ARE NOT PREVENTED

## 2025-08-13 ASSESSMENT — PAIN DESCRIPTION - PAIN TYPE: TYPE: CHRONIC PAIN

## 2025-08-13 ASSESSMENT — PAIN DESCRIPTION - LOCATION
LOCATION: HIP;BACK
LOCATION: BACK;HIP

## 2025-08-14 ENCOUNTER — APPOINTMENT (OUTPATIENT)
Dept: ULTRASOUND IMAGING | Age: 55
DRG: 441 | End: 2025-08-14
Attending: INTERNAL MEDICINE
Payer: MEDICARE

## 2025-08-14 PROBLEM — K75.0 LIVER ABSCESS: Status: ACTIVE | Noted: 2025-08-14

## 2025-08-14 PROBLEM — C22.0 HEPATOMA (HCC): Status: ACTIVE | Noted: 2025-08-14

## 2025-08-14 LAB
ALBUMIN FLD-MCNC: 1.5 G/DL
ALBUMIN SERPL-MCNC: 2.9 G/DL (ref 3.5–5.2)
ALBUMIN/GLOB SERPL: 0.7 {RATIO} (ref 1–2.5)
ALP SERPL-CCNC: 159 U/L (ref 40–129)
ALT SERPL-CCNC: 9 U/L (ref 10–50)
ANA SER QL IA: ABNORMAL
ANION GAP SERPL CALCULATED.3IONS-SCNC: 13 MMOL/L (ref 9–16)
APPEARANCE FLD: NORMAL
AST SERPL-CCNC: 21 U/L (ref 10–50)
BASOPHILS # BLD: 0 K/UL (ref 0–0.2)
BASOPHILS NFR BLD: 0 % (ref 0–2)
BILIRUB SERPL-MCNC: 0.8 MG/DL (ref 0–1.2)
BODY FLD TYPE: NORMAL
BUN SERPL-MCNC: 28 MG/DL (ref 6–20)
CALCIUM SERPL-MCNC: 9.2 MG/DL (ref 8.6–10.4)
CEA SERPL-MCNC: 3 NG/ML (ref 0–3.8)
CHLORIDE SERPL-SCNC: 91 MMOL/L (ref 98–107)
CLOT CHECK: NORMAL
CO2 SERPL-SCNC: 27 MMOL/L (ref 20–31)
COLOR FLD: YELLOW
CREAT SERPL-MCNC: 3.4 MG/DL (ref 0.7–1.2)
DSDNA IGG SER QL IA: 5.8 IU/ML
EOSINOPHIL # BLD: 0 K/UL (ref 0–0.44)
EOSINOPHILS RELATIVE PERCENT: 0 % (ref 1–4)
ERYTHROCYTE [DISTWIDTH] IN BLOOD BY AUTOMATED COUNT: 16.2 % (ref 11.8–14.4)
GFR, ESTIMATED: 20 ML/MIN/1.73M2
GLUCOSE SERPL-MCNC: 99 MG/DL (ref 74–99)
HCT VFR BLD AUTO: 29.2 % (ref 40.7–50.3)
HGB BLD-MCNC: 9.1 G/DL (ref 13–17)
IMM GRANULOCYTES # BLD AUTO: 0.16 K/UL (ref 0–0.3)
IMM GRANULOCYTES NFR BLD: 1 %
LYMPHOCYTES NFR BLD: 0.62 K/UL (ref 1.1–3.7)
LYMPHOCYTES NFR FLD: 11 %
LYMPHOCYTES RELATIVE PERCENT: 4 % (ref 24–43)
MCH RBC QN AUTO: 31.1 PG (ref 25.2–33.5)
MCHC RBC AUTO-ENTMCNC: 31.2 G/DL (ref 28.4–34.8)
MCV RBC AUTO: 99.7 FL (ref 82.6–102.9)
MITOCHONDRIA M2 IGG SER-ACNC: 2.2 U/ML (ref 0–4)
MONOCYTES NFR BLD: 0.62 K/UL (ref 0.1–1.2)
MONOCYTES NFR BLD: 4 % (ref 3–12)
MONOCYTES NFR FLD: 17 %
MORPHOLOGY: ABNORMAL
MRSA, DNA, NASAL: NEGATIVE
NEUTROPHILS NFR BLD: 91 % (ref 36–65)
NEUTROPHILS NFR FLD: 72 %
NEUTS SEG NFR BLD: 14.2 K/UL (ref 1.5–8.1)
NRBC BLD-RTO: 0 PER 100 WBC
NUC CELL # FLD: 470 CELLS/UL
NUCLEAR IGG SER IA-RTO: 0.7 U/ML
PLATELET # BLD AUTO: 100 K/UL (ref 138–453)
PMV BLD AUTO: 11 FL (ref 8.1–13.5)
POTASSIUM SERPL-SCNC: 4.3 MMOL/L (ref 3.7–5.3)
PROT FLD-MCNC: 2.8 G/DL
PROT SERPL-MCNC: 6.8 G/DL (ref 6.6–8.7)
RBC # BLD AUTO: 2.93 M/UL (ref 4.21–5.77)
RBC # BLD: ABNORMAL 10*6/UL
RBC # FLD: <3000 CELLS/UL
SODIUM SERPL-SCNC: 131 MMOL/L (ref 136–145)
SPECIMEN DESCRIPTION: NORMAL
SPECIMEN TYPE: NORMAL
SPECIMEN TYPE: NORMAL
WBC OTHER # BLD: 15.6 K/UL (ref 3.5–11.3)

## 2025-08-14 PROCEDURE — 99223 1ST HOSP IP/OBS HIGH 75: CPT | Performed by: INTERNAL MEDICINE

## 2025-08-14 PROCEDURE — 88112 CYTOPATH CELL ENHANCE TECH: CPT

## 2025-08-14 PROCEDURE — 0W9G3ZZ DRAINAGE OF PERITONEAL CAVITY, PERCUTANEOUS APPROACH: ICD-10-PCS | Performed by: RADIOLOGY

## 2025-08-14 PROCEDURE — 2709999900 US GUIDED PARACENTESIS

## 2025-08-14 PROCEDURE — 82378 CARCINOEMBRYONIC ANTIGEN: CPT

## 2025-08-14 PROCEDURE — 6360000002 HC RX W HCPCS: Performed by: NURSE PRACTITIONER

## 2025-08-14 PROCEDURE — 6370000000 HC RX 637 (ALT 250 FOR IP): Performed by: NURSE PRACTITIONER

## 2025-08-14 PROCEDURE — 2500000003 HC RX 250 WO HCPCS: Performed by: NURSE PRACTITIONER

## 2025-08-14 PROCEDURE — 80053 COMPREHEN METABOLIC PANEL: CPT

## 2025-08-14 PROCEDURE — 99222 1ST HOSP IP/OBS MODERATE 55: CPT | Performed by: STUDENT IN AN ORGANIZED HEALTH CARE EDUCATION/TRAINING PROGRAM

## 2025-08-14 PROCEDURE — 88305 TISSUE EXAM BY PATHOLOGIST: CPT

## 2025-08-14 PROCEDURE — 85025 COMPLETE CBC W/AUTO DIFF WBC: CPT

## 2025-08-14 PROCEDURE — 87075 CULTR BACTERIA EXCEPT BLOOD: CPT

## 2025-08-14 PROCEDURE — 87070 CULTURE OTHR SPECIMN AEROBIC: CPT

## 2025-08-14 PROCEDURE — 36415 COLL VENOUS BLD VENIPUNCTURE: CPT

## 2025-08-14 PROCEDURE — 2060000000 HC ICU INTERMEDIATE R&B

## 2025-08-14 PROCEDURE — 87205 SMEAR GRAM STAIN: CPT

## 2025-08-14 PROCEDURE — 2580000003 HC RX 258: Performed by: NURSE PRACTITIONER

## 2025-08-14 PROCEDURE — 94640 AIRWAY INHALATION TREATMENT: CPT

## 2025-08-14 RX ORDER — GUAIFENESIN 600 MG/1
600 TABLET, EXTENDED RELEASE ORAL 2 TIMES DAILY
Status: DISCONTINUED | OUTPATIENT
Start: 2025-08-14 | End: 2025-08-18 | Stop reason: HOSPADM

## 2025-08-14 RX ADMIN — ASPIRIN 81 MG: 81 TABLET, COATED ORAL at 09:35

## 2025-08-14 RX ADMIN — GUAIFENESIN 600 MG: 600 TABLET, EXTENDED RELEASE ORAL at 21:33

## 2025-08-14 RX ADMIN — OXYCODONE 10 MG: 5 TABLET ORAL at 09:44

## 2025-08-14 RX ADMIN — METRONIDAZOLE 500 MG: 500 INJECTION, SOLUTION INTRAVENOUS at 12:43

## 2025-08-14 RX ADMIN — ALBUTEROL SULFATE 2.5 MG: 2.5 SOLUTION RESPIRATORY (INHALATION) at 20:49

## 2025-08-14 RX ADMIN — VALSARTAN 160 MG: 160 TABLET, FILM COATED ORAL at 09:36

## 2025-08-14 RX ADMIN — IRON SUCROSE 200 MG: 20 INJECTION, SOLUTION INTRAVENOUS at 16:00

## 2025-08-14 RX ADMIN — ALBUTEROL SULFATE 2.5 MG: 2.5 SOLUTION RESPIRATORY (INHALATION) at 10:55

## 2025-08-14 RX ADMIN — SODIUM CHLORIDE, PRESERVATIVE FREE 10 ML: 5 INJECTION INTRAVENOUS at 09:36

## 2025-08-14 RX ADMIN — OXYCODONE 10 MG: 5 TABLET ORAL at 17:18

## 2025-08-14 RX ADMIN — PANTOPRAZOLE SODIUM 40 MG: 40 TABLET, DELAYED RELEASE ORAL at 05:06

## 2025-08-14 RX ADMIN — OXYCODONE 10 MG: 5 TABLET ORAL at 21:32

## 2025-08-14 RX ADMIN — Medication 5000 UNITS: at 09:35

## 2025-08-14 RX ADMIN — ALLOPURINOL 300 MG: 300 TABLET ORAL at 09:36

## 2025-08-14 RX ADMIN — OXYCODONE HYDROCHLORIDE 5 MG: 5 TABLET ORAL at 05:07

## 2025-08-14 RX ADMIN — METRONIDAZOLE 500 MG: 500 INJECTION, SOLUTION INTRAVENOUS at 04:32

## 2025-08-14 RX ADMIN — DULOXETINE 60 MG: 30 CAPSULE, DELAYED RELEASE ORAL at 09:35

## 2025-08-14 RX ADMIN — Medication 100 MG: at 09:35

## 2025-08-14 RX ADMIN — WATER 2000 MG: 1 INJECTION INTRAMUSCULAR; INTRAVENOUS; SUBCUTANEOUS at 10:40

## 2025-08-14 RX ADMIN — CARVEDILOL 25 MG: 25 TABLET, FILM COATED ORAL at 15:59

## 2025-08-14 RX ADMIN — SODIUM CHLORIDE, PRESERVATIVE FREE 10 ML: 5 INJECTION INTRAVENOUS at 20:14

## 2025-08-14 RX ADMIN — Medication 400 MG: at 09:35

## 2025-08-14 RX ADMIN — CARVEDILOL 25 MG: 25 TABLET, FILM COATED ORAL at 09:36

## 2025-08-14 RX ADMIN — OXYCODONE 10 MG: 5 TABLET ORAL at 13:14

## 2025-08-14 RX ADMIN — METRONIDAZOLE 500 MG: 500 INJECTION, SOLUTION INTRAVENOUS at 20:29

## 2025-08-14 RX ADMIN — CLONIDINE HYDROCHLORIDE 0.1 MG: 0.1 TABLET ORAL at 20:14

## 2025-08-14 ASSESSMENT — PAIN SCALES - GENERAL
PAINLEVEL_OUTOF10: 3
PAINLEVEL_OUTOF10: 3
PAINLEVEL_OUTOF10: 7
PAINLEVEL_OUTOF10: 5
PAINLEVEL_OUTOF10: 5
PAINLEVEL_OUTOF10: 4
PAINLEVEL_OUTOF10: 7
PAINLEVEL_OUTOF10: 3
PAINLEVEL_OUTOF10: 0
PAINLEVEL_OUTOF10: 3
PAINLEVEL_OUTOF10: 4
PAINLEVEL_OUTOF10: 7
PAINLEVEL_OUTOF10: 5

## 2025-08-14 ASSESSMENT — PAIN - FUNCTIONAL ASSESSMENT
PAIN_FUNCTIONAL_ASSESSMENT: 0-10

## 2025-08-14 ASSESSMENT — PAIN DESCRIPTION - ORIENTATION
ORIENTATION: LEFT
ORIENTATION: LEFT
ORIENTATION: RIGHT

## 2025-08-14 ASSESSMENT — PAIN DESCRIPTION - DESCRIPTORS
DESCRIPTORS: DISCOMFORT
DESCRIPTORS: DISCOMFORT
DESCRIPTORS: DISCOMFORT;ACHING

## 2025-08-14 ASSESSMENT — PAIN DESCRIPTION - LOCATION
LOCATION: BACK;HIP
LOCATION: ABDOMEN

## 2025-08-15 ENCOUNTER — APPOINTMENT (OUTPATIENT)
Dept: DIALYSIS | Age: 55
DRG: 441 | End: 2025-08-15
Attending: INTERNAL MEDICINE
Payer: MEDICARE

## 2025-08-15 PROBLEM — K74.60 CIRRHOSIS OF LIVER WITH ASCITES (HCC): Status: ACTIVE | Noted: 2025-08-15

## 2025-08-15 PROBLEM — R18.8 CIRRHOSIS OF LIVER WITH ASCITES (HCC): Status: ACTIVE | Noted: 2025-08-15

## 2025-08-15 LAB
ALBUMIN SERPL-MCNC: 2.7 G/DL (ref 3.5–5.2)
ALBUMIN/GLOB SERPL: 0.8 {RATIO} (ref 1–2.5)
ALP SERPL-CCNC: 127 U/L (ref 40–129)
ALT SERPL-CCNC: 6 U/L (ref 10–50)
ANION GAP SERPL CALCULATED.3IONS-SCNC: 13 MMOL/L (ref 9–16)
AST SERPL-CCNC: 18 U/L (ref 10–50)
BASOPHILS # BLD: 0.03 K/UL (ref 0–0.2)
BASOPHILS NFR BLD: 0 % (ref 0–2)
BILIRUB DIRECT SERPL-MCNC: 0.4 MG/DL (ref 0–0.2)
BILIRUB INDIRECT SERPL-MCNC: 0.4 MG/DL (ref 0–1)
BILIRUB SERPL-MCNC: 0.8 MG/DL (ref 0–1.2)
BUN SERPL-MCNC: 34 MG/DL (ref 6–20)
CALCIUM SERPL-MCNC: 8.8 MG/DL (ref 8.6–10.4)
CASE NUMBER:: NORMAL
CHLORIDE SERPL-SCNC: 89 MMOL/L (ref 98–107)
CO2 SERPL-SCNC: 25 MMOL/L (ref 20–31)
CREAT SERPL-MCNC: 3.9 MG/DL (ref 0.7–1.2)
EOSINOPHIL # BLD: 0.05 K/UL (ref 0–0.44)
EOSINOPHILS RELATIVE PERCENT: 0 % (ref 1–4)
ERYTHROCYTE [DISTWIDTH] IN BLOOD BY AUTOMATED COUNT: 15.9 % (ref 11.8–14.4)
GFR, ESTIMATED: 17 ML/MIN/1.73M2
GLUCOSE SERPL-MCNC: 96 MG/DL (ref 74–99)
HCT VFR BLD AUTO: 28.1 % (ref 40.7–50.3)
HGB BLD-MCNC: 8.7 G/DL (ref 13–17)
IMM GRANULOCYTES # BLD AUTO: 0.11 K/UL (ref 0–0.3)
IMM GRANULOCYTES NFR BLD: 1 %
INR PPP: 1.4
LYMPHOCYTES NFR BLD: 0.81 K/UL (ref 1.1–3.7)
LYMPHOCYTES RELATIVE PERCENT: 5 % (ref 24–43)
MCH RBC QN AUTO: 30.7 PG (ref 25.2–33.5)
MCHC RBC AUTO-ENTMCNC: 31 G/DL (ref 28.4–34.8)
MCV RBC AUTO: 99.3 FL (ref 82.6–102.9)
MICROORGANISM SPEC CULT: NORMAL
MICROORGANISM/AGENT SPEC: NORMAL
MONOCYTES NFR BLD: 0.65 K/UL (ref 0.1–1.2)
MONOCYTES NFR BLD: 4 % (ref 3–12)
NEUTROPHILS NFR BLD: 90 % (ref 36–65)
NEUTS SEG NFR BLD: 14.76 K/UL (ref 1.5–8.1)
NRBC BLD-RTO: 0 PER 100 WBC
PLATELET # BLD AUTO: ABNORMAL K/UL (ref 138–453)
PLATELET, FLUORESCENCE: 95 K/UL (ref 138–453)
PLATELETS.RETICULATED NFR BLD AUTO: 3 % (ref 1.1–10.3)
POTASSIUM SERPL-SCNC: 4.3 MMOL/L (ref 3.7–5.3)
PROT SERPL-MCNC: 6.3 G/DL (ref 6.6–8.7)
PROTHROMBIN TIME: 17.6 SEC (ref 11.7–14.9)
RBC # BLD AUTO: 2.83 M/UL (ref 4.21–5.77)
RBC # BLD: ABNORMAL 10*6/UL
SERVICE CMNT-IMP: NORMAL
SMOOTH MUSCLE ANTIBODY: 29 UNITS (ref 0–19)
SODIUM SERPL-SCNC: 127 MMOL/L (ref 136–145)
SPECIMEN DESCRIPTION: NORMAL
SPECIMEN DESCRIPTION: NORMAL
WBC OTHER # BLD: 16.4 K/UL (ref 3.5–11.3)

## 2025-08-15 PROCEDURE — 99223 1ST HOSP IP/OBS HIGH 75: CPT | Performed by: INTERNAL MEDICINE

## 2025-08-15 PROCEDURE — 85055 RETICULATED PLATELET ASSAY: CPT

## 2025-08-15 PROCEDURE — 85610 PROTHROMBIN TIME: CPT

## 2025-08-15 PROCEDURE — 90935 HEMODIALYSIS ONE EVALUATION: CPT

## 2025-08-15 PROCEDURE — 6360000002 HC RX W HCPCS: Performed by: NURSE PRACTITIONER

## 2025-08-15 PROCEDURE — 99231 SBSQ HOSP IP/OBS SF/LOW 25: CPT | Performed by: INTERNAL MEDICINE

## 2025-08-15 PROCEDURE — 2500000003 HC RX 250 WO HCPCS: Performed by: NURSE PRACTITIONER

## 2025-08-15 PROCEDURE — 36415 COLL VENOUS BLD VENIPUNCTURE: CPT

## 2025-08-15 PROCEDURE — 6370000000 HC RX 637 (ALT 250 FOR IP): Performed by: NURSE PRACTITIONER

## 2025-08-15 PROCEDURE — 80053 COMPREHEN METABOLIC PANEL: CPT

## 2025-08-15 PROCEDURE — 85025 COMPLETE CBC W/AUTO DIFF WBC: CPT

## 2025-08-15 PROCEDURE — 2580000003 HC RX 258: Performed by: NURSE PRACTITIONER

## 2025-08-15 PROCEDURE — 90935 HEMODIALYSIS ONE EVALUATION: CPT | Performed by: STUDENT IN AN ORGANIZED HEALTH CARE EDUCATION/TRAINING PROGRAM

## 2025-08-15 PROCEDURE — 2060000000 HC ICU INTERMEDIATE R&B

## 2025-08-15 PROCEDURE — 82248 BILIRUBIN DIRECT: CPT

## 2025-08-15 PROCEDURE — 99232 SBSQ HOSP IP/OBS MODERATE 35: CPT | Performed by: STUDENT IN AN ORGANIZED HEALTH CARE EDUCATION/TRAINING PROGRAM

## 2025-08-15 RX ADMIN — CARVEDILOL 25 MG: 25 TABLET, FILM COATED ORAL at 08:29

## 2025-08-15 RX ADMIN — SODIUM CHLORIDE, PRESERVATIVE FREE 10 ML: 5 INJECTION INTRAVENOUS at 20:31

## 2025-08-15 RX ADMIN — DULOXETINE 60 MG: 30 CAPSULE, DELAYED RELEASE ORAL at 08:28

## 2025-08-15 RX ADMIN — PANTOPRAZOLE SODIUM 40 MG: 40 TABLET, DELAYED RELEASE ORAL at 06:29

## 2025-08-15 RX ADMIN — ASPIRIN 81 MG: 81 TABLET, COATED ORAL at 08:29

## 2025-08-15 RX ADMIN — IRON SUCROSE 200 MG: 20 INJECTION, SOLUTION INTRAVENOUS at 20:34

## 2025-08-15 RX ADMIN — OXYCODONE 10 MG: 5 TABLET ORAL at 02:20

## 2025-08-15 RX ADMIN — HEPARIN SODIUM 5000 UNITS: 5000 INJECTION INTRAVENOUS; SUBCUTANEOUS at 15:05

## 2025-08-15 RX ADMIN — METRONIDAZOLE 500 MG: 500 INJECTION, SOLUTION INTRAVENOUS at 21:29

## 2025-08-15 RX ADMIN — OXYCODONE 10 MG: 5 TABLET ORAL at 06:29

## 2025-08-15 RX ADMIN — OXYCODONE 10 MG: 5 TABLET ORAL at 14:24

## 2025-08-15 RX ADMIN — Medication 100 MG: at 08:28

## 2025-08-15 RX ADMIN — METRONIDAZOLE 500 MG: 500 INJECTION, SOLUTION INTRAVENOUS at 15:18

## 2025-08-15 RX ADMIN — WATER 2000 MG: 1 INJECTION INTRAMUSCULAR; INTRAVENOUS; SUBCUTANEOUS at 15:06

## 2025-08-15 RX ADMIN — GUAIFENESIN 600 MG: 600 TABLET, EXTENDED RELEASE ORAL at 20:32

## 2025-08-15 RX ADMIN — METRONIDAZOLE 500 MG: 500 INJECTION, SOLUTION INTRAVENOUS at 04:11

## 2025-08-15 RX ADMIN — CARVEDILOL 25 MG: 25 TABLET, FILM COATED ORAL at 17:56

## 2025-08-15 RX ADMIN — HEPARIN SODIUM 5000 UNITS: 5000 INJECTION INTRAVENOUS; SUBCUTANEOUS at 21:23

## 2025-08-15 RX ADMIN — Medication 400 MG: at 08:29

## 2025-08-15 RX ADMIN — CLONIDINE HYDROCHLORIDE 0.1 MG: 0.1 TABLET ORAL at 20:32

## 2025-08-15 RX ADMIN — GUAIFENESIN 600 MG: 600 TABLET, EXTENDED RELEASE ORAL at 08:29

## 2025-08-15 RX ADMIN — SODIUM CHLORIDE, PRESERVATIVE FREE 10 ML: 5 INJECTION INTRAVENOUS at 08:28

## 2025-08-15 RX ADMIN — OXYCODONE 10 MG: 5 TABLET ORAL at 18:41

## 2025-08-15 RX ADMIN — Medication 5000 UNITS: at 08:29

## 2025-08-15 RX ADMIN — ALLOPURINOL 300 MG: 300 TABLET ORAL at 08:28

## 2025-08-15 ASSESSMENT — PAIN DESCRIPTION - LOCATION
LOCATION: BACK
LOCATION: BACK
LOCATION: BACK;HIP
LOCATION: BACK;HIP
LOCATION: BACK
LOCATION: BACK;HIP

## 2025-08-15 ASSESSMENT — PAIN DESCRIPTION - ONSET
ONSET: ON-GOING
ONSET: ON-GOING

## 2025-08-15 ASSESSMENT — PAIN SCALES - GENERAL
PAINLEVEL_OUTOF10: 5
PAINLEVEL_OUTOF10: 8
PAINLEVEL_OUTOF10: 0
PAINLEVEL_OUTOF10: 7
PAINLEVEL_OUTOF10: 4
PAINLEVEL_OUTOF10: 7
PAINLEVEL_OUTOF10: 5
PAINLEVEL_OUTOF10: 8
PAINLEVEL_OUTOF10: 5
PAINLEVEL_OUTOF10: 8
PAINLEVEL_OUTOF10: 6

## 2025-08-15 ASSESSMENT — PAIN DESCRIPTION - DESCRIPTORS
DESCRIPTORS: DISCOMFORT;ACHING;STABBING
DESCRIPTORS: DISCOMFORT
DESCRIPTORS: ACHING;DISCOMFORT
DESCRIPTORS: DISCOMFORT
DESCRIPTORS: DISCOMFORT;ACHING;SORE
DESCRIPTORS: DISCOMFORT;ACHING

## 2025-08-15 ASSESSMENT — PAIN DESCRIPTION - ORIENTATION
ORIENTATION: RIGHT;LOWER;LEFT
ORIENTATION: LEFT
ORIENTATION: LOWER
ORIENTATION: LEFT
ORIENTATION: RIGHT;LOWER;LEFT
ORIENTATION: LEFT

## 2025-08-15 ASSESSMENT — PAIN - FUNCTIONAL ASSESSMENT
PAIN_FUNCTIONAL_ASSESSMENT: 0-10

## 2025-08-15 ASSESSMENT — PAIN DESCRIPTION - PAIN TYPE
TYPE: CHRONIC PAIN
TYPE: CHRONIC PAIN

## 2025-08-15 ASSESSMENT — PAIN DESCRIPTION - FREQUENCY
FREQUENCY: CONTINUOUS
FREQUENCY: CONTINUOUS

## 2025-08-16 LAB
ALBUMIN SERPL-MCNC: 2.6 G/DL (ref 3.5–5.2)
ALBUMIN/GLOB SERPL: 0.7 {RATIO} (ref 1–2.5)
ALP SERPL-CCNC: 154 U/L (ref 40–129)
ALT SERPL-CCNC: 5 U/L (ref 10–50)
ANION GAP SERPL CALCULATED.3IONS-SCNC: 11 MMOL/L (ref 9–16)
AST SERPL-CCNC: 17 U/L (ref 10–50)
BASOPHILS # BLD: 0 K/UL (ref 0–0.2)
BASOPHILS NFR BLD: 0 % (ref 0–2)
BILIRUB DIRECT SERPL-MCNC: 0.4 MG/DL (ref 0–0.2)
BILIRUB INDIRECT SERPL-MCNC: 0.3 MG/DL (ref 0–1)
BILIRUB SERPL-MCNC: 0.7 MG/DL (ref 0–1.2)
BUN SERPL-MCNC: 21 MG/DL (ref 6–20)
CALCIUM SERPL-MCNC: 8.4 MG/DL (ref 8.6–10.4)
CHLORIDE SERPL-SCNC: 90 MMOL/L (ref 98–107)
CO2 SERPL-SCNC: 25 MMOL/L (ref 20–31)
CREAT SERPL-MCNC: 3 MG/DL (ref 0.7–1.2)
EOSINOPHIL # BLD: 0 K/UL (ref 0–0.44)
EOSINOPHILS RELATIVE PERCENT: 0 % (ref 1–4)
ERYTHROCYTE [DISTWIDTH] IN BLOOD BY AUTOMATED COUNT: 15.9 % (ref 11.8–14.4)
GFR, ESTIMATED: 24 ML/MIN/1.73M2
GLUCOSE SERPL-MCNC: 114 MG/DL (ref 74–99)
HCT VFR BLD AUTO: 29.3 % (ref 40.7–50.3)
HGB BLD-MCNC: 9.1 G/DL (ref 13–17)
IMM GRANULOCYTES # BLD AUTO: 0.14 K/UL (ref 0–0.3)
IMM GRANULOCYTES NFR BLD: 1 %
INR PPP: 1.4
LKM AB TITR SER IF: NORMAL {TITER}
LYMPHOCYTES NFR BLD: 0.69 K/UL (ref 1.1–3.7)
LYMPHOCYTES RELATIVE PERCENT: 5 % (ref 24–43)
MCH RBC QN AUTO: 30.6 PG (ref 25.2–33.5)
MCHC RBC AUTO-ENTMCNC: 31.1 G/DL (ref 28.4–34.8)
MCV RBC AUTO: 98.7 FL (ref 82.6–102.9)
MONOCYTES NFR BLD: 0.69 K/UL (ref 0.1–1.2)
MONOCYTES NFR BLD: 5 % (ref 3–12)
MORPHOLOGY: ABNORMAL
NEUTROPHILS NFR BLD: 89 % (ref 36–65)
NEUTS SEG NFR BLD: 12.28 K/UL (ref 1.5–8.1)
NRBC BLD-RTO: 0 PER 100 WBC
PLATELET # BLD AUTO: 90 K/UL (ref 138–453)
PMV BLD AUTO: 11.9 FL (ref 8.1–13.5)
POTASSIUM SERPL-SCNC: 3.6 MMOL/L (ref 3.7–5.3)
PROT SERPL-MCNC: 6.4 G/DL (ref 6.6–8.7)
PROTHROMBIN TIME: 17.9 SEC (ref 11.7–14.9)
RBC # BLD AUTO: 2.97 M/UL (ref 4.21–5.77)
SODIUM SERPL-SCNC: 126 MMOL/L (ref 136–145)
WBC OTHER # BLD: 13.8 K/UL (ref 3.5–11.3)

## 2025-08-16 PROCEDURE — 6370000000 HC RX 637 (ALT 250 FOR IP): Performed by: NURSE PRACTITIONER

## 2025-08-16 PROCEDURE — 2500000003 HC RX 250 WO HCPCS: Performed by: NURSE PRACTITIONER

## 2025-08-16 PROCEDURE — 6360000002 HC RX W HCPCS: Performed by: NURSE PRACTITIONER

## 2025-08-16 PROCEDURE — 82248 BILIRUBIN DIRECT: CPT

## 2025-08-16 PROCEDURE — 6370000000 HC RX 637 (ALT 250 FOR IP): Performed by: STUDENT IN AN ORGANIZED HEALTH CARE EDUCATION/TRAINING PROGRAM

## 2025-08-16 PROCEDURE — 36415 COLL VENOUS BLD VENIPUNCTURE: CPT

## 2025-08-16 PROCEDURE — 99232 SBSQ HOSP IP/OBS MODERATE 35: CPT | Performed by: STUDENT IN AN ORGANIZED HEALTH CARE EDUCATION/TRAINING PROGRAM

## 2025-08-16 PROCEDURE — 87522 HEPATITIS C REVRS TRNSCRPJ: CPT

## 2025-08-16 PROCEDURE — 99232 SBSQ HOSP IP/OBS MODERATE 35: CPT | Performed by: INTERNAL MEDICINE

## 2025-08-16 PROCEDURE — 80053 COMPREHEN METABOLIC PANEL: CPT

## 2025-08-16 PROCEDURE — 99232 SBSQ HOSP IP/OBS MODERATE 35: CPT

## 2025-08-16 PROCEDURE — 85610 PROTHROMBIN TIME: CPT

## 2025-08-16 PROCEDURE — 85025 COMPLETE CBC W/AUTO DIFF WBC: CPT

## 2025-08-16 PROCEDURE — 2060000000 HC ICU INTERMEDIATE R&B

## 2025-08-16 RX ADMIN — METRONIDAZOLE 500 MG: 500 INJECTION, SOLUTION INTRAVENOUS at 05:54

## 2025-08-16 RX ADMIN — PANTOPRAZOLE SODIUM 40 MG: 40 TABLET, DELAYED RELEASE ORAL at 05:54

## 2025-08-16 RX ADMIN — SODIUM CHLORIDE, PRESERVATIVE FREE 10 ML: 5 INJECTION INTRAVENOUS at 20:27

## 2025-08-16 RX ADMIN — OXYCODONE 10 MG: 5 TABLET ORAL at 14:01

## 2025-08-16 RX ADMIN — Medication 400 MG: at 09:39

## 2025-08-16 RX ADMIN — HEPARIN SODIUM 5000 UNITS: 5000 INJECTION INTRAVENOUS; SUBCUTANEOUS at 21:31

## 2025-08-16 RX ADMIN — POTASSIUM BICARBONATE 20 MEQ: 782 TABLET, EFFERVESCENT ORAL at 14:01

## 2025-08-16 RX ADMIN — Medication 5000 UNITS: at 09:37

## 2025-08-16 RX ADMIN — HEPARIN SODIUM 5000 UNITS: 5000 INJECTION INTRAVENOUS; SUBCUTANEOUS at 05:54

## 2025-08-16 RX ADMIN — OXYCODONE 10 MG: 5 TABLET ORAL at 06:02

## 2025-08-16 RX ADMIN — OXYCODONE 10 MG: 5 TABLET ORAL at 21:31

## 2025-08-16 RX ADMIN — OXYCODONE 10 MG: 5 TABLET ORAL at 17:31

## 2025-08-16 RX ADMIN — CARVEDILOL 25 MG: 25 TABLET, FILM COATED ORAL at 17:32

## 2025-08-16 RX ADMIN — METRONIDAZOLE 500 MG: 500 INJECTION, SOLUTION INTRAVENOUS at 12:48

## 2025-08-16 RX ADMIN — Medication 100 MG: at 09:39

## 2025-08-16 RX ADMIN — GUAIFENESIN 600 MG: 600 TABLET, EXTENDED RELEASE ORAL at 21:30

## 2025-08-16 RX ADMIN — DULOXETINE 60 MG: 30 CAPSULE, DELAYED RELEASE ORAL at 09:36

## 2025-08-16 RX ADMIN — METRONIDAZOLE 500 MG: 500 INJECTION, SOLUTION INTRAVENOUS at 20:30

## 2025-08-16 RX ADMIN — OXYCODONE 10 MG: 5 TABLET ORAL at 09:38

## 2025-08-16 RX ADMIN — HEPARIN SODIUM 5000 UNITS: 5000 INJECTION INTRAVENOUS; SUBCUTANEOUS at 12:45

## 2025-08-16 RX ADMIN — FUROSEMIDE 60 MG: 40 TABLET ORAL at 09:38

## 2025-08-16 RX ADMIN — WATER 2000 MG: 1 INJECTION INTRAMUSCULAR; INTRAVENOUS; SUBCUTANEOUS at 09:36

## 2025-08-16 RX ADMIN — CLONIDINE HYDROCHLORIDE 0.1 MG: 0.1 TABLET ORAL at 21:30

## 2025-08-16 RX ADMIN — GUAIFENESIN 600 MG: 600 TABLET, EXTENDED RELEASE ORAL at 09:39

## 2025-08-16 RX ADMIN — ALLOPURINOL 300 MG: 300 TABLET ORAL at 09:39

## 2025-08-16 RX ADMIN — OXYCODONE 10 MG: 5 TABLET ORAL at 01:08

## 2025-08-16 RX ADMIN — CARVEDILOL 25 MG: 25 TABLET, FILM COATED ORAL at 09:37

## 2025-08-16 RX ADMIN — SODIUM CHLORIDE, PRESERVATIVE FREE 10 ML: 5 INJECTION INTRAVENOUS at 09:36

## 2025-08-16 RX ADMIN — ASPIRIN 81 MG: 81 TABLET, COATED ORAL at 09:37

## 2025-08-16 ASSESSMENT — PAIN DESCRIPTION - DESCRIPTORS
DESCRIPTORS: ACHING;DISCOMFORT

## 2025-08-16 ASSESSMENT — PAIN - FUNCTIONAL ASSESSMENT
PAIN_FUNCTIONAL_ASSESSMENT: 0-10

## 2025-08-16 ASSESSMENT — PAIN SCALES - GENERAL
PAINLEVEL_OUTOF10: 8
PAINLEVEL_OUTOF10: 3
PAINLEVEL_OUTOF10: 4
PAINLEVEL_OUTOF10: 7
PAINLEVEL_OUTOF10: 5
PAINLEVEL_OUTOF10: 0
PAINLEVEL_OUTOF10: 0
PAINLEVEL_OUTOF10: 8
PAINLEVEL_OUTOF10: 7
PAINLEVEL_OUTOF10: 0
PAINLEVEL_OUTOF10: 7
PAINLEVEL_OUTOF10: 7

## 2025-08-16 ASSESSMENT — PAIN DESCRIPTION - LOCATION
LOCATION: ABDOMEN;BACK
LOCATION: BACK;SHOULDER
LOCATION: BACK;HIP

## 2025-08-16 ASSESSMENT — PAIN DESCRIPTION - ORIENTATION
ORIENTATION: MID;LOWER
ORIENTATION: LEFT;LOWER;MID

## 2025-08-17 LAB
ALBUMIN SERPL-MCNC: 2.7 G/DL (ref 3.5–5.2)
ALBUMIN/GLOB SERPL: 0.7 {RATIO} (ref 1–2.5)
ALP SERPL-CCNC: 176 U/L (ref 40–129)
ALT SERPL-CCNC: <5 U/L (ref 10–50)
ANION GAP SERPL CALCULATED.3IONS-SCNC: 12 MMOL/L (ref 9–16)
AST SERPL-CCNC: 17 U/L (ref 10–50)
BASOPHILS # BLD: 0 K/UL (ref 0–0.2)
BASOPHILS NFR BLD: 0 % (ref 0–2)
BILIRUB DIRECT SERPL-MCNC: 0.4 MG/DL (ref 0–0.2)
BILIRUB INDIRECT SERPL-MCNC: 0.2 MG/DL (ref 0–1)
BILIRUB SERPL-MCNC: 0.6 MG/DL (ref 0–1.2)
BUN SERPL-MCNC: 27 MG/DL (ref 6–20)
CALCIUM SERPL-MCNC: 8.6 MG/DL (ref 8.6–10.4)
CHLORIDE SERPL-SCNC: 90 MMOL/L (ref 98–107)
CO2 SERPL-SCNC: 24 MMOL/L (ref 20–31)
CREAT SERPL-MCNC: 3.8 MG/DL (ref 0.7–1.2)
EOSINOPHIL # BLD: 0 K/UL (ref 0–0.4)
EOSINOPHILS RELATIVE PERCENT: 0 % (ref 1–4)
ERYTHROCYTE [DISTWIDTH] IN BLOOD BY AUTOMATED COUNT: 15.6 % (ref 11.8–14.4)
GFR, ESTIMATED: 18 ML/MIN/1.73M2
GLUCOSE SERPL-MCNC: 103 MG/DL (ref 74–99)
HCT VFR BLD AUTO: 28.7 % (ref 40.7–50.3)
HGB BLD-MCNC: 8.9 G/DL (ref 13–17)
IMM GRANULOCYTES # BLD AUTO: 0 K/UL (ref 0–0.3)
IMM GRANULOCYTES NFR BLD: 0 %
INR PPP: 1.4
LYMPHOCYTES NFR BLD: 0.99 K/UL (ref 1–4.8)
LYMPHOCYTES RELATIVE PERCENT: 7 % (ref 24–44)
MCH RBC QN AUTO: 30.4 PG (ref 25.2–33.5)
MCHC RBC AUTO-ENTMCNC: 31 G/DL (ref 28.4–34.8)
MCV RBC AUTO: 98 FL (ref 82.6–102.9)
MICROORGANISM SPEC CULT: NORMAL
MICROORGANISM SPEC CULT: NORMAL
MONOCYTES NFR BLD: 0.99 K/UL (ref 0.1–0.8)
MONOCYTES NFR BLD: 7 % (ref 1–7)
MORPHOLOGY: ABNORMAL
NEUTROPHILS NFR BLD: 86 % (ref 36–66)
NEUTS SEG NFR BLD: 12.22 K/UL (ref 1.8–7.7)
NRBC BLD-RTO: 0 PER 100 WBC
PLATELET # BLD AUTO: ABNORMAL K/UL (ref 138–453)
PLATELET, FLUORESCENCE: 101 K/UL (ref 138–453)
PLATELETS.RETICULATED NFR BLD AUTO: 3.7 % (ref 1.1–10.3)
POTASSIUM SERPL-SCNC: 4.3 MMOL/L (ref 3.7–5.3)
PROT SERPL-MCNC: 6.4 G/DL (ref 6.6–8.7)
PROTHROMBIN TIME: 17.8 SEC (ref 11.7–14.9)
RBC # BLD AUTO: 2.93 M/UL (ref 4.21–5.77)
SERVICE CMNT-IMP: NORMAL
SERVICE CMNT-IMP: NORMAL
SODIUM SERPL-SCNC: 126 MMOL/L (ref 136–145)
SPECIMEN DESCRIPTION: NORMAL
SPECIMEN DESCRIPTION: NORMAL
WBC OTHER # BLD: 14.2 K/UL (ref 3.5–11.3)

## 2025-08-17 PROCEDURE — 36415 COLL VENOUS BLD VENIPUNCTURE: CPT

## 2025-08-17 PROCEDURE — 82248 BILIRUBIN DIRECT: CPT

## 2025-08-17 PROCEDURE — 6370000000 HC RX 637 (ALT 250 FOR IP): Performed by: NURSE PRACTITIONER

## 2025-08-17 PROCEDURE — 99232 SBSQ HOSP IP/OBS MODERATE 35: CPT | Performed by: STUDENT IN AN ORGANIZED HEALTH CARE EDUCATION/TRAINING PROGRAM

## 2025-08-17 PROCEDURE — 6360000002 HC RX W HCPCS: Performed by: NURSE PRACTITIONER

## 2025-08-17 PROCEDURE — 80053 COMPREHEN METABOLIC PANEL: CPT

## 2025-08-17 PROCEDURE — 99232 SBSQ HOSP IP/OBS MODERATE 35: CPT | Performed by: INTERNAL MEDICINE

## 2025-08-17 PROCEDURE — 2580000003 HC RX 258: Performed by: NURSE PRACTITIONER

## 2025-08-17 PROCEDURE — 85610 PROTHROMBIN TIME: CPT

## 2025-08-17 PROCEDURE — 2500000003 HC RX 250 WO HCPCS: Performed by: NURSE PRACTITIONER

## 2025-08-17 PROCEDURE — 85025 COMPLETE CBC W/AUTO DIFF WBC: CPT

## 2025-08-17 PROCEDURE — 99232 SBSQ HOSP IP/OBS MODERATE 35: CPT

## 2025-08-17 PROCEDURE — 85055 RETICULATED PLATELET ASSAY: CPT

## 2025-08-17 PROCEDURE — 2060000000 HC ICU INTERMEDIATE R&B

## 2025-08-17 PROCEDURE — 94640 AIRWAY INHALATION TREATMENT: CPT

## 2025-08-17 PROCEDURE — 6370000000 HC RX 637 (ALT 250 FOR IP): Performed by: STUDENT IN AN ORGANIZED HEALTH CARE EDUCATION/TRAINING PROGRAM

## 2025-08-17 RX ORDER — CALCIUM CARBONATE 500 MG/1
500 TABLET, CHEWABLE ORAL 3 TIMES DAILY PRN
Status: DISCONTINUED | OUTPATIENT
Start: 2025-08-17 | End: 2025-08-18 | Stop reason: HOSPADM

## 2025-08-17 RX ORDER — BENZONATATE 100 MG/1
100 CAPSULE ORAL 3 TIMES DAILY PRN
Status: DISCONTINUED | OUTPATIENT
Start: 2025-08-17 | End: 2025-08-18 | Stop reason: HOSPADM

## 2025-08-17 RX ADMIN — Medication 100 MG: at 07:58

## 2025-08-17 RX ADMIN — METRONIDAZOLE 500 MG: 500 INJECTION, SOLUTION INTRAVENOUS at 05:24

## 2025-08-17 RX ADMIN — OXYCODONE 10 MG: 5 TABLET ORAL at 12:29

## 2025-08-17 RX ADMIN — CARVEDILOL 25 MG: 25 TABLET, FILM COATED ORAL at 17:13

## 2025-08-17 RX ADMIN — FUROSEMIDE 60 MG: 40 TABLET ORAL at 07:59

## 2025-08-17 RX ADMIN — SODIUM CHLORIDE: 0.9 INJECTION, SOLUTION INTRAVENOUS at 21:14

## 2025-08-17 RX ADMIN — OXYCODONE 10 MG: 5 TABLET ORAL at 17:13

## 2025-08-17 RX ADMIN — CARVEDILOL 25 MG: 25 TABLET, FILM COATED ORAL at 07:59

## 2025-08-17 RX ADMIN — OXYCODONE 10 MG: 5 TABLET ORAL at 21:26

## 2025-08-17 RX ADMIN — HEPARIN SODIUM 5000 UNITS: 5000 INJECTION INTRAVENOUS; SUBCUTANEOUS at 12:29

## 2025-08-17 RX ADMIN — OXYCODONE 10 MG: 5 TABLET ORAL at 08:00

## 2025-08-17 RX ADMIN — OXYCODONE 10 MG: 5 TABLET ORAL at 03:35

## 2025-08-17 RX ADMIN — Medication 5000 UNITS: at 07:58

## 2025-08-17 RX ADMIN — SODIUM CHLORIDE, PRESERVATIVE FREE 10 ML: 5 INJECTION INTRAVENOUS at 21:04

## 2025-08-17 RX ADMIN — DULOXETINE 60 MG: 30 CAPSULE, DELAYED RELEASE ORAL at 07:58

## 2025-08-17 RX ADMIN — GUAIFENESIN 600 MG: 600 TABLET, EXTENDED RELEASE ORAL at 21:04

## 2025-08-17 RX ADMIN — ASPIRIN 81 MG: 81 TABLET, COATED ORAL at 07:58

## 2025-08-17 RX ADMIN — HEPARIN SODIUM 5000 UNITS: 5000 INJECTION INTRAVENOUS; SUBCUTANEOUS at 05:20

## 2025-08-17 RX ADMIN — PANTOPRAZOLE SODIUM 40 MG: 40 TABLET, DELAYED RELEASE ORAL at 05:20

## 2025-08-17 RX ADMIN — HEPARIN SODIUM 5000 UNITS: 5000 INJECTION INTRAVENOUS; SUBCUTANEOUS at 21:27

## 2025-08-17 RX ADMIN — GUAIFENESIN 600 MG: 600 TABLET, EXTENDED RELEASE ORAL at 07:59

## 2025-08-17 RX ADMIN — ALBUTEROL SULFATE 2.5 MG: 2.5 SOLUTION RESPIRATORY (INHALATION) at 23:02

## 2025-08-17 RX ADMIN — Medication 400 MG: at 07:59

## 2025-08-17 RX ADMIN — METRONIDAZOLE 500 MG: 500 INJECTION, SOLUTION INTRAVENOUS at 21:15

## 2025-08-17 RX ADMIN — CLONIDINE HYDROCHLORIDE 0.1 MG: 0.1 TABLET ORAL at 21:04

## 2025-08-17 RX ADMIN — WATER 2000 MG: 1 INJECTION INTRAMUSCULAR; INTRAVENOUS; SUBCUTANEOUS at 12:12

## 2025-08-17 RX ADMIN — SODIUM CHLORIDE, PRESERVATIVE FREE 10 ML: 5 INJECTION INTRAVENOUS at 07:59

## 2025-08-17 RX ADMIN — METRONIDAZOLE 500 MG: 500 INJECTION, SOLUTION INTRAVENOUS at 12:32

## 2025-08-17 RX ADMIN — ALLOPURINOL 300 MG: 300 TABLET ORAL at 07:59

## 2025-08-17 ASSESSMENT — PAIN SCALES - GENERAL
PAINLEVEL_OUTOF10: 4
PAINLEVEL_OUTOF10: 7
PAINLEVEL_OUTOF10: 7
PAINLEVEL_OUTOF10: 4
PAINLEVEL_OUTOF10: 8
PAINLEVEL_OUTOF10: 6
PAINLEVEL_OUTOF10: 7
PAINLEVEL_OUTOF10: 4
PAINLEVEL_OUTOF10: 4

## 2025-08-17 ASSESSMENT — PAIN DESCRIPTION - DESCRIPTORS
DESCRIPTORS: ACHING;SHARP
DESCRIPTORS: SORE
DESCRIPTORS_2: ACHING;SHARP
DESCRIPTORS: ACHING;DISCOMFORT

## 2025-08-17 ASSESSMENT — PAIN DESCRIPTION - ORIENTATION
ORIENTATION_2: LEFT
ORIENTATION: LEFT
ORIENTATION: POSTERIOR;RIGHT;LEFT
ORIENTATION: LOWER

## 2025-08-17 ASSESSMENT — PAIN DESCRIPTION - LOCATION
LOCATION: BACK
LOCATION: ABDOMEN
LOCATION: BACK;SHOULDER
LOCATION: BACK
LOCATION_2: SHOULDER

## 2025-08-17 ASSESSMENT — PAIN - FUNCTIONAL ASSESSMENT
PAIN_FUNCTIONAL_ASSESSMENT: 0-10
PAIN_FUNCTIONAL_ASSESSMENT_SITE2: PREVENTS OR INTERFERES SOME ACTIVE ACTIVITIES AND ADLS
PAIN_FUNCTIONAL_ASSESSMENT: 0-10
PAIN_FUNCTIONAL_ASSESSMENT: PREVENTS OR INTERFERES SOME ACTIVE ACTIVITIES AND ADLS
PAIN_FUNCTIONAL_ASSESSMENT: 0-10

## 2025-08-17 ASSESSMENT — PAIN DESCRIPTION - PAIN TYPE: TYPE: CHRONIC PAIN

## 2025-08-17 ASSESSMENT — PAIN DESCRIPTION - ONSET: ONSET: ON-GOING

## 2025-08-17 ASSESSMENT — PAIN DESCRIPTION - FREQUENCY: FREQUENCY: CONTINUOUS

## 2025-08-17 ASSESSMENT — PAIN DESCRIPTION - INTENSITY: RATING_2: 7

## 2025-08-18 ENCOUNTER — APPOINTMENT (OUTPATIENT)
Dept: ULTRASOUND IMAGING | Age: 55
DRG: 441 | End: 2025-08-18
Attending: INTERNAL MEDICINE
Payer: MEDICARE

## 2025-08-18 ENCOUNTER — APPOINTMENT (OUTPATIENT)
Dept: DIALYSIS | Age: 55
DRG: 441 | End: 2025-08-18
Attending: INTERNAL MEDICINE
Payer: MEDICARE

## 2025-08-18 VITALS
RESPIRATION RATE: 18 BRPM | HEIGHT: 69 IN | TEMPERATURE: 97.4 F | OXYGEN SATURATION: 95 % | WEIGHT: 195.11 LBS | SYSTOLIC BLOOD PRESSURE: 123 MMHG | HEART RATE: 71 BPM | DIASTOLIC BLOOD PRESSURE: 83 MMHG | BODY MASS INDEX: 28.9 KG/M2

## 2025-08-18 LAB
ANION GAP SERPL CALCULATED.3IONS-SCNC: 13 MMOL/L (ref 9–16)
BUN SERPL-MCNC: 32 MG/DL (ref 6–20)
CALCIUM SERPL-MCNC: 8.5 MG/DL (ref 8.6–10.4)
CHLORIDE SERPL-SCNC: 91 MMOL/L (ref 98–107)
CO2 SERPL-SCNC: 22 MMOL/L (ref 20–31)
CREAT SERPL-MCNC: 4.4 MG/DL (ref 0.7–1.2)
ERYTHROCYTE [DISTWIDTH] IN BLOOD BY AUTOMATED COUNT: 15.7 % (ref 11.8–14.4)
GFR, ESTIMATED: 15 ML/MIN/1.73M2
GLUCOSE SERPL-MCNC: 103 MG/DL (ref 74–99)
HCT VFR BLD AUTO: 28.5 % (ref 40.7–50.3)
HGB BLD-MCNC: 9 G/DL (ref 13–17)
MCH RBC QN AUTO: 31.3 PG (ref 25.2–33.5)
MCHC RBC AUTO-ENTMCNC: 31.6 G/DL (ref 28.4–34.8)
MCV RBC AUTO: 99 FL (ref 82.6–102.9)
NRBC BLD-RTO: 0 PER 100 WBC
PLATELET # BLD AUTO: 114 K/UL (ref 138–453)
PMV BLD AUTO: 12.5 FL (ref 8.1–13.5)
POTASSIUM SERPL-SCNC: 4.3 MMOL/L (ref 3.7–5.3)
RBC # BLD AUTO: 2.88 M/UL (ref 4.21–5.77)
SMOOTH MUSCLE IGG TITR SER: ABNORMAL {TITER}
SODIUM SERPL-SCNC: 126 MMOL/L (ref 136–145)
SURGICAL PATHOLOGY REPORT: NORMAL
WBC OTHER # BLD: 13.9 K/UL (ref 3.5–11.3)

## 2025-08-18 PROCEDURE — 90935 HEMODIALYSIS ONE EVALUATION: CPT

## 2025-08-18 PROCEDURE — 6360000002 HC RX W HCPCS: Performed by: NURSE PRACTITIONER

## 2025-08-18 PROCEDURE — 6370000000 HC RX 637 (ALT 250 FOR IP): Performed by: NURSE PRACTITIONER

## 2025-08-18 PROCEDURE — 36415 COLL VENOUS BLD VENIPUNCTURE: CPT

## 2025-08-18 PROCEDURE — 99238 HOSP IP/OBS DSCHRG MGMT 30/<: CPT | Performed by: STUDENT IN AN ORGANIZED HEALTH CARE EDUCATION/TRAINING PROGRAM

## 2025-08-18 PROCEDURE — 90935 HEMODIALYSIS ONE EVALUATION: CPT | Performed by: INTERNAL MEDICINE

## 2025-08-18 PROCEDURE — 6370000000 HC RX 637 (ALT 250 FOR IP): Performed by: STUDENT IN AN ORGANIZED HEALTH CARE EDUCATION/TRAINING PROGRAM

## 2025-08-18 PROCEDURE — 2580000003 HC RX 258: Performed by: NURSE PRACTITIONER

## 2025-08-18 PROCEDURE — 80048 BASIC METABOLIC PNL TOTAL CA: CPT

## 2025-08-18 PROCEDURE — 2500000003 HC RX 250 WO HCPCS: Performed by: NURSE PRACTITIONER

## 2025-08-18 PROCEDURE — 85027 COMPLETE CBC AUTOMATED: CPT

## 2025-08-18 PROCEDURE — 6370000000 HC RX 637 (ALT 250 FOR IP): Performed by: INTERNAL MEDICINE

## 2025-08-18 PROCEDURE — 99232 SBSQ HOSP IP/OBS MODERATE 35: CPT

## 2025-08-18 RX ORDER — METRONIDAZOLE 500 MG/1
500 TABLET ORAL 2 TIMES DAILY
Qty: 10 TABLET | Refills: 0 | Status: SHIPPED | OUTPATIENT
Start: 2025-08-18 | End: 2025-08-23

## 2025-08-18 RX ORDER — CARVEDILOL 12.5 MG/1
12.5 TABLET ORAL 2 TIMES DAILY WITH MEALS
Status: DISCONTINUED | OUTPATIENT
Start: 2025-08-18 | End: 2025-08-18 | Stop reason: HOSPADM

## 2025-08-18 RX ORDER — LEVOFLOXACIN 750 MG/1
750 TABLET, FILM COATED ORAL DAILY
Qty: 5 TABLET | Refills: 0 | Status: SHIPPED | OUTPATIENT
Start: 2025-08-18 | End: 2025-08-23

## 2025-08-18 RX ORDER — ASPIRIN 81 MG/1
81 TABLET ORAL DAILY
Status: CANCELLED | OUTPATIENT
Start: 2025-08-19

## 2025-08-18 RX ORDER — CARVEDILOL 12.5 MG/1
12.5 TABLET ORAL 2 TIMES DAILY WITH MEALS
Qty: 60 TABLET | Refills: 3 | Status: SHIPPED | OUTPATIENT
Start: 2025-08-18

## 2025-08-18 RX ADMIN — SODIUM CHLORIDE, PRESERVATIVE FREE 10 ML: 5 INJECTION INTRAVENOUS at 08:33

## 2025-08-18 RX ADMIN — OXYCODONE 10 MG: 5 TABLET ORAL at 08:36

## 2025-08-18 RX ADMIN — SODIUM CHLORIDE: 0.9 INJECTION, SOLUTION INTRAVENOUS at 04:03

## 2025-08-18 RX ADMIN — CARVEDILOL 12.5 MG: 12.5 TABLET, FILM COATED ORAL at 18:14

## 2025-08-18 RX ADMIN — METRONIDAZOLE 500 MG: 500 INJECTION, SOLUTION INTRAVENOUS at 04:04

## 2025-08-18 RX ADMIN — Medication 400 MG: at 08:32

## 2025-08-18 RX ADMIN — FUROSEMIDE 60 MG: 40 TABLET ORAL at 08:32

## 2025-08-18 RX ADMIN — SODIUM CHLORIDE, PRESERVATIVE FREE 10 ML: 5 INJECTION INTRAVENOUS at 04:01

## 2025-08-18 RX ADMIN — PANTOPRAZOLE SODIUM 40 MG: 40 TABLET, DELAYED RELEASE ORAL at 03:54

## 2025-08-18 RX ADMIN — CARVEDILOL 25 MG: 25 TABLET, FILM COATED ORAL at 08:32

## 2025-08-18 RX ADMIN — OXYCODONE 10 MG: 5 TABLET ORAL at 03:53

## 2025-08-18 RX ADMIN — Medication 100 MG: at 08:32

## 2025-08-18 RX ADMIN — GUAIFENESIN 600 MG: 600 TABLET, EXTENDED RELEASE ORAL at 08:32

## 2025-08-18 RX ADMIN — DULOXETINE 60 MG: 30 CAPSULE, DELAYED RELEASE ORAL at 08:32

## 2025-08-18 RX ADMIN — Medication 5000 UNITS: at 08:33

## 2025-08-18 RX ADMIN — OXYCODONE 10 MG: 5 TABLET ORAL at 12:51

## 2025-08-18 RX ADMIN — ALLOPURINOL 300 MG: 300 TABLET ORAL at 08:32

## 2025-08-18 ASSESSMENT — PAIN DESCRIPTION - DESCRIPTORS
DESCRIPTORS: ACHING;SHARP
DESCRIPTORS: ACHING;DISCOMFORT
DESCRIPTORS: ACHING;DISCOMFORT
DESCRIPTORS: ACHING;SHARP
DESCRIPTORS: ACHING;DISCOMFORT
DESCRIPTORS: DISCOMFORT;PRESSURE

## 2025-08-18 ASSESSMENT — PAIN SCALES - GENERAL
PAINLEVEL_OUTOF10: 4
PAINLEVEL_OUTOF10: 7
PAINLEVEL_OUTOF10: 4
PAINLEVEL_OUTOF10: 7
PAINLEVEL_OUTOF10: 7

## 2025-08-18 ASSESSMENT — PAIN - FUNCTIONAL ASSESSMENT
PAIN_FUNCTIONAL_ASSESSMENT: PREVENTS OR INTERFERES SOME ACTIVE ACTIVITIES AND ADLS
PAIN_FUNCTIONAL_ASSESSMENT: PREVENTS OR INTERFERES SOME ACTIVE ACTIVITIES AND ADLS
PAIN_FUNCTIONAL_ASSESSMENT: 0-10

## 2025-08-18 ASSESSMENT — PAIN DESCRIPTION - PAIN TYPE
TYPE: CHRONIC PAIN

## 2025-08-18 ASSESSMENT — PAIN DESCRIPTION - LOCATION
LOCATION: BACK;SHOULDER
LOCATION: BACK
LOCATION: HIP;BACK;SHOULDER
LOCATION: BACK;SHOULDER
LOCATION: BACK
LOCATION: BACK;SHOULDER

## 2025-08-18 ASSESSMENT — PAIN DESCRIPTION - ORIENTATION
ORIENTATION: LEFT;RIGHT;POSTERIOR
ORIENTATION: RIGHT;LEFT;POSTERIOR

## 2025-08-18 ASSESSMENT — ENCOUNTER SYMPTOMS
SHORTNESS OF BREATH: 0
BACK PAIN: 0
COUGH: 0
ABDOMINAL PAIN: 1
NAUSEA: 1
WHEEZING: 0
VOMITING: 0
ABDOMINAL DISTENTION: 1
DIARRHEA: 1

## 2025-08-18 ASSESSMENT — PAIN DESCRIPTION - ONSET
ONSET: ON-GOING

## 2025-08-18 ASSESSMENT — PAIN DESCRIPTION - FREQUENCY
FREQUENCY: CONTINUOUS

## 2025-08-19 LAB
HCV RNA # SERPL NAA+PROBE: DETECTED {COPIES}/ML
HCV RNA SERPL NAA+PROBE-ACNC: ABNORMAL IU/ML
HCV RNA SERPL NAA+PROBE-LOG IU: 4.82 LOG IU/ML
SPECIMEN SOURCE: ABNORMAL

## 2025-08-20 LAB
MICROORGANISM SPEC CULT: NORMAL
MICROORGANISM/AGENT SPEC: NORMAL
SERVICE CMNT-IMP: NORMAL
SPECIMEN DESCRIPTION: NORMAL

## 2025-08-27 ENCOUNTER — HOSPITAL ENCOUNTER (OUTPATIENT)
Dept: ULTRASOUND IMAGING | Age: 55
Discharge: HOME | End: 2025-08-29
Attending: RADIOLOGY
Payer: MEDICARE

## 2025-08-27 ENCOUNTER — HOSPITAL ENCOUNTER (OUTPATIENT)
Dept: ULTRASOUND IMAGING | Age: 55
Setting detail: OBSERVATION
Discharge: HOME | End: 2025-08-28
Attending: INTERNAL MEDICINE | Admitting: INTERNAL MEDICINE
Payer: MEDICARE

## 2025-08-27 ENCOUNTER — APPOINTMENT (OUTPATIENT)
Dept: CT IMAGING | Age: 55
End: 2025-08-27
Attending: INTERNAL MEDICINE
Payer: MEDICARE

## 2025-08-27 VITALS
TEMPERATURE: 98 F | DIASTOLIC BLOOD PRESSURE: 59 MMHG | SYSTOLIC BLOOD PRESSURE: 101 MMHG | HEART RATE: 73 BPM | OXYGEN SATURATION: 96 % | RESPIRATION RATE: 14 BRPM

## 2025-08-27 DIAGNOSIS — R16.0 LIVER MASS: ICD-10-CM

## 2025-08-27 DIAGNOSIS — K75.0 LIVER ABSCESS: Primary | ICD-10-CM

## 2025-08-27 DIAGNOSIS — R18.8 ASCITES OF LIVER: ICD-10-CM

## 2025-08-27 PROBLEM — K70.30 ALCOHOLIC CIRRHOSIS OF LIVER WITHOUT ASCITES (HCC): Status: ACTIVE | Noted: 2025-08-27

## 2025-08-27 PROBLEM — B18.2 CHRONIC HEPATITIS C WITHOUT HEPATIC COMA (HCC): Status: ACTIVE | Noted: 2025-08-27

## 2025-08-27 PROCEDURE — 87070 CULTURE OTHR SPECIMN AEROBIC: CPT

## 2025-08-27 PROCEDURE — 6360000002 HC RX W HCPCS: Performed by: NURSE PRACTITIONER

## 2025-08-27 PROCEDURE — 88304 TISSUE EXAM BY PATHOLOGIST: CPT

## 2025-08-27 PROCEDURE — 76705 ECHO EXAM OF ABDOMEN: CPT

## 2025-08-27 PROCEDURE — 6360000002 HC RX W HCPCS

## 2025-08-27 PROCEDURE — 2500000003 HC RX 250 WO HCPCS: Performed by: NURSE PRACTITIONER

## 2025-08-27 PROCEDURE — 94664 DEMO&/EVAL PT USE INHALER: CPT

## 2025-08-27 PROCEDURE — 87205 SMEAR GRAM STAIN: CPT

## 2025-08-27 PROCEDURE — 99223 1ST HOSP IP/OBS HIGH 75: CPT | Performed by: INTERNAL MEDICINE

## 2025-08-27 PROCEDURE — 75989 ABSCESS DRAINAGE UNDER X-RAY: CPT

## 2025-08-27 PROCEDURE — 2580000003 HC RX 258: Performed by: NURSE PRACTITIONER

## 2025-08-27 PROCEDURE — G0378 HOSPITAL OBSERVATION PER HR: HCPCS

## 2025-08-27 PROCEDURE — 88312 SPECIAL STAINS GROUP 1: CPT

## 2025-08-27 PROCEDURE — 96375 TX/PRO/DX INJ NEW DRUG ADDON: CPT

## 2025-08-27 PROCEDURE — 88333 PATH CONSLTJ SURG CYTO XM 1: CPT

## 2025-08-27 PROCEDURE — 6370000000 HC RX 637 (ALT 250 FOR IP): Performed by: RADIOLOGY

## 2025-08-27 PROCEDURE — 6360000002 HC RX W HCPCS: Performed by: RADIOLOGY

## 2025-08-27 PROCEDURE — G0379 DIRECT REFER HOSPITAL OBSERV: HCPCS

## 2025-08-27 PROCEDURE — 96365 THER/PROPH/DIAG IV INF INIT: CPT

## 2025-08-27 PROCEDURE — 74176 CT ABD & PELVIS W/O CONTRAST: CPT

## 2025-08-27 PROCEDURE — 88342 IMHCHEM/IMCYTCHM 1ST ANTB: CPT

## 2025-08-27 PROCEDURE — 87075 CULTR BACTERIA EXCEPT BLOOD: CPT

## 2025-08-27 PROCEDURE — 94761 N-INVAS EAR/PLS OXIMETRY MLT: CPT

## 2025-08-27 PROCEDURE — 6370000000 HC RX 637 (ALT 250 FOR IP): Performed by: NURSE PRACTITIONER

## 2025-08-27 RX ORDER — MORPHINE SULFATE 4 MG/ML
4 INJECTION, SOLUTION INTRAMUSCULAR; INTRAVENOUS ONCE
Status: COMPLETED | OUTPATIENT
Start: 2025-08-27 | End: 2025-08-27

## 2025-08-27 RX ORDER — LANOLIN ALCOHOL/MO/W.PET/CERES
400 CREAM (GRAM) TOPICAL DAILY
Status: DISCONTINUED | OUTPATIENT
Start: 2025-08-27 | End: 2025-08-28 | Stop reason: HOSPADM

## 2025-08-27 RX ORDER — CLONIDINE HYDROCHLORIDE 0.1 MG/1
0.1 TABLET ORAL NIGHTLY
Status: DISCONTINUED | OUTPATIENT
Start: 2025-08-27 | End: 2025-08-28 | Stop reason: HOSPADM

## 2025-08-27 RX ORDER — ALLOPURINOL 300 MG/1
300 TABLET ORAL DAILY
Status: DISCONTINUED | OUTPATIENT
Start: 2025-08-27 | End: 2025-08-28 | Stop reason: HOSPADM

## 2025-08-27 RX ORDER — CHLORHEXIDINE GLUCONATE 40 MG/ML
SOLUTION TOPICAL DAILY PRN
Status: DISCONTINUED | OUTPATIENT
Start: 2025-08-27 | End: 2025-08-28 | Stop reason: HOSPADM

## 2025-08-27 RX ORDER — OXYCODONE HYDROCHLORIDE 5 MG/1
5 TABLET ORAL EVERY 6 HOURS PRN
Refills: 0 | Status: DISCONTINUED | OUTPATIENT
Start: 2025-08-27 | End: 2025-08-28

## 2025-08-27 RX ORDER — ONDANSETRON 2 MG/ML
4 INJECTION INTRAMUSCULAR; INTRAVENOUS EVERY 6 HOURS PRN
Status: DISCONTINUED | OUTPATIENT
Start: 2025-08-27 | End: 2025-08-28 | Stop reason: HOSPADM

## 2025-08-27 RX ORDER — DULOXETIN HYDROCHLORIDE 60 MG/1
60 CAPSULE, DELAYED RELEASE ORAL DAILY
Status: DISCONTINUED | OUTPATIENT
Start: 2025-08-27 | End: 2025-08-28 | Stop reason: HOSPADM

## 2025-08-27 RX ORDER — ALBUTEROL SULFATE 90 UG/1
2 INHALANT RESPIRATORY (INHALATION) EVERY 6 HOURS PRN
Status: DISCONTINUED | OUTPATIENT
Start: 2025-08-27 | End: 2025-08-28 | Stop reason: HOSPADM

## 2025-08-27 RX ORDER — ACETAMINOPHEN 325 MG/1
650 TABLET ORAL ONCE
Status: COMPLETED | OUTPATIENT
Start: 2025-08-27 | End: 2025-08-27

## 2025-08-27 RX ORDER — MIDODRINE HYDROCHLORIDE 5 MG/1
10 TABLET ORAL DAILY PRN
Status: DISCONTINUED | OUTPATIENT
Start: 2025-08-27 | End: 2025-08-28 | Stop reason: HOSPADM

## 2025-08-27 RX ORDER — LIDOCAINE HYDROCHLORIDE 10 MG/ML
INJECTION, SOLUTION EPIDURAL; INFILTRATION; INTRACAUDAL; PERINEURAL PRN
Status: COMPLETED | OUTPATIENT
Start: 2025-08-27 | End: 2025-08-27

## 2025-08-27 RX ORDER — SODIUM CHLORIDE 0.9 % (FLUSH) 0.9 %
10 SYRINGE (ML) INJECTION PRN
Status: DISCONTINUED | OUTPATIENT
Start: 2025-08-27 | End: 2025-08-28 | Stop reason: HOSPADM

## 2025-08-27 RX ORDER — SODIUM CHLORIDE 9 MG/ML
INJECTION, SOLUTION INTRAVENOUS PRN
Status: DISCONTINUED | OUTPATIENT
Start: 2025-08-27 | End: 2025-08-28 | Stop reason: HOSPADM

## 2025-08-27 RX ORDER — SODIUM CHLORIDE 0.9 % (FLUSH) 0.9 %
10 SYRINGE (ML) INJECTION EVERY 12 HOURS SCHEDULED
Status: DISCONTINUED | OUTPATIENT
Start: 2025-08-27 | End: 2025-08-28 | Stop reason: HOSPADM

## 2025-08-27 RX ORDER — FENTANYL CITRATE 50 UG/ML
INJECTION, SOLUTION INTRAMUSCULAR; INTRAVENOUS PRN
Status: COMPLETED | OUTPATIENT
Start: 2025-08-27 | End: 2025-08-27

## 2025-08-27 RX ORDER — CARVEDILOL 12.5 MG/1
12.5 TABLET ORAL 2 TIMES DAILY WITH MEALS
Status: DISCONTINUED | OUTPATIENT
Start: 2025-08-27 | End: 2025-08-28 | Stop reason: HOSPADM

## 2025-08-27 RX ORDER — BUMETANIDE 1 MG/1
2 TABLET ORAL 2 TIMES DAILY
Status: ON HOLD | COMMUNITY

## 2025-08-27 RX ORDER — GAUZE BANDAGE 2" X 2"
100 BANDAGE TOPICAL DAILY
Status: DISCONTINUED | OUTPATIENT
Start: 2025-08-27 | End: 2025-08-28

## 2025-08-27 RX ORDER — ONDANSETRON 4 MG/1
4 TABLET, ORALLY DISINTEGRATING ORAL EVERY 8 HOURS PRN
Status: DISCONTINUED | OUTPATIENT
Start: 2025-08-27 | End: 2025-08-28 | Stop reason: HOSPADM

## 2025-08-27 RX ORDER — BUMETANIDE 1 MG/1
2 TABLET ORAL 2 TIMES DAILY
Status: DISCONTINUED | OUTPATIENT
Start: 2025-08-27 | End: 2025-08-28 | Stop reason: HOSPADM

## 2025-08-27 RX ORDER — ASPIRIN 81 MG/1
81 TABLET ORAL DAILY
Status: DISCONTINUED | OUTPATIENT
Start: 2025-08-27 | End: 2025-08-28 | Stop reason: HOSPADM

## 2025-08-27 RX ORDER — PANTOPRAZOLE SODIUM 40 MG/1
40 TABLET, DELAYED RELEASE ORAL DAILY
Status: DISCONTINUED | OUTPATIENT
Start: 2025-08-27 | End: 2025-08-28 | Stop reason: HOSPADM

## 2025-08-27 RX ORDER — M-VIT,TX,IRON,MINS/CALC/FOLIC 27MG-0.4MG
1 TABLET ORAL DAILY
Status: DISCONTINUED | OUTPATIENT
Start: 2025-08-27 | End: 2025-08-28 | Stop reason: HOSPADM

## 2025-08-27 RX ORDER — ALBUTEROL SULFATE 0.83 MG/ML
1.25 SOLUTION RESPIRATORY (INHALATION) EVERY 6 HOURS PRN
Status: DISCONTINUED | OUTPATIENT
Start: 2025-08-27 | End: 2025-08-28 | Stop reason: HOSPADM

## 2025-08-27 RX ADMIN — Medication 400 MG: at 16:46

## 2025-08-27 RX ADMIN — LIDOCAINE HYDROCHLORIDE 7 ML: 10 INJECTION, SOLUTION EPIDURAL; INFILTRATION; INTRACAUDAL; PERINEURAL at 13:06

## 2025-08-27 RX ADMIN — FENTANYL CITRATE 25 MCG: 50 INJECTION, SOLUTION INTRAMUSCULAR; INTRAVENOUS at 13:06

## 2025-08-27 RX ADMIN — Medication 5000 UNITS: at 16:51

## 2025-08-27 RX ADMIN — ALLOPURINOL 300 MG: 300 TABLET ORAL at 16:46

## 2025-08-27 RX ADMIN — PIPERACILLIN AND TAZOBACTAM 4500 MG: 4; .5 INJECTION, POWDER, LYOPHILIZED, FOR SOLUTION INTRAVENOUS at 16:57

## 2025-08-27 RX ADMIN — OXYCODONE 5 MG: 5 TABLET ORAL at 16:47

## 2025-08-27 RX ADMIN — Medication 1 TABLET: at 16:46

## 2025-08-27 RX ADMIN — CLONIDINE HYDROCHLORIDE 0.1 MG: 0.1 TABLET ORAL at 20:25

## 2025-08-27 RX ADMIN — DULOXETINE 60 MG: 60 CAPSULE, DELAYED RELEASE ORAL at 16:46

## 2025-08-27 RX ADMIN — MORPHINE SULFATE 4 MG: 4 INJECTION, SOLUTION INTRAMUSCULAR; INTRAVENOUS at 21:20

## 2025-08-27 RX ADMIN — BUMETANIDE 2 MG: 1 TABLET ORAL at 20:25

## 2025-08-27 RX ADMIN — OXYCODONE 5 MG: 5 TABLET ORAL at 23:20

## 2025-08-27 RX ADMIN — PANTOPRAZOLE SODIUM 40 MG: 40 TABLET, DELAYED RELEASE ORAL at 16:47

## 2025-08-27 RX ADMIN — SODIUM CHLORIDE, PRESERVATIVE FREE 10 ML: 5 INJECTION INTRAVENOUS at 20:32

## 2025-08-27 RX ADMIN — ACETAMINOPHEN 650 MG: 325 TABLET ORAL at 14:40

## 2025-08-27 ASSESSMENT — PAIN DESCRIPTION - ORIENTATION
ORIENTATION: RIGHT

## 2025-08-27 ASSESSMENT — PAIN DESCRIPTION - DESCRIPTORS
DESCRIPTORS: STABBING
DESCRIPTORS: ACHING;STABBING
DESCRIPTORS: ACHING;STABBING
DESCRIPTORS: PRESSURE;STABBING
DESCRIPTORS: ACHING;STABBING
DESCRIPTORS: STABBING

## 2025-08-27 ASSESSMENT — PAIN SCALES - GENERAL
PAINLEVEL_OUTOF10: 7
PAINLEVEL_OUTOF10: 6
PAINLEVEL_OUTOF10: 8
PAINLEVEL_OUTOF10: 7
PAINLEVEL_OUTOF10: 6
PAINLEVEL_OUTOF10: 7
PAINLEVEL_OUTOF10: 5
PAINLEVEL_OUTOF10: 7
PAINLEVEL_OUTOF10: 5

## 2025-08-27 ASSESSMENT — PAIN - FUNCTIONAL ASSESSMENT
PAIN_FUNCTIONAL_ASSESSMENT: 0-10
PAIN_FUNCTIONAL_ASSESSMENT: 0-10
PAIN_FUNCTIONAL_ASSESSMENT: ACTIVITIES ARE NOT PREVENTED
PAIN_FUNCTIONAL_ASSESSMENT: 0-10
PAIN_FUNCTIONAL_ASSESSMENT: PREVENTS OR INTERFERES SOME ACTIVE ACTIVITIES AND ADLS
PAIN_FUNCTIONAL_ASSESSMENT: ACTIVITIES ARE NOT PREVENTED
PAIN_FUNCTIONAL_ASSESSMENT: 0-10
PAIN_FUNCTIONAL_ASSESSMENT: PREVENTS OR INTERFERES SOME ACTIVE ACTIVITIES AND ADLS
PAIN_FUNCTIONAL_ASSESSMENT: 0-10

## 2025-08-27 ASSESSMENT — PAIN DESCRIPTION - FREQUENCY
FREQUENCY: CONTINUOUS
FREQUENCY: CONTINUOUS

## 2025-08-27 ASSESSMENT — PAIN DESCRIPTION - LOCATION
LOCATION: ABDOMEN

## 2025-08-27 ASSESSMENT — PAIN DESCRIPTION - ONSET
ONSET: ON-GOING
ONSET: ON-GOING

## 2025-08-27 ASSESSMENT — PAIN DESCRIPTION - PAIN TYPE
TYPE: ACUTE PAIN
TYPE: SURGICAL PAIN

## 2025-08-28 VITALS
HEIGHT: 69 IN | WEIGHT: 199.52 LBS | TEMPERATURE: 97.2 F | OXYGEN SATURATION: 96 % | RESPIRATION RATE: 17 BRPM | SYSTOLIC BLOOD PRESSURE: 132 MMHG | HEART RATE: 66 BPM | DIASTOLIC BLOOD PRESSURE: 79 MMHG | BODY MASS INDEX: 29.55 KG/M2

## 2025-08-28 LAB
ANION GAP SERPL CALCULATED.3IONS-SCNC: 12 MMOL/L (ref 9–16)
BASOPHILS # BLD: 0.04 K/UL (ref 0–0.2)
BASOPHILS NFR BLD: 0 % (ref 0–2)
BUN SERPL-MCNC: 25 MG/DL (ref 6–20)
BUN/CREAT SERPL: 7 (ref 9–20)
CALCIUM SERPL-MCNC: 8.4 MG/DL (ref 8.6–10.4)
CHLORIDE SERPL-SCNC: 91 MMOL/L (ref 98–107)
CO2 SERPL-SCNC: 27 MMOL/L (ref 20–31)
CREAT SERPL-MCNC: 3.8 MG/DL (ref 0.7–1.2)
EOSINOPHIL # BLD: <0.03 K/UL (ref 0–0.44)
EOSINOPHILS RELATIVE PERCENT: 0 % (ref 1–4)
ERYTHROCYTE [DISTWIDTH] IN BLOOD BY AUTOMATED COUNT: 15.6 % (ref 11.8–14.4)
GFR, ESTIMATED: 18 ML/MIN/1.73M2
GLUCOSE SERPL-MCNC: 105 MG/DL (ref 74–99)
HCT VFR BLD AUTO: 27.7 % (ref 40.7–50.3)
HGB BLD-MCNC: 8.6 G/DL (ref 13–17)
IMM GRANULOCYTES # BLD AUTO: 0.08 K/UL (ref 0–0.3)
IMM GRANULOCYTES NFR BLD: 1 %
LYMPHOCYTES NFR BLD: 0.91 K/UL (ref 1.1–3.7)
LYMPHOCYTES RELATIVE PERCENT: 9 % (ref 24–43)
MCH RBC QN AUTO: 31.2 PG (ref 25.2–33.5)
MCHC RBC AUTO-ENTMCNC: 31 G/DL (ref 28.4–34.8)
MCV RBC AUTO: 100.4 FL (ref 82.6–102.9)
MONOCYTES NFR BLD: 0.73 K/UL (ref 0.1–1.2)
MONOCYTES NFR BLD: 7 % (ref 3–12)
NEUTROPHILS NFR BLD: 83 % (ref 36–65)
NEUTS SEG NFR BLD: 8.83 K/UL (ref 1.5–8.1)
NRBC BLD-RTO: 0 PER 100 WBC
PLATELET # BLD AUTO: 116 K/UL (ref 138–453)
PMV BLD AUTO: 11.5 FL (ref 8.1–13.5)
POTASSIUM SERPL-SCNC: 4 MMOL/L (ref 3.7–5.3)
RBC # BLD AUTO: 2.76 M/UL (ref 4.21–5.77)
SODIUM SERPL-SCNC: 130 MMOL/L (ref 136–145)
WBC OTHER # BLD: 10.6 K/UL (ref 3.5–11.3)

## 2025-08-28 PROCEDURE — 94761 N-INVAS EAR/PLS OXIMETRY MLT: CPT

## 2025-08-28 PROCEDURE — 96366 THER/PROPH/DIAG IV INF ADDON: CPT

## 2025-08-28 PROCEDURE — 6370000000 HC RX 637 (ALT 250 FOR IP)

## 2025-08-28 PROCEDURE — 6370000000 HC RX 637 (ALT 250 FOR IP): Performed by: NURSE PRACTITIONER

## 2025-08-28 PROCEDURE — 88112 CYTOPATH CELL ENHANCE TECH: CPT

## 2025-08-28 PROCEDURE — G0378 HOSPITAL OBSERVATION PER HR: HCPCS

## 2025-08-28 PROCEDURE — 88307 TISSUE EXAM BY PATHOLOGIST: CPT

## 2025-08-28 PROCEDURE — 85025 COMPLETE CBC W/AUTO DIFF WBC: CPT

## 2025-08-28 PROCEDURE — 2580000003 HC RX 258: Performed by: NURSE PRACTITIONER

## 2025-08-28 PROCEDURE — 6370000000 HC RX 637 (ALT 250 FOR IP): Performed by: INTERNAL MEDICINE

## 2025-08-28 PROCEDURE — 36415 COLL VENOUS BLD VENIPUNCTURE: CPT

## 2025-08-28 PROCEDURE — 88342 IMHCHEM/IMCYTCHM 1ST ANTB: CPT

## 2025-08-28 PROCEDURE — 6360000002 HC RX W HCPCS: Performed by: NURSE PRACTITIONER

## 2025-08-28 PROCEDURE — 80048 BASIC METABOLIC PNL TOTAL CA: CPT

## 2025-08-28 PROCEDURE — 88341 IMHCHEM/IMCYTCHM EA ADD ANTB: CPT

## 2025-08-28 PROCEDURE — 2500000003 HC RX 250 WO HCPCS: Performed by: NURSE PRACTITIONER

## 2025-08-28 RX ORDER — GAUZE BANDAGE 2" X 2"
100 BANDAGE TOPICAL DAILY
Status: DISCONTINUED | OUTPATIENT
Start: 2025-08-28 | End: 2025-08-28

## 2025-08-28 RX ORDER — CIPROFLOXACIN 250 MG/1
250 TABLET, FILM COATED ORAL 2 TIMES DAILY
Qty: 28 TABLET | Refills: 0 | Status: ON HOLD | OUTPATIENT
Start: 2025-08-28 | End: 2025-09-05

## 2025-08-28 RX ORDER — LANOLIN ALCOHOL/MO/W.PET/CERES
100 CREAM (GRAM) TOPICAL DAILY
Status: DISCONTINUED | OUTPATIENT
Start: 2025-08-28 | End: 2025-08-28 | Stop reason: HOSPADM

## 2025-08-28 RX ORDER — OXYCODONE HYDROCHLORIDE 5 MG/1
10 TABLET ORAL EVERY 6 HOURS PRN
Refills: 0 | Status: DISCONTINUED | OUTPATIENT
Start: 2025-08-28 | End: 2025-08-28 | Stop reason: HOSPADM

## 2025-08-28 RX ORDER — METRONIDAZOLE 500 MG/1
500 TABLET ORAL 3 TIMES DAILY
Qty: 42 TABLET | Refills: 0 | Status: ON HOLD | OUTPATIENT
Start: 2025-08-28 | End: 2025-09-05

## 2025-08-28 RX ADMIN — PIPERACILLIN AND TAZOBACTAM 3375 MG: 3; .375 INJECTION, POWDER, LYOPHILIZED, FOR SOLUTION INTRAVENOUS at 04:22

## 2025-08-28 RX ADMIN — OXYCODONE HYDROCHLORIDE 10 MG: 5 TABLET ORAL at 09:11

## 2025-08-28 RX ADMIN — Medication 5000 UNITS: at 09:11

## 2025-08-28 RX ADMIN — OXYCODONE HYDROCHLORIDE 10 MG: 5 TABLET ORAL at 02:43

## 2025-08-28 RX ADMIN — ALLOPURINOL 300 MG: 300 TABLET ORAL at 09:11

## 2025-08-28 RX ADMIN — BUMETANIDE 2 MG: 1 TABLET ORAL at 09:11

## 2025-08-28 RX ADMIN — SODIUM CHLORIDE, PRESERVATIVE FREE 10 ML: 5 INJECTION INTRAVENOUS at 09:11

## 2025-08-28 RX ADMIN — PANTOPRAZOLE SODIUM 40 MG: 40 TABLET, DELAYED RELEASE ORAL at 09:11

## 2025-08-28 RX ADMIN — Medication 1 TABLET: at 09:11

## 2025-08-28 RX ADMIN — Medication 400 MG: at 09:11

## 2025-08-28 RX ADMIN — Medication 100 MG: at 11:53

## 2025-08-28 RX ADMIN — DULOXETINE 60 MG: 60 CAPSULE, DELAYED RELEASE ORAL at 09:11

## 2025-08-28 ASSESSMENT — PAIN DESCRIPTION - ONSET
ONSET: ON-GOING
ONSET: ON-GOING

## 2025-08-28 ASSESSMENT — PAIN - FUNCTIONAL ASSESSMENT
PAIN_FUNCTIONAL_ASSESSMENT: 0-10
PAIN_FUNCTIONAL_ASSESSMENT: ACTIVITIES ARE NOT PREVENTED
PAIN_FUNCTIONAL_ASSESSMENT: 0-10
PAIN_FUNCTIONAL_ASSESSMENT: ACTIVITIES ARE NOT PREVENTED
PAIN_FUNCTIONAL_ASSESSMENT: 0-10
PAIN_FUNCTIONAL_ASSESSMENT: ACTIVITIES ARE NOT PREVENTED

## 2025-08-28 ASSESSMENT — PAIN SCALES - GENERAL
PAINLEVEL_OUTOF10: 8
PAINLEVEL_OUTOF10: 5
PAINLEVEL_OUTOF10: 4
PAINLEVEL_OUTOF10: 7
PAINLEVEL_OUTOF10: 8

## 2025-08-28 ASSESSMENT — PAIN DESCRIPTION - DESCRIPTORS
DESCRIPTORS: SHARP
DESCRIPTORS: ACHING;STABBING
DESCRIPTORS: STABBING;SHARP

## 2025-08-28 ASSESSMENT — PAIN DESCRIPTION - LOCATION
LOCATION: ABDOMEN

## 2025-08-28 ASSESSMENT — PAIN DESCRIPTION - FREQUENCY
FREQUENCY: CONTINUOUS
FREQUENCY: CONTINUOUS

## 2025-08-28 ASSESSMENT — PAIN DESCRIPTION - PAIN TYPE
TYPE: SURGICAL PAIN
TYPE: SURGICAL PAIN

## 2025-08-28 ASSESSMENT — PAIN DESCRIPTION - ORIENTATION
ORIENTATION: RIGHT;UPPER
ORIENTATION: RIGHT;UPPER
ORIENTATION: RIGHT

## 2025-08-29 ENCOUNTER — TELEPHONE (OUTPATIENT)
Dept: ONCOLOGY | Age: 55
End: 2025-08-29

## 2025-08-29 LAB
CASE NUMBER:: NORMAL
SPECIMEN DESCRIPTION: NORMAL

## 2025-08-31 LAB
MICROORGANISM SPEC CULT: NORMAL
MICROORGANISM/AGENT SPEC: NORMAL
MICROORGANISM/AGENT SPEC: NORMAL
SPECIMEN DESCRIPTION: NORMAL

## 2025-09-01 ENCOUNTER — APPOINTMENT (OUTPATIENT)
Dept: CT IMAGING | Age: 55
End: 2025-09-01
Payer: MEDICARE

## 2025-09-01 ENCOUNTER — HOSPITAL ENCOUNTER (EMERGENCY)
Age: 55
Discharge: ANOTHER HOSPITAL | End: 2025-09-03
Attending: STUDENT IN AN ORGANIZED HEALTH CARE EDUCATION/TRAINING PROGRAM
Payer: MEDICARE

## 2025-09-01 DIAGNOSIS — R41.82 ALTERED MENTAL STATUS, UNSPECIFIED ALTERED MENTAL STATUS TYPE: Primary | ICD-10-CM

## 2025-09-01 DIAGNOSIS — K75.0 LIVER ABSCESS: ICD-10-CM

## 2025-09-01 DIAGNOSIS — A41.9 SEPSIS, DUE TO UNSPECIFIED ORGANISM, UNSPECIFIED WHETHER ACUTE ORGAN DYSFUNCTION PRESENT (HCC): ICD-10-CM

## 2025-09-01 LAB
AMMONIA PLAS-SCNC: 19 UMOL/L (ref 11–51)
BASOPHILS # BLD: 0 K/UL (ref 0–0.2)
BASOPHILS NFR BLD: 0 % (ref 0–2)
EOSINOPHIL # BLD: 0 K/UL (ref 0–0.44)
EOSINOPHILS RELATIVE PERCENT: 0 % (ref 1–4)
ERYTHROCYTE [DISTWIDTH] IN BLOOD BY AUTOMATED COUNT: 15.2 % (ref 11.8–14.4)
HCT VFR BLD AUTO: 32.7 % (ref 40.7–50.3)
HGB BLD-MCNC: 10.3 G/DL (ref 13–17)
IMM GRANULOCYTES # BLD AUTO: 0 K/UL (ref 0–0.3)
IMM GRANULOCYTES NFR BLD: 0 %
LACTATE BLDV-SCNC: 2 MMOL/L (ref 0.5–2.2)
LYMPHOCYTES NFR BLD: 0.66 K/UL (ref 1.1–3.7)
LYMPHOCYTES RELATIVE PERCENT: 3 % (ref 24–43)
MCH RBC QN AUTO: 31.1 PG (ref 25.2–33.5)
MCHC RBC AUTO-ENTMCNC: 31.5 G/DL (ref 28.4–34.8)
MCV RBC AUTO: 98.8 FL (ref 82.6–102.9)
MONOCYTES NFR BLD: 0.44 K/UL (ref 0.1–1.2)
MONOCYTES NFR BLD: 2 % (ref 3–12)
MORPHOLOGY: NORMAL
NEUTROPHILS NFR BLD: 95 % (ref 36–65)
NEUTS SEG NFR BLD: 21 K/UL (ref 1.5–8.1)
NRBC BLD-RTO: 0 PER 100 WBC
PLATELET # BLD AUTO: 128 K/UL (ref 138–453)
PMV BLD AUTO: 10.4 FL (ref 8.1–13.5)
RBC # BLD AUTO: 3.31 M/UL (ref 4.21–5.77)
TROPONIN I SERPL HS-MCNC: 33 NG/L (ref 0–22)
TROPONIN I SERPL HS-MCNC: 47 NG/L (ref 0–22)
WBC OTHER # BLD: 22.1 K/UL (ref 3.5–11.3)

## 2025-09-01 PROCEDURE — 2580000003 HC RX 258: Performed by: STUDENT IN AN ORGANIZED HEALTH CARE EDUCATION/TRAINING PROGRAM

## 2025-09-01 PROCEDURE — 96361 HYDRATE IV INFUSION ADD-ON: CPT

## 2025-09-01 PROCEDURE — 85025 COMPLETE CBC W/AUTO DIFF WBC: CPT

## 2025-09-01 PROCEDURE — 80053 COMPREHEN METABOLIC PANEL: CPT

## 2025-09-01 PROCEDURE — 96368 THER/DIAG CONCURRENT INF: CPT

## 2025-09-01 PROCEDURE — 96365 THER/PROPH/DIAG IV INF INIT: CPT

## 2025-09-01 PROCEDURE — 36415 COLL VENOUS BLD VENIPUNCTURE: CPT

## 2025-09-01 PROCEDURE — 6360000004 HC RX CONTRAST MEDICATION: Performed by: STUDENT IN AN ORGANIZED HEALTH CARE EDUCATION/TRAINING PROGRAM

## 2025-09-01 PROCEDURE — 71260 CT THORAX DX C+: CPT

## 2025-09-01 PROCEDURE — 6370000000 HC RX 637 (ALT 250 FOR IP): Performed by: STUDENT IN AN ORGANIZED HEALTH CARE EDUCATION/TRAINING PROGRAM

## 2025-09-01 PROCEDURE — 93005 ELECTROCARDIOGRAM TRACING: CPT | Performed by: STUDENT IN AN ORGANIZED HEALTH CARE EDUCATION/TRAINING PROGRAM

## 2025-09-01 PROCEDURE — 83605 ASSAY OF LACTIC ACID: CPT

## 2025-09-01 PROCEDURE — 6360000002 HC RX W HCPCS: Performed by: STUDENT IN AN ORGANIZED HEALTH CARE EDUCATION/TRAINING PROGRAM

## 2025-09-01 PROCEDURE — 84484 ASSAY OF TROPONIN QUANT: CPT

## 2025-09-01 PROCEDURE — 70450 CT HEAD/BRAIN W/O DYE: CPT

## 2025-09-01 PROCEDURE — 81001 URINALYSIS AUTO W/SCOPE: CPT

## 2025-09-01 PROCEDURE — 82140 ASSAY OF AMMONIA: CPT

## 2025-09-01 PROCEDURE — 87040 BLOOD CULTURE FOR BACTERIA: CPT

## 2025-09-01 RX ORDER — 0.9 % SODIUM CHLORIDE 0.9 %
250 INTRAVENOUS SOLUTION INTRAVENOUS ONCE
Status: COMPLETED | OUTPATIENT
Start: 2025-09-01 | End: 2025-09-01

## 2025-09-01 RX ORDER — IOPAMIDOL 755 MG/ML
75 INJECTION, SOLUTION INTRAVASCULAR
Status: COMPLETED | OUTPATIENT
Start: 2025-09-01 | End: 2025-09-01

## 2025-09-01 RX ORDER — ACETAMINOPHEN 500 MG
500 TABLET ORAL ONCE
Status: COMPLETED | OUTPATIENT
Start: 2025-09-01 | End: 2025-09-01

## 2025-09-01 RX ORDER — VANCOMYCIN 2 G/400ML
2000 INJECTION, SOLUTION INTRAVENOUS ONCE
Status: COMPLETED | OUTPATIENT
Start: 2025-09-01 | End: 2025-09-02

## 2025-09-01 RX ADMIN — SODIUM CHLORIDE 250 ML: 0.9 INJECTION, SOLUTION INTRAVENOUS at 20:59

## 2025-09-01 RX ADMIN — PIPERACILLIN AND TAZOBACTAM 3375 MG: 3; .375 INJECTION, POWDER, LYOPHILIZED, FOR SOLUTION INTRAVENOUS; PARENTERAL at 22:43

## 2025-09-01 RX ADMIN — ACETAMINOPHEN 500 MG: 500 TABLET ORAL at 20:58

## 2025-09-01 RX ADMIN — VANCOMYCIN 2000 MG: 2 INJECTION, SOLUTION INTRAVENOUS at 23:14

## 2025-09-01 RX ADMIN — IOPAMIDOL 75 ML: 755 INJECTION, SOLUTION INTRAVENOUS at 21:53

## 2025-09-01 ASSESSMENT — PAIN - FUNCTIONAL ASSESSMENT: PAIN_FUNCTIONAL_ASSESSMENT: 0-10

## 2025-09-01 ASSESSMENT — PAIN DESCRIPTION - DESCRIPTORS: DESCRIPTORS: NAGGING

## 2025-09-01 ASSESSMENT — PAIN SCALES - GENERAL: PAINLEVEL_OUTOF10: 3

## 2025-09-01 ASSESSMENT — PAIN DESCRIPTION - LOCATION: LOCATION: ABDOMEN

## 2025-09-02 LAB
ALBUMIN SERPL-MCNC: 2.4 G/DL (ref 3.5–5.2)
ALBUMIN SERPL-MCNC: 3 G/DL (ref 3.5–5.2)
ALBUMIN/GLOB SERPL: 0.7 {RATIO} (ref 1–2.5)
ALBUMIN/GLOB SERPL: 0.7 {RATIO} (ref 1–2.5)
ALP SERPL-CCNC: 122 U/L (ref 40–129)
ALP SERPL-CCNC: 91 U/L (ref 40–129)
ALT SERPL-CCNC: <5 U/L (ref 10–50)
ALT SERPL-CCNC: <5 U/L (ref 10–50)
ANION GAP SERPL CALCULATED.3IONS-SCNC: 14 MMOL/L (ref 9–16)
ANION GAP SERPL CALCULATED.3IONS-SCNC: 14 MMOL/L (ref 9–16)
AST SERPL-CCNC: 17 U/L (ref 10–50)
AST SERPL-CCNC: 33 U/L (ref 10–50)
BACTERIA URNS QL MICRO: ABNORMAL
BASOPHILS # BLD: 0.04 K/UL (ref 0–0.2)
BASOPHILS NFR BLD: 0 % (ref 0–2)
BILIRUB SERPL-MCNC: 0.6 MG/DL (ref 0–1.2)
BILIRUB SERPL-MCNC: 0.8 MG/DL (ref 0–1.2)
BILIRUB UR QL STRIP: NEGATIVE
BUN SERPL-MCNC: 18 MG/DL (ref 6–20)
BUN SERPL-MCNC: 24 MG/DL (ref 6–20)
BUN/CREAT SERPL: 6 (ref 9–20)
BUN/CREAT SERPL: 6 (ref 9–20)
CALCIUM SERPL-MCNC: 8 MG/DL (ref 8.6–10.4)
CALCIUM SERPL-MCNC: 8.5 MG/DL (ref 8.6–10.4)
CHLORIDE SERPL-SCNC: 89 MMOL/L (ref 98–107)
CHLORIDE SERPL-SCNC: 89 MMOL/L (ref 98–107)
CLARITY UR: CLEAR
CO2 SERPL-SCNC: 23 MMOL/L (ref 20–31)
CO2 SERPL-SCNC: 25 MMOL/L (ref 20–31)
COLOR UR: YELLOW
CREAT SERPL-MCNC: 3 MG/DL (ref 0.7–1.2)
CREAT SERPL-MCNC: 3.7 MG/DL (ref 0.7–1.2)
EKG ATRIAL RATE: 99 BPM
EKG P AXIS: 6 DEGREES
EKG P-R INTERVAL: 142 MS
EKG Q-T INTERVAL: 358 MS
EKG QRS DURATION: 92 MS
EKG QTC CALCULATION (BAZETT): 459 MS
EKG R AXIS: 89 DEGREES
EKG T AXIS: -16 DEGREES
EKG VENTRICULAR RATE: 99 BPM
EOSINOPHIL # BLD: 0.03 K/UL (ref 0–0.44)
EOSINOPHILS RELATIVE PERCENT: 0 % (ref 1–4)
EPI CELLS #/AREA URNS HPF: ABNORMAL /HPF (ref 0–5)
ERYTHROCYTE [DISTWIDTH] IN BLOOD BY AUTOMATED COUNT: 15.3 % (ref 11.8–14.4)
GFR, ESTIMATED: 18 ML/MIN/1.73M2
GFR, ESTIMATED: 24 ML/MIN/1.73M2
GLUCOSE SERPL-MCNC: 118 MG/DL (ref 74–99)
GLUCOSE SERPL-MCNC: 96 MG/DL (ref 74–99)
GLUCOSE UR STRIP-MCNC: NEGATIVE MG/DL
HCT VFR BLD AUTO: 28 % (ref 40.7–50.3)
HGB BLD-MCNC: 9.2 G/DL (ref 13–17)
HGB UR QL STRIP.AUTO: ABNORMAL
IMM GRANULOCYTES # BLD AUTO: 0.15 K/UL (ref 0–0.3)
IMM GRANULOCYTES NFR BLD: 1 %
KETONES UR STRIP-MCNC: NEGATIVE MG/DL
LEUKOCYTE ESTERASE UR QL STRIP: NEGATIVE
LYMPHOCYTES NFR BLD: 0.93 K/UL (ref 1.1–3.7)
LYMPHOCYTES RELATIVE PERCENT: 5 % (ref 24–43)
MCH RBC QN AUTO: 32.1 PG (ref 25.2–33.5)
MCHC RBC AUTO-ENTMCNC: 32.9 G/DL (ref 28.4–34.8)
MCV RBC AUTO: 97.6 FL (ref 82.6–102.9)
MICROORGANISM SPEC CULT: NORMAL
MICROORGANISM/AGENT SPEC: NORMAL
MICROORGANISM/AGENT SPEC: NORMAL
MONOCYTES NFR BLD: 0.89 K/UL (ref 0.1–1.2)
MONOCYTES NFR BLD: 5 % (ref 3–12)
NEUTROPHILS NFR BLD: 89 % (ref 36–65)
NEUTS SEG NFR BLD: 16.21 K/UL (ref 1.5–8.1)
NITRITE UR QL STRIP: POSITIVE
NRBC BLD-RTO: 0 PER 100 WBC
PH UR STRIP: 7.5 [PH] (ref 5–9)
PLATELET # BLD AUTO: 110 K/UL (ref 138–453)
PMV BLD AUTO: 11.9 FL (ref 8.1–13.5)
POTASSIUM SERPL-SCNC: 3.7 MMOL/L (ref 3.7–5.3)
POTASSIUM SERPL-SCNC: 4.4 MMOL/L (ref 3.7–5.3)
PROT SERPL-MCNC: 5.9 G/DL (ref 6.6–8.7)
PROT SERPL-MCNC: 7 G/DL (ref 6.6–8.7)
PROT UR STRIP-MCNC: ABNORMAL MG/DL
RBC # BLD AUTO: 2.87 M/UL (ref 4.21–5.77)
RBC #/AREA URNS HPF: ABNORMAL /HPF (ref 0–2)
SODIUM SERPL-SCNC: 126 MMOL/L (ref 136–145)
SODIUM SERPL-SCNC: 128 MMOL/L (ref 136–145)
SP GR UR STRIP: 1.01 (ref 1.01–1.02)
SPECIMEN DESCRIPTION: NORMAL
SURGICAL PATHOLOGY REPORT: NORMAL
UROBILINOGEN UR STRIP-ACNC: NORMAL EU/DL (ref 0–1)
WBC #/AREA URNS HPF: ABNORMAL /HPF (ref 0–5)
WBC OTHER # BLD: 18.3 K/UL (ref 3.5–11.3)

## 2025-09-02 PROCEDURE — 96366 THER/PROPH/DIAG IV INF ADDON: CPT

## 2025-09-02 PROCEDURE — 99285 EMERGENCY DEPT VISIT HI MDM: CPT

## 2025-09-02 PROCEDURE — 85025 COMPLETE CBC W/AUTO DIFF WBC: CPT

## 2025-09-02 PROCEDURE — 2580000003 HC RX 258: Performed by: STUDENT IN AN ORGANIZED HEALTH CARE EDUCATION/TRAINING PROGRAM

## 2025-09-02 PROCEDURE — 6370000000 HC RX 637 (ALT 250 FOR IP): Performed by: EMERGENCY MEDICINE

## 2025-09-02 PROCEDURE — 96361 HYDRATE IV INFUSION ADD-ON: CPT

## 2025-09-02 PROCEDURE — 6360000002 HC RX W HCPCS: Performed by: STUDENT IN AN ORGANIZED HEALTH CARE EDUCATION/TRAINING PROGRAM

## 2025-09-02 PROCEDURE — 93010 ELECTROCARDIOGRAM REPORT: CPT | Performed by: INTERNAL MEDICINE

## 2025-09-02 PROCEDURE — 96367 TX/PROPH/DG ADDL SEQ IV INF: CPT

## 2025-09-02 PROCEDURE — 80053 COMPREHEN METABOLIC PANEL: CPT

## 2025-09-02 RX ORDER — 0.9 % SODIUM CHLORIDE 0.9 %
250 INTRAVENOUS SOLUTION INTRAVENOUS ONCE
Status: COMPLETED | OUTPATIENT
Start: 2025-09-02 | End: 2025-09-02

## 2025-09-02 RX ORDER — OXYCODONE HYDROCHLORIDE 5 MG/1
5 TABLET ORAL EVERY 6 HOURS PRN
Refills: 0 | Status: DISCONTINUED | OUTPATIENT
Start: 2025-09-02 | End: 2025-09-03 | Stop reason: HOSPADM

## 2025-09-02 RX ADMIN — PIPERACILLIN AND TAZOBACTAM 3375 MG: 3; .375 INJECTION, POWDER, LYOPHILIZED, FOR SOLUTION INTRAVENOUS at 06:59

## 2025-09-02 RX ADMIN — SODIUM CHLORIDE 250 ML: 9 INJECTION, SOLUTION INTRAVENOUS at 00:15

## 2025-09-02 RX ADMIN — PIPERACILLIN AND TAZOBACTAM 2250 MG: 2; .25 INJECTION, POWDER, LYOPHILIZED, FOR SOLUTION INTRAVENOUS at 15:10

## 2025-09-02 RX ADMIN — OXYCODONE 5 MG: 5 TABLET ORAL at 18:45

## 2025-09-02 RX ADMIN — OXYCODONE 5 MG: 5 TABLET ORAL at 12:47

## 2025-09-02 ASSESSMENT — PAIN DESCRIPTION - LOCATION
LOCATION: ABDOMEN
LOCATION: ABDOMEN

## 2025-09-02 ASSESSMENT — PAIN SCALES - GENERAL
PAINLEVEL_OUTOF10: 8
PAINLEVEL_OUTOF10: 5

## 2025-09-02 ASSESSMENT — PAIN DESCRIPTION - ORIENTATION
ORIENTATION: RIGHT;LOWER
ORIENTATION: RIGHT;LOWER

## 2025-09-02 ASSESSMENT — PAIN - FUNCTIONAL ASSESSMENT: PAIN_FUNCTIONAL_ASSESSMENT: 0-10

## 2025-09-03 VITALS
OXYGEN SATURATION: 98 % | TEMPERATURE: 98.8 F | SYSTOLIC BLOOD PRESSURE: 135 MMHG | DIASTOLIC BLOOD PRESSURE: 77 MMHG | HEART RATE: 86 BPM | RESPIRATION RATE: 14 BRPM

## 2025-09-03 PROBLEM — N18.6 ESRD ON HEMODIALYSIS (HCC): Status: ACTIVE | Noted: 2025-09-03

## 2025-09-03 PROBLEM — Z99.2 ESRD ON HEMODIALYSIS (HCC): Status: ACTIVE | Noted: 2025-09-03

## 2025-09-03 PROBLEM — A41.9 SEPSIS (HCC): Status: ACTIVE | Noted: 2025-09-03

## 2025-09-03 LAB — SURGICAL PATHOLOGY REPORT: NORMAL

## 2025-09-03 PROCEDURE — 6370000000 HC RX 637 (ALT 250 FOR IP): Performed by: EMERGENCY MEDICINE

## 2025-09-03 PROCEDURE — 6360000002 HC RX W HCPCS: Performed by: STUDENT IN AN ORGANIZED HEALTH CARE EDUCATION/TRAINING PROGRAM

## 2025-09-03 PROCEDURE — 96366 THER/PROPH/DIAG IV INF ADDON: CPT

## 2025-09-03 PROCEDURE — 2580000003 HC RX 258: Performed by: STUDENT IN AN ORGANIZED HEALTH CARE EDUCATION/TRAINING PROGRAM

## 2025-09-03 RX ADMIN — PIPERACILLIN AND TAZOBACTAM 2250 MG: 2; .25 INJECTION, POWDER, LYOPHILIZED, FOR SOLUTION INTRAVENOUS at 01:05

## 2025-09-03 RX ADMIN — OXYCODONE 5 MG: 5 TABLET ORAL at 01:02

## 2025-09-03 RX ADMIN — PIPERACILLIN AND TAZOBACTAM 2250 MG: 2; .25 INJECTION, POWDER, LYOPHILIZED, FOR SOLUTION INTRAVENOUS at 08:11

## 2025-09-03 RX ADMIN — OXYCODONE 5 MG: 5 TABLET ORAL at 08:14

## 2025-09-03 ASSESSMENT — PAIN - FUNCTIONAL ASSESSMENT: PAIN_FUNCTIONAL_ASSESSMENT: 0-10

## 2025-09-03 ASSESSMENT — PAIN SCALES - GENERAL
PAINLEVEL_OUTOF10: 7
PAINLEVEL_OUTOF10: 7

## 2025-09-03 ASSESSMENT — PAIN DESCRIPTION - LOCATION
LOCATION: GENERALIZED
LOCATION: ABDOMEN

## 2025-09-03 ASSESSMENT — PAIN DESCRIPTION - ORIENTATION: ORIENTATION: RIGHT;LOWER

## 2025-09-06 LAB
MICROORGANISM SPEC CULT: NORMAL
MICROORGANISM SPEC CULT: NORMAL
SERVICE CMNT-IMP: NORMAL
SERVICE CMNT-IMP: NORMAL
SPECIMEN DESCRIPTION: NORMAL
SPECIMEN DESCRIPTION: NORMAL

## (undated) DEVICE — SOLUTION IV IRRIG WATER 1000ML POUR BRL 2F7114

## (undated) DEVICE — INTENDED FOR TISSUE SEPARATION, AND OTHER PROCEDURES THAT REQUIRE A SHARP SURGICAL BLADE TO PUNCTURE OR CUT.: Brand: BARD-PARKER ® CARBON RIB-BACK BLADES

## (undated) DEVICE — SYRINGE BULB 50/CS 48/PLT: Brand: MEDEGEN MEDICAL PRODUCTS, LLC

## (undated) DEVICE — CHLORAPREP 26ML ORANGE

## (undated) DEVICE — SOLUTION IRRIG 1000ML STRL H2O USP PLAS POUR BTL

## (undated) DEVICE — Device: Brand: ALLEGRO 1X SILICONE I/A HANDPIECE (6)

## (undated) DEVICE — BIT DRL DIA2MM CALIB FOR ANK FRAC MGMT SYS

## (undated) DEVICE — SCREW BNE L22MM DIA2.7MM CORT ANK S STL NONLOCKING LO PROF
Type: IMPLANTABLE DEVICE | Site: ANKLE | Status: NON-FUNCTIONAL
Removed: 2019-08-13

## (undated) DEVICE — GLOVE SURG SZ 75 L12IN FNGR THK87MIL WHT LTX FREE

## (undated) DEVICE — ANCHOR FIX DISP FOR ANK FRAC SYS BB-TAK

## (undated) DEVICE — GLOVE SURG SZ 65 L12IN FNGR THK87MIL WHT LTX FREE

## (undated) DEVICE — DRAPE,U/ SHT,SPLIT,PLAS,STERIL: Brand: MEDLINE

## (undated) DEVICE — SUTURE VCRL SZ 0 L36IN ABSRB UD L36MM CT-1 1/2 CIR J946H

## (undated) DEVICE — GLOVE SURG SZ 65 CRM LTX FREE POLYISOPRENE POLYMER BEAD ANTI

## (undated) DEVICE — BANDAGE COMPR M W4INXL10YD WHT BGE VELC E MTRX HK AND LOOP

## (undated) DEVICE — YANKAUER,FLEXIBLE HANDLE,REGLR CAPACITY: Brand: MEDLINE INDUSTRIES, INC.

## (undated) DEVICE — DRAPE C ARM CLP FOR GE 9600 9800

## (undated) DEVICE — SOFT SHIELD® COLLAGEN SHIELD, 12 HOURS (CE): Brand: SOFT SHIELD® COLLAGEN SHIELDS

## (undated) DEVICE — AMVISC PLUS  0.8ML: Brand: AMVISC PLUS

## (undated) DEVICE — KNIFE OPHTH DIA22MM 45DEG SLT W HNDL SHRP ANG PNT DEL DBL

## (undated) DEVICE — BASIC PACK: Brand: MEDLINE INDUSTRIES, INC.

## (undated) DEVICE — BLADE 30D THK3.4MM 10.5X1.9MM ANG STAB

## (undated) DEVICE — BETADINE 5% EYE SOL

## (undated) DEVICE — PADDING,UNDERCAST,COTTON, 4"X4YD STERILE: Brand: MEDLINE

## (undated) DEVICE — Z DISC NO SUB GLOVE SURG SZ 7 L12IN FNGR THK87MIL WHT LTX FREE

## (undated) DEVICE — BIT DRL DIA2.6MM CANN FOR ANK FRAC MGMT SYS

## (undated) DEVICE — DRAPE,EXTREMITY,89X128,STERILE: Brand: MEDLINE

## (undated) DEVICE — BIT DRL DIA2.5MM LNG GRAD FOR ANK FRAC MGMT SYS

## (undated) DEVICE — SOLUTION IV IRRIG POUR BRL 0.9% SODIUM CHL 2F7124

## (undated) DEVICE — PAD,ABDOMINAL,8"X10",ST,LF: Brand: MEDLINE

## (undated) DEVICE — Device

## (undated) DEVICE — BLADE SURG NO10 C STL STR DISP GLASSVAN

## (undated) DEVICE — DRESSING GZ PETRO ST OVERWRAP 5X9IN XRFRM CURAD

## (undated) DEVICE — SYRINGE MED 20ML STD CLR PLAS LUERLOCK TIP N CTRL DISP

## (undated) DEVICE — SUTURE VCRL + SZ 2-0 L27IN ABSRB WHT SH 1/2 CIR TAPERCUT VCP417H

## (undated) DEVICE — THREE-QUARTER SHEET: Brand: CONVERTORS

## (undated) DEVICE — BANDAGE COMPR W6INXL12FT SMOOTH FOR LIMB EXSANG ESMARCH

## (undated) DEVICE — SPONGE GZ W4XL4IN COT 12 PLY TYP VII WVN C FLD DSGN

## (undated) DEVICE — ELECTRODE ES AD CRD L15FT DISP FOR PT BELOW 30LB REM

## (undated) DEVICE — PENCIL ES L3M BTTN SWCH HOLSTER W/ BLDE ELECTRD EDGE

## (undated) DEVICE — GLOVE SURG SZ 7 L12IN FNGR THK87MIL WHT LTX FREE

## (undated) DEVICE — STAPLER SKIN H3.9MM WIRE DIA0.58MM CRWN 6.9MM 35 STPL FIX

## (undated) DEVICE — DRESSING PETRO W3XL8IN OIL EMUL N ADH GZ KNIT IMPREG CELOS

## (undated) DEVICE — UNDERGLOVE SURG SZ 8 BLU LTX FREE SYN POLYISOPRENE POLYMER

## (undated) DEVICE — SOLUTION IRRIG 1000ML 0.9% SOD CHL USP POUR PLAS BTL

## (undated) DEVICE — HYPODERMIC SAFETY NEEDLE: Brand: MAGELLAN

## (undated) DEVICE — GUIDEWIRE ORTH L150MM DIA0.053IN W/ TRCR TIP FOR ANK FRAC

## (undated) DEVICE — TUBING, SUCTION, 9/32" X 12', STRAIGHT: Brand: MEDLINE INDUSTRIES, INC.

## (undated) DEVICE — GLOVE ORANGE PI 8 1/2   MSG9085